# Patient Record
Sex: MALE | Race: BLACK OR AFRICAN AMERICAN
[De-identification: names, ages, dates, MRNs, and addresses within clinical notes are randomized per-mention and may not be internally consistent; named-entity substitution may affect disease eponyms.]

---

## 2017-01-15 ENCOUNTER — HOSPITAL ENCOUNTER (INPATIENT)
Dept: HOSPITAL 74 - YASAS | Age: 57
LOS: 4 days | Discharge: HOME | DRG: 897 | End: 2017-01-19
Attending: INTERNAL MEDICINE | Admitting: INTERNAL MEDICINE
Payer: COMMERCIAL

## 2017-01-15 VITALS — BODY MASS INDEX: 24.4 KG/M2

## 2017-01-15 DIAGNOSIS — F10.230: Primary | ICD-10-CM

## 2017-01-15 DIAGNOSIS — F17.210: ICD-10-CM

## 2017-01-15 DIAGNOSIS — F20.0: ICD-10-CM

## 2017-01-15 DIAGNOSIS — Z87.438: ICD-10-CM

## 2017-01-15 DIAGNOSIS — F12.20: ICD-10-CM

## 2017-01-15 DIAGNOSIS — Z86.79: ICD-10-CM

## 2017-01-15 DIAGNOSIS — Z87.898: ICD-10-CM

## 2017-01-15 LAB
APPEARANCE UR: CLEAR
BILIRUB UR STRIP.AUTO-MCNC: NEGATIVE MG/DL
COLOR UR: (no result)
KETONES UR QL STRIP: NEGATIVE
LEUKOCYTE ESTERASE UR QL STRIP.AUTO: NEGATIVE
NITRITE UR QL STRIP: NEGATIVE
PH UR: 8 [PH] (ref 5–8)
PROT UR QL STRIP: NEGATIVE
PROT UR QL STRIP: NEGATIVE
RBC # UR STRIP: NEGATIVE /UL
SP GR UR: 1.01 (ref 1–1.03)
UROBILINOGEN UR STRIP-MCNC: NEGATIVE E.U./DL (ref 0.2–1)

## 2017-01-15 PROCEDURE — HZ2ZZZZ DETOXIFICATION SERVICES FOR SUBSTANCE ABUSE TREATMENT: ICD-10-PCS | Performed by: INTERNAL MEDICINE

## 2017-01-15 RX ADMIN — Medication SCH MG: at 22:13

## 2017-01-15 RX ADMIN — NICOTINE POLACRILEX PRN MG: 4 GUM, CHEWING ORAL at 21:38

## 2017-01-15 RX ADMIN — NICOTINE POLACRILEX PRN MG: 4 GUM, CHEWING ORAL at 17:31

## 2017-01-15 NOTE — HP
CIWA Score





- CIWA Score


Nausea/Vomitin


Muscle Tremors: 3


Anxiety: 2


Agitation: 1-Slight > Activity


Paroxysmal Sweats: 2


Orientation: 0-Oriented


Tacttile Disturbances: 2-Mild Itch/Numbness/Burn


Auditory Disturbances: 1-Very Mild


Visual Disturbances: 2-Mild Sensitivity


Headache: 0-None Present


CIWA-Ar Total Score: 18





Admission ROS [facility]





- HPI


Chief Complaint: 


"I am here to try to stop drinking and using drugs."


Allergies/Adverse Reactions: 


 Allergies











Allergy/AdvReac Type Severity Reaction Status Date / Time


 


No Known Allergies Allergy   Verified 01/15/17 12:04











History of Present Illness: 


Pt. is a 55 YO male here to Detox from Alcohol. History of previous Detox 

admissions. Pt. also uses K2 on a daily basis and occasionally uses Crack 

Cocaine.


Exam Limitations: No Limitations





- Ebola screening


Have you traveled outside of the country in the last 21 days: No


Have you had contact with anyone from an Ebola affected area: No


Have you been sick,other than usual withdrawal symptoms: No


Do you have a fever: No





- Review of Systems


Constitutional: Diaphoresis, Malaise, Night Sweats


EENT: reports: Blurred Vision, Tinnitus (Occasional - bilateral.), Nose 

Congestion, Sinus Pressure, Dental Problems (Multiple cavities.)


Respiratory: reports: Cough, SOB with Exertion


Cardiac: reports: No Symptoms Reported


GI: reports: Diarrhea, Nausea, Vomiting


: reports: No Symptoms Reported


Musculoskeletal: reports: Joint Pain (Pain in Left hip.)


Integumentary: reports: No Symptoms Reported


Neuro: reports: Numbness (Occasional in Left Upper Back.), Tingling (Occasional 

in Left Upper Back.), Tremors


Endocrine: reports: No Symptoms Reported


Hematology: reports: No Symptoms Reported


Psychiatric: reports: Judgement Intact, Mood/Affect Appropiate, Orientated x3, 

Anxious, Depressed


Other Systems: Reviewed and Negative





Patient History





- Patient Medical History


Hx Anemia: No


Hx Asthma: No


Hx Chronic Obstructive Pulmonary Disease (COPD): No


Hx Cancer: No


Hx Cardiac Disorders: No


Hx Congestive Heart Failure: No


Hx Hypertension: No


Hx Hypercholesterolemia: No


Hx Pacemaker: No


HX Cerebrovascular Accident: No


Hx Seizures: No


Hx Dementia: No


Hx Diabetes: No


Hx Gastrointestinal Disorders: No


Hx Liver Disease: No


Hx Genitourinary Disorders: No


Hx Sexually Transmitted Disorders: Yes (gonerrhea and syphillis ; Treated.)


Hx Renal Disease (ESRD): No


Hx Thyroid Disease: No


Hx Human Immunodeficiency Virus (HIV): No (LAST TESTED: : NEGATIVE.)


Hx Hepatitis C: No (Never Tested.)


Hx Depression: Yes


Hx Suicide Attempt: No


Hx Bipolar Disorder: No


Hx Schizophrenia: Yes (On meds.)





- Patient Surgical History


Past Surgical History: No


Hx Neurologic Surgery: No


Hx Cataract Extraction: No


Hx Cardiac Surgery: No


Hx Lung Surgery: No


Hx Breast Surgery: No


Hx Breast Biopsy: No


Hx Abdominal Surgery: No


Hx Appendectomy: No


Hx Cholecystectomy: No


Hx Genitourinary Surgery: No


Hx  Section: No


Hx Orthopedic Surgery: No


Anesthesia Reaction: No





- PPD History


Previous Implant?: Yes ((+) PPD hx.)


Documented Results: Positive w/o proof


Implanted On Prior R Admission?: No


PPD to be Administered?: No





- Reproductive History


Patient is a Female of Child Bearing Age (11 -55 yrs old): No (PATIENT IS MALE.)





- Smoking Cessation


Smoking history: Current every day smoker


Have you smoked in the past 12 months: Yes


Aproximately how many cigarettes per day: 40


Cigars Per Day: 1


Hx Chewing Tobacco Use: No


Initiated information on smoking cessation: Yes


'Breaking Loose' booklet given: 01/15/17 (GIVEN ON UNIT.)





- Substance & Tx. History


Hx Alcohol Use: Yes


Hx Substance Use: Yes


Substance Use Type: Marijuana


Hx Substance Use Treatment: Yes (Previous Detox Admissions.)





- Substances Abused


  ** Alcohol


Route: Oral


Frequency: Daily


Amount used: (4) 40OZ bEER, 2 PINTS EJ RAFAEL


Age of first use: 10


Date of Last Use: 17





  ** Marijuana/Hashish


Route: Smoking


Frequency: Daily


Amount used: 1-2 Joints (K2)


Age of first use: 53


Date of Last Use: 15





Family Disease History





- Family Disease History


Family Disease History: Diabetes: Mother (Arthritis.), Brother, Other: Father (

alcohol,), Mother





Admission Physical Exam [facility]





- Vital Signs


Vital Signs: 


 Vital Signs - 24 hr











  01/15/17





  12:07


 


Temperature 97.9 F


 


Pulse Rate 92 H


 


Respiratory 18





Rate 


 


Blood Pressure 129/74














- Physical


General Appearance: Yes: No Apparent Distress, Nourished, Appropriately Dressed

, Tremorous


HEENTM: Yes: Hearing grossly Normal, Normocephalic, Normal Voice, CHRIS, Pharynx 

Normal


Respiratory: Yes: Chest Non-Tender, Lungs Clear, No Respiratory Distress


Neck: Yes: No masses,lesions,Nodules, Supple, Trachea in good position


Breast: Yes: Breast Exam Deferred


Cardiology: Yes: Regular Rhythm, Regular Rate, S1, S2


Abdominal: Yes: Normal Bowel Sounds, Non Tender, Flat


Genitourinary: Yes: Within Normal Limits


Back: Yes: Normal Inspection


Musculoskeletal: Yes: full range of Motion, Gait Steady


Extremities: Yes: Normal Inspection, Normal Range of Motion, Non-Tender, Tremors


Neurological: Yes: Fully Oriented, Alert, Normal Mood/Affect, Normal Response


Integumentary: Yes: Normal Color, Dry, Warm


Lymphatic: Yes: Within Normal Limits





- Diagnostic


(1) Nicotine dependence


Current Visit: Yes   Status: Chronic





(2) Alcohol dependence with uncomplicated withdrawal


Current Visit: Yes   Status: Acute





(3) Cannabis dependence


Current Visit: Yes   Status: Acute








Cleared for Admission [facility]





- Detox or Rehab


[facility] Level of Care: Medically Managed


Detox Regimen/Protocol: Librium (PATIENT ADVISED TO FOLLOW-UP WITH  / REHAB 

MEDICAL PROIVDER AFTER DETOX DISCHARGE FOR GENERAL MEDICAL EVALUATION.)





[facility] Breath Alcohol Content


Breath Alcohol Content: 0





Urine Drug Screen





- Results


Drug Screen Negative: Yes

## 2017-01-16 LAB
ALBUMIN SERPL-MCNC: 3.3 G/DL (ref 3.4–5)
ALP SERPL-CCNC: 71 U/L (ref 45–117)
ALT SERPL-CCNC: 23 U/L (ref 12–78)
ANION GAP SERPL CALC-SCNC: 5 MMOL/L (ref 8–16)
AST SERPL-CCNC: 15 U/L (ref 15–37)
BILIRUB SERPL-MCNC: 0.5 MG/DL (ref 0.2–1)
CALCIUM SERPL-MCNC: 8.6 MG/DL (ref 8.5–10.1)
CO2 SERPL-SCNC: 32 MMOL/L (ref 21–32)
CREAT SERPL-MCNC: 1 MG/DL (ref 0.7–1.3)
DEPRECATED RDW RBC AUTO: 16.4 % (ref 11.9–15.9)
GLUCOSE SERPL-MCNC: 123 MG/DL (ref 74–106)
MCH RBC QN AUTO: 23.1 PG (ref 25.7–33.7)
MCHC RBC AUTO-ENTMCNC: 32.5 G/DL (ref 32–35.9)
MCV RBC: 71 FL (ref 80–96)
PLATELET # BLD AUTO: 144 K/MM3 (ref 134–434)
PMV BLD: 10.1 FL (ref 7.5–11.1)
PROT SERPL-MCNC: 6.2 G/DL (ref 6.4–8.2)
WBC # BLD AUTO: 7 K/MM3 (ref 4–10)

## 2017-01-16 RX ADMIN — NICOTINE POLACRILEX PRN MG: 4 GUM, CHEWING ORAL at 12:59

## 2017-01-16 RX ADMIN — NICOTINE POLACRILEX PRN MG: 4 GUM, CHEWING ORAL at 10:53

## 2017-01-16 RX ADMIN — NICOTINE POLACRILEX PRN MG: 4 GUM, CHEWING ORAL at 16:53

## 2017-01-16 RX ADMIN — NICOTINE POLACRILEX PRN MG: 4 GUM, CHEWING ORAL at 19:55

## 2017-01-16 RX ADMIN — Medication SCH MG: at 22:12

## 2017-01-16 RX ADMIN — NICOTINE POLACRILEX PRN MG: 4 GUM, CHEWING ORAL at 22:13

## 2017-01-16 RX ADMIN — NICOTINE POLACRILEX PRN MG: 4 GUM, CHEWING ORAL at 08:30

## 2017-01-16 RX ADMIN — NICOTINE POLACRILEX PRN MG: 4 GUM, CHEWING ORAL at 01:12

## 2017-01-16 RX ADMIN — LITHIUM CARBONATE SCH MG: 300 CAPSULE, GELATIN COATED ORAL at 22:12

## 2017-01-16 RX ADMIN — LITHIUM CARBONATE SCH MG: 300 CAPSULE, GELATIN COATED ORAL at 10:57

## 2017-01-16 RX ADMIN — HYDROXYZINE PAMOATE PRN MG: 50 CAPSULE ORAL at 02:23

## 2017-01-16 RX ADMIN — Medication SCH TAB: at 10:13

## 2017-01-16 RX ADMIN — NICOTINE POLACRILEX PRN MG: 4 GUM, CHEWING ORAL at 05:21

## 2017-01-16 NOTE — CONSULT
[facility] Psychiatric Consult





- Data


Date of interview: 01/16/17


Admission source: [facility]


Identifying data: This is 56 years ols AA  male,father of 2 resides in 

shelter,supported by [facility].Patient admitted to [address] [place] [address] for Alcohol,Cannabis 

and K2 dependence.


Substance Abuse History: REports drinking beer,pint of lien since 9 yo.Cannabis including K2 since 53 years old(2 joints daily).


Medical History: H/O Alcohol induced syncope.


Psychiatric History: patient sees psychiatrist since 1997 when he was admitted 

to [facility] after DOD.Patient was dx with Schizophrenia and started 

on Thorazine.He reports more that 15 psychiatric hospitalizations.Most recent 

admisssion was about 2 months ago.Patient has no regular psychiatric follow up ,

obtaining meds from local ER.Current medications:Li 600 mg po bid and Zyprexa 

10 mg po bid.


Physical/Sexual Abuse/Trauma History: denies





Mental Status Exam





- Mental Status Exam


Alert and Oriented to: Time, Place, Person


Cognitive Function: Grossly Intact


Patient Appearance: Unkempt


Mood: Sad, Anxious


Affect: Mood Congruent, Labile


Patient Behavior: Cooperative


Speech Pattern: Clear


Voice Loudness: Normal


Thought Process: Goal Oriented


Thought Disorder: Being Controlled


Hallucinations: Denies


Suicidal Ideation: Denies


Homicidal Ideation: Denies


Insight/Judgement: Fair


Sleep: Fair


Appetite: Fair


Muscle strength/Tone: Normal


Gait/Station: Normal





Psychiatric Findings





- Problem List (Axis 1, 2,3)


(1) Cannabis dependence


Current Visit: Yes   Status: Chronic





(2) Nicotine dependence


Current Visit: Yes   Status: Chronic





(3) Alcohol dependence


Current Visit: No   Status: Active





(4) Weight decreased


Current Visit: Yes   Status: Active





(5) Paranoid schizophrenia


Current Visit: Yes   Status: Chronic








- Initial Treatment Plan


Initial Treatment Plan: Continue Zyprexa 0 mg po bid and Li 600 mg po bid.  

Will monitor porgress.

## 2017-01-16 NOTE — PN
S CIWA





- CIWA Score


Nausea/Vomitin-No Nausea/No Vomiting


Muscle Tremors: 3


Anxiety: 4-Mod. Anxious/Guarded


Agitation: 4-Moderately Restless


Paroxysmal Sweats: 3


Orientation: 0-Oriented


Tacttile Disturbances: 1-Very Mild Itch/Numbness


Auditory Disturbances: 0-None


Visual Disturbances: 0-None


Headache: 0-None Present


CIWA-Ar Total Score: 15





[facility] Progress Note (SOAP)


Subjective: 


ANXIETY,TREMORS,SWEATING,INTERRUPTED SLEEP,RESTLESS.


Objective: 





17 10:03


 Vital Signs - 8 hr











  17





  03:30 06:35


 


Temperature  97.6 F


 


Pulse Rate  78


 


Respiratory 18 18





Rate  


 


Blood Pressure  107/72








 Laboratory Last Values











Urine Color  Straw   01/15/17  11:00    


 


Urine Appearance  Clear   01/15/17  11:00    


 


Urine pH  8.0  (5.0-8.0)   01/15/17  11:00    


 


Ur Specific Gravity  1.012  (1.001-1.035)   01/15/17  11:00    


 


Urine Protein  Negative  (NEGATIVE)   01/15/17  11:00    


 


Urine Glucose (UA)  Negative  (NEGATIVE)   01/15/17  11:00    


 


Urine Ketones  Negative  (NEGATIVE)   01/15/17  11:00    


 


Urine Blood  Negative  (NEGATIVE)   01/15/17  11:00    


 


Urine Nitrite  Negative  (NEGATIVE)   01/15/17  11:00    


 


Urine Bilirubin  Negative  (NEGATIVE)   01/15/17  11:00    


 


Urine Urobilinogen  Negative E.U./dl (0.2-1.0)   01/15/17  11:00    


 


Ur Leukocyte Esterase  Negative  (NEGATIVE)   01/15/17  11:00    








LABS NOTED


Assessment: 





17 10:03


WITHDRAWAL SX.


Plan: 


CONTINUE DETOX

## 2017-01-16 NOTE — EKG
Test Reason : 

Blood Pressure : ***/*** mmHG

Vent. Rate : 082 BPM     Atrial Rate : 082 BPM

   P-R Int : 164 ms          QRS Dur : 104 ms

    QT Int : 418 ms       P-R-T Axes : 067 064 017 degrees

   QTc Int : 488 ms

 

NORMAL SINUS RHYTHM

VOLTAGE CRITERIA FOR LEFT VENTRICULAR HYPERTROPHY

T WAVE ABNORMALITY, CONSIDER LATERAL ISCHEMIA  VS REPOLARIZATION

ABNORMALITIES

PROLONGED QT

ABNORMAL ECG

NO PREVIOUS ECGS AVAILABLE

Confirmed by KAILASH AGOSTO MD ([id number]) on 1/16/2017 9:24:54 AM

 

Referred By:             Confirmed By:KAILASH AGOSTO MD

## 2017-01-17 RX ADMIN — NICOTINE POLACRILEX PRN MG: 4 GUM, CHEWING ORAL at 21:04

## 2017-01-17 RX ADMIN — Medication SCH MG: at 22:35

## 2017-01-17 RX ADMIN — NICOTINE POLACRILEX PRN MG: 4 GUM, CHEWING ORAL at 09:17

## 2017-01-17 RX ADMIN — NICOTINE POLACRILEX PRN MG: 4 GUM, CHEWING ORAL at 12:01

## 2017-01-17 RX ADMIN — NICOTINE POLACRILEX PRN MG: 4 GUM, CHEWING ORAL at 16:59

## 2017-01-17 RX ADMIN — HYDROXYZINE PAMOATE PRN MG: 50 CAPSULE ORAL at 17:39

## 2017-01-17 RX ADMIN — NICOTINE POLACRILEX PRN MG: 4 GUM, CHEWING ORAL at 06:42

## 2017-01-17 RX ADMIN — NICOTINE POLACRILEX PRN MG: 4 GUM, CHEWING ORAL at 19:02

## 2017-01-17 RX ADMIN — NICOTINE POLACRILEX PRN MG: 4 GUM, CHEWING ORAL at 02:49

## 2017-01-17 RX ADMIN — LITHIUM CARBONATE SCH MG: 300 CAPSULE, GELATIN COATED ORAL at 22:35

## 2017-01-17 RX ADMIN — LITHIUM CARBONATE SCH MG: 300 CAPSULE, GELATIN COATED ORAL at 10:16

## 2017-01-17 RX ADMIN — Medication SCH TAB: at 10:17

## 2017-01-17 NOTE — PN
S CIWA





- CIWA Score


Nausea/Vomitin-No Nausea/No Vomiting


Muscle Tremors: 3


Anxiety: 3


Agitation: 4-Moderately Restless


Paroxysmal Sweats: 3


Orientation: 0-Oriented


Tacttile Disturbances: 0-None


Auditory Disturbances: 0-None


Visual Disturbances: 0-None


Headache: 0-None Present


CIWA-Ar Total Score: 13





BHS Progress Note (SOAP)


Subjective: 


ANXIETY,TREMORS,SWEATING,INTERRUPTED SLEEP,RESTLESS.


Objective: 





17 11:26


 Vital Signs - 8 hr











  17





  06:17 09:18


 


Temperature 96.5 F L 96.2 F L


 


Pulse Rate 81 83


 


Respiratory 18 18





Rate  


 


Blood Pressure 102/67 115/75








 Laboratory Tests











  01/15/17 01/16/17 01/16/17





  11:00 08:00 08:00


 


WBC   7.0 


 


RBC   5.26 


 


Hgb   12.1 


 


Hct   37.4 


 


MCV   71.0 L 


 


MCHC   32.5 


 


RDW   16.4 H 


 


Plt Count   144 


 


MPV   10.1 


 


Sickle Cell Screen   


 


Sodium    139


 


Potassium    4.3


 


Chloride    102


 


Carbon Dioxide    32


 


Anion Gap    5 L


 


BUN    12  D


 


Creatinine    1.0


 


Creat Clearance w eGFR    > 60


 


Random Glucose    123 H D


 


Calcium    8.6


 


Total Bilirubin    0.5  D


 


AST    15  D


 


ALT    23  D


 


Alkaline Phosphatase    71


 


Total Protein    6.2 L


 


Albumin    3.3 L


 


Urine Color  Straw  


 


Urine Appearance  Clear  


 


Urine pH  8.0  


 


Ur Specific Gravity  1.012  


 


Urine Protein  Negative  


 


Urine Glucose (UA)  Negative  


 


Urine Ketones  Negative  


 


Urine Blood  Negative  


 


Urine Nitrite  Negative  


 


Urine Bilirubin  Negative  


 


Urine Urobilinogen  Negative  


 


Ur Leukocyte Esterase  Negative  


 


RPR Titer   














  17





  08:00 08:00


 


WBC  


 


RBC  


 


Hgb  


 


Hct  


 


MCV  


 


MCHC  


 


RDW  


 


Plt Count  


 


MPV  


 


Sickle Cell Screen   Negative


 


Sodium  


 


Potassium  


 


Chloride  


 


Carbon Dioxide  


 


Anion Gap  


 


BUN  


 


Creatinine  


 


Creat Clearance w eGFR  


 


Random Glucose  


 


Calcium  


 


Total Bilirubin  


 


AST  


 


ALT  


 


Alkaline Phosphatase  


 


Total Protein  


 


Albumin  


 


Urine Color  


 


Urine Appearance  


 


Urine pH  


 


Ur Specific Gravity  


 


Urine Protein  


 


Urine Glucose (UA)  


 


Urine Ketones  


 


Urine Blood  


 


Urine Nitrite  


 


Urine Bilirubin  


 


Urine Urobilinogen  


 


Ur Leukocyte Esterase  


 


RPR Titer  Nonreactive 








LABS NOTED


Assessment: 





17 11:30


WITHDRAWAL SX.


Plan: 


CONTINUE DETOX

## 2017-01-18 RX ADMIN — NICOTINE POLACRILEX PRN MG: 4 GUM, CHEWING ORAL at 10:22

## 2017-01-18 RX ADMIN — HYDROXYZINE PAMOATE PRN MG: 50 CAPSULE ORAL at 13:58

## 2017-01-18 RX ADMIN — NICOTINE POLACRILEX PRN MG: 4 GUM, CHEWING ORAL at 07:28

## 2017-01-18 RX ADMIN — NICOTINE POLACRILEX PRN MG: 4 GUM, CHEWING ORAL at 22:19

## 2017-01-18 RX ADMIN — NICOTINE POLACRILEX PRN MG: 4 GUM, CHEWING ORAL at 17:00

## 2017-01-18 RX ADMIN — LITHIUM CARBONATE SCH MG: 300 CAPSULE, GELATIN COATED ORAL at 10:21

## 2017-01-18 RX ADMIN — NICOTINE POLACRILEX PRN MG: 4 GUM, CHEWING ORAL at 19:00

## 2017-01-18 RX ADMIN — NICOTINE POLACRILEX PRN MG: 4 GUM, CHEWING ORAL at 12:31

## 2017-01-18 RX ADMIN — NICOTINE POLACRILEX PRN MG: 4 GUM, CHEWING ORAL at 02:35

## 2017-01-18 RX ADMIN — LITHIUM CARBONATE SCH MG: 300 CAPSULE, GELATIN COATED ORAL at 22:16

## 2017-01-18 RX ADMIN — Medication SCH TAB: at 10:20

## 2017-01-18 RX ADMIN — Medication SCH MG: at 22:16

## 2017-01-18 RX ADMIN — NICOTINE POLACRILEX PRN MG: 4 GUM, CHEWING ORAL at 05:26

## 2017-01-18 RX ADMIN — NICOTINE POLACRILEX PRN MG: 4 GUM, CHEWING ORAL at 14:38

## 2017-01-18 NOTE — PN
[facility] Progress Note (SOAP)


Subjective: 


SWEATING,INTERRUPTED SLEEP,RESTLESS


Objective: 





01/18/17 15:22


 Vital Signs - 8 hr











  01/18/17 01/18/17





  09:49 14:17


 


Temperature 98.1 F 98.8 F


 


Pulse Rate 84 81


 


Respiratory 18 18





Rate  


 


Blood Pressure 112/70 112/76








 Laboratory Last Values











WBC  7.0 K/mm3 (4.0-10.0)   01/16/17  08:00    


 


RBC  5.26 M/mm3 (4.00-5.60)   01/16/17  08:00    


 


Hgb  12.1 GM/dL (11.7-16.9)   01/16/17  08:00    


 


Hct  37.4 % (35.4-49)   01/16/17  08:00    


 


MCV  71.0 fl (80-96)  L  01/16/17  08:00    


 


MCHC  32.5 g/dl (32.0-35.9)   01/16/17  08:00    


 


RDW  16.4 % (11.9-15.9)  H  01/16/17  08:00    


 


Plt Count  144 K/MM3 (134-434)   01/16/17  08:00    


 


MPV  10.1 fl (7.5-11.1)   01/16/17  08:00    


 


Sickle Cell Screen  Negative  (NEGATIVE)   01/16/17  08:00    


 


Sodium  139 mmol/L (136-145)   01/16/17  08:00    


 


Potassium  4.3 mmol/L (3.5-5.1)   01/16/17  08:00    


 


Chloride  102 mmol/L ()   01/16/17  08:00    


 


Carbon Dioxide  32 mmol/L (21-32)   01/16/17  08:00    


 


Anion Gap  5  (8-16)  L  01/16/17  08:00    


 


BUN  12 mg/dL (7-18)  D 01/16/17  08:00    


 


Creatinine  1.0 mg/dL (0.7-1.3)   01/16/17  08:00    


 


Creat Clearance w eGFR  > 60  (>60)   01/16/17  08:00    


 


Random Glucose  123 mg/dL ()  H D 01/16/17  08:00    


 


Calcium  8.6 mg/dL (8.5-10.1)   01/16/17  08:00    


 


Total Bilirubin  0.5 mg/dL (0.2-1.0)  D 01/16/17  08:00    


 


AST  15 U/L (15-37)  D 01/16/17  08:00    


 


ALT  23 U/L (12-78)  D 01/16/17  08:00    


 


Alkaline Phosphatase  71 U/L ()   01/16/17  08:00    


 


Total Protein  6.2 g/dl (6.4-8.2)  L  01/16/17  08:00    


 


Albumin  3.3 g/dl (3.4-5.0)  L  01/16/17  08:00    


 


Urine Color  Straw   01/15/17  11:00    


 


Urine Appearance  Clear   01/15/17  11:00    


 


Urine pH  8.0  (5.0-8.0)   01/15/17  11:00    


 


Ur Specific Gravity  1.012  (1.001-1.035)   01/15/17  11:00    


 


Urine Protein  Negative  (NEGATIVE)   01/15/17  11:00    


 


Urine Glucose (UA)  Negative  (NEGATIVE)   01/15/17  11:00    


 


Urine Ketones  Negative  (NEGATIVE)   01/15/17  11:00    


 


Urine Blood  Negative  (NEGATIVE)   01/15/17  11:00    


 


Urine Nitrite  Negative  (NEGATIVE)   01/15/17  11:00    


 


Urine Bilirubin  Negative  (NEGATIVE)   01/15/17  11:00    


 


Urine Urobilinogen  Negative E.U./dl (0.2-1.0)   01/15/17  11:00    


 


Ur Leukocyte Esterase  Negative  (NEGATIVE)   01/15/17  11:00    


 


RPR Titer  Nonreactive  (NONREACTIVE)   01/16/17  08:00    








LABS NOTED


Assessment: 





01/18/17 15:22


WITHDRAWAL SX.


Plan: 


CONTINUE DETOX

## 2017-01-19 VITALS — SYSTOLIC BLOOD PRESSURE: 111 MMHG | TEMPERATURE: 97.3 F | DIASTOLIC BLOOD PRESSURE: 75 MMHG | HEART RATE: 77 BPM

## 2017-01-19 RX ADMIN — NICOTINE POLACRILEX PRN MG: 4 GUM, CHEWING ORAL at 07:31

## 2017-01-19 RX ADMIN — NICOTINE POLACRILEX PRN MG: 4 GUM, CHEWING ORAL at 05:22

## 2017-01-19 RX ADMIN — NICOTINE POLACRILEX PRN MG: 4 GUM, CHEWING ORAL at 02:41

## 2017-01-19 NOTE — DS
[facility] Detox Discharge Summary


Admission Date: 


01/15/17





Discharge Date: 01/19/17





- History


Present History: Alcohol Dependence, Cannabis Dependence


Pertinent Past History: 


PARANOID SCHIZOPHRENIA





- Physical Exam Results


Vital Signs: 


 Vital Signs











Temperature  97.3 F L  01/19/17 06:15


 


Pulse Rate  77   01/19/17 06:15


 


Respiratory Rate  16   01/19/17 06:15


 


Blood Pressure  111/75   01/19/17 06:15


 


O2 Sat by Pulse Oximetry (%)      











Pertinent Admission Physical Exam Findings: 


WITHDRAWAL SX.


 Laboratory Last Values











WBC  7.0 K/mm3 (4.0-10.0)   01/16/17  08:00    


 


RBC  5.26 M/mm3 (4.00-5.60)   01/16/17  08:00    


 


Hgb  12.1 GM/dL (11.7-16.9)   01/16/17  08:00    


 


Hct  37.4 % (35.4-49)   01/16/17  08:00    


 


MCV  71.0 fl (80-96)  L  01/16/17  08:00    


 


MCHC  32.5 g/dl (32.0-35.9)   01/16/17  08:00    


 


RDW  16.4 % (11.9-15.9)  H  01/16/17  08:00    


 


Plt Count  144 K/MM3 (134-434)   01/16/17  08:00    


 


MPV  10.1 fl (7.5-11.1)   01/16/17  08:00    


 


Sickle Cell Screen  Negative  (NEGATIVE)   01/16/17  08:00    


 


Sodium  139 mmol/L (136-145)   01/16/17  08:00    


 


Potassium  4.3 mmol/L (3.5-5.1)   01/16/17  08:00    


 


Chloride  102 mmol/L ()   01/16/17  08:00    


 


Carbon Dioxide  32 mmol/L (21-32)   01/16/17  08:00    


 


Anion Gap  5  (8-16)  L  01/16/17  08:00    


 


BUN  12 mg/dL (7-18)  D 01/16/17  08:00    


 


Creatinine  1.0 mg/dL (0.7-1.3)   01/16/17  08:00    


 


Creat Clearance w eGFR  > 60  (>60)   01/16/17  08:00    


 


Random Glucose  123 mg/dL ()  H D 01/16/17  08:00    


 


Calcium  8.6 mg/dL (8.5-10.1)   01/16/17  08:00    


 


Total Bilirubin  0.5 mg/dL (0.2-1.0)  D 01/16/17  08:00    


 


AST  15 U/L (15-37)  D 01/16/17  08:00    


 


ALT  23 U/L (12-78)  D 01/16/17  08:00    


 


Alkaline Phosphatase  71 U/L ()   01/16/17  08:00    


 


Total Protein  6.2 g/dl (6.4-8.2)  L  01/16/17  08:00    


 


Albumin  3.3 g/dl (3.4-5.0)  L  01/16/17  08:00    


 


Urine Color  Straw   01/15/17  11:00    


 


Urine Appearance  Clear   01/15/17  11:00    


 


Urine pH  8.0  (5.0-8.0)   01/15/17  11:00    


 


Ur Specific Gravity  1.012  (1.001-1.035)   01/15/17  11:00    


 


Urine Protein  Negative  (NEGATIVE)   01/15/17  11:00    


 


Urine Glucose (UA)  Negative  (NEGATIVE)   01/15/17  11:00    


 


Urine Ketones  Negative  (NEGATIVE)   01/15/17  11:00    


 


Urine Blood  Negative  (NEGATIVE)   01/15/17  11:00    


 


Urine Nitrite  Negative  (NEGATIVE)   01/15/17  11:00    


 


Urine Bilirubin  Negative  (NEGATIVE)   01/15/17  11:00    


 


Urine Urobilinogen  Negative E.U./dl (0.2-1.0)   01/15/17  11:00    


 


Ur Leukocyte Esterase  Negative  (NEGATIVE)   01/15/17  11:00    


 


RPR Titer  Nonreactive  (NONREACTIVE)   01/16/17  08:00    








LABS NOTED





- Treatment


Hospital Course: Detox Protocol Followed, Detoxed Safely, Responded well, 

Discharged Condition Good, Rehab Referral Accepted





- Medication


Discharge Medications: 


Ambulatory Orders





Lithium Carbonate [Eskalith -] 600 mg PO BID 09/12/16 


Olanzapine [Zyprexa -] 10 mg PO DAILY 01/15/17 


Olanzapine [Zyprexa] 20 mg PO HS 01/15/17 


Lithium Carbonate [Eskalith -] 600 mg PO BID #120 capsule 01/16/17 


Olanzapine [ZyPREXA -] 10 mg PO BID #60 tablet 01/16/17 











- Diagnosis


(1) Alcohol dependence with uncomplicated withdrawal


Current Visit: Yes   Status: Acute





(2) Cannabis dependence


Current Visit: Yes   Status: Chronic





(3) Nicotine dependence


Current Visit: Yes   Status: Chronic





(4) Paranoid schizophrenia


Current Visit: Yes   Status: Chronic








- AMA


Did Patient Leave Against Medical Advice: No

## 2017-03-03 ENCOUNTER — HOSPITAL ENCOUNTER (INPATIENT)
Dept: HOSPITAL 74 - YASAS | Age: 57
LOS: 3 days | Discharge: LEFT BEFORE DISCHARGE | DRG: 894 | End: 2017-03-06
Attending: INTERNAL MEDICINE | Admitting: INTERNAL MEDICINE
Payer: COMMERCIAL

## 2017-03-03 VITALS — BODY MASS INDEX: 23.7 KG/M2

## 2017-03-03 DIAGNOSIS — R76.11: ICD-10-CM

## 2017-03-03 DIAGNOSIS — F20.0: ICD-10-CM

## 2017-03-03 DIAGNOSIS — Z87.898: ICD-10-CM

## 2017-03-03 DIAGNOSIS — F12.20: ICD-10-CM

## 2017-03-03 DIAGNOSIS — F10.230: Primary | ICD-10-CM

## 2017-03-03 DIAGNOSIS — Z87.438: ICD-10-CM

## 2017-03-03 DIAGNOSIS — F17.210: ICD-10-CM

## 2017-03-03 DIAGNOSIS — Z86.79: ICD-10-CM

## 2017-03-03 LAB
APPEARANCE UR: CLEAR
BILIRUB UR STRIP.AUTO-MCNC: NEGATIVE MG/DL
COLOR UR: (no result)
KETONES UR QL STRIP: NEGATIVE
LEUKOCYTE ESTERASE UR QL STRIP.AUTO: NEGATIVE
NITRITE UR QL STRIP: NEGATIVE
PH UR: 6 [PH] (ref 5–8)
PROT UR QL STRIP: NEGATIVE
PROT UR QL STRIP: NEGATIVE
RBC # UR STRIP: NEGATIVE /UL
SP GR UR: 1.02 (ref 1–1.03)
UROBILINOGEN UR STRIP-MCNC: NEGATIVE E.U./DL (ref 0.2–1)

## 2017-03-03 PROCEDURE — HZ2ZZZZ DETOXIFICATION SERVICES FOR SUBSTANCE ABUSE TREATMENT: ICD-10-PCS | Performed by: SURGERY

## 2017-03-03 RX ADMIN — NICOTINE POLACRILEX PRN MG: 4 GUM, CHEWING ORAL at 22:37

## 2017-03-03 RX ADMIN — Medication SCH APPLIC: at 23:50

## 2017-03-03 RX ADMIN — Medication SCH MG: at 22:34

## 2017-03-03 NOTE — HP
CIWA Score





- CIWA Score


Nausea/Vomitin-Mild Nausea/No Vomiting


Muscle Tremors: 4-Moderate,w/Arms Extend


Anxiety: 4-Mod. Anxious/Guarded


Agitation: 4-Moderately Restless


Paroxysmal Sweats: 1-Minimal Palms Moist


Orientation: 3-Disoriented Date>2 days


Tacttile Disturbances: 0-None


Auditory Disturbances: 0-None


Visual Disturbances: 0-None


Headache: 0-None Present


CIWA-Ar Total Score: 17





Admission ROS [facility]





- HPI


Chief Complaint: 


WITHDRAWAL SX


Allergies/Adverse Reactions: 


 Allergies











Allergy/AdvReac Type Severity Reaction Status Date / Time


 


No Known Allergies Allergy   Verified 17 20:48











History of Present Illness: 


56 YEARS OLD MALE WITH LONG HISTORY OF ALCOHOL NICOTINE DEPENDENCE, DENIES 

MEDICAL ISSUE HAS SCHIZOPHRENIA  IS ADMITTED TO DETOX


Exam Limitations: No Limitations





- Ebola screening


Have you traveled outside of the country in the last 21 days: No


Have you had contact with anyone from an Ebola affected area: No


Have you been sick,other than usual withdrawal symptoms: No


Do you have a fever: No





- Review of Systems


Constitutional: Chills, Changes in sleep, Weight Stable


EENT: reports: Dental Problems (POOR DENTITION), Other (READING EYE GLASSES)


Respiratory: reports: SOB with Exertion


Cardiac: reports: No Symptoms Reported


GI: reports: Nausea, Poor Fluid Intake, Abdominal cramping


: reports: No Symptoms Reported


Musculoskeletal: reports: Joint Pain (LEFT SHOULDER)


Integumentary: reports: No Symptoms Reported


Neuro: reports: Tremors


Endocrine: reports: No Symptoms Reported


Hematology: reports: No Symptoms Reported


Psychiatric: reports: Judgement Intact


Other Systems: Reviewed and Negative





Patient History





- Patient Medical History


Hx Anemia: No


Hx Asthma: No


Hx Chronic Obstructive Pulmonary Disease (COPD): No


Hx Cancer: No


Hx Cardiac Disorders: No


Hx Congestive Heart Failure: No


Hx Hypertension: No


Hx Hypercholesterolemia: No


Hx Pacemaker: No


HX Cerebrovascular Accident: No


Hx Seizures: No


Hx Dementia: No


Hx Diabetes: No


Hx Gastrointestinal Disorders: No


Hx Liver Disease: No


Hx Genitourinary Disorders: No


Hx Sexually Transmitted Disorders: Yes (gonerrhea and syphillis ; Treated.)


Hx Renal Disease (ESRD): No


Hx Thyroid Disease: No


Hx Human Immunodeficiency Virus (HIV): No (LAST TESTED: : NEGATIVE.)


Hx Hepatitis C: No (Never Tested.)


Hx Depression: No


Hx Suicide Attempt: No


Hx Bipolar Disorder: No


Hx Schizophrenia: Yes (On meds.)





- Patient Surgical History


Past Surgical History: No


Hx Neurologic Surgery: No


Hx Cataract Extraction: No


Hx Cardiac Surgery: No


Hx Lung Surgery: No


Hx Breast Surgery: No


Hx Breast Biopsy: No


Hx Abdominal Surgery: No


Hx Appendectomy: No


Hx Cholecystectomy: No


Hx Genitourinary Surgery: No


Hx Orthopedic Surgery: No





- PPD History


Previous Implant?: Yes


Documented Results: Positive w/proof


Implanted On Prior [facility] Admission?: No


PPD to be Administered?: No





- Smoking Cessation


Smoking history: Current every day smoker


Have you smoked in the past 12 months: Yes


Aproximately how many cigarettes per day: 40


Cigars Per Day: 1


Hx Chewing Tobacco Use: No


Initiated information on smoking cessation: Yes


'Breaking Loose' booklet given: 17





- Substance & Tx. History


Hx Alcohol Use: Yes


Hx Substance Use: No


Substance Use Type: Alcohol


Hx Substance Use Treatment: Yes





- Substances Abused


  ** Alcohol


Route: Oral


Frequency: Daily


Amount used: 40OZX6 BEER/ RAFAEL PINT


Age of first use: 10


Date of Last Use: 17





Family Disease History





- Family Disease History


Family Disease History: Diabetes: Mother (Arthritis.), Brother, Other: Father (

alcohol,), Mother





Admission Physical Exam [facility]





- Vital Signs


Vital Signs: 


 Vital Signs - 24 hr











  17





  19:00


 


Temperature 96 F L


 


Pulse Rate 84


 


Respiratory 20





Rate 


 


Blood Pressure 129/71














- Physical


General Appearance: Yes: Appropriately Dressed, Mild Distress, Thin, Tremorous, 

Irritable, Sweating, Anxious


HEENTM: Yes: Hearing grossly Normal, Normal ENT Inspection, Normocephalic, 

Normal Voice


Respiratory: Yes: Chest Non-Tender, Lungs Clear, Normal Breath Sounds, No 

Respiratory Distress, No Accessory Muscle Use


Neck: Yes: Supple, Trachea in good position


Breast: Yes: Breasts Symetrical


Cardiology: Yes: Regular Rhythm, Regular Rate, S1, S2


Abdominal: Yes: Non Tender, Soft


Genitourinary: Yes: Within Normal Limits


Back: Yes: Normal Inspection


Musculoskeletal: Yes: full range of Motion, Gait Steady, Muscle Pain (LEFT 

SHOULDER)


Extremities: Yes: Normal Range of Motion, Non-Tender, Tremors


Neurological: Yes: Alert, Motor Strength 5/5, Normal Response, Depressed Affect


Integumentary: Yes: Warm


Lymphatic: Yes: Within Normal Limits





- Diagnostic


(1) Weight decreased


Current Visit: Yes   Status: Active





(2) shizophrenia


Current Visit: Yes   Status: Suspected





(3) Alcohol dependence with uncomplicated withdrawal


Current Visit: Yes   Status: Acute





(4) Nicotine dependence


Current Visit: Yes   Status: Acute





(5) Positive PPD, treated


Current Visit: Yes   Status: Resolved








Cleared for Admission S





- Detox or Rehab


S Level of Care: Medically Managed


Detox Regimen/Protocol: Librium





BHS Breath Alcohol Content


Breath Alcohol Content: 0





Urine Drug Screen





- Results


Drug Screen Negative: Yes

## 2017-03-04 LAB
ALBUMIN SERPL-MCNC: 3.3 G/DL (ref 3.4–5)
ALP SERPL-CCNC: 85 U/L (ref 45–117)
ALT SERPL-CCNC: 14 U/L (ref 12–78)
ANION GAP SERPL CALC-SCNC: 8 MMOL/L (ref 8–16)
AST SERPL-CCNC: 12 U/L (ref 15–37)
BILIRUB SERPL-MCNC: 0.2 MG/DL (ref 0.2–1)
CALCIUM SERPL-MCNC: 8.3 MG/DL (ref 8.5–10.1)
CO2 SERPL-SCNC: 29 MMOL/L (ref 21–32)
CREAT SERPL-MCNC: 1 MG/DL (ref 0.7–1.3)
DEPRECATED RDW RBC AUTO: 16.5 % (ref 11.9–15.9)
GLUCOSE SERPL-MCNC: 125 MG/DL (ref 74–106)
MCH RBC QN AUTO: 23.2 PG (ref 25.7–33.7)
MCHC RBC AUTO-ENTMCNC: 32.3 G/DL (ref 32–35.9)
MCV RBC: 72.1 FL (ref 80–96)
PLATELET # BLD AUTO: 121 K/MM3 (ref 134–434)
PMV BLD: 10 FL (ref 7.5–11.1)
PROT SERPL-MCNC: 6 G/DL (ref 6.4–8.2)
WBC # BLD AUTO: 4.3 K/MM3 (ref 4–10)

## 2017-03-04 RX ADMIN — Medication SCH MG: at 22:30

## 2017-03-04 RX ADMIN — ASPIRIN 81 MG SCH MG: 81 TABLET ORAL at 10:28

## 2017-03-04 RX ADMIN — NICOTINE POLACRILEX PRN MG: 4 GUM, CHEWING ORAL at 22:31

## 2017-03-04 RX ADMIN — NICOTINE POLACRILEX PRN MG: 4 GUM, CHEWING ORAL at 09:18

## 2017-03-04 RX ADMIN — NICOTINE SCH: 21 PATCH TRANSDERMAL at 10:28

## 2017-03-04 RX ADMIN — NICOTINE POLACRILEX PRN MG: 4 GUM, CHEWING ORAL at 06:29

## 2017-03-04 RX ADMIN — Medication SCH APPLIC: at 22:31

## 2017-03-04 RX ADMIN — NICOTINE POLACRILEX PRN MG: 4 GUM, CHEWING ORAL at 17:20

## 2017-03-04 RX ADMIN — Medication SCH TAB: at 10:28

## 2017-03-04 RX ADMIN — NICOTINE POLACRILEX PRN MG: 4 GUM, CHEWING ORAL at 13:19

## 2017-03-04 NOTE — PN
S CIWA





- CIWA Score


Nausea/Vomiting: 3


Muscle Tremors: 3


Anxiety: 3


Agitation: 3


Paroxysmal Sweats: 1-Minimal Palms Moist


Orientation: 0-Oriented


Tacttile Disturbances: 1-Very Mild Itch/Numbness


Auditory Disturbances: 1-Very Mild


Visual Disturbances: 1-Very Mild Sensitivity


Headache: 2-Mild


CIWA-Ar Total Score: 18





BHS Progress Note (SOAP)


Subjective: 


ALERT,IRRITABLE,ANXIOUS,INTERRUPTED SLEEP,TREMOR


Objective: 





03/04/17 10:07


 Vital Signs











Temperature  96.3 F L  03/04/17 06:25


 


Pulse Rate  73   03/04/17 06:25


 


Respiratory Rate  18   03/04/17 06:25


 


Blood Pressure  115/70   03/04/17 06:25


 


O2 Sat by Pulse Oximetry (%)      








EKG NSR INVERTED T IN 2,3,AVF,V3 TO V6


NO CHEST PAIN,NO SOB,NO DIZZINESS


 Laboratory Last Values











Urine Color  Ltyellow   03/03/17  21:09    


 


Urine Appearance  Clear   03/03/17  21:09    


 


Urine pH  6.0  (5.0-8.0)  D 03/03/17  21:09    


 


Ur Specific Gravity  1.017  (1.001-1.035)   03/03/17  21:09    


 


Urine Protein  Negative  (NEGATIVE)   03/03/17  21:09    


 


Urine Glucose (UA)  Negative  (NEGATIVE)   03/03/17  21:09    


 


Urine Ketones  Negative  (NEGATIVE)   03/03/17  21:09    


 


Urine Blood  Negative  (NEGATIVE)   03/03/17  21:09    


 


Urine Nitrite  Negative  (NEGATIVE)   03/03/17  21:09    


 


Urine Bilirubin  Negative  (NEGATIVE)   03/03/17  21:09    


 


Urine Urobilinogen  Negative E.U./dl (0.2-1.0)   03/03/17  21:09    


 


Ur Leukocyte Esterase  Negative  (NEGATIVE)   03/03/17  21:09    








LABS PENDING


Assessment: 





03/04/17 10:11


WITHDRAWAL SYMPTOM


Plan: 


CONTINUE DETOX

## 2017-03-04 NOTE — CONSULT
[facility] Psychiatric Consult





- Data


Date of interview: 03/04/17


Admission source: [facility]


Identifying data: Readmission to [facility] for this 55 y/o AA male seeking 

detox treatment on [address] for alcohol,cocaine (crack) and marijuana (K2) 

dependence.Patient is single without children,domiciled,unemployed and 

supported on SSI benefits.


Substance Abuse History: - Smoking Cessation.  Smoking history: Current every 

day smoker.  Have you smoked in the past 12 months: Yes.  Aproximately how many 

cigarettes per day: 40.  Cigars Per Day: 1.  Hx Chewing Tobacco Use: No.  

Initiated information on smoking cessation: Yes.  'Breaking Loose' booklet given

: 03/03/17.  - Substance & Tx. History.  Hx Alcohol Use: Yes.  Hx Substance Use

: No.  Substance Use Type: Alcohol.  Hx Substance Use Treatment: Yes.  - 

Substances Abused.  ** Alcohol.  Route: Oral.  Frequency: Daily.  Amount used: 

40OZX6 BEER/ RAFAEL PINT.  Age of first use: 10.  Date of Last Use: 03/03/17.  

Confirmed by the patient in this interview.


Psychiatric History: First contact with Psychiatry was in 1997 (committment to 

[facility]) after a suicide attempt via overdose.DOD.Diagnosed 

with Schizophrenia.History of multiple psychiatric hospitalizations.Mr Conrad states that he does not go to OPD clinics.He uses emergency room 

settings for refills of medications.Patient informs that he is currently on 

zyprexa 20 mg po hs (confirmed by pharmacy claims of 3/1/17).


Physical/Sexual Abuse/Trauma History: Patient denies.


Additional Comment: Drug Screen is negative.





Mental Status Exam





- Mental Status Exam


Alert and Oriented to: Time, Place, Person


Cognitive Function: Good


Patient Appearance: Well Groomed


Mood: Hopeful, Euthymic


Affect: Normal Range


Patient Behavior: Appropriate, Cooperative


Speech Pattern: Clear


Voice Loudness: Normal


Thought Process: Goal Oriented


Thought Disorder: Not Present


Hallucinations: Denies


Suicidal Ideation: Denies


Homicidal Ideation: Denies


Insight/Judgement: Poor


Sleep: Well


Appetite: Good


Muscle strength/Tone: Normal


Gait/Station: Normal





Psychiatric Findings





- Problem List (Axis 1, 2,3)


(1) Alcohol dependence with uncomplicated withdrawal


Current Visit: Yes   Status: Acute





(2) Cannabis dependence


Current Visit: Yes   Status: Acute





(3) Nicotine dependence


Current Visit: Yes   Status: Acute





(4) Paranoid schizophrenia


Current Visit: Yes   Status: Chronic








- Initial Treatment Plan


Initial Treatment Plan: Psychoeducation.Detoxification.Medication : zyprexa 20 

mg po hs.Patient made aware of risk of metabolic syndrome,sedation and vascular 

issues.He agrees with this plan of care.Observation.No scripts needed at 

discharge.

## 2017-03-05 RX ADMIN — TOLNAFTATE SCH APPLIC: 10 CREAM TOPICAL at 12:30

## 2017-03-05 RX ADMIN — NICOTINE POLACRILEX PRN MG: 4 GUM, CHEWING ORAL at 21:36

## 2017-03-05 RX ADMIN — NICOTINE POLACRILEX PRN MG: 4 GUM, CHEWING ORAL at 10:47

## 2017-03-05 RX ADMIN — NICOTINE POLACRILEX PRN MG: 4 GUM, CHEWING ORAL at 08:46

## 2017-03-05 RX ADMIN — Medication SCH: at 13:14

## 2017-03-05 RX ADMIN — Medication SCH TAB: at 10:47

## 2017-03-05 RX ADMIN — NICOTINE POLACRILEX PRN MG: 4 GUM, CHEWING ORAL at 00:42

## 2017-03-05 RX ADMIN — NICOTINE SCH: 21 PATCH TRANSDERMAL at 10:47

## 2017-03-05 RX ADMIN — NICOTINE POLACRILEX PRN MG: 4 GUM, CHEWING ORAL at 17:21

## 2017-03-05 RX ADMIN — NICOTINE POLACRILEX PRN MG: 4 GUM, CHEWING ORAL at 15:09

## 2017-03-05 RX ADMIN — Medication SCH APPLIC: at 23:20

## 2017-03-05 RX ADMIN — ASPIRIN 81 MG SCH MG: 81 TABLET ORAL at 10:47

## 2017-03-05 RX ADMIN — NICOTINE POLACRILEX PRN MG: 4 GUM, CHEWING ORAL at 04:00

## 2017-03-05 RX ADMIN — Medication SCH APPLIC: at 22:33

## 2017-03-05 RX ADMIN — TOLNAFTATE SCH APPLIC: 10 CREAM TOPICAL at 22:31

## 2017-03-05 RX ADMIN — Medication SCH MG: at 22:28

## 2017-03-05 RX ADMIN — NICOTINE POLACRILEX PRN MG: 4 GUM, CHEWING ORAL at 12:52

## 2017-03-05 RX ADMIN — NICOTINE POLACRILEX PRN MG: 4 GUM, CHEWING ORAL at 19:25

## 2017-03-05 RX ADMIN — NICOTINE POLACRILEX PRN MG: 4 GUM, CHEWING ORAL at 06:04

## 2017-03-05 RX ADMIN — Medication SCH: at 18:55

## 2017-03-05 NOTE — PN
[facility] CIWA





- CIWA Score


Nausea/Vomiting: 3


Muscle Tremors: 3


Anxiety: 3


Agitation: 2


Paroxysmal Sweats: 1-Minimal Palms Moist


Orientation: 0-Oriented


Tacttile Disturbances: 1-Very Mild Itch/Numbness


Auditory Disturbances: 1-Very Mild


Visual Disturbances: 1-Very Mild Sensitivity


Headache: 2-Mild


CIWA-Ar Total Score: 17





BHS Progress Note (SOAP)


Subjective: 


ALERT,IRRITABLE,ANXIOUS,INTERRUPTED SLEEP,INTERRUPTED SLEEP


Objective: 


03/05/17 11:13


 Vital Signs











Temperature  97 F L  03/05/17 09:48


 


Pulse Rate  75   03/05/17 09:48


 


Respiratory Rate  18   03/05/17 09:48


 


Blood Pressure  145/67   03/05/17 09:48


 


O2 Sat by Pulse Oximetry (%)      














03/05/17 11:13


 Laboratory Last Values











WBC  4.3 K/mm3 (4.0-10.0)  D 03/04/17  08:00    


 


RBC  5.22 M/mm3 (4.00-5.60)   03/04/17  08:00    


 


Hgb  12.1 GM/dL (11.7-16.9)   03/04/17  08:00    


 


Hct  37.6 % (35.4-49)   03/04/17  08:00    


 


MCV  72.1 fl (80-96)  L  03/04/17  08:00    


 


MCHC  32.3 g/dl (32.0-35.9)   03/04/17  08:00    


 


RDW  16.5 % (11.9-15.9)  H  03/04/17  08:00    


 


Plt Count  121 K/MM3 (134-434)  L  03/04/17  08:00    


 


MPV  10.0 fl (7.5-11.1)   03/04/17  08:00    


 


Sodium  141 mmol/L (136-145)   03/04/17  08:00    


 


Potassium  4.1 mmol/L (3.5-5.1)   03/04/17  08:00    


 


Chloride  104 mmol/L ()   03/04/17  08:00    


 


Carbon Dioxide  29 mmol/L (21-32)   03/04/17  08:00    


 


Anion Gap  8  (8-16)   03/04/17  08:00    


 


BUN  14 mg/dL (7-18)   03/04/17  08:00    


 


Creatinine  1.0 mg/dL (0.7-1.3)   03/04/17  08:00    


 


Creat Clearance w eGFR  > 60  (>60)   03/04/17  08:00    


 


Random Glucose  125 mg/dL ()  H  03/04/17  08:00    


 


Calcium  8.3 mg/dL (8.5-10.1)  L  03/04/17  08:00    


 


Total Bilirubin  0.2 mg/dL (0.2-1.0)  D 03/04/17  08:00    


 


AST  12 U/L (15-37)  L  03/04/17  08:00    


 


ALT  14 U/L (12-78)  D 03/04/17  08:00    


 


Alkaline Phosphatase  85 U/L ()   03/04/17  08:00    


 


Total Protein  6.0 g/dl (6.4-8.2)  L  03/04/17  08:00    


 


Albumin  3.3 g/dl (3.4-5.0)  L  03/04/17  08:00    


 


Urine Color  Ltyellow   03/03/17  21:09    


 


Urine Appearance  Clear   03/03/17  21:09    


 


Urine pH  6.0  (5.0-8.0)  D 03/03/17  21:09    


 


Ur Specific Gravity  1.017  (1.001-1.035)   03/03/17  21:09    


 


Urine Protein  Negative  (NEGATIVE)   03/03/17  21:09    


 


Urine Glucose (UA)  Negative  (NEGATIVE)   03/03/17  21:09    


 


Urine Ketones  Negative  (NEGATIVE)   03/03/17  21:09    


 


Urine Blood  Negative  (NEGATIVE)   03/03/17  21:09    


 


Urine Nitrite  Negative  (NEGATIVE)   03/03/17  21:09    


 


Urine Bilirubin  Negative  (NEGATIVE)   03/03/17  21:09    


 


Urine Urobilinogen  Negative E.U./dl (0.2-1.0)   03/03/17  21:09    


 


Ur Leukocyte Esterase  Negative  (NEGATIVE)   03/03/17  21:09    


 


RPR Titer  Nonreactive  (NONREACTIVE)   03/04/17  08:00    











Assessment: 





03/05/17 11:14


WITHDRAWAL SYMPTOM


Plan: 


CONTINUE DETOX,BGM MONITORING,INITIAL GLUCOSE

## 2017-03-05 NOTE — EKG
Test Reason : 

Blood Pressure : ***/*** mmHG

Vent. Rate : 062 BPM     Atrial Rate : 062 BPM

   P-R Int : 200 ms          QRS Dur : 108 ms

    QT Int : 470 ms       P-R-T Axes : 067 070 258 degrees

   QTc Int : 477 ms

 

NORMAL SINUS RHYTHM with prolonged AV conduction

MODERATE VOLTAGE CRITERIA FOR LVH, MAY BE NORMAL VARIANT

MARKED T WAVE ABNORMALITY, CONSIDER ANTEROLATERAL ISCHEMIA

PROLONGED QT

ABNORMAL ECG

WHEN COMPARED WITH ECG OF 15-MCKINLEY-2017 16:40,

VA INTERVAL HAS INCREASED

T WAVE INVERSION MORE EVIDENT IN INFERIOR LEADS AND  LATERAL LEADS

Confirmed by LEYDI PHILLIPS, DENNYS ([id number]) on 3/5/2017 10:34:41 PM

 

Referred By:             Confirmed By:DENNYS HOLLEY MD

## 2017-03-05 NOTE — EKG
Test Reason : 

Blood Pressure : ***/*** mmHG

Vent. Rate : 061 BPM     Atrial Rate : 061 BPM

   P-R Int : 202 ms          QRS Dur : 106 ms

    QT Int : 470 ms       P-R-T Axes : 058 060 226 degrees

   QTc Int : 473 ms

 

NORMAL SINUS RHYTHM with prolonged AV conduction

MODERATE VOLTAGE CRITERIA FOR LVH, MAY BE NORMAL VARIANT

MARKED T WAVE ABNORMALITY, CONSIDER ANTEROLATERAL ISCHEMIA

PROLONGED QT

ABNORMAL ECG

WHEN COMPARED WITH ECG OF 03-MAR-2017 22:08,

NO SIGNIFICANT CHANGE WAS FOUND

Confirmed by DENNYS HOLLEY MD ([id number]) on 3/5/2017 10:31:50 PM

 

Referred By:             Confirmed By:DENNYS HOLLEY MD

## 2017-03-06 VITALS — TEMPERATURE: 97 F | SYSTOLIC BLOOD PRESSURE: 127 MMHG | HEART RATE: 86 BPM | DIASTOLIC BLOOD PRESSURE: 79 MMHG

## 2017-03-06 RX ADMIN — NICOTINE POLACRILEX PRN MG: 4 GUM, CHEWING ORAL at 08:33

## 2017-03-06 RX ADMIN — NICOTINE POLACRILEX PRN MG: 4 GUM, CHEWING ORAL at 04:07

## 2017-03-06 RX ADMIN — NICOTINE POLACRILEX PRN MG: 4 GUM, CHEWING ORAL at 06:31

## 2017-03-06 RX ADMIN — NICOTINE POLACRILEX PRN MG: 4 GUM, CHEWING ORAL at 00:01

## 2017-03-06 RX ADMIN — Medication SCH APPLIC: at 05:36

## 2017-03-06 NOTE — DS
[facility] Detox Discharge Summary


Admission Date: 


03/03/17





Discharge Date: 03/06/17 (i need to go to welfare office)





- History


Present History: Alcohol Dependence, Cannabis Dependence





- Physical Exam Results


Vital Signs: 


 Vital Signs











Temperature  97 F L  03/06/17 10:12


 


Pulse Rate  86   03/06/17 10:12


 


Respiratory Rate  18   03/06/17 10:12


 


Blood Pressure  127/79   03/06/17 10:12


 


O2 Sat by Pulse Oximetry (%)      














- Medication


Discharge Medications: 


Ambulatory Orders





Lithium Carbonate [Eskalith -] 600 mg PO BID 09/12/16 


Olanzapine [Zyprexa -] 10 mg PO DAILY 01/15/17 


Olanzapine [Zyprexa] 20 mg PO HS 01/15/17 


Lithium Carbonate [Eskalith -] 600 mg PO BID #120 capsule 01/16/17 


Olanzapine [ZyPREXA -] 10 mg PO BID #60 tablet 01/16/17 











- Diagnosis


(1) Weight decreased


Current Visit: Yes   Status: Active





(2) Alcohol dependence with uncomplicated withdrawal


Current Visit: Yes   Status: Chronic





(3) Cannabis dependence


Current Visit: Yes   Status: Chronic





(4) Nicotine dependence


Current Visit: Yes   Status: Chronic





(5) Paranoid schizophrenia


Current Visit: Yes   Status: Chronic





(6) shizophrenia


Current Visit: Yes   Status: Suspected





(7) Positive PPD, treated


Current Visit: Yes   Status: Resolved





(8) Alcohol dependence


Current Visit: No   Status: Active





(9) syncope alcohol related


Current Visit: No   Status: Active








- AMA


Did Patient Leave Against Medical Advice: Yes

## 2017-03-07 ENCOUNTER — EMERGENCY (EMERGENCY)
Facility: HOSPITAL | Age: 57
LOS: 1 days | Discharge: HOME | End: 2017-03-07
Attending: EMERGENCY MEDICINE | Admitting: EMERGENCY MEDICINE
Payer: MEDICARE

## 2017-03-07 VITALS
DIASTOLIC BLOOD PRESSURE: 70 MMHG | TEMPERATURE: 98 F | HEART RATE: 76 BPM | SYSTOLIC BLOOD PRESSURE: 110 MMHG | OXYGEN SATURATION: 98 % | RESPIRATION RATE: 18 BRPM

## 2017-03-07 VITALS
HEART RATE: 90 BPM | RESPIRATION RATE: 18 BRPM | DIASTOLIC BLOOD PRESSURE: 79 MMHG | TEMPERATURE: 97 F | OXYGEN SATURATION: 98 % | SYSTOLIC BLOOD PRESSURE: 123 MMHG

## 2017-03-07 DIAGNOSIS — R44.3 HALLUCINATIONS, UNSPECIFIED: ICD-10-CM

## 2017-03-07 DIAGNOSIS — Z79.899 OTHER LONG TERM (CURRENT) DRUG THERAPY: ICD-10-CM

## 2017-03-07 DIAGNOSIS — F20.89 OTHER SCHIZOPHRENIA: ICD-10-CM

## 2017-03-07 DIAGNOSIS — F19.232: ICD-10-CM

## 2017-03-07 DIAGNOSIS — F20.0 PARANOID SCHIZOPHRENIA: ICD-10-CM

## 2017-03-07 DIAGNOSIS — R45.851 SUICIDAL IDEATIONS: ICD-10-CM

## 2017-03-07 LAB
ALBUMIN SERPL ELPH-MCNC: 3.7 G/DL — SIGNIFICANT CHANGE UP (ref 3.4–5)
ALP SERPL-CCNC: 90 U/L — SIGNIFICANT CHANGE UP (ref 40–120)
ALT FLD-CCNC: 21 U/L — SIGNIFICANT CHANGE UP (ref 12–42)
ANION GAP SERPL CALC-SCNC: 7 MMOL/L — LOW (ref 9–16)
APAP SERPL-MCNC: <2 UG/ML — LOW (ref 10–30)
AST SERPL-CCNC: 18 U/L — SIGNIFICANT CHANGE UP (ref 15–37)
BASOPHILS NFR BLD AUTO: 1 % — SIGNIFICANT CHANGE UP (ref 0–2)
BILIRUB SERPL-MCNC: 0.1 MG/DL — LOW (ref 0.2–1.2)
BUN SERPL-MCNC: 10 MG/DL — SIGNIFICANT CHANGE UP (ref 7–23)
CALCIUM SERPL-MCNC: 8.5 MG/DL — SIGNIFICANT CHANGE UP (ref 8.5–10.5)
CHLORIDE SERPL-SCNC: 104 MMOL/L — SIGNIFICANT CHANGE UP (ref 96–108)
CO2 SERPL-SCNC: 29 MMOL/L — SIGNIFICANT CHANGE UP (ref 22–31)
CREAT SERPL-MCNC: 1.05 MG/DL — SIGNIFICANT CHANGE UP (ref 0.5–1.3)
EOSINOPHIL NFR BLD AUTO: 2.3 % — SIGNIFICANT CHANGE UP (ref 0–6)
ETHANOL SERPL-MCNC: <3 MG/DL — SIGNIFICANT CHANGE UP
GLUCOSE SERPL-MCNC: 174 MG/DL — HIGH (ref 70–99)
HCT VFR BLD CALC: 36.1 % — LOW (ref 39–50)
HGB BLD-MCNC: 12 G/DL — LOW (ref 13–17)
LYMPHOCYTES # BLD AUTO: 37.3 % — SIGNIFICANT CHANGE UP (ref 13–44)
MCHC RBC-ENTMCNC: 23.5 PG — LOW (ref 27–34)
MCHC RBC-ENTMCNC: 33.2 G/DL — SIGNIFICANT CHANGE UP (ref 32–36)
MCV RBC AUTO: 70.6 FL — LOW (ref 80–100)
MONOCYTES NFR BLD AUTO: 8.7 % — SIGNIFICANT CHANGE UP (ref 2–14)
NEUTROPHILS NFR BLD AUTO: 50.7 % — SIGNIFICANT CHANGE UP (ref 43–77)
PLATELET # BLD AUTO: 130 K/UL — LOW (ref 150–400)
POTASSIUM SERPL-MCNC: 3.7 MMOL/L — SIGNIFICANT CHANGE UP (ref 3.5–5.3)
POTASSIUM SERPL-SCNC: 3.7 MMOL/L — SIGNIFICANT CHANGE UP (ref 3.5–5.3)
PROT SERPL-MCNC: 7 G/DL — SIGNIFICANT CHANGE UP (ref 6.4–8.2)
RBC # BLD: 5.11 M/UL — SIGNIFICANT CHANGE UP (ref 4.2–5.8)
RBC # FLD: 16.2 % — SIGNIFICANT CHANGE UP (ref 10.3–16.9)
SALICYLATES SERPL-MCNC: <1.7 MG/DL — LOW (ref 2.8–20)
SODIUM SERPL-SCNC: 140 MMOL/L — SIGNIFICANT CHANGE UP (ref 135–145)
WBC # BLD: 5.3 K/UL — SIGNIFICANT CHANGE UP (ref 3.8–10.5)
WBC # FLD AUTO: 5.3 K/UL — SIGNIFICANT CHANGE UP (ref 3.8–10.5)

## 2017-03-07 PROCEDURE — 80053 COMPREHEN METABOLIC PANEL: CPT

## 2017-03-07 PROCEDURE — 99284 EMERGENCY DEPT VISIT MOD MDM: CPT | Mod: 25

## 2017-03-07 PROCEDURE — 93005 ELECTROCARDIOGRAM TRACING: CPT

## 2017-03-07 PROCEDURE — 93010 ELECTROCARDIOGRAM REPORT: CPT

## 2017-03-07 PROCEDURE — 80307 DRUG TEST PRSMV CHEM ANLYZR: CPT

## 2017-03-07 PROCEDURE — 85025 COMPLETE CBC W/AUTO DIFF WBC: CPT

## 2017-03-07 PROCEDURE — 99285 EMERGENCY DEPT VISIT HI MDM: CPT | Mod: 25

## 2017-03-07 PROCEDURE — 99285 EMERGENCY DEPT VISIT HI MDM: CPT

## 2017-03-07 PROCEDURE — 36415 COLL VENOUS BLD VENIPUNCTURE: CPT

## 2017-03-07 NOTE — ED BEHAVIORAL HEALTH ASSESSMENT NOTE - DIFFERENTIAL
K2 intoxication  K2 withdrawal  schizophrenia, chronic, paranoid type  K2 use disorder  cannabis use disorder  alcohol use disorder  cocaine use disorder deferred

## 2017-03-07 NOTE — ED BEHAVIORAL HEALTH ASSESSMENT NOTE - AXIS III
sickle cell trait, alpha thalassemia sickle cell trait, alpha thalassemia, arthritis, chronic shoulder dislocation

## 2017-03-07 NOTE — ED BEHAVIORAL HEALTH ASSESSMENT NOTE - DESCRIPTION
Pt has not been agitated.  He is located in the Hallway on a wheelchair with 1:1 at side.  He is sleeping with the covers over his head, and examination could not be adequately conducted due to pt sedation. sickle cell trait and alpha thalassemia per [facility] ED eval 11/30/16 undomiciled- between shelter and streets, recently at [place]. also documented at [facility] on past eval.  Documented to receive disability per [facility] ED eval 9/6/16.  No other details noted. cooperative, anxious, behaving appropriately lives with son. Has lived in shelters for years as well Pt has not been agitated.  He is located in the Hallway on a wheelchair with 1:1 at side.  He is sleeping with the covers over his head, and examination could not be adequately conducted due to pt sedation.  None

## 2017-03-07 NOTE — ED BEHAVIORAL HEALTH ASSESSMENT NOTE - NS ED [facility] PLAN HOLD IN ED BH CONTACTED FT
spoke directly with ACT Team who should be notified again when dispo is determined- by ED physician or psychiatrist

## 2017-03-07 NOTE — ED BEHAVIORAL HEALTH ASSESSMENT NOTE - HPI (INCLUDE ILLNESS QUALITY, SEVERITY, DURATION, TIMING, CONTEXT, MODIFYING FACTORS, ASSOCIATED SIGNS AND SYMPTOMS)
Per ED physician, Dr. Barnes, pt reported he was not currently taking his medications and WI c/o CAH to "hurt [him]self" for a period of time, but claimed that today he felt like he was "actually going to do it".  Denied OD, or any recent substance use, injuries.  Denied homicidal ideation.  Denied having psychiatrist, but records from [facility] where he has visited ED with psychiatric eval at least 4 times in less than 1 year (and has had at least 23 documented psychiatric hospitalizations there alone), including 2 hospitalizations as dispo from first 2 visits- 8/6 and 8/916- he has been followed by ACT.      Most recent system ED visit was 11/30/16 when he was released to the same ACT Team which was involved and called, reporting that pt had just been released the day prior from psychiatric hospitalization at [facility], even though pt reported he had not taken medications x 6 mos.   He had been released from the most recent prior system eval at [facility] ED, 9/16/16, with similar c/o CAH for suicidal ideation as well as urge to jump in front of subway, after it was confirmed that he was recently seen by the team and determined to be "at baseline".      Per ACT team contacted based on records noted, pt had just been seen at their center this morning.  Clinicians there report that he seemed "stable", more than he had in months prior to his coming more regularly as he had in the last mo, ~ 2x weekly, up until a few wks ago when he seemed to have "disappeared" for a couple wks, but returned in the last wk.  He typically has had to visit the providers as opposed to the inverse which would be typical because they explain he is not domiciled or located to be residing/ staying in a consistent location making it very challenging to find/ keep track of him.  He denied any suicidal ideation or CAH then.  He did report having recently used substances and actually sought detox to which he was referred at [facility].  They were not clear on what substances he endorsed having used, suggested it involved K2, as has been typical, but also possibly EtOH and cocaine.     Pt denies any EtOH use, and, indeed, UDS is negative.  He initially denied recent substance use again on direct eval, but he seemed to sedated to provide coherent report, as he also seemed not to deny K2 use.  It was only when prompted about contact with ACT team that he corrected and clarified that he had visited with providers just this morning and that he has been taking psychotropic medication, including last just this morning.  He did admit only again when prompted that the plan was for him to pursue detox today.  He was only able to provide very limited information, including rationale of forgoing this plan that "it is too crowded there", implying concern that he would not be admitted, though he confirms he did not pursue this.  However, any further details beyond that could not be further reviewed or clarified due to pt's decreased sensorium, despite repeated verbal and tactile stimulation.  He could not coherently indicate what impelled him to, instead, come for evaluation at [facility] ED.  He endorsed CAH to self-harm at the outset of evaluation, but, again, only on specific prompting about this, without reporting it outright, and this was not indicated in response to this subsequent inquiry to seek clarification.  He just says that he is "too tired", and indicated at the outset on questioning about this that he "didn't get any sleep last night", specifically, when asked, indicating that he stayed at shelter at [place].  No delusional material elicited or reported, including persecutory ideation about the "devil" which he is known to experience 56 y/o single AAM, domiciled with son, frequently homeless as well, no dependents, disabled due to mental illness, history of Schizoaffective Disorder, multiple admissions, admitted in 2016 for [facility] for psychosis, no reported hx of suicide/violence per past records and patient denies, no known legal problems, history of arthritis, anemia and sickle cell traits, BIB self as a walk-in as patient states he has been feeling increasingly unsafe in the community as he states he is having CAH to kill self and others & believes someone is controlling his body.    Per records, patient was [facility] by [facility] emergency: [phone], [facility] ACT Team ([phone]) - voicemail left with callback number; [phone] (ACT Team 1). Writer called these numbers and one number did not work & messages left on the other lines were not returned as of 5pm.     In ED, patient has poor eye contact, appears internally preoccupied and tells writer he "always" hears voices. However, he states that recently the voices have been more intense, he has been hearing voices telling him to "kill yourself" and states he has also been hearing voices telling him to kill others. He states that he usually can ignore the voices, but recently it has been more difficult and stressful. He reports he has also recently begun to believe than an unknown party is controlling his mind & body & believes they can also force him to say what they want him to say. He states this is very disturbing to him. He reports he takes Zyprexa and this decreases the voices. However, he ran out of medications on Thursday. He states other recent medication changes include Lithium being discontinued, "it caused liver problems". He reports he does not feel safe in the community at this time. Reports mood is "fine" but states sleeps is very poor due to anxiety about voices. He denies any problems with eating, ADLs. He denies any desire to hurt self/others but admits that he does not feel in control of himself presently. Admits he drank a 32 ounce beer yesterday, but otherwise has had no alcohol for over a week. Denies using any drugs. Per ED physician, Dr. Barnes, pt reported he was not currently taking his medications and WI c/o CAH to "hurt [him]self" for a period of time, but claimed that today he felt like he was "actually going to do it".  Denied OD, or any recent substance use, injuries.  Denied homicidal ideation.  Denied having psychiatrist, but records from [facility] where he has visited ED with psychiatric eval at least 4 times in less than 1 year (and has had at least 23 documented psychiatric hospitalizations there alone), including 2 hospitalizations as dispo from first 2 visits- 8/6 and 8/916- he has been followed by ACT.      Most recent system ED visit was 11/30/16 when he was released to the same ACT Team which was involved and called, reporting that pt had just been released the day prior from psychiatric hospitalization at [facility], even though pt reported he had not taken medications x 6 mos.   He had been released from the most recent prior system eval at [facility] ED, 9/16/16, with similar c/o CAH for suicidal ideation as well as urge to [place] front of subway, after it was confirmed that he was recently seen by the team and determined to be "at baseline".      Per ACT team contacted based on records noted, pt had just been seen at their center this morning.  Clinicians there report that he seemed "stable", more than he had in months prior to his coming more regularly as he had in the last mo, ~ 2x weekly, up until a few wks ago when he seemed to have "disappeared" for a couple wks, but returned in the last wk.  He typically has had to visit the providers as opposed to the inverse which would be typical because they explain he is not domiciled or located to be residing/ staying in a consistent location making it very challenging to find/ keep track of him.  He denied any suicidal ideation or CAH then.  He did report having recently used substances and actually sought detox to which he was referred at [facility].  They were not clear on what substances he endorsed having used, suggested it involved K2, as has been typical, but also possibly EtOH and cocaine.     Pt denies any EtOH use, and, indeed, UDS is negative.  He initially denied recent substance use again on direct eval, but he seemed to sedated to provide coherent report, as he also seemed not to deny K2 use.  It was only when prompted about contact with ACT team that he corrected and clarified that he had visited with providers just this morning and that he has been taking psychotropic medication, including last just this morning.  He did admit only again when prompted that the plan was for him to pursue detox today.  He was only able to provide very limited information, including rationale of forgoing this plan that "it is too crowded there", implying concern that he would not be admitted, though he confirms he did not pursue this.  However, any further details beyond that could not be further reviewed or clarified due to pt's decreased sensorium, despite repeated verbal and tactile stimulation.  He could not coherently indicate what impelled him to, instead, come for evaluation at [facility] ED.  He endorsed CAH to self-harm at the outset of evaluation, but, again, only on specific prompting about this, without reporting it outright, and this was not indicated in response to this subsequent inquiry to seek clarification.  He just says that he is "too tired", and indicated at the outset on questioning about this that he "didn't get any sleep last night", specifically, when asked, indicating that he stayed at shelter at [place].  No delusional material elicited or reported, including persecutory ideation about the "devil" which he is known to experience

## 2017-03-07 NOTE — ED BEHAVIORAL HEALTH ASSESSMENT NOTE - PATIENT SEEN BY
Attending Psychiatrist without NP/Trainee Attending Psychiatrist supervising NP/Trainee and meeting pt

## 2017-03-07 NOTE — ED BEHAVIORAL HEALTH ASSESSMENT NOTE - RISK ASSESSMENT
Given pt's current state and limited examination, pt's risk cannot be fully assessed given his concerning statements, even though it is suspicious for malingering based on inconsistency in present and on past similar presentation, but, moreover, the patient must be held in ED in current state due to impairment from sedation. Risk factors include Schizophrenia diagnosis, hx of substance abuse, psychosis, impulsivity, impaired insight & judgement, medication noncompliance/medication changes. This patient is at high risk for harm and requires inpatient level of care for safety and stabilization.

## 2017-03-07 NOTE — ED PROVIDER NOTE - MEDICAL DECISION MAKING DETAILS
hx of schizophrenia and polysubstance abuse here with complaint of suicidal ideation, but in the setting of substance use. Psych initially unable to clear, pt too sleepy, will await sobriety, and reassess

## 2017-03-07 NOTE — ED BEHAVIORAL HEALTH ASSESSMENT NOTE - LEGAL HISTORY
multiple arrests noted in 11/30/16 [facility] ED eval for jumping turnstiles no current legal issues

## 2017-03-07 NOTE — ED BEHAVIORAL HEALTH ASSESSMENT NOTE - SUICIDE RISK FACTORS
Command hallucinations to hurt self/Substance abuse/dependence Substance abuse/dependence/Command hallucinations to hurt self/Unable to engage in safety planning/Agitation/severe anxiety

## 2017-03-07 NOTE — ED BEHAVIORAL HEALTH ASSESSMENT NOTE - DESCRIPTION (FIRST USE, LAST USE, QUANTITY, FREQUENCY, DURATION)
K2 h/o alcohol use disorder, no further details obtained d/t pt sedation,  Pt denies recent use, and UDS negative. reportedly smokes daily per most recent system eval at [facility] ED 11/30/16 h/o cocaine use d/o; unclear pattern of recent use, pt denied at last system eval- [facility] ED 11/30/16 uses k2 in past drank 32 ounce beer yesterday. no other drinking for a week has smoked in past

## 2017-03-07 NOTE — ED BEHAVIORAL HEALTH ASSESSMENT NOTE - DETAILS
Thorazine and Prolixin- dystonia? father- depression informed and d/w ED physician, Dr. Barnes, plan informed and d/w ED physician, Dr. Barnes, plan- pt needs to be re-evaluated due to sedation but based on info obtained and provided with inconsistency of report yielding high suspicion for malingering, along with treatment plan discussed of return to ACT Team as early as tomorrow if not pursuing detox as originally suggested- if not at [facility], may go to [facility] or [facility]- repeat psychiatry speciality assessment may not be necessary, but is available if risk needs further assessment right sided hip pain voices to harm self and others self-referred handoff to prachi informed and d/w ED physician, Dr. Barnes, plan- pt needs to be re-evaluated due to sedation but based on info obtained and provided with inconsistency of report yielding high suspicion for malingering, along with treatment plan discussed of return to ACT Team as early as tomorrow if not pursuing detox as originally suggested- if not at [facility], may go to [facility] or [facility]- repeat psychiatry speciality assessment may not be necessary, but is available if risk needs further assessment
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
## 2017-03-07 NOTE — ED ADULT NURSE NOTE - OBJECTIVE STATEMENT
Pt with PMH od Schizophrenia presents to ED c/o hearing various male voices  "for a day or two" telling him to hurt himself by cutting. Pt reports previous suicidal attempt of jumping off the roof. Pt states he currently takes Zyprexa, he denies seeing a Psychiatrist or visiting a clinic - states " I just go to the ER". He reports staying at the shelter in the [place]. He denies any pain presently, he denies any PMH/PSH. Denies any social support, state has no family in [place]. He declines to see a SW at this time. Constant obs initiated in triage for pt's safety. Belongings labeled and placed separate from pt as per protocol. Pt awaiting MD atkins. No ss of injury, trauma, pt is cooperative and not agitated at this time.

## 2017-03-07 NOTE — ED BEHAVIORAL HEALTH ASSESSMENT NOTE - PSYCHIATRIC ISSUES AND PLAN (INCLUDE STANDING AND PRN MEDICATION)
f/u with ACT if released and c/w outpatient tx regimen if issues related to substance intox/ w/d since pt noted to be o/w stable based on assessment just hrs ago Restart Zyprexa 10mg q HS for psychosis. Consider restarting Lithium after getting collateral (unclear why it was stopped). Zyprexa 5mg IM or p.o. for agitation
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
## 2017-03-07 NOTE — ED BEHAVIORAL HEALTH ASSESSMENT NOTE - SUBSTANCE ISSUES AND PLAN (INCLUDE STANDING AND PRN MEDICATION)
on hold; UDS pending; may refer for detox if still sought none, patient does not use substances to a degree that he is at risk for withdrawal

## 2017-03-07 NOTE — ED PROVIDER NOTE - PSYCHIATRIC, MLM
Alert and oriented to person, place, time/situation. normal mood and affect. endorses suicidal ideation, denies homicidal ideation, denies overdose or suicide attempt

## 2017-03-07 NOTE — ED ADULT NURSE NOTE - CHPI ED SYMPTOMS NEG
no weight loss/no disorientation/no weakness/no paranoia/no change in level of consciousness/no agitation/no homicidal/no confusion

## 2017-03-07 NOTE — ED BEHAVIORAL HEALTH ASSESSMENT NOTE - MEDICAL ISSUES AND PLAN (INCLUDE STANDING AND PRN MEDICATION)
n/a, stable fall risk. EM provider states patient is medically cleared for psychiatric hospitalization with no ongoing recommendations. Motrin 400mg q 6 hours PRN for pain. na

## 2017-03-07 NOTE — ED BEHAVIORAL HEALTH ASSESSMENT NOTE - PAST PSYCHOTROPIC MEDICATION
Prolixin- dystonia?  Documented: Thorazine, Haldol 5-10 mg BID with Cogentin 1 mg at bedtime to BID, Haldol decanoate 200 mg IM q mo, olanzapine 10 mg BID  Lithium

## 2017-03-07 NOTE — ED BEHAVIORAL HEALTH ASSESSMENT NOTE - OTHER
ED physician, [facility] Mental Health CounselorMauro (emergency line- [phone], main- [phone]) unable to assess due to sedation impoverished walks slowly due to arthritic pain psychotic and with CAH to harm self and others

## 2017-03-07 NOTE — ED BEHAVIORAL HEALTH ASSESSMENT NOTE - SUICIDE PROTECTIVE FACTORS
Positive therapeutic relationships Responsibility to family and others/Positive therapeutic relationships

## 2017-03-07 NOTE — ED PROVIDER NOTE - OBJECTIVE STATEMENT
57yo M hx of schizophrenia, states hasn't been on meds in months, here for suicidal ideation. Known to use drugs, pt sleepy in ED, denies etoh ingestion. Denies any drug overdose. states that he is hearing voices telling him to kill himself.

## 2017-03-07 NOTE — ED BEHAVIORAL HEALTH ASSESSMENT NOTE - NS ED BHA PLAN TR REFERRAL APPT DISCUSSED2 FT
Patient does not want detox.  benzo + urine.  Refusing to go back to shelter.  Denies AVH HI SI and has been sleeping peacefully all evening at this point.  Wants to stay and asks to remain until AM to sleep but is told he will be discharged.  Says he will sleep on train but then asks for [employer].  Did not say he had compliants with shelter. ·  Referral / Appointment Details	Patient does not want detox.  benzo + urine.  Refusing to go back to shelter.  Denies AVH HI SI and has been sleeping peacefully all evening at this point.  Wants to stay and asks to remain until AM to sleep but is told he will be discharged.  Says he will sleep on train but then asks for [employer].  Did not say he had compliants with shelter.

## 2017-03-07 NOTE — ED BEHAVIORAL HEALTH ASSESSMENT NOTE - CURRENT MEDICATION
unknown, reports compliance, but does not confirm regimen-  most recent noted is divalproex 500 mg BID Haldol 10 mg BID Zyprexa unknown dose  Patient states Lithium was recently discontinued

## 2017-03-07 NOTE — ED PROVIDER NOTE - PROGRESS NOTE DETAILS
Took sign out from Dr. Barnes, 56M with hx of schizophrenia, unclear compliance with meds, presenting with auditory hallucinations. Pt has hx of polysubstance abuse. Awaiting re-eval by psych. Dr. Cao to re-eval. As per Dr. Cao, pt can be discharged home. No active si/hi at this time. Pt jessica PO, sleeping but awakens and conversant.

## 2017-03-11 ENCOUNTER — INPATIENT (INPATIENT)
Facility: HOSPITAL | Age: 57
LOS: 4 days | Discharge: HOME | End: 2017-03-16
Attending: PSYCHIATRY & NEUROLOGY | Admitting: PSYCHIATRY & NEUROLOGY
Payer: MEDICARE

## 2017-03-11 VITALS
RESPIRATION RATE: 16 BRPM | TEMPERATURE: 98 F | DIASTOLIC BLOOD PRESSURE: 82 MMHG | SYSTOLIC BLOOD PRESSURE: 122 MMHG | OXYGEN SATURATION: 100 % | HEART RATE: 88 BPM

## 2017-03-11 DIAGNOSIS — F20.9 SCHIZOPHRENIA, UNSPECIFIED: ICD-10-CM

## 2017-03-11 LAB
ALBUMIN SERPL ELPH-MCNC: 4.6 G/DL — SIGNIFICANT CHANGE UP (ref 3.3–5)
ALP SERPL-CCNC: 79 U/L — SIGNIFICANT CHANGE UP (ref 40–120)
ALT FLD-CCNC: 16 U/L — SIGNIFICANT CHANGE UP (ref 4–41)
AMPHET UR-MCNC: NEGATIVE — SIGNIFICANT CHANGE UP
ANISOCYTOSIS BLD QL: SLIGHT — SIGNIFICANT CHANGE UP
APAP SERPL-MCNC: < 15 UG/ML — LOW (ref 15–25)
APPEARANCE UR: CLEAR — SIGNIFICANT CHANGE UP
AST SERPL-CCNC: 23 U/L — SIGNIFICANT CHANGE UP (ref 4–40)
BARBITURATES MEASUREMENT: NEGATIVE — SIGNIFICANT CHANGE UP
BARBITURATES UR SCN-MCNC: NEGATIVE — SIGNIFICANT CHANGE UP
BASOPHILS # BLD AUTO: 0.02 K/UL — SIGNIFICANT CHANGE UP (ref 0–0.2)
BASOPHILS NFR BLD AUTO: 0.3 % — SIGNIFICANT CHANGE UP (ref 0–2)
BASOPHILS NFR SPEC: 0.8 % — SIGNIFICANT CHANGE UP (ref 0–2)
BENZODIAZ SERPL-MCNC: POSITIVE — SIGNIFICANT CHANGE UP
BENZODIAZ UR-MCNC: POSITIVE — SIGNIFICANT CHANGE UP
BILIRUB SERPL-MCNC: 0.2 MG/DL — SIGNIFICANT CHANGE UP (ref 0.2–1.2)
BILIRUB UR-MCNC: NEGATIVE — SIGNIFICANT CHANGE UP
BLOOD UR QL VISUAL: NEGATIVE — SIGNIFICANT CHANGE UP
BUN SERPL-MCNC: 20 MG/DL — SIGNIFICANT CHANGE UP (ref 7–23)
CALCIUM SERPL-MCNC: 9.9 MG/DL — SIGNIFICANT CHANGE UP (ref 8.4–10.5)
CANNABINOIDS UR-MCNC: NEGATIVE — SIGNIFICANT CHANGE UP
CHLORIDE SERPL-SCNC: 102 MMOL/L — SIGNIFICANT CHANGE UP (ref 98–107)
CO2 SERPL-SCNC: 28 MMOL/L — SIGNIFICANT CHANGE UP (ref 22–31)
COCAINE METAB.OTHER UR-MCNC: NEGATIVE — SIGNIFICANT CHANGE UP
COLOR SPEC: YELLOW — SIGNIFICANT CHANGE UP
CREAT SERPL-MCNC: 1.11 MG/DL — SIGNIFICANT CHANGE UP (ref 0.5–1.3)
EOSINOPHIL # BLD AUTO: 0.08 K/UL — SIGNIFICANT CHANGE UP (ref 0–0.5)
EOSINOPHIL NFR BLD AUTO: 1.1 % — SIGNIFICANT CHANGE UP (ref 0–6)
EOSINOPHIL NFR FLD: 0.8 % — SIGNIFICANT CHANGE UP (ref 0–6)
ETHANOL BLD-MCNC: < 10 MG/DL — SIGNIFICANT CHANGE UP
GIANT PLATELETS BLD QL SMEAR: PRESENT — SIGNIFICANT CHANGE UP
GLUCOSE SERPL-MCNC: 121 MG/DL — HIGH (ref 70–99)
GLUCOSE UR-MCNC: NEGATIVE — SIGNIFICANT CHANGE UP
HCT VFR BLD CALC: 40.6 % — SIGNIFICANT CHANGE UP (ref 39–50)
HGB BLD-MCNC: 13.4 G/DL — SIGNIFICANT CHANGE UP (ref 13–17)
HYALINE CASTS # UR AUTO: SIGNIFICANT CHANGE UP (ref 0–?)
IMM GRANULOCYTES NFR BLD AUTO: 0.3 % — SIGNIFICANT CHANGE UP (ref 0–1.5)
KETONES UR-MCNC: NEGATIVE — SIGNIFICANT CHANGE UP
LEUKOCYTE ESTERASE UR-ACNC: NEGATIVE — SIGNIFICANT CHANGE UP
LYMPHOCYTES # BLD AUTO: 2.17 K/UL — SIGNIFICANT CHANGE UP (ref 1–3.3)
LYMPHOCYTES # BLD AUTO: 30.8 % — SIGNIFICANT CHANGE UP (ref 13–44)
LYMPHOCYTES NFR SPEC AUTO: 26.2 % — SIGNIFICANT CHANGE UP (ref 13–44)
MCHC RBC-ENTMCNC: 23.4 PG — LOW (ref 27–34)
MCHC RBC-ENTMCNC: 33 % — SIGNIFICANT CHANGE UP (ref 32–36)
MCV RBC AUTO: 70.9 FL — LOW (ref 80–100)
METHADONE UR-MCNC: NEGATIVE — SIGNIFICANT CHANGE UP
MICROCYTES BLD QL: SLIGHT — SIGNIFICANT CHANGE UP
MONOCYTES # BLD AUTO: 0.57 K/UL — SIGNIFICANT CHANGE UP (ref 0–0.9)
MONOCYTES NFR BLD AUTO: 8.1 % — SIGNIFICANT CHANGE UP (ref 2–14)
MONOCYTES NFR BLD: 4.9 % — SIGNIFICANT CHANGE UP (ref 2–9)
MUCOUS THREADS # UR AUTO: SIGNIFICANT CHANGE UP
NEUTROPHIL AB SER-ACNC: 65.7 % — SIGNIFICANT CHANGE UP (ref 43–77)
NEUTROPHILS # BLD AUTO: 4.18 K/UL — SIGNIFICANT CHANGE UP (ref 1.8–7.4)
NEUTROPHILS NFR BLD AUTO: 59.4 % — SIGNIFICANT CHANGE UP (ref 43–77)
NITRITE UR-MCNC: NEGATIVE — SIGNIFICANT CHANGE UP
OPIATES UR-MCNC: NEGATIVE — SIGNIFICANT CHANGE UP
OVALOCYTES BLD QL SMEAR: SLIGHT — SIGNIFICANT CHANGE UP
OXYCODONE UR-MCNC: NEGATIVE — SIGNIFICANT CHANGE UP
PCP UR-MCNC: NEGATIVE — SIGNIFICANT CHANGE UP
PH UR: 7 — SIGNIFICANT CHANGE UP (ref 4.6–8)
PLATELET # BLD AUTO: 129 K/UL — LOW (ref 150–400)
PLATELET COUNT - ESTIMATE: SIGNIFICANT CHANGE UP
PMV BLD: SIGNIFICANT CHANGE UP FL (ref 7–13)
POIKILOCYTOSIS BLD QL AUTO: SLIGHT — SIGNIFICANT CHANGE UP
POTASSIUM SERPL-MCNC: 4.8 MMOL/L — SIGNIFICANT CHANGE UP (ref 3.5–5.3)
POTASSIUM SERPL-SCNC: 4.8 MMOL/L — SIGNIFICANT CHANGE UP (ref 3.5–5.3)
PROT SERPL-MCNC: 8.1 G/DL — SIGNIFICANT CHANGE UP (ref 6–8.3)
PROT UR-MCNC: 20 — SIGNIFICANT CHANGE UP
RBC # BLD: 5.73 M/UL — SIGNIFICANT CHANGE UP (ref 4.2–5.8)
RBC # FLD: 15.9 % — HIGH (ref 10.3–14.5)
RBC CASTS # UR COMP ASSIST: SIGNIFICANT CHANGE UP (ref 0–?)
SALICYLATES SERPL-MCNC: < 5 MG/DL — LOW (ref 15–30)
SODIUM SERPL-SCNC: 144 MMOL/L — SIGNIFICANT CHANGE UP (ref 135–145)
SP GR SPEC: 1.02 — SIGNIFICANT CHANGE UP (ref 1–1.03)
SQUAMOUS # UR AUTO: SIGNIFICANT CHANGE UP
TSH SERPL-MCNC: 0.88 UIU/ML — SIGNIFICANT CHANGE UP (ref 0.27–4.2)
UROBILINOGEN FLD QL: NORMAL E.U. — SIGNIFICANT CHANGE UP (ref 0.1–0.2)
VARIANT LYMPHS # BLD: 1.6 % — SIGNIFICANT CHANGE UP
WBC # BLD: 7.04 K/UL — SIGNIFICANT CHANGE UP (ref 3.8–10.5)
WBC # FLD AUTO: 7.04 K/UL — SIGNIFICANT CHANGE UP (ref 3.8–10.5)
WBC UR QL: SIGNIFICANT CHANGE UP (ref 0–?)

## 2017-03-11 PROCEDURE — 99285 EMERGENCY DEPT VISIT HI MDM: CPT

## 2017-03-11 RX ORDER — OLANZAPINE 15 MG/1
5 TABLET, FILM COATED ORAL ONCE
Qty: 0 | Refills: 0 | Status: DISCONTINUED | OUTPATIENT
Start: 2017-03-11 | End: 2017-03-16

## 2017-03-11 RX ORDER — OLANZAPINE 15 MG/1
5 TABLET, FILM COATED ORAL EVERY 6 HOURS
Qty: 0 | Refills: 0 | Status: DISCONTINUED | OUTPATIENT
Start: 2017-03-11 | End: 2017-03-16

## 2017-03-11 RX ORDER — OLANZAPINE 15 MG/1
10 TABLET, FILM COATED ORAL AT BEDTIME
Qty: 0 | Refills: 0 | Status: DISCONTINUED | OUTPATIENT
Start: 2017-03-11 | End: 2017-03-14

## 2017-03-11 RX ORDER — IBUPROFEN 200 MG
400 TABLET ORAL ONCE
Qty: 0 | Refills: 0 | Status: COMPLETED | OUTPATIENT
Start: 2017-03-11 | End: 2017-03-11

## 2017-03-11 RX ORDER — NICOTINE POLACRILEX 2 MG
4 GUM BUCCAL
Qty: 0 | Refills: 0 | Status: DISCONTINUED | OUTPATIENT
Start: 2017-03-11 | End: 2017-03-16

## 2017-03-11 RX ORDER — IBUPROFEN 200 MG
400 TABLET ORAL EVERY 6 HOURS
Qty: 0 | Refills: 0 | Status: DISCONTINUED | OUTPATIENT
Start: 2017-03-11 | End: 2017-03-16

## 2017-03-11 RX ADMIN — Medication 400 MILLIGRAM(S): at 17:02

## 2017-03-11 RX ADMIN — Medication 400 MILLIGRAM(S): at 17:29

## 2017-03-11 RX ADMIN — OLANZAPINE 10 MILLIGRAM(S): 15 TABLET, FILM COATED ORAL at 21:57

## 2017-03-11 NOTE — ED ADULT TRIAGE NOTE - CHIEF COMPLAINT QUOTE
pt with a c/o Auditory Hallucinations pt states the voices are telling him to kill himself and to kill others. Pt visual Hallucinations.   pmhx Anemia schizophrenia

## 2017-03-11 NOTE — ED BEHAVIORAL HEALTH ASSESSMENT NOTE - RISK ASSESSMENT
Risk factors include Schizophrenia diagnosis, hx of substance abuse, psychosis, impulsivity, impaired insight & judgement, medication noncompliance/medication changes. This patient is at high risk for harm and requires inpatient level of care for safety and stabilization.

## 2017-03-11 NOTE — ED BEHAVIORAL HEALTH NOTE - BEHAVIORAL HEALTH NOTE
Writer performed psyckes under emergency status and provided to evaluating psychiatric nurse practitioner.

## 2017-03-11 NOTE — ED BEHAVIORAL HEALTH ASSESSMENT NOTE - MEDICAL ISSUES AND PLAN (INCLUDE STANDING AND PRN MEDICATION)
fall risk. EM provider states patient is medically cleared for psychiatric hospitalization with no ongoing recommendations. Motrin 400mg q 6 hours PRN for pain.

## 2017-03-11 NOTE — ED PROVIDER NOTE - PSYCHIATRIC RISK
VERBALIZING WISH TO HARM OTHERS/VERBALIZING HOMICIDAL IDEATIONS/VERBALIZING WISH TO HARM SELF/VERBALIZING SUICIDAL IDEATIONS

## 2017-03-11 NOTE — ED BEHAVIORAL HEALTH ASSESSMENT NOTE - DESCRIPTION
cooperative, anxious, behaving appropriately sickle cell trait and alpha thalassemia per J ED eval 11/30/16 lives with son. Has lived in shelters for years as well

## 2017-03-11 NOTE — ED PROVIDER NOTE - MEDICAL DECISION MAKING DETAILS
55 y/o Schizoaffective Disorder, Arthritis, Anemia and Sickle cell traits  Labs, Urine Tox/UA, EKG. No evidence of physical injuries , broken skin or deformities.   Medical evaluation performed. There is no clinical evidence of intoxication or any acute medical problem requiring immediate intervention. Patient is awaiting psychiatric consultation. Final disposition will be determined by psychiatrist.

## 2017-03-11 NOTE — ED BEHAVIORAL HEALTH ASSESSMENT NOTE - DETAILS
Thorazine and Prolixin- dystonia? father- depression right sided hip pain voices to harm self and others handoff to prachi self-referred

## 2017-03-11 NOTE — ED BEHAVIORAL HEALTH ASSESSMENT NOTE - HPI (INCLUDE ILLNESS QUALITY, SEVERITY, DURATION, TIMING, CONTEXT, MODIFYING FACTORS, ASSOCIATED SIGNS AND SYMPTOMS)
56 y/o single AAM, domiciled with son, frequently homeless as well, no dependents, disabled due to mental illness, history of Schizoaffective Disorder, multiple admissions, admitted in 2016 for [facility] for psychosis, no reported hx of suicide/violence per past records and patient denies, no known legal problems, history of arthritis, anemia and sickle cell traits, BIB self as a walk-in as patient states he has been feeling increasingly unsafe in the community as he states he is having CAH to kill self and others & believes someone is controlling his body.    Per records, patient was treated by [facility] emergency: [phone], [facility] ACT Team ([phone]) - voicemail left with callback number; [phone] (ACT Team 1). Writer called these numbers and one number did not work & messages left on the other lines were not returned as of 5pm.     In ED, patient has poor eye contact, appears internally preoccupied and tells writer he "always" hears voices. However, he states that recently the voices have been more intense, he has been hearing voices telling him to "kill yourself" and states he has also been hearing voices telling him to kill others. He states that he usually can ignore the voices, but recently it has been more difficult and stressful. He reports he has also recently begun to believe than an unknown party is controlling his mind & body & believes they can also force him to say what they want him to say. He states this is very disturbing to him. He reports he takes Zyprexa and this decreases the voices. However, he ran out of medications on Thursday. He states other recent medication changes include Lithium being discontinued, "it caused liver problems". He reports he does not feel safe in the community at this time. Reports mood is "fine" but states sleeps is very poor due to anxiety about voices. He denies any problems with eating, ADLs. He denies any desire to hurt self/others but admits that he does not feel in control of himself presently. Admits he drank a 32 ounce beer yesterday, but otherwise has had no alcohol for over a week. Denies using any drugs.

## 2017-03-11 NOTE — ED BEHAVIORAL HEALTH ASSESSMENT NOTE - CASE SUMMARY
58 y/o single AAM, domiciled with son, frequently homeless as well, no dependents, disabled due to mental illness, history of Schizoaffective Disorder, multiple admissions, admitted in 2016 for [facility] for psychosis, no reported hx of suicide/violence per past records and patient denies, no known legal problems, history of arthritis, anemia and sickle cell traits, BIB self as a walk-in as patient states he has been feeling increasingly unsafe in the community as he states he is having CAH to kill self and others & believes someone is controlling his body. Would benefit from an inpatient psychiatric hospitalization.

## 2017-03-11 NOTE — ED BEHAVIORAL HEALTH ASSESSMENT NOTE - SUMMARY
58 y/o single AAM, domiciled with son, frequently homeless as well, no dependents, disabled due to mental illness, history of Schizoaffective Disorder, multiple admissions, admitted in 2016 for [facility] for psychosis, no reported hx of suicide/violence per past records and patient denies, no known legal problems, history of arthritis, anemia and sickle cell traits, BIB self as a walk-in as patient states he has been feeling increasingly unsafe in the community as he states he is having CAH to kill self and others & believes someone is controlling his body.  In ED, patient is reporting continuous CAH to harm self, others & is reporting delusions of control. He has had medication changes recently which may be causing a worsening of symptoms & is also admitting to missing Zyprexa for the past 3 days. At present, patient does not feel safe in the community and presents a risk for harm to self or others. Patient will benefit from inpatient psychiatric hospitalization for safety and stabilization.

## 2017-03-11 NOTE — ED PROVIDER NOTE - OBJECTIVE STATEMENT
57 y/o Schizoaffective Disorder, Arthritis, Anemia and Sickle cell trait, Substance Abuse  BIB self w c/o command auditory hallucinations to kill self and others. States he has an active plan to cut wrist. Denies punching or kicking any objects. Denies pain, SOB, fever, chills chest/ abdominal discomfort. Denies visual hallucinations. Denies recent use of alcohol or illicit drugs.

## 2017-03-11 NOTE — ED BEHAVIORAL HEALTH ASSESSMENT NOTE - PSYCHIATRIC ISSUES AND PLAN (INCLUDE STANDING AND PRN MEDICATION)
Restart Zyprexa 10mg q HS for psychosis. Consider restarting Lithium after getting collateral (unclear why it was stopped). Zyprexa 5mg IM or p.o. for agitation

## 2017-03-11 NOTE — ED BEHAVIORAL HEALTH ASSESSMENT NOTE - SUICIDE RISK FACTORS
Command hallucinations to hurt self/Agitation/severe anxiety/Substance abuse/dependence/Unable to engage in safety planning

## 2017-03-12 PROCEDURE — 99222 1ST HOSP IP/OBS MODERATE 55: CPT

## 2017-03-12 RX ORDER — DIPHENHYDRAMINE HCL 50 MG
50 CAPSULE ORAL AT BEDTIME
Qty: 0 | Refills: 0 | Status: DISCONTINUED | OUTPATIENT
Start: 2017-03-12 | End: 2017-03-16

## 2017-03-12 RX ADMIN — Medication 50 MILLIGRAM(S): at 21:02

## 2017-03-12 RX ADMIN — Medication 4 MILLIGRAM(S): at 07:05

## 2017-03-12 RX ADMIN — Medication 4 MILLIGRAM(S): at 16:57

## 2017-03-12 RX ADMIN — Medication 4 MILLIGRAM(S): at 13:19

## 2017-03-12 RX ADMIN — OLANZAPINE 10 MILLIGRAM(S): 15 TABLET, FILM COATED ORAL at 20:58

## 2017-03-13 PROCEDURE — 99232 SBSQ HOSP IP/OBS MODERATE 35: CPT

## 2017-03-13 RX ORDER — TUBERCULIN PURIFIED PROTEIN DERIVATIVE 5 [IU]/.1ML
5 INJECTION, SOLUTION INTRADERMAL ONCE
Qty: 0 | Refills: 0 | Status: DISCONTINUED | OUTPATIENT
Start: 2017-03-13 | End: 2017-03-16

## 2017-03-13 RX ADMIN — Medication 4 MILLIGRAM(S): at 09:34

## 2017-03-13 RX ADMIN — Medication 4 MILLIGRAM(S): at 03:24

## 2017-03-13 RX ADMIN — Medication 50 MILLIGRAM(S): at 20:30

## 2017-03-13 RX ADMIN — OLANZAPINE 10 MILLIGRAM(S): 15 TABLET, FILM COATED ORAL at 20:57

## 2017-03-13 RX ADMIN — Medication 4 MILLIGRAM(S): at 19:41

## 2017-03-13 NOTE — ED BEHAVIORAL HEALTH NOTE - BEHAVIORAL HEALTH NOTE
SHAMA called pt's insurance  and informed that pt has [facility] Medicare ID # [id number]. SHAMA spoke to Dai and obtained auth 3/11/17-3/16/17 #[id number] concurrent review Chante Mahmood [phone] ext. [phone] due on 3/16.

## 2017-03-14 PROCEDURE — 99232 SBSQ HOSP IP/OBS MODERATE 35: CPT

## 2017-03-14 RX ORDER — OLANZAPINE 15 MG/1
15 TABLET, FILM COATED ORAL AT BEDTIME
Qty: 0 | Refills: 0 | Status: DISCONTINUED | OUTPATIENT
Start: 2017-03-14 | End: 2017-03-16

## 2017-03-14 RX ADMIN — Medication 4 MILLIGRAM(S): at 19:30

## 2017-03-14 RX ADMIN — Medication 50 MILLIGRAM(S): at 20:30

## 2017-03-14 RX ADMIN — Medication 4 MILLIGRAM(S): at 05:40

## 2017-03-14 RX ADMIN — OLANZAPINE 15 MILLIGRAM(S): 15 TABLET, FILM COATED ORAL at 20:41

## 2017-03-15 VITALS — DIASTOLIC BLOOD PRESSURE: 74 MMHG | SYSTOLIC BLOOD PRESSURE: 108 MMHG | TEMPERATURE: 98 F | HEART RATE: 74 BPM

## 2017-03-15 PROCEDURE — 71010: CPT | Mod: 26

## 2017-03-15 PROCEDURE — 99232 SBSQ HOSP IP/OBS MODERATE 35: CPT

## 2017-03-15 RX ORDER — OLANZAPINE 15 MG/1
1 TABLET, FILM COATED ORAL
Qty: 15 | Refills: 0
Start: 2017-03-15 | End: 2017-03-30

## 2017-03-15 RX ORDER — TUBERCULIN PURIFIED PROTEIN DERIVATIVE 5 [IU]/.1ML
5 INJECTION, SOLUTION INTRADERMAL ONCE
Qty: 0 | Refills: 0 | Status: DISCONTINUED | OUTPATIENT
Start: 2017-03-15 | End: 2017-03-15

## 2017-03-15 RX ADMIN — Medication 4 MILLIGRAM(S): at 04:30

## 2017-03-15 RX ADMIN — OLANZAPINE 15 MILLIGRAM(S): 15 TABLET, FILM COATED ORAL at 20:02

## 2017-03-16 PROCEDURE — 99238 HOSP IP/OBS DSCHRG MGMT 30/<: CPT

## 2017-03-16 RX ADMIN — Medication 4 MILLIGRAM(S): at 08:53

## 2018-01-29 ENCOUNTER — FORM ENCOUNTER (OUTPATIENT)
Age: 58
End: 2018-01-29

## 2018-01-29 ENCOUNTER — APPOINTMENT (OUTPATIENT)
Dept: INTERNAL MEDICINE | Facility: CLINIC | Age: 58
End: 2018-01-29
Payer: MEDICAID

## 2018-01-29 VITALS
SYSTOLIC BLOOD PRESSURE: 117 MMHG | HEART RATE: 67 BPM | BODY MASS INDEX: 24.61 KG/M2 | TEMPERATURE: 98.1 F | DIASTOLIC BLOOD PRESSURE: 77 MMHG | WEIGHT: 166.13 LBS | HEIGHT: 69.09 IN | OXYGEN SATURATION: 97 %

## 2018-01-29 DIAGNOSIS — R10.30 LOWER ABDOMINAL PAIN, UNSPECIFIED: ICD-10-CM

## 2018-01-29 DIAGNOSIS — Z82.49 FAMILY HISTORY OF ISCHEMIC HEART DISEASE AND OTHER DISEASES OF THE CIRCULATORY SYSTEM: ICD-10-CM

## 2018-01-29 DIAGNOSIS — Z78.9 OTHER SPECIFIED HEALTH STATUS: ICD-10-CM

## 2018-01-29 DIAGNOSIS — Z12.11 ENCOUNTER FOR SCREENING FOR MALIGNANT NEOPLASM OF COLON: ICD-10-CM

## 2018-01-29 PROCEDURE — 99204 OFFICE O/P NEW MOD 45 MIN: CPT

## 2018-01-30 ENCOUNTER — OUTPATIENT (OUTPATIENT)
Dept: OUTPATIENT SERVICES | Facility: HOSPITAL | Age: 58
LOS: 1 days | End: 2018-01-30

## 2018-01-30 ENCOUNTER — APPOINTMENT (OUTPATIENT)
Dept: ULTRASOUND IMAGING | Facility: CLINIC | Age: 58
End: 2018-01-30
Payer: MEDICAID

## 2018-01-30 ENCOUNTER — TRANSCRIPTION ENCOUNTER (OUTPATIENT)
Age: 58
End: 2018-01-30

## 2018-01-30 PROBLEM — R10.30 SEVERE RT GROIN PAIN: Status: ACTIVE | Noted: 2018-01-29

## 2018-01-30 PROCEDURE — 76882 US LMTD JT/FCL EVL NVASC XTR: CPT | Mod: 26,LT

## 2018-01-30 PROCEDURE — 76870 US EXAM SCROTUM: CPT | Mod: 26

## 2018-02-01 ENCOUNTER — TRANSCRIPTION ENCOUNTER (OUTPATIENT)
Age: 58
End: 2018-02-01

## 2018-02-05 ENCOUNTER — TRANSCRIPTION ENCOUNTER (OUTPATIENT)
Age: 58
End: 2018-02-05

## 2018-02-09 ENCOUNTER — TRANSCRIPTION ENCOUNTER (OUTPATIENT)
Age: 58
End: 2018-02-09

## 2018-02-26 ENCOUNTER — EMERGENCY (EMERGENCY)
Facility: HOSPITAL | Age: 58
LOS: 1 days | Discharge: HOME | End: 2018-02-26
Attending: EMERGENCY MEDICINE | Admitting: EMERGENCY MEDICINE
Payer: MEDICAID

## 2018-02-26 VITALS
DIASTOLIC BLOOD PRESSURE: 55 MMHG | HEART RATE: 59 BPM | OXYGEN SATURATION: 99 % | RESPIRATION RATE: 18 BRPM | SYSTOLIC BLOOD PRESSURE: 93 MMHG

## 2018-02-26 VITALS
OXYGEN SATURATION: 100 % | TEMPERATURE: 98 F | SYSTOLIC BLOOD PRESSURE: 142 MMHG | RESPIRATION RATE: 17 BRPM | HEART RATE: 69 BPM | DIASTOLIC BLOOD PRESSURE: 82 MMHG

## 2018-02-26 DIAGNOSIS — R07.9 CHEST PAIN, UNSPECIFIED: ICD-10-CM

## 2018-02-26 DIAGNOSIS — R05 COUGH: ICD-10-CM

## 2018-02-26 DIAGNOSIS — R07.2 PRECORDIAL PAIN: ICD-10-CM

## 2018-02-26 DIAGNOSIS — R00.2 PALPITATIONS: ICD-10-CM

## 2018-02-26 DIAGNOSIS — R42 DIZZINESS AND GIDDINESS: ICD-10-CM

## 2018-02-26 DIAGNOSIS — R06.02 SHORTNESS OF BREATH: ICD-10-CM

## 2018-02-26 DIAGNOSIS — G47.00 INSOMNIA, UNSPECIFIED: ICD-10-CM

## 2018-02-26 LAB
ALBUMIN SERPL ELPH-MCNC: 3.7 G/DL — SIGNIFICANT CHANGE UP (ref 3.4–5)
ALP SERPL-CCNC: 80 U/L — SIGNIFICANT CHANGE UP (ref 40–120)
ALT FLD-CCNC: 28 U/L — SIGNIFICANT CHANGE UP (ref 12–42)
ANION GAP SERPL CALC-SCNC: 10 MMOL/L — SIGNIFICANT CHANGE UP (ref 9–16)
AST SERPL-CCNC: 25 U/L — SIGNIFICANT CHANGE UP (ref 15–37)
BILIRUB SERPL-MCNC: 0.5 MG/DL — SIGNIFICANT CHANGE UP (ref 0.2–1.2)
BUN SERPL-MCNC: 17 MG/DL — SIGNIFICANT CHANGE UP (ref 7–23)
CALCIUM SERPL-MCNC: 9.5 MG/DL — SIGNIFICANT CHANGE UP (ref 8.5–10.5)
CHLORIDE SERPL-SCNC: 107 MMOL/L — SIGNIFICANT CHANGE UP (ref 96–108)
CK MB BLD-MCNC: 1.06 % — SIGNIFICANT CHANGE UP
CK MB CFR SERPL CALC: 2.2 NG/ML — SIGNIFICANT CHANGE UP (ref 0.5–3.6)
CO2 SERPL-SCNC: 26 MMOL/L — SIGNIFICANT CHANGE UP (ref 22–31)
CREAT SERPL-MCNC: 1.17 MG/DL — SIGNIFICANT CHANGE UP (ref 0.5–1.3)
D DIMER BLD IA.RAPID-MCNC: 400 NG/ML DDU — HIGH
GLUCOSE SERPL-MCNC: 118 MG/DL — HIGH (ref 70–99)
HCT VFR BLD CALC: 48.7 % — SIGNIFICANT CHANGE UP (ref 39–50)
HGB BLD-MCNC: 16.1 G/DL — SIGNIFICANT CHANGE UP (ref 13–17)
MAGNESIUM SERPL-MCNC: 2.1 MG/DL — SIGNIFICANT CHANGE UP (ref 1.6–2.6)
MCHC RBC-ENTMCNC: 31.7 PG — SIGNIFICANT CHANGE UP (ref 27–34)
MCHC RBC-ENTMCNC: 33.1 G/DL — SIGNIFICANT CHANGE UP (ref 32–36)
MCV RBC AUTO: 95.9 FL — SIGNIFICANT CHANGE UP (ref 80–100)
NT-PROBNP SERPL-SCNC: 43 PG/ML — SIGNIFICANT CHANGE UP
PCP SPEC-MCNC: SIGNIFICANT CHANGE UP
PLATELET # BLD AUTO: 210 K/UL — SIGNIFICANT CHANGE UP (ref 150–400)
POTASSIUM SERPL-MCNC: 3.9 MMOL/L — SIGNIFICANT CHANGE UP (ref 3.5–5.3)
POTASSIUM SERPL-SCNC: 3.9 MMOL/L — SIGNIFICANT CHANGE UP (ref 3.5–5.3)
PROT SERPL-MCNC: 7.1 G/DL — SIGNIFICANT CHANGE UP (ref 6.4–8.2)
RBC # BLD: 5.08 M/UL — SIGNIFICANT CHANGE UP (ref 4.2–5.8)
RBC # FLD: 12 % — SIGNIFICANT CHANGE UP (ref 10.3–16.9)
SODIUM SERPL-SCNC: 143 MMOL/L — SIGNIFICANT CHANGE UP (ref 132–145)
TROPONIN I SERPL-MCNC: <0.017 NG/ML — LOW (ref 0.02–0.06)
TROPONIN I SERPL-MCNC: <0.017 NG/ML — LOW (ref 0.02–0.06)
WBC # BLD: 6.8 K/UL — SIGNIFICANT CHANGE UP (ref 3.8–10.5)
WBC # FLD AUTO: 6.8 K/UL — SIGNIFICANT CHANGE UP (ref 3.8–10.5)

## 2018-02-26 PROCEDURE — 93010 ELECTROCARDIOGRAM REPORT: CPT | Mod: 77

## 2018-02-26 PROCEDURE — 93010 ELECTROCARDIOGRAM REPORT: CPT

## 2018-02-26 PROCEDURE — 71046 X-RAY EXAM CHEST 2 VIEWS: CPT | Mod: 26

## 2018-02-26 PROCEDURE — 75574 CT ANGIO HRT W/3D IMAGE: CPT | Mod: 26

## 2018-02-26 PROCEDURE — 99285 EMERGENCY DEPT VISIT HI MDM: CPT

## 2018-02-26 PROCEDURE — 99220: CPT | Mod: 25

## 2018-02-26 RX ORDER — METOPROLOL TARTRATE 50 MG
50 TABLET ORAL ONCE
Refills: 0 | Status: COMPLETED | OUTPATIENT
Start: 2018-02-26 | End: 2018-02-26

## 2018-02-26 RX ORDER — NITROGLYCERIN 6.5 MG
0.4 CAPSULE, EXTENDED RELEASE ORAL ONCE
Refills: 0 | Status: COMPLETED | OUTPATIENT
Start: 2018-02-26 | End: 2018-02-26

## 2018-02-26 RX ORDER — NITROGLYCERIN 6.5 MG
0.4 CAPSULE, EXTENDED RELEASE ORAL ONCE
Refills: 0 | Status: DISCONTINUED | OUTPATIENT
Start: 2018-02-26 | End: 2018-02-26

## 2018-02-26 RX ORDER — ASPIRIN/CALCIUM CARB/MAGNESIUM 324 MG
325 TABLET ORAL ONCE
Refills: 0 | Status: COMPLETED | OUTPATIENT
Start: 2018-02-26 | End: 2018-02-26

## 2018-02-26 RX ORDER — METOPROLOL TARTRATE 50 MG
5 TABLET ORAL ONCE
Refills: 0 | Status: DISCONTINUED | OUTPATIENT
Start: 2018-02-26 | End: 2018-02-26

## 2018-02-26 RX ADMIN — Medication 325 MILLIGRAM(S): at 09:14

## 2018-02-26 RX ADMIN — Medication 0.4 MILLIGRAM(S): at 14:47

## 2018-02-26 RX ADMIN — Medication 50 MILLIGRAM(S): at 09:14

## 2018-02-26 NOTE — ED PROVIDER NOTE - ATTENDING CONTRIBUTION TO CARE
Pt is a 58yo M with no PMH who reports sudden onset of SSCP that started last night around 11pm.  Associated with palpitations.  Lasted 20-30 minutes last night.  Pain returned at ~6am this morning.  +Dizziness and SOB.    PE - agree with PA exam as above.   A/P - CP.  EKG - multiple PVCs and T w inversions.  IV, labs, CTA coronaries.  Cardiology consultation.  Will place on observation for cardiac w/u.

## 2018-02-26 NOTE — ED CDU PROVIDER INITIAL DAY NOTE - FAMILY HISTORY
Father  Still living? Yes, Estimated age: 51-60  Family history of myocardial infarction, Age at diagnosis: Age Unknown  Family history of cerebrovascular accident (CVA), Age at diagnosis: Age Unknown     Mother  Still living? Yes, Estimated age: 51-60  Family history of myocardial infarction, Age at diagnosis: Age Unknown

## 2018-02-26 NOTE — ED PROVIDER NOTE - DIAGNOSTIC INTERPRETATION
Xray (wet reads) interpreted by DAREN MIN   CXR - Cardiac silhouette, aortic knob, mediastinal and hilar contours appear wnl, no acute consolidation, infiltrate, effusion, or PTX. No bony abnormalities noted

## 2018-02-26 NOTE — ED PROVIDER NOTE - PHYSICAL EXAMINATION
Gen - NAD, comfortable in stretcher, non-toxic appearing, speaking in full sentences   Skin - warm, dry, intact  HEENT - AT/NC, PERRL, EOMI, no conjunctival injection, moist oral mucosa, o/p clear with no erythema, edema, or exudate, uvula midline, airway patent, neck supple and NT, FROM, no JVD or carotid bruits b/l, no palpable nodes  CV - S1S2, R/R/R, no reproducible tenderness on exam   Resp - respiration non-labored, CTAB, symmetric bs b/l, no r/r/w  GI - NABS, soft, ND, NT, no rebound or guarding, no CVAT b/l   MS - w/w/p, no c/c/e, calves supple and NT, distal pulses symmetric b/l   Neuro - AxOx3, no focal neuro deficits, CN II-XII grossly intact, cerebellar function intact, negative pronator drift, negative nystagmus, ambulatory without gait disturbance

## 2018-02-26 NOTE — ED CDU PROVIDER INITIAL DAY NOTE - ATTENDING CONTRIBUTION TO CARE
Pt on observation due to new onset of CP.  Pt being evaluated by cardiology and will perform CTA of heart.  Will f/u cardiology recommendations.

## 2018-02-26 NOTE — ED CDU PROVIDER INITIAL DAY NOTE - PROGRESS NOTE DETAILS
pt with mid sternal cp with associated palpitations, subjective sob, and dizziness since last night, +family h/o MI/stents/PPM in age 50-60, EKG and tele monitoring noted frequent PVCs vs NSVT, will keep in obs for serial labs, CCTA r/o ischemic dz, and cardiology consult.  Given dose of ASA and metoprolol, Dr. Red consulted and made aware, agrees with plan.  Will continue current monitoring attempted to proceed with CCTA but pt noted to have erratic range of HR while in CT, accelerated junctional vs NSR w multiple runs of PVCs, repeat EKG performed noted to be sinus bradycardia at 45 with J point elevations, case discussed with cardio, will abort CCTA for now and continue cardiac monitoring, will repeat EKG and 2nd trop, awaiting Dr. Red consult heart rhythm appears to revert back to NSR without any aberrancy or PVCs, sustaining in 50s, case discussed with cardiology again, seen by Dr. Red at bedside, decides to proceed with CCTA, and repeat EKGs/labs

## 2018-02-26 NOTE — ED CDU PROVIDER INITIAL DAY NOTE - ST/T WAVE
+PVCs and Q waves in II, ST changes TW strain pattern noted in V3-6 with PVCs, no ST changes J point elevations, no ST changes

## 2018-02-26 NOTE — ED PROVIDER NOTE - MEDICAL DECISION MAKING DETAILS
pt with mid sternal cp with associated palpitations, subjective sob, and dizziness since last night, +family h/o MI/stents/PPM in age 50-60, EKG and tele monitoring noted frequent PVCs vs NSVT, will keep in obs for serial labs, CCTA r/o ischemic dz, and cardiology consult

## 2018-02-26 NOTE — ED PROVIDER NOTE - OBJECTIVE STATEMENT
56 yo M with no known PMHx, non smoker, presenting 56 yo M with no known PMHx, non smoker, presenting c/o sudden onset of CP in the mid sternal region approximately 11pm last night.  Pain is dull and uncomfortable with associated palpitations 58 yo M with no known PMHx, non smoker, presenting c/o sudden onset of CP in the mid sternal region approximately 11pm last night.  Pain is dull and uncomfortable with associated palpitations, rated 5/10 and lasting for 20-30 mins last night.  Pt reports difficulty sleeping through the night and noted relapse of pain around 6am this morning when he woke up to do some morning exercise.  Pain has been persistent since then with lightheadedness and subjective SOB.  No similar sx in the past.  Denies fever, chills, diaphoresis, EDWARDS, orthopnea, cough, hemoptysis, wheezing, peripheral edema, focal weakness, numbness, tingling, paresthesia, HA, dizziness, neck pain, N/V/D/C, abdominal pain, change in urinary/bowel function, trauma, fall, rash, and malaise. Family h/o father and mother with MI w stents, PPM, and CVA in the past.  No prior stress test or TTE noted

## 2018-02-26 NOTE — ED CDU PROVIDER INITIAL DAY NOTE - OBJECTIVE STATEMENT
58 yo M with no known PMHx, non smoker, presenting c/o sudden onset of CP in the mid sternal region approximately 11pm last night.  Pain is dull and uncomfortable with associated palpitations, rated 5/10 and lasting for 20-30 mins last night.  Pt reports difficulty sleeping through the night and noted relapse of pain around 6am this morning when he woke up to do some morning exercise.  Pain has been persistent since then with lightheadedness and subjective SOB.  No similar sx in the past.  Denies fever, chills, diaphoresis, EDWARDS, orthopnea, cough, hemoptysis, wheezing, peripheral edema, focal weakness, numbness, tingling, paresthesia, HA, dizziness, neck pain, N/V/D/C, abdominal pain, change in urinary/bowel function, trauma, fall, rash, and malaise. Family h/o father and mother with MI w stents, PPM, and CVA in the past.  No prior stress test or TTE noted

## 2018-02-26 NOTE — ED CDU PROVIDER DISPOSITION NOTE - CLINICAL COURSE
pt with palpitations and atypical mid sternal cp since last night, noted frequent PVCs on EKG, labs unremarkable, cardiology consulted, rec CCTA and tele monitoring, seen by Dr. Red at bedside,  limited echo reveals preserved LVEF, no acute valvular issues.  CE neg x 2.  Given frequent ectopics on EKG, rec prompt f/u in office for further cardiology w/u, holter and TTE.  CCTA with unremarkable findings, 0 calcium score, AFVSS at time of d/c, pt non-toxic appearing and hemodynamically stable, results, ddx, and f/u plans discussed with pt at bedside, strict return precautions discussed, prompt return to ER for any worsening or new sx, pt verbalized understanding.

## 2018-02-26 NOTE — CONSULT NOTE ADULT - SUBJECTIVE AND OBJECTIVE BOX
[facility] Cardiology Consultation    CHIEF COMPLAINT: CP/palpitations    HISTORY OF PRESENT ILLNESS: 56 y/o male who presented today secondary to chest discomfort. Symptoms were noted this morning after doing some pushups. The discomfort was mid-sternal and pressure like in nature. It was associated with minor palpitations. No syncope. NO dyspnea. No dizziness noted. No prior such complains. No recent cardiac testing. Frequent ectopy noted on arrival.     PAST MEDICAL & SURGICAL HISTORY:  No pertinent past medical history  No significant past surgical history        Allergies NKDA      MEDICATIONS: none    FAMILY HISTORY:  Family history of cerebrovascular accident (CVA) (Father)  Family history of myocardial infarction (Father, Mother)    SOCIAL HISTORY:  no EtOH, tobacco or drug use    REVIEW OF SYSTEMS:  CONSTITUTIONAL: No fever, weight loss, or fatigue  EYES: No eye pain, visual disturbances, or discharge  ENMT:  No difficulty hearing, tinnitus, vertigo; No sinus or throat pain  NECK: No pain or stiffness  BREASTS: No pain, masses, or nipple discharge  RESPIRATORY: No cough, wheezing, chills or hemoptysis; No Shortness of Breath  CARDIOVASCULAR: No chest pain, palpitations, dizziness, or leg swelling  GASTROINTESTINAL: No abdominal or epigastric pain. No nausea, vomiting, or hematemesis; No diarrhea or constipation. No melena or hematochezia.  GENITOURINARY: No dysuria, frequency, hematuria, or incontinence  NEUROLOGICAL: No headaches, memory loss, loss of strength, numbness, or tremors  SKIN: No itching, burning, rashes, or lesions   LYMPH Nodes: No enlarged glands  ENDOCRINE: No heat or cold intolerance; No hair loss  MUSCULOSKELETAL: No joint pain or swelling; No muscle, back, or extremity pain  PSYCHIATRIC: No depression, anxiety, mood swings, or difficulty sleeping  HEME/LYMPH: No easy bruising, or bleeding gums  ALLERY AND IMMUNOLOGIC: No hives or eczema	      PHYSICAL EXAM:  T(C): 36.7 (02-26-18 @ 13:20), Max: 36.7 (02-26-18 @ 10:59)  HR: 50 (02-26-18 @ 13:20) (50 - 69)  BP: 99/60 (02-26-18 @ 13:20) (99/56 - 142/82)  RR: 16 (02-26-18 @ 13:20) (16 - 17)  SpO2: 99% (02-26-18 @ 13:20) (99% - 100%)  Appearance: middle aged male NAD  HEENT:   Normal oral mucosa, PERRL, EOMI	  Lymphatic: No lymphadenopathy  Cardiovascular: Normal S1 S2, No JVD, No murmurs, No edema  Respiratory: Lungs clear to auscultation	  Psychiatry: A & O x 3, Mood & affect appropriate  Gastrointestinal:  Soft, Non-tender, + BS	  Skin: No rashes, No ecchymoses, No cyanosis	  Neurologic: Non-focal  Extremities: Normal range of motion, No clubbing, cyanosis or edema  Vascular: Peripheral pulses palpable 2+ bilaterally  	    ECG:  	Sinus kain no st-t abnormalities.     LABS:	 	  CARDIAC MARKERS:  Troponin I, Serum: <0.017 ng/mL (02-26 @ 12:25)  Troponin I, Serum: <0.017 ng/mL (02-26 @ 09:08)                          16.1   6.8   )-----------( 210      ( 26 Feb 2018 09:08 )             48.7     02-26    143  |  107  |  17  ----------------------------<  118<H>  3.9   |  26  |  1.17    Ca    9.5      26 Feb 2018 09:08  Mg     2.1     02-26    TPro  7.1  /  Alb  3.7  /  TBili  0.5  /  DBili  x   /  AST  25  /  ALT  28  /  AlkPhos  80  02-26    proBNP: Serum Pro-Brain Natriuretic Peptide: 43 pg/mL (02-26 @ 09:09)        ASSESSMENT/PLAN: 	56 y/o male with atypical chest discomfort and palpitations  1. patient seen and examined, chart reviewed  2. limited bedside echo done, c/w preserved LVEF, no acute valvular issues  3. s/p metoprolol, holding for now and his heart rate seems to be at the lower end  4. recommend a follow up in the office for echo and a holter    I spent 45 min in care of this patient

## 2018-02-26 NOTE — ED CDU PROVIDER DISPOSITION NOTE - ATTENDING CONTRIBUTION TO CARE
Pt was placed on observation for cardiac work up.  EKG shows multiple PVCs.  Cardiac troponin negative x 2.  Pt evaluated by Dr. Red and limited echo performed.  Good LV function and no acute valvular dz.  CTA performed and ca score of zero.  Pt stable for d/c but strict instructions on continued cardiac f/u and testing as out pt.

## 2018-02-27 ENCOUNTER — TRANSCRIPTION ENCOUNTER (OUTPATIENT)
Age: 58
End: 2018-02-27

## 2018-02-27 ENCOUNTER — OTHER (OUTPATIENT)
Age: 58
End: 2018-02-27

## 2018-02-28 ENCOUNTER — NON-APPOINTMENT (OUTPATIENT)
Age: 58
End: 2018-02-28

## 2018-02-28 ENCOUNTER — APPOINTMENT (OUTPATIENT)
Dept: HEART AND VASCULAR | Facility: CLINIC | Age: 58
End: 2018-02-28
Payer: MEDICAID

## 2018-02-28 VITALS
WEIGHT: 164 LBS | OXYGEN SATURATION: 98 % | DIASTOLIC BLOOD PRESSURE: 79 MMHG | TEMPERATURE: 97.8 F | HEIGHT: 69.9 IN | SYSTOLIC BLOOD PRESSURE: 138 MMHG | BODY MASS INDEX: 23.48 KG/M2 | HEART RATE: 60 BPM

## 2018-02-28 DIAGNOSIS — I47.2 VENTRICULAR TACHYCARDIA: ICD-10-CM

## 2018-02-28 PROCEDURE — 99214 OFFICE O/P EST MOD 30 MIN: CPT

## 2018-03-01 PROBLEM — I47.2 VENTRICULAR TACHYCARDIA (PAROXYSMAL): Status: ACTIVE | Noted: 2018-03-01

## 2018-03-01 NOTE — ED CDU PROVIDER INITIAL DAY NOTE - NS ED MD PROGRESS NOTE ADD
Nemesio Calvin  30 y.o. female  1992  [address]  [id number]     [facility] Practice: Progress Note       Encounter Date: 3/1/2018    Chief Complaint   Patient presents with    Diarrhea     abdominal cramping    Form Completion     FMLA forms       History provided by patient and mother  History of Present Illness   Nemesio Calvin is a 30 y.o. female who presents to clinic today for:    Diarrhea/fatgiue  Patient present with cc of diarrhea x 2 days. Patient reports that she usually has diarrhea intermittently 2-3/week ut has been > 10 episodes. Hx of C. Diff and has been treated with both flagyl and vanomycin. She is hydrating. Associated with increased weakness. Reports that her blood sugars have been ranging 129-260s. Health Maintenance  Health Maintenance Due   Topic Date Due    DTaP/Tdap/Td series (1 - Tdap) 03/03/2011    PAP AKA CERVICAL CYTOLOGY  03/03/2011     Review of Systems   Review of Systems   Constitutional: Positive for malaise/fatigue. Negative for chills, diaphoresis and fever. Respiratory: Negative for cough, shortness of breath and wheezing. Cardiovascular: Negative for palpitations and leg swelling. Gastrointestinal: Positive for abdominal pain, diarrhea and nausea. Negative for constipation and vomiting. Genitourinary: Negative for dysuria, frequency, hematuria and urgency. Skin: Negative for itching and rash. Neurological: Positive for weakness. Negative for dizziness and headaches. Vitals/Objective:     Vitals:    03/01/18 1402   BP: 129/90   Pulse: (!) 102   Resp: 18   Temp: 98.3 °F (36.8 °C)   TempSrc: Oral   SpO2: 100%   Weight: 144 lb (65.3 kg)   Height: 5' 5.5\" (1.664 m)     Body mass index is 23.6 kg/(m^2). Wt Readings from Last 3 Encounters:   03/01/18 144 lb (65.3 kg)   02/23/18 154 lb 5.2 oz (70 kg)   02/07/18 162 lb (73.5 kg)       Physical Exam   Constitutional: She is oriented to person, place, and time.  She appears well-developed and well-nourished. No distress. HENT:   Head: Normocephalic and atraumatic. Mouth/Throat: No oropharyngeal exudate. Eyes: Conjunctivae are normal. Pupils are equal, round, and reactive to light. Neck: Normal range of motion. Neck supple. Cardiovascular: Normal rate and regular rhythm. No murmur heard. Pulmonary/Chest: Effort normal and breath sounds normal. No respiratory distress. She has no wheezes. Abdominal: Bowel sounds are normal. She exhibits no distension. There is no tenderness. There is no rebound and no guarding. Musculoskeletal: She exhibits no edema. Neurological: She is alert and oriented to person, place, and time. No results found for this or any previous visit (from the past 24 hour(s)). Assessment and Plan:     Encounter Diagnoses     ICD-10-CM ICD-9-CM   1. Diarrhea of presumed infectious origin A09 009.3   2. Type 1 diabetes mellitus with complication ([facility]) W08.9 250.91   3. Anemia, unspecified type D64.9 285.9   4. Gastroparesis due to DM ([facility]) E11.43 250.60    K31.84 536.3       1. Diarrhea of presumed infectious origin  Patient has had recurrent episodes of c diff. Will treat based on clinic picture. - CULTURE, STOOL  - C DIFFICILE TOXIN A & B BY EIA  - metroNIDAZOLE (FLAGYL) 500 mg tablet; Take 1 Tab by mouth three (3) times daily for 10 days. Dispense: 30 Tab; Refill: 0    2. Type 1 diabetes mellitus with complication ([facility])  - REFERRAL TO ENDOCRINOLOGY    3. Anemia, unspecified type  - FOLATE  - IRON PROFILE  - VITAMIN B12  - RETICULOCYTE COUNT    4. Gastroparesis due to DM [facility]  Patient has resumed taking.   - amitriptyline (ELAVIL) 10 mg tablet; Take 1 Tab by mouth nightly. Dispense: 30 Tab; Refill: 0    I have discussed the diagnosis with the patient and the intended plan as seen in the above orders. she has expressed understanding. The patient has received an after-visit summary and questions were answered concerning future plans.   I have discussed medication side effects and warnings with the patient as well. Electronically Signed: Norma Arenas MD     History/Allergies   Patients past medical, surgical and family histories were reviewed and updated. Past Medical History:   Diagnosis Date    Alcoholic pancreatitis     Clostridium difficile infection 2017    treated with po vanc in ER    Diabetes mellitus 2002    Gastroparesis due to DM ([facility] [address]     Neuropathy in diabetes [facility])       Past Surgical History:   Procedure Laterality Date    HX  SECTION  2006     Family History   Problem Relation Age of Onset    Breast Cancer Maternal Grandmother 61    Hypertension Maternal Grandmother     Cancer Paternal Grandmother     Diabetes Paternal Grandmother     Diabetes Mother     Hypertension Mother     Diabetes Father      Social History     Social History    Marital status: SINGLE     Spouse name: N/A    Number of children: N/A    Years of education: N/A     Occupational History    Not on file. Social History Main Topics    Smoking status: Never Smoker    Smokeless tobacco: Never Used    Alcohol use No    Drug use: Yes     Special: Marijuana    Sexual activity: Not on file     Other Topics Concern    Not on file     Social History Narrative         No Known Allergies    Disposition     Follow-up Disposition:  Return in about 4 weeks (around 3/29/2018) for Fatigue. No future appointments. Current Medications after this visit     Current Outpatient Prescriptions   Medication Sig    amitriptyline (ELAVIL) 10 mg tablet Take 1 Tab by mouth nightly.  metroNIDAZOLE (FLAGYL) 500 mg tablet Take 1 Tab by mouth three (3) times daily for 10 days.  ondansetron (ZOFRAN ODT) 4 mg disintegrating tablet Take 1 Tab by mouth every eight (8) hours as needed for Nausea for up to 12 doses.  metoclopramide HCl (REGLAN) 5 mg tablet Take 1 Tab by mouth four (4) times daily.     insulin glargine (LANTUS,BASAGLAR) 100 unit/mL (3 mL) inpn 15 Units by SubCUTAneous route daily.  insulin regular (HUMULIN R U-100) 100 unit/mL injection INJECT 1 UNIT UNDER THE SKIN BEFORE BREAKFAST, 3 UNITS BEFORE LUNCH AND 1 UNIT BEFORE DINNER    gabapentin (NEURONTIN) 100 mg capsule Take 1 Cap by mouth two (2) times a day.  Blood-Glucose Meter (ONETOUCH ULTRA2) monitoring kit Dx. Code E11.9    acetaminophen (TYLENOL) 500 mg tablet Take 1 Tab by mouth every six (6) hours as needed for Pain.  insulin syringe-needle U-100 (BD INSULIN SYRINGE ULTRA-FINE) 1/2 mL 31 gauge x 15/64\" syrg 1 Syringe by SubCUTAneous route four (4) times daily. Dx code E10.59    glucose blood VI test strips (CONTOUR NEXT STRIPS) strip QID Dx. E10.59    glucose blood VI test strips (ASCENSIA AUTODISC VI, ONE TOUCH ULTRA TEST VI) strip QID Dx. E11.9    Insulin Needles, Disposable, (PAUL PEN NEEDLE) 32 x 5/32 \" ndle Use 4 times daily. Dx code 250.00     No current facility-administered medications for this visit.       Medications Discontinued During This Encounter   Medication Reason    furosemide (LASIX) 20 mg tablet Therapy Completed    amitriptyline (ELAVIL) 10 mg tablet Reorder Add Progress Note...

## 2018-03-02 ENCOUNTER — APPOINTMENT (OUTPATIENT)
Dept: HEART AND VASCULAR | Facility: CLINIC | Age: 58
End: 2018-03-02
Payer: MEDICAID

## 2018-03-02 PROCEDURE — 93306 TTE W/DOPPLER COMPLETE: CPT

## 2018-03-14 ENCOUNTER — APPOINTMENT (OUTPATIENT)
Dept: HEART AND VASCULAR | Facility: CLINIC | Age: 58
End: 2018-03-14
Payer: MEDICAID

## 2018-03-14 DIAGNOSIS — I49.3 VENTRICULAR PREMATURE DEPOLARIZATION: ICD-10-CM

## 2018-03-14 DIAGNOSIS — R00.2 PALPITATIONS: ICD-10-CM

## 2018-03-14 DIAGNOSIS — R07.9 CHEST PAIN, UNSPECIFIED: ICD-10-CM

## 2018-03-14 PROCEDURE — 93015 CV STRESS TEST SUPVJ I&R: CPT

## 2018-03-19 ENCOUNTER — TRANSCRIPTION ENCOUNTER (OUTPATIENT)
Age: 58
End: 2018-03-19

## 2018-03-20 ENCOUNTER — TRANSCRIPTION ENCOUNTER (OUTPATIENT)
Age: 58
End: 2018-03-20

## 2018-05-10 ENCOUNTER — TRANSCRIPTION ENCOUNTER (OUTPATIENT)
Age: 58
End: 2018-05-10

## 2018-05-10 ENCOUNTER — RX RENEWAL (OUTPATIENT)
Age: 58
End: 2018-05-10

## 2018-07-03 ENCOUNTER — APPOINTMENT (OUTPATIENT)
Dept: GASTROENTEROLOGY | Facility: CLINIC | Age: 58
End: 2018-07-03
Payer: MEDICAID

## 2018-07-03 VITALS
WEIGHT: 165 LBS | SYSTOLIC BLOOD PRESSURE: 100 MMHG | RESPIRATION RATE: 14 BRPM | DIASTOLIC BLOOD PRESSURE: 75 MMHG | OXYGEN SATURATION: 98 % | BODY MASS INDEX: 23.62 KG/M2 | HEIGHT: 69.9 IN | HEART RATE: 69 BPM | TEMPERATURE: 98.2 F

## 2018-07-03 DIAGNOSIS — Z88.9 ALLERGY STATUS TO UNSPECIFIED DRUGS, MEDICAMENTS AND BIOLOGICAL SUBSTANCES: ICD-10-CM

## 2018-07-03 PROCEDURE — 99203 OFFICE O/P NEW LOW 30 MIN: CPT

## 2018-08-08 ENCOUNTER — RESULT REVIEW (OUTPATIENT)
Age: 58
End: 2018-08-08

## 2018-08-08 ENCOUNTER — APPOINTMENT (OUTPATIENT)
Dept: GASTROENTEROLOGY | Facility: HOSPITAL | Age: 58
End: 2018-08-08

## 2018-08-08 ENCOUNTER — OUTPATIENT (OUTPATIENT)
Dept: OUTPATIENT SERVICES | Facility: HOSPITAL | Age: 58
LOS: 1 days | Discharge: HOME | End: 2018-08-08
Payer: MEDICAID

## 2018-08-08 PROCEDURE — 45385 COLONOSCOPY W/LESION REMOVAL: CPT

## 2018-08-09 LAB — SURGICAL PATHOLOGY STUDY: SIGNIFICANT CHANGE UP

## 2018-08-17 ENCOUNTER — TRANSCRIPTION ENCOUNTER (OUTPATIENT)
Age: 58
End: 2018-08-17

## 2018-08-22 ENCOUNTER — APPOINTMENT (OUTPATIENT)
Dept: INTERNAL MEDICINE | Facility: CLINIC | Age: 58
End: 2018-08-22
Payer: MEDICAID

## 2018-08-22 VITALS
HEIGHT: 69.9 IN | BODY MASS INDEX: 23.62 KG/M2 | OXYGEN SATURATION: 98 % | DIASTOLIC BLOOD PRESSURE: 73 MMHG | HEART RATE: 64 BPM | TEMPERATURE: 98.2 F | WEIGHT: 165 LBS | SYSTOLIC BLOOD PRESSURE: 143 MMHG

## 2018-08-22 DIAGNOSIS — J45.990 EXERCISE INDUCED BRONCHOSPASM: ICD-10-CM

## 2018-08-22 DIAGNOSIS — R06.02 SHORTNESS OF BREATH: ICD-10-CM

## 2018-08-22 DIAGNOSIS — J30.9 ALLERGIC RHINITIS, UNSPECIFIED: ICD-10-CM

## 2018-08-22 PROCEDURE — 99214 OFFICE O/P EST MOD 30 MIN: CPT

## 2018-08-22 NOTE — PHYSICAL EXAM
[No Acute Distress] : no acute distress [Well Nourished] : well nourished [Normal Sclera/Conjunctiva] : normal sclera/conjunctiva [PERRL] : pupils equal round and reactive to light [Normal Outer Ear/Nose] : the outer ears and nose were normal in appearance [Normal Oropharynx] : the oropharynx was normal [No JVD] : no jugular venous distention [Supple] : supple [No Joint Swelling] : no joint swelling [Grossly Normal Strength/Tone] : grossly normal strength/tone [No Rash] : no rash [No Skin Lesions] : no skin lesions [Normal Gait] : normal gait [Coordination Grossly Intact] : coordination grossly intact [Normal Affect] : the affect was normal [Normal Insight/Judgement] : insight and judgment were intact [de-identified] : left ear cerumen impaction, right ear canal open

## 2018-08-22 NOTE — REVIEW OF SYSTEMS
[Dyspnea on Exertion] : dyspnea on exertion [Dysuria] : no dysuria [Negative] : Heme/Lymph [FreeTextEntry4] : left ear fullness

## 2018-08-22 NOTE — HISTORY OF PRESENT ILLNESS
[FreeTextEntry8] : patient today complains of ear clogged ear of the left. he has had a ear wax build up in the past that was done by previous MD.\par \par

## 2018-08-23 ENCOUNTER — TRANSCRIPTION ENCOUNTER (OUTPATIENT)
Age: 58
End: 2018-08-23

## 2018-08-27 ENCOUNTER — RX RENEWAL (OUTPATIENT)
Age: 58
End: 2018-08-27

## 2018-08-27 RX ORDER — ALBUTEROL SULFATE 90 UG/1
108 (90 BASE) AEROSOL, METERED RESPIRATORY (INHALATION) EVERY 4 HOURS
Qty: 1 | Refills: 0 | Status: DISCONTINUED | COMMUNITY
Start: 2018-08-22 | End: 2018-08-27

## 2018-08-28 ENCOUNTER — APPOINTMENT (OUTPATIENT)
Dept: OTOLARYNGOLOGY | Facility: CLINIC | Age: 58
End: 2018-08-28
Payer: MEDICAID

## 2018-08-28 VITALS
WEIGHT: 163 LBS | TEMPERATURE: 98.2 F | BODY MASS INDEX: 24.14 KG/M2 | HEIGHT: 69 IN | SYSTOLIC BLOOD PRESSURE: 123 MMHG | HEART RATE: 65 BPM | OXYGEN SATURATION: 96 % | DIASTOLIC BLOOD PRESSURE: 81 MMHG

## 2018-08-28 DIAGNOSIS — H61.23 IMPACTED CERUMEN, BILATERAL: ICD-10-CM

## 2018-08-28 PROCEDURE — 69210 REMOVE IMPACTED EAR WAX UNI: CPT

## 2018-08-28 PROCEDURE — 99203 OFFICE O/P NEW LOW 30 MIN: CPT | Mod: 25

## 2018-10-22 ENCOUNTER — APPOINTMENT (OUTPATIENT)
Dept: INTERNAL MEDICINE | Facility: CLINIC | Age: 58
End: 2018-10-22
Payer: MEDICAID

## 2018-10-22 VITALS
HEART RATE: 63 BPM | BODY MASS INDEX: 23.99 KG/M2 | DIASTOLIC BLOOD PRESSURE: 77 MMHG | HEIGHT: 69 IN | OXYGEN SATURATION: 98 % | TEMPERATURE: 98.4 F | WEIGHT: 162 LBS | SYSTOLIC BLOOD PRESSURE: 123 MMHG

## 2018-10-22 DIAGNOSIS — Z23 ENCOUNTER FOR IMMUNIZATION: ICD-10-CM

## 2018-10-22 DIAGNOSIS — Z12.5 ENCOUNTER FOR SCREENING FOR MALIGNANT NEOPLASM OF PROSTATE: ICD-10-CM

## 2018-10-22 PROCEDURE — 99396 PREV VISIT EST AGE 40-64: CPT | Mod: 25

## 2018-10-22 PROCEDURE — 90686 IIV4 VACC NO PRSV 0.5 ML IM: CPT

## 2018-10-22 PROCEDURE — G0008: CPT

## 2018-10-22 PROCEDURE — 36415 COLL VENOUS BLD VENIPUNCTURE: CPT

## 2018-10-22 PROCEDURE — G0444 DEPRESSION SCREEN ANNUAL: CPT

## 2018-10-22 RX ORDER — ALBUTEROL SULFATE 90 UG/1
108 (90 BASE) AEROSOL, METERED RESPIRATORY (INHALATION) EVERY 4 HOURS
Qty: 1 | Refills: 3 | Status: DISCONTINUED | COMMUNITY
Start: 2018-08-27 | End: 2018-10-22

## 2018-10-22 NOTE — PHYSICAL EXAM

## 2018-10-22 NOTE — HEALTH RISK ASSESSMENT
[0] : 2) Feeling down, depressed, or hopeless: Not at all (0) [[id number]] : 0 [Patient reported colonoscopy was normal] : Patient reported colonoscopy was normal [ColonoscopyDate] : 8/8/2018

## 2018-10-22 NOTE — HISTORY OF PRESENT ILLNESS
[de-identified] : 57 year old male patient came in for preventive visit.\par \par Exercises every day, admits to a healthy diet.\par \par MSM male, is in a committed relationship with a male partner, Mandeep for 27 years. \par \par Patient is an  in University.

## 2018-10-24 ENCOUNTER — TRANSCRIPTION ENCOUNTER (OUTPATIENT)
Age: 58
End: 2018-10-24

## 2018-10-24 LAB
ALBUMIN SERPL ELPH-MCNC: 4.3 G/DL
ALP BLD-CCNC: 72 U/L
ALT SERPL-CCNC: 22 U/L
ANION GAP SERPL CALC-SCNC: 11 MMOL/L
APPEARANCE: CLEAR
AST SERPL-CCNC: 23 U/L
BACTERIA: NEGATIVE
BASOPHILS # BLD AUTO: 0.05 K/UL
BASOPHILS NFR BLD AUTO: 0.9 %
BILIRUB SERPL-MCNC: 0.6 MG/DL
BILIRUBIN URINE: NEGATIVE
BLOOD URINE: NEGATIVE
BUN SERPL-MCNC: 16 MG/DL
CALCIUM SERPL-MCNC: 9.7 MG/DL
CHLORIDE SERPL-SCNC: 104 MMOL/L
CHOLEST SERPL-MCNC: 197 MG/DL
CHOLEST/HDLC SERPL: 3.9 RATIO
CO2 SERPL-SCNC: 27 MMOL/L
COLOR: YELLOW
CREAT SERPL-MCNC: 1.16 MG/DL
EOSINOPHIL # BLD AUTO: 0.18 K/UL
EOSINOPHIL NFR BLD AUTO: 3.1 %
GLUCOSE QUALITATIVE U: NEGATIVE MG/DL
GLUCOSE SERPL-MCNC: 107 MG/DL
HBA1C MFR BLD HPLC: 5.7 %
HCT VFR BLD CALC: 50.1 %
HDLC SERPL-MCNC: 51 MG/DL
HGB BLD-MCNC: 16.1 G/DL
IMM GRANULOCYTES NFR BLD AUTO: 0.3 %
KETONES URINE: NEGATIVE
LDLC SERPL CALC-MCNC: 113 MG/DL
LEUKOCYTE ESTERASE URINE: NEGATIVE
LYMPHOCYTES # BLD AUTO: 1.61 K/UL
LYMPHOCYTES NFR BLD AUTO: 27.9 %
MAN DIFF?: NORMAL
MCHC RBC-ENTMCNC: 32.1 GM/DL
MCHC RBC-ENTMCNC: 32.3 PG
MCV RBC AUTO: 100.6 FL
MICROSCOPIC-UA: NORMAL
MONOCYTES # BLD AUTO: 0.47 K/UL
MONOCYTES NFR BLD AUTO: 8.1 %
NEUTROPHILS # BLD AUTO: 3.44 K/UL
NEUTROPHILS NFR BLD AUTO: 59.7 %
NITRITE URINE: NEGATIVE
PH URINE: 6
PLATELET # BLD AUTO: 212 K/UL
POTASSIUM SERPL-SCNC: 5 MMOL/L
PROT SERPL-MCNC: 7.4 G/DL
PROTEIN URINE: NEGATIVE MG/DL
PSA FREE FLD-MCNC: 42.8
PSA FREE SERPL-MCNC: 1.13 NG/ML
PSA SERPL-MCNC: 2.64 NG/ML
RBC # BLD: 4.98 M/UL
RBC # FLD: 13.9 %
RED BLOOD CELLS URINE: 1 /HPF
SODIUM SERPL-SCNC: 142 MMOL/L
SPECIFIC GRAVITY URINE: 1.02
SQUAMOUS EPITHELIAL CELLS: 0 /HPF
TRIGL SERPL-MCNC: 164 MG/DL
UROBILINOGEN URINE: NEGATIVE MG/DL
WBC # FLD AUTO: 5.77 K/UL
WHITE BLOOD CELLS URINE: 0 /HPF

## 2018-10-24 NOTE — ED ADULT NURSE NOTE - ISOLATION TYPE:
27 y/o man with a PMH of poorly controlled HIV due to nonadherence, ESRD, seizures disorder, here for obtundation, intubated, bilateral PNA, self extubated and now DNR/DNI.  his questions regarding ARV and surgical intervention for R arm AVF site and renal transplant were addressed. Needs to improve his HIV care before any decision about surgery.     PNA  -stop zosyn  -Start augmentin 500mg daily to complete 7 days for possible Aspiration pneumonia     HIV/AIDS  -ARV has been resumed, the same regimen and repeat VL pending  -continue Mepron 750mg enteral tube BID    ESRD   -continue dialysis  -meds dosed renally.     history of Seizure d/o   -continue Anti epileptic drugs  -There is possibility of drug related AMS (on pain meds and benzodiazepines) None

## 2018-10-31 ENCOUNTER — APPOINTMENT (OUTPATIENT)
Dept: DERMATOLOGY | Facility: CLINIC | Age: 58
End: 2018-10-31
Payer: MEDICAID

## 2018-10-31 VITALS — SYSTOLIC BLOOD PRESSURE: 135 MMHG | DIASTOLIC BLOOD PRESSURE: 75 MMHG

## 2018-10-31 DIAGNOSIS — D22.9 MELANOCYTIC NEVI, UNSPECIFIED: ICD-10-CM

## 2018-10-31 DIAGNOSIS — Z12.83 ENCOUNTER FOR SCREENING FOR MALIGNANT NEOPLASM OF SKIN: ICD-10-CM

## 2018-10-31 PROCEDURE — 99203 OFFICE O/P NEW LOW 30 MIN: CPT

## 2018-11-26 ENCOUNTER — TRANSCRIPTION ENCOUNTER (OUTPATIENT)
Age: 58
End: 2018-11-26

## 2019-01-28 ENCOUNTER — TRANSCRIPTION ENCOUNTER (OUTPATIENT)
Age: 59
End: 2019-01-28

## 2019-01-28 ENCOUNTER — RX RENEWAL (OUTPATIENT)
Age: 59
End: 2019-01-28

## 2019-04-19 ENCOUNTER — TRANSCRIPTION ENCOUNTER (OUTPATIENT)
Age: 59
End: 2019-04-19

## 2019-04-22 ENCOUNTER — TRANSCRIPTION ENCOUNTER (OUTPATIENT)
Age: 59
End: 2019-04-22

## 2019-06-26 ENCOUNTER — EMERGENCY (EMERGENCY)
Facility: HOSPITAL | Age: 59
LOS: 1 days | Discharge: PSYCHIATRIC OR SUBSTANCE USE FACILITY | End: 2019-06-26
Attending: EMERGENCY MEDICINE | Admitting: EMERGENCY MEDICINE
Payer: COMMERCIAL

## 2019-06-26 ENCOUNTER — INPATIENT (INPATIENT)
Facility: HOSPITAL | Age: 59
LOS: 15 days | Discharge: HOME | End: 2019-07-12
Attending: PSYCHIATRY & NEUROLOGY | Admitting: PSYCHIATRY & NEUROLOGY
Payer: COMMERCIAL

## 2019-06-26 VITALS
TEMPERATURE: 98 F | HEART RATE: 80 BPM | SYSTOLIC BLOOD PRESSURE: 100 MMHG | DIASTOLIC BLOOD PRESSURE: 80 MMHG | OXYGEN SATURATION: 100 % | RESPIRATION RATE: 18 BRPM

## 2019-06-26 VITALS
RESPIRATION RATE: 22 BRPM | DIASTOLIC BLOOD PRESSURE: 75 MMHG | SYSTOLIC BLOOD PRESSURE: 143 MMHG | OXYGEN SATURATION: 100 % | HEART RATE: 108 BPM

## 2019-06-26 VITALS — HEIGHT: 71 IN | RESPIRATION RATE: 18 BRPM | WEIGHT: 160.28 LBS | TEMPERATURE: 97 F

## 2019-06-26 DIAGNOSIS — R69 ILLNESS, UNSPECIFIED: ICD-10-CM

## 2019-06-26 DIAGNOSIS — F20.9 SCHIZOPHRENIA, UNSPECIFIED: ICD-10-CM

## 2019-06-26 PROBLEM — D64.9 ANEMIA, UNSPECIFIED: Chronic | Status: ACTIVE | Noted: 2017-03-11

## 2019-06-26 PROBLEM — D57.1 SICKLE-CELL DISEASE WITHOUT CRISIS: Chronic | Status: ACTIVE | Noted: 2017-03-11

## 2019-06-26 LAB
ALBUMIN SERPL ELPH-MCNC: 3.8 G/DL — SIGNIFICANT CHANGE UP (ref 3.3–5)
ALP SERPL-CCNC: 80 U/L — SIGNIFICANT CHANGE UP (ref 40–120)
ALT FLD-CCNC: 11 U/L — SIGNIFICANT CHANGE UP (ref 4–41)
ANION GAP SERPL CALC-SCNC: 11 MMO/L — SIGNIFICANT CHANGE UP (ref 7–14)
ANISOCYTOSIS BLD QL: SIGNIFICANT CHANGE UP
APAP SERPL-MCNC: < 15 UG/ML — LOW (ref 15–25)
APTT BLD: 39.3 SEC — HIGH (ref 27.5–36.3)
AST SERPL-CCNC: 13 U/L — SIGNIFICANT CHANGE UP (ref 4–40)
BASOPHILS # BLD AUTO: 0.02 K/UL — SIGNIFICANT CHANGE UP (ref 0–0.2)
BASOPHILS NFR BLD AUTO: 0.4 % — SIGNIFICANT CHANGE UP (ref 0–2)
BASOPHILS NFR SPEC: 0 % — SIGNIFICANT CHANGE UP (ref 0–2)
BILIRUB SERPL-MCNC: < 0.2 MG/DL — LOW (ref 0.2–1.2)
BLASTS # FLD: 0 % — SIGNIFICANT CHANGE UP (ref 0–0)
BUN SERPL-MCNC: 12 MG/DL — SIGNIFICANT CHANGE UP (ref 7–23)
CALCIUM SERPL-MCNC: 9.4 MG/DL — SIGNIFICANT CHANGE UP (ref 8.4–10.5)
CHLORIDE SERPL-SCNC: 93 MMOL/L — LOW (ref 98–107)
CO2 SERPL-SCNC: 27 MMOL/L — SIGNIFICANT CHANGE UP (ref 22–31)
CREAT SERPL-MCNC: 0.8 MG/DL — SIGNIFICANT CHANGE UP (ref 0.5–1.3)
ELLIPTOCYTES BLD QL SMEAR: SIGNIFICANT CHANGE UP
EOSINOPHIL # BLD AUTO: 0.01 K/UL — SIGNIFICANT CHANGE UP (ref 0–0.5)
EOSINOPHIL NFR BLD AUTO: 0.2 % — SIGNIFICANT CHANGE UP (ref 0–6)
EOSINOPHIL NFR FLD: 0 % — SIGNIFICANT CHANGE UP (ref 0–6)
ETHANOL BLD-MCNC: < 10 MG/DL — SIGNIFICANT CHANGE UP
GIANT PLATELETS BLD QL SMEAR: PRESENT — SIGNIFICANT CHANGE UP
GLUCOSE SERPL-MCNC: 130 MG/DL — HIGH (ref 70–99)
HCT VFR BLD CALC: 31 % — LOW (ref 39–50)
HGB BLD-MCNC: 9.9 G/DL — LOW (ref 13–17)
HYPOCHROMIA BLD QL: SIGNIFICANT CHANGE UP
IMM GRANULOCYTES NFR BLD AUTO: 0.4 % — SIGNIFICANT CHANGE UP (ref 0–1.5)
INR BLD: 1.42 — HIGH (ref 0.88–1.17)
LYMPHOCYTES # BLD AUTO: 1.03 K/UL — SIGNIFICANT CHANGE UP (ref 1–3.3)
LYMPHOCYTES # BLD AUTO: 18.7 % — SIGNIFICANT CHANGE UP (ref 13–44)
LYMPHOCYTES NFR SPEC AUTO: 18 % — SIGNIFICANT CHANGE UP (ref 13–44)
MCHC RBC-ENTMCNC: 21.4 PG — LOW (ref 27–34)
MCHC RBC-ENTMCNC: 31.9 % — LOW (ref 32–36)
MCV RBC AUTO: 67 FL — LOW (ref 80–100)
METAMYELOCYTES # FLD: 0 % — SIGNIFICANT CHANGE UP (ref 0–1)
MICROCYTES BLD QL: SIGNIFICANT CHANGE UP
MONOCYTES # BLD AUTO: 0.35 K/UL — SIGNIFICANT CHANGE UP (ref 0–0.9)
MONOCYTES NFR BLD AUTO: 6.4 % — SIGNIFICANT CHANGE UP (ref 2–14)
MONOCYTES NFR BLD: 5.1 % — SIGNIFICANT CHANGE UP (ref 2–9)
MYELOCYTES NFR BLD: 0 % — SIGNIFICANT CHANGE UP (ref 0–0)
NEUTROPHIL AB SER-ACNC: 73.5 % — SIGNIFICANT CHANGE UP (ref 43–77)
NEUTROPHILS # BLD AUTO: 4.07 K/UL — SIGNIFICANT CHANGE UP (ref 1.8–7.4)
NEUTROPHILS NFR BLD AUTO: 73.9 % — SIGNIFICANT CHANGE UP (ref 43–77)
NEUTS BAND # BLD: 0 % — SIGNIFICANT CHANGE UP (ref 0–6)
NRBC # FLD: 0 K/UL — SIGNIFICANT CHANGE UP (ref 0–0)
OTHER - HEMATOLOGY %: 0 — SIGNIFICANT CHANGE UP
PLATELET # BLD AUTO: 314 K/UL — SIGNIFICANT CHANGE UP (ref 150–400)
PLATELET COUNT - ESTIMATE: NORMAL — SIGNIFICANT CHANGE UP
PMV BLD: 9.1 FL — SIGNIFICANT CHANGE UP (ref 7–13)
POIKILOCYTOSIS BLD QL AUTO: SIGNIFICANT CHANGE UP
POLYCHROMASIA BLD QL SMEAR: SLIGHT — SIGNIFICANT CHANGE UP
POTASSIUM SERPL-MCNC: 4.1 MMOL/L — SIGNIFICANT CHANGE UP (ref 3.5–5.3)
POTASSIUM SERPL-SCNC: 4.1 MMOL/L — SIGNIFICANT CHANGE UP (ref 3.5–5.3)
PROMYELOCYTES # FLD: 0 % — SIGNIFICANT CHANGE UP (ref 0–0)
PROT SERPL-MCNC: 7.5 G/DL — SIGNIFICANT CHANGE UP (ref 6–8.3)
PROTHROM AB SERPL-ACNC: 15.9 SEC — HIGH (ref 9.8–13.1)
RBC # BLD: 4.63 M/UL — SIGNIFICANT CHANGE UP (ref 4.2–5.8)
RBC # FLD: 16.7 % — HIGH (ref 10.3–14.5)
SALICYLATES SERPL-MCNC: < 5 MG/DL — LOW (ref 15–30)
SODIUM SERPL-SCNC: 131 MMOL/L — LOW (ref 135–145)
TSH SERPL-MCNC: 0.45 UIU/ML — SIGNIFICANT CHANGE UP (ref 0.27–4.2)
VARIANT LYMPHS # BLD: 3.4 % — SIGNIFICANT CHANGE UP
WBC # BLD: 5.5 K/UL — SIGNIFICANT CHANGE UP (ref 3.8–10.5)
WBC # FLD AUTO: 5.5 K/UL — SIGNIFICANT CHANGE UP (ref 3.8–10.5)

## 2019-06-26 PROCEDURE — 99285 EMERGENCY DEPT VISIT HI MDM: CPT

## 2019-06-26 PROCEDURE — 70450 CT HEAD/BRAIN W/O DYE: CPT | Mod: 26

## 2019-06-26 RX ORDER — HALOPERIDOL DECANOATE 100 MG/ML
5 INJECTION INTRAMUSCULAR
Refills: 0 | Status: DISCONTINUED | OUTPATIENT
Start: 2019-06-26 | End: 2019-06-27

## 2019-06-26 RX ORDER — ATORVASTATIN CALCIUM 80 MG/1
10 TABLET, FILM COATED ORAL AT BEDTIME
Refills: 0 | Status: DISCONTINUED | OUTPATIENT
Start: 2019-06-26 | End: 2019-07-12

## 2019-06-26 RX ORDER — HYDROXYZINE HCL 10 MG
25 TABLET ORAL EVERY 8 HOURS
Refills: 0 | Status: DISCONTINUED | OUTPATIENT
Start: 2019-06-26 | End: 2019-07-12

## 2019-06-26 RX ORDER — HALOPERIDOL DECANOATE 100 MG/ML
5 INJECTION INTRAMUSCULAR ONCE
Refills: 0 | Status: COMPLETED | OUTPATIENT
Start: 2019-06-26 | End: 2019-06-26

## 2019-06-26 RX ORDER — OLANZAPINE 15 MG/1
15 TABLET, FILM COATED ORAL AT BEDTIME
Refills: 0 | Status: DISCONTINUED | OUTPATIENT
Start: 2019-06-26 | End: 2019-06-27

## 2019-06-26 RX ORDER — MIDAZOLAM HYDROCHLORIDE 1 MG/ML
5 INJECTION, SOLUTION INTRAMUSCULAR; INTRAVENOUS ONCE
Refills: 0 | Status: DISCONTINUED | OUTPATIENT
Start: 2019-06-26 | End: 2019-06-26

## 2019-06-26 RX ORDER — LISINOPRIL 2.5 MG/1
2.5 TABLET ORAL DAILY
Refills: 0 | Status: DISCONTINUED | OUTPATIENT
Start: 2019-06-26 | End: 2019-07-10

## 2019-06-26 RX ORDER — METFORMIN HYDROCHLORIDE 850 MG/1
500 TABLET ORAL
Refills: 0 | Status: DISCONTINUED | OUTPATIENT
Start: 2019-06-26 | End: 2019-07-03

## 2019-06-26 RX ORDER — BENZTROPINE MESYLATE 1 MG
1 TABLET ORAL
Refills: 0 | Status: DISCONTINUED | OUTPATIENT
Start: 2019-06-26 | End: 2019-07-12

## 2019-06-26 RX ORDER — NICOTINE POLACRILEX 2 MG
2 GUM BUCCAL
Refills: 0 | Status: DISCONTINUED | OUTPATIENT
Start: 2019-06-26 | End: 2019-07-12

## 2019-06-26 RX ADMIN — MIDAZOLAM HYDROCHLORIDE 5 MILLIGRAM(S): 1 INJECTION, SOLUTION INTRAMUSCULAR; INTRAVENOUS at 13:00

## 2019-06-26 RX ADMIN — HALOPERIDOL DECANOATE 5 MILLIGRAM(S): 100 INJECTION INTRAMUSCULAR at 11:24

## 2019-06-26 RX ADMIN — Medication 2 MILLIGRAM(S): at 11:24

## 2019-06-26 NOTE — ED ADULT NURSE NOTE - CHIEF COMPLAINT QUOTE
pt from [facility] staff is unknown if is complaint with medications. pt presents stating he is the devil and going to kill everyone. aggressive in triage. refused temp in triage

## 2019-06-26 NOTE — ED ADULT TRIAGE NOTE - CHIEF COMPLAINT QUOTE
pt from [facility] staff is unknown if is complaint with medications. pt presents stating he is the devil and going to kill everyone. aggressive in triage. refused temp in triage

## 2019-06-26 NOTE — ED ADULT NURSE NOTE - OBJECTIVE STATEMENT
Patient to  using profanity and accompanied by [employer] security. Pt undressed and given meds for agitation. Evaluated by Medical MD and waITING FOR PSYCH EVAL, FURTHER TESTING AND DISPO.    NALINI Bañuelos

## 2019-06-26 NOTE — ED BEHAVIORAL HEALTH ASSESSMENT NOTE - RISK ASSESSMENT
Risk factors include Schizophrenia diagnosis, hx of substance abuse, psychosis, impulsivity, impaired insight & judgement, medication noncompliance/medication changes. This patient is at high risk for harm and requires inpatient level of care for safety and stabilization. High acute risk; a safe discharge cannot not be made at this time, pt is an imminent danger to others, has recent incarceration for violence, is making threats in ED and requires inpatient psychiatric admission for treatment and stabilization. No need for 1; 1 CO, despite threats has maintained good behavioural control in the ED.

## 2019-06-26 NOTE — ED BEHAVIORAL HEALTH NOTE - BEHAVIORAL HEALTH NOTE
Worker called [facility] and spoke to Dary ([phone]) who accepted patient for inpatient transfer to [address] [place] [address] 2nd floor bed 275 with accepting Dr. Bauer. Worker informed nurse ruelas and informed that nurse to nurse report be given to [phone] Fiorella. Worker faxed legal paper work to [phone]. Legals is in chart. Patient will be transferred.

## 2019-06-26 NOTE — ED BEHAVIORAL HEALTH ASSESSMENT NOTE - NS ED BHA BILLING ATTENDING WO NP TRAINEE
I concur with the Admission Order and I certify that services are provided in accordance with Section 42 CFR § 412.3
Other

## 2019-06-26 NOTE — H&P ADULT - NSHPPHYSICALEXAM_GEN_ALL_CORE
Vital Signs Last 24 Hrs  T(C): 35.9 (06-26-19 @ 19:50)  T(F): 96.6 (06-26-19 @ 19:50), Max: 97.7 (06-26-19 @ 15:36)  HR: 80 (06-26-19 @ 15:36) (80 - 108)  BP: 100/80 (06-26-19 @ 15:36)  BP(mean): --  RR: 18 (06-26-19 @ 19:50) (18 - 22)  SpO2: 100% (06-26-19 @ 15:36) (100% - 100%)  Wt(kg): --

## 2019-06-26 NOTE — ED PROVIDER NOTE - PROGRESS NOTE DETAILS
MD Cochran: patient medically cleared.  No evidence of hemorrhage on CT head.  Area of low-attenuation likely represents chronic microvascular change more than it does acute CVA.  As far as restarting DOAC is concerned, I do not see any medical problem in his Hx that would necessitate anticoagulation, and there is no indication in the paperwork that he came from.  Per Psychiatry, he will likely get admitted to [facility] inpatient psychiatry, and they are actively looking for a bed.

## 2019-06-26 NOTE — ED BEHAVIORAL HEALTH ASSESSMENT NOTE - PSYCHIATRIC ISSUES AND PLAN (INCLUDE STANDING AND PRN MEDICATION)
Restart Zyprexa 10mg q HS for psychosis. Consider restarting Lithium after getting collateral (unclear why it was stopped). Zyprexa 5mg IM or p.o. for agitation Consider NAIK given poor compliance, restart haldol 5mg bid, zyprexa 15mg hs,benztropine 1mg bid

## 2019-06-26 NOTE — ED BEHAVIORAL HEALTH ASSESSMENT NOTE - HPI (INCLUDE ILLNESS QUALITY, SEVERITY, DURATION, TIMING, CONTEXT, MODIFYING FACTORS, ASSOCIATED SIGNS AND SYMPTOMS)
59 y/o single AAM, domiciled in \[facility] [place] [facility], no dependents, disabled due to mental illness, history of Schizoaffective Disorder, multiple admissions, last admitted 3/19 [place] [address] for psychosis;  no reported hx of suicide/violence per past records and patient denies, no known legal problems, history of arthritis, anemia and sickle cell traits, BIB EMS, sent in by psychiatric residence with worsening agitation and     Per records, patient was [facility] by [facility]: [facility] emergency: [phone], [facility] ACT Team ([phone]) - voicemail left with callback number; [phone] (ACT Team 1). Writer called these numbers and one number did not work & messages left on the other lines were not returned as of 5pm.     In ED, patient has poor eye contact, appears internally preoccupied and tells writer he "always" hears voices. However, he states that recently the voices have been more intense, he has been hearing voices telling him to "kill yourself" and states he has also been hearing voices telling him to kill others. He states that he usually can ignore the voices, but recently it has been more difficult and stressful. He reports he has also recently begun to believe than an unknown party is controlling his mind & body & believes they can also force him to say what they want him to say. He states this is very disturbing to him. He reports he takes Zyprexa and this decreases the voices. However, he ran out of medications on Thursday. He states other recent medication changes include Lithium being discontinued, "it caused liver problems". He reports he does not feel safe in the community at this time. Reports mood is "fine" but states sleeps is very poor due to anxiety about voices. He denies any problems with eating, ADLs. He denies any desire to hurt self/others but admits that he does not feel in control of himself presently. Admits he drank a 32 ounce beer yesterday, but otherwise has had no alcohol for over a week. Denies using any drugs. 57 y/o single AAM, domiciled in [facility] [place] [facility] since 6/7/19;, no dependents, disabled due to mental illness, history of Schizoaffective Disorder, multiple admissions, last admitted 3/19 [facility] for psychosis;  no reported hx of suicide/violence per past records and patient denies, patient is on parole w a hx of assault in the second degree and was in [facility] until 6/7/19 , history of arthritis, anemia and sickle cell traits, BIB EMS, sent in by psychiatric residence with worsening agitation and threatening to kill staff with guns. Of note, patient's  was informed by [facility] staff and he met with patient this morning and suggested that EMS be activated. Staff report that the patient was at his chronic baseline until the past week , and is delusional (thinks he "can make bears do things") and threatening staff. On interview pt is in jeans and T shirt and a high visibility workman's vest; he is guarded, suspicious, combative and irritable, responding to internal stimuli, thought processes are disorganized. He tells writer "shut up ...suck my d**k bitch, I am God and I will have you killed". He states "I can control things ...I will control you ..I control all the bears". Staff at [facility] suspect  medication non compliance-patient will take pill in a cup but no one witnesses him take medication. Patient required IM medication for severe agitation

## 2019-06-26 NOTE — ED PROVIDER NOTE - ENMT, MLM
Airway patent, Nasal mucosa clear. Mouth with normal mucosa. Throat has no vesicles, no oropharyngeal exudates and uvula is midline.  NCAT

## 2019-06-26 NOTE — ED BEHAVIORAL HEALTH ASSESSMENT NOTE - MEDICAL ISSUES AND PLAN (INCLUDE STANDING AND PRN MEDICATION)
fall risk. EM provider states patient is medically cleared for psychiatric hospitalization with no ongoing recommendations. Motrin 400mg q 6 hours PRN for pain. fall risk. EM provider states patient is medically cleared for psychiatric hospitalization with no ongoing recommendations., restart  atorvastatin 10mg hs, metformin 500mg bid, Lisinopril 2.5mg qd; will not restart Eliquis until further information acquired as to why patient is on Eliquis

## 2019-06-26 NOTE — ED BEHAVIORAL HEALTH ASSESSMENT NOTE - DETAILS
handoff to prachi father- depression right sided hip pain voices to harm self and others self-referred Thorazine and Prolixin- dystonia? [facility] handoff given

## 2019-06-26 NOTE — H&P ADULT - NSHPLABSRESULTS_GEN_ALL_CORE
9.9    5.50  )-----------( 314      ( 26 Jun 2019 12:31 )             31.0       06-26    131<L>  |  93<L>  |  12  ----------------------------<  130<H>  4.1   |  27  |  0.80    Ca    9.4      26 Jun 2019 12:10    TPro  7.5  /  Alb  3.8  /  TBili  < 0.2<L>  /  DBili  x   /  AST  13  /  ALT  11  /  AlkPhos  80  06-26                  PT/INR - ( 26 Jun 2019 12:10 )   PT: 15.9 SEC;   INR: 1.42          PTT - ( 26 Jun 2019 12:10 )  PTT:39.3 SEC    Lactate Trend            CAPILLARY BLOOD GLUCOSE

## 2019-06-26 NOTE — ED PROVIDER NOTE - CLINICAL SUMMARY MEDICAL DECISION MAKING FREE TEXT BOX
Impression:  psychosis, due to medication noncompliance as an outpatient  Plan:  labs, ecg, ua/utox, CT head (on Eliquis).

## 2019-06-26 NOTE — ED PROVIDER NOTE - OBJECTIVE STATEMENT
59 yo M, bib EMS from [place] c/o threatening behavior, psychosis, HI.    Onset: unknown.  Context:  known schizophrenia, recent [facility] admits for same problem.  Per med list, patient on apixaban (unk reasons).  Better/worse: unk due to mental status (combative/defiant).  Assoc. Sx:   unk due to mental status

## 2019-06-26 NOTE — ED BEHAVIORAL HEALTH ASSESSMENT NOTE - SUICIDE RISK FACTORS
Substance abuse/dependence/Agitation/severe anxiety/Unable to engage in safety planning/Command hallucinations to hurt self

## 2019-06-26 NOTE — ED BEHAVIORAL HEALTH ASSESSMENT NOTE - DESCRIPTION
lives with son. Has lived in shelters for years as well cooperative, anxious, behaving appropriately sickle cell trait and alpha thalassemia per J ED eval 11/30/16 guarded, suspicious, combative and irritable, responding to internal stimuliICU Vital Signs Last 24 Hrs  T(C): --  T(F): --  HR: 108 (26 Jun 2019 10:46) (108 - 108)  BP: 143/75 (26 Jun 2019 10:46) (143/75 - 143/75)  BP(mean): --  ABP: --  ABP(mean): --  RR: 22 (26 Jun 2019 10:46) (22 - 22)  SpO2: 100% (26 Jun 2019 10:46) (100% - 100%)
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
## 2019-06-26 NOTE — H&P ADULT - HISTORY OF PRESENT ILLNESS
58 YEARS OLD MALE WITH PMH OF ANEMIA, SCHIZOPHRENIA, SICKLE , AND SUBSTANCE ABUSE COME TO THE HOSPITAL C/O HOMICIDAL IDEATIONS .
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
## 2019-06-26 NOTE — ED BEHAVIORAL HEALTH NOTE - BEHAVIORAL HEALTH NOTE
Worker called [facility] and was informed that there is no male beds at this time. Worker called [facility] and spoke to Susie who states that she will inform the attending to review the chart.
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
## 2019-06-26 NOTE — ED BEHAVIORAL HEALTH ASSESSMENT NOTE - OTHER
psychotic and with CAH to harm self and others walks slowly due to arthritic pain staff at [facility] refused to answer question suspected AH [id number] peers

## 2019-06-26 NOTE — ED BEHAVIORAL HEALTH ASSESSMENT NOTE - CURRENT MEDICATION
Zyprexa unknown dose  Patient states Lithium was recently discontinued atorvastatin 10mg hs, metformin 500mg bid, Lisinopril 2.5mg qd, apixaban (Eliquis)5mg bid, olanzapine 15mg bid, haldol 5mg bid, benztropine 1mg bid

## 2019-06-26 NOTE — ED BEHAVIORAL HEALTH ASSESSMENT NOTE - AXIS IV
Problem related to social environment Problem related to social environment/Problems with access to healthcare services

## 2019-06-27 DIAGNOSIS — E11.49 TYPE 2 DIABETES MELLITUS WITH OTHER DIABETIC NEUROLOGICAL COMPLICATION: ICD-10-CM

## 2019-06-27 DIAGNOSIS — I10 ESSENTIAL (PRIMARY) HYPERTENSION: ICD-10-CM

## 2019-06-27 DIAGNOSIS — F20.9 SCHIZOPHRENIA, UNSPECIFIED: ICD-10-CM

## 2019-06-27 LAB — PCP SPEC-MCNC: SIGNIFICANT CHANGE UP

## 2019-06-27 PROCEDURE — 90792 PSYCH DIAG EVAL W/MED SRVCS: CPT

## 2019-06-27 RX ORDER — DIPHENHYDRAMINE HCL 50 MG
50 CAPSULE ORAL AT BEDTIME
Refills: 0 | Status: DISCONTINUED | OUTPATIENT
Start: 2019-06-27 | End: 2019-07-12

## 2019-06-27 RX ORDER — HALOPERIDOL DECANOATE 100 MG/ML
10 INJECTION INTRAMUSCULAR AT BEDTIME
Refills: 0 | Status: DISCONTINUED | OUTPATIENT
Start: 2019-06-27 | End: 2019-07-12

## 2019-06-27 RX ORDER — BENZOCAINE AND MENTHOL 5; 1 G/100ML; G/100ML
1 LIQUID ORAL THREE TIMES A DAY
Refills: 0 | Status: COMPLETED | OUTPATIENT
Start: 2019-06-27 | End: 2019-06-28

## 2019-06-27 RX ORDER — HALOPERIDOL DECANOATE 100 MG/ML
5 INJECTION INTRAMUSCULAR DAILY
Refills: 0 | Status: DISCONTINUED | OUTPATIENT
Start: 2019-06-27 | End: 2019-07-12

## 2019-06-27 RX ADMIN — Medication 2 MILLIGRAM(S): at 23:51

## 2019-06-27 RX ADMIN — HALOPERIDOL DECANOATE 5 MILLIGRAM(S): 100 INJECTION INTRAMUSCULAR at 07:46

## 2019-06-27 RX ADMIN — HALOPERIDOL DECANOATE 10 MILLIGRAM(S): 100 INJECTION INTRAMUSCULAR at 20:26

## 2019-06-27 NOTE — CHART NOTE - NSCHARTNOTEFT_GEN_A_CORE
Patient is a 58 year old  Male with serious persistent mental illness and multiple prior admissions admitted to unit from [facility] ED due to command hallucinations to harm himself and others. Patient is a resident of [facility]  Kalyani Monteiro . Patient has a lengthy criminal history, was [facility] on 6-9-19. Patient is calm upon approach and is amenable to treatment. Patient contracts for safety on the unit. Patient denies active SI HI and AVH but appears internally preoccupied. Patient is thought disordered and labile. Patient admitted to unit 939 emergency status for treatment safety and observation. Please see social work psychosocial assessment for more information.

## 2019-06-27 NOTE — CONSULT NOTE ADULT - SUBJECTIVE AND OBJECTIVE BOX
ANDREA ESCOBEDO  58y  Male      Patient is a 58y old  Male who presents with a chief complaint of 58 YEARS OLD MALE COME TO THE HOSPITAL FOR PSYCHIATRIC EVALUATION . (26 Jun 2019 20:23)    HPI:  58 YEARS OLD MALE WITH PMH OF ANEMIA, SCHIZOPHRENIA, SICKLE , AND SUBSTANCE ABUSE COME TO THE HOSPITAL C/O HOMICIDAL IDEATIONS . (26 Jun 2019 20:23)    INTERVAL HPI/OVERNIGHT EVENTS:  HEALTH ISSUES - PROBLEM Dx:  Schizophrenia: Schizophrenia        PAST MEDICAL & SURGICAL HISTORY:  Sickle cell anemia  Anemia  Substance abuse  Schizophrenia  No significant past surgical history    alos h/o htn adn niddm     FAMILY HISTORY:  No pertinent family history in first degree relatives    atorvastatin 10 milliGRAM(s) Oral at bedtime  benztropine 1 milliGRAM(s) Oral two times a day  haloperidol     Tablet 5 milliGRAM(s) Oral two times a day  hydrOXYzine hydrochloride 25 milliGRAM(s) Oral every 8 hours PRN  lisinopril 2.5 milliGRAM(s) Oral daily  metFORMIN 500 milliGRAM(s) Oral two times a day  nicotine  Polacrilex Gum 2 milliGRAM(s) Oral every 3 hours PRN  OLANZapine 15 milliGRAM(s) Oral at bedtime      REVIEW OF SYSTEMS:  CONSTITUTIONAL: No fever, weight loss, or fatigue  EYES: No eye pain, visual disturbances, or discharge  ENMT:  No difficulty hearing, tinnitus, vertigo; No sinus or throat pain  NECK: No pain or stiffness  BREASTS: No pain, masses, or nipple discharge  RESPIRATORY: No cough, wheezing, chills or hemoptysis; No shortness of breath  CARDIOVASCULAR: No chest pain, palpitations, dizziness, or leg swelling  GASTROINTESTINAL: No abdominal or epigastric pain. No nausea, vomiting, or hematemesis; No diarrhea or constipation. No melena or hematochezia.  GENITOURINARY: No dysuria, frequency, hematuria, or incontinence  NEUROLOGICAL: No headaches, memory loss, loss of strength, numbness, or tremors  SKIN: No itching, burning, rashes, or lesions   LYMPH NODES: No enlarged glands  ENDOCRINE: No heat or cold intolerance; No hair loss  MUSCULOSKELETAL: No joint pain or swelling; No muscle, back, or extremity pain  PSYCHIATRIC: as per hpi and previous psych history  HEME/LYMPH: No easy bruising, or bleeding gums  ALLERY AND IMMUNOLOGIC: No hives or eczema    T(C): 35.9 (06-26-19 @ 19:50), Max: 36.5 (06-26-19 @ 15:36)  HR: 80 (06-26-19 @ 15:36) (80 - 108)  BP: 100/80 (06-26-19 @ 15:36) (100/80 - 143/75)  RR: 18 (06-26-19 @ 19:50) (18 - 22)  SpO2: 100% (06-26-19 @ 15:36) (100% - 100%)  Wt(kg): --Vital Signs Last 24 Hrs  T(C): 35.9 (26 Jun 2019 19:50), Max: 36.5 (26 Jun 2019 15:36)  T(F): 96.6 (26 Jun 2019 19:50), Max: 97.7 (26 Jun 2019 15:36)  HR: 80 (26 Jun 2019 15:36) (80 - 108)  BP: 100/80 (26 Jun 2019 15:36) (100/80 - 143/75)  BP(mean): --  RR: 18 (26 Jun 2019 19:50) (18 - 22)  SpO2: 100% (26 Jun 2019 15:36) (100% - 100%)    PHYSICAL EXAM:  GENERAL: NAD,well-developed  HEAD:  Atraumatic, Normocephalic  EYES: EOMI, PERRLA, conjunctiva and sclera clear  ENMT: No tonsillar erythema, exudates, or enlargement; Moist mucous membranes, Good dentition, No lesions  NECK: Supple, No JVD, Normal thyroid  CHEST/LUNG: Clear bs bilaterally; No rales, rhonchi, wheezing  HEART: Regular rate and rhythm; No murmurs, rubs, or gallops  ABDOMEN: Soft, Nontender, Nondistended; Bowel sounds present  EXTREMITIES:  2+ Peripheral Pulses, No clubbing, cyanosis, or edema  LYMPH: No lymphadenopathy noted  SKIN: No rashes or lesions  Neuro: alert  no focal deficits    Consultant(s) Notes Reviewed:  [x ] YES  [ ] NO  Care Discussed with Consultants/Other Providers [ x] YES  [ ] NO    LABS:                        9.9    5.50  )-----------( 314      ( 26 Jun 2019 12:31 )             31.0     06-26    131<L>  |  93<L>  |  12  ----------------------------<  130<H>  4.1   |  27  |  0.80    Ca    9.4      26 Jun 2019 12:10    TPro  7.5  /  Alb  3.8  /  TBili  < 0.2<L>  /  DBili  x   /  AST  13  /  ALT  11  /  AlkPhos  80  06-26    PT/INR - ( 26 Jun 2019 12:10 )   PT: 15.9 SEC;   INR: 1.42          PTT - ( 26 Jun 2019 12:10 )  PTT:39.3 SEC    CAPILLARY BLOOD GLUCOSE                RADIOLOGY & ADDITIONAL TESTS:    Imaging Personally Reviewed:  [ ] YES  [ ] NO

## 2019-06-27 NOTE — PROGRESS NOTE BEHAVIORAL HEALTH - NSBHFUPINTERVALHXFT_PSY_A_CORE
Pt was seen, and evaluated, and chart was reviewed. No acute events overnight.  Patient is alert, Ox3,  calm, cooperative, compliant with treatment. Patient is in good behavioral control.  Pt presents no acute management problems.     ***Pt denies and presents no evidence for suicidal or homicidal ideas plans or intentions.  Pt is not acutely psychotic and denies A/V H.  ***Pt slept well, and reports normal appetite and regular bowel movements. Pt is tolerating meds well w/o any acute S/E.  Pt has no medication related complaints. Continues current medication regimen. Writer attempted to call collateral Mr [facility] at [phone] LPN at residence. Pt Is on AOT c  Dr. Vann at [facility] psychiatrist per records, and other providers on team have included Arnol Zabala and Neda. Pt was seen, and evaluated, and chart was reviewed. No acute events overnight.  Patient is alert, evasive, thought disordered, paranoid. Patient is in good behavioral control.  Pt presents no acute management problems.     One to one obs. was discontinued. ***Pt denies and presents no evidence for suicidal or homicidal ideas plans or intentions.    Continues home medication regimen. Writer attempted to call collateral Mr Kearney at [facility] at [phone] LPN at residence. Pt was seen, and evaluated, and chart was reviewed. No acute events overnight.  Patient is alert, evasive, thought disordered, paranoid. Patient is in good behavioral control.  Pt presents no acute management problems.     One to one obs. was discontinued. ***Pt denies and presents no evidence for suicidal or homicidal ideas plans or intentions.    Continues home medication regimen. Writer attempted to call collateral Mr Kearney at [facility] at [phone] LPN at residence. Nika Kearns is the pts forensic ICM worker- [phone]. She is expected to visit pt later today . She castro aslo provide additional clin info re the pt.

## 2019-06-28 ENCOUNTER — TRANSCRIPTION ENCOUNTER (OUTPATIENT)
Age: 59
End: 2019-06-28

## 2019-06-28 LAB
AMPHET UR-MCNC: NEGATIVE — SIGNIFICANT CHANGE UP
BARBITURATES UR SCN-MCNC: NEGATIVE — SIGNIFICANT CHANGE UP
BENZODIAZ UR-MCNC: POSITIVE
COCAINE METAB.OTHER UR-MCNC: NEGATIVE — SIGNIFICANT CHANGE UP
METHADONE UR-MCNC: NEGATIVE — SIGNIFICANT CHANGE UP
OPIATES UR-MCNC: NEGATIVE — SIGNIFICANT CHANGE UP
PROPOXYPHENE QUALITATIVE URINE RESULT: NEGATIVE — SIGNIFICANT CHANGE UP

## 2019-06-28 PROCEDURE — 99232 SBSQ HOSP IP/OBS MODERATE 35: CPT

## 2019-06-28 RX ORDER — APIXABAN 2.5 MG/1
5 TABLET, FILM COATED ORAL
Refills: 0 | Status: DISCONTINUED | OUTPATIENT
Start: 2019-06-28 | End: 2019-07-12

## 2019-06-28 RX ADMIN — HALOPERIDOL DECANOATE 5 MILLIGRAM(S): 100 INJECTION INTRAMUSCULAR at 08:30

## 2019-06-28 RX ADMIN — HALOPERIDOL DECANOATE 10 MILLIGRAM(S): 100 INJECTION INTRAMUSCULAR at 20:39

## 2019-06-28 NOTE — CHART NOTE - NSCHARTNOTEFT_GEN_A_CORE
Called by RN, patient w/Hx of AFIB on chronic AC.  RN reports paperwork from [facility] relates patient on Eliquis 5mg BID.  Medication entered into home med rec and entered for inpatient use.

## 2019-06-28 NOTE — CHART NOTE - NSCHARTNOTEFT_GEN_A_CORE
Treatment team met with patient on rounds this morning. Patient is compliant with treatement patient is taking medications as prescribed. Patient is AOx3, denies SI HI and AHV. Patient in good behavioral control at this time. Patient activities of daily living are within normal limits. Patient reports normal appetite and good sleep. Patient has no physical complaints. Patient has no medication related questions. Patient is disorganized, labile and unpredictable. Patient not stable for discharge at this juncture.

## 2019-06-29 PROCEDURE — 99232 SBSQ HOSP IP/OBS MODERATE 35: CPT

## 2019-06-29 RX ADMIN — Medication 1 MILLIGRAM(S): at 10:15

## 2019-06-29 RX ADMIN — Medication 2 MILLIGRAM(S): at 12:04

## 2019-06-29 RX ADMIN — Medication 1 MILLIGRAM(S): at 20:17

## 2019-06-29 RX ADMIN — HALOPERIDOL DECANOATE 5 MILLIGRAM(S): 100 INJECTION INTRAMUSCULAR at 10:15

## 2019-06-29 RX ADMIN — HALOPERIDOL DECANOATE 10 MILLIGRAM(S): 100 INJECTION INTRAMUSCULAR at 20:17
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
## 2019-06-29 NOTE — PROGRESS NOTE BEHAVIORAL HEALTH - NSBHFUPINTERVALHXFT_PSY_A_CORE
Pt was seen, and evaluated, and chart was reviewed. No acute events overnight.  Patient is alert, Ox3,  calm, cooperative, compliant with treatment. Patient is in good behavioral control.  Pt is thought diorderd and ilogcal    ***Pt denies and presents no evidence for suicidal or homicidal ideas plans or intentions.  Pt denies A/V H.  ***Pt slept well, is hungry is asking for double portions.  Pt is tolerating meds well w/o any acute S/E.  Pt has no medication related complaints. Continues current medication regimen. Pt was seen, and evaluated, and chart was reviewed. No acute events overnight.  Patient is alert, Ox3,  calm, cooperative, compliant with treatment. Patient is in good behavioral control.  Pt is thought diorderd and ilogcal    ***Pt denies and presents no evidence for suicidal or homicidal ideas plans or intentions.  Pt denies A/V H.  ***Pt slept well, Pt is tolerating meds well w/o any acute S/E.  Pt has no medication related complaints. Continues current medication regimen.

## 2019-06-30 PROCEDURE — 99231 SBSQ HOSP IP/OBS SF/LOW 25: CPT

## 2019-06-30 RX ADMIN — Medication 2 MILLIGRAM(S): at 19:38

## 2019-06-30 RX ADMIN — Medication 1 MILLIGRAM(S): at 08:26

## 2019-06-30 RX ADMIN — HALOPERIDOL DECANOATE 10 MILLIGRAM(S): 100 INJECTION INTRAMUSCULAR at 20:36

## 2019-06-30 RX ADMIN — Medication 1 MILLIGRAM(S): at 20:37

## 2019-06-30 RX ADMIN — HALOPERIDOL DECANOATE 5 MILLIGRAM(S): 100 INJECTION INTRAMUSCULAR at 08:25

## 2019-06-30 NOTE — PROGRESS NOTE BEHAVIORAL HEALTH - NSBHFUPINTERVALHXFT_PSY_A_CORE
Pt is seen, and evaluated, and chart is reviewed. No acute events overnight.  Patient is alert, Ox3,  calm, cooperative, compliant with treatment. Patient is in good behavioral control.  Pt  has been disorganized psychotic presents with thought disorder. He denies and presents no evidence for suicidal or homicidal ideas plans or intentions.  Pt denies A/V H.  He slept well, Pt is tolerating meds well w/o any acute S/E.  Pt has no medication related complaints. Continues current medication regimen.

## 2019-07-01 ENCOUNTER — EMERGENCY (EMERGENCY)
Facility: HOSPITAL | Age: 59
LOS: 1 days | Discharge: HOME | End: 2019-07-01
Attending: EMERGENCY MEDICINE | Admitting: EMERGENCY MEDICINE
Payer: MEDICAID

## 2019-07-01 VITALS
TEMPERATURE: 98 F | HEART RATE: 75 BPM | OXYGEN SATURATION: 97 % | RESPIRATION RATE: 17 BRPM | DIASTOLIC BLOOD PRESSURE: 86 MMHG | SYSTOLIC BLOOD PRESSURE: 140 MMHG

## 2019-07-01 DIAGNOSIS — R33.9 RETENTION OF URINE, UNSPECIFIED: ICD-10-CM

## 2019-07-01 DIAGNOSIS — M79.89 OTHER SPECIFIED SOFT TISSUE DISORDERS: ICD-10-CM

## 2019-07-01 DIAGNOSIS — M79.661 PAIN IN RIGHT LOWER LEG: ICD-10-CM

## 2019-07-01 LAB
APPEARANCE UR: CLEAR — SIGNIFICANT CHANGE UP
BILIRUB UR-MCNC: NEGATIVE — SIGNIFICANT CHANGE UP
COLOR SPEC: YELLOW — SIGNIFICANT CHANGE UP
DIFF PNL FLD: ABNORMAL
GLUCOSE UR QL: NEGATIVE — SIGNIFICANT CHANGE UP
KETONES UR-MCNC: NEGATIVE — SIGNIFICANT CHANGE UP
LEUKOCYTE ESTERASE UR-ACNC: NEGATIVE — SIGNIFICANT CHANGE UP
NITRITE UR-MCNC: NEGATIVE — SIGNIFICANT CHANGE UP
PH UR: 5.5 — SIGNIFICANT CHANGE UP (ref 5–8)
PROT UR-MCNC: NEGATIVE MG/DL — SIGNIFICANT CHANGE UP
SP GR SPEC: >=1.03 — SIGNIFICANT CHANGE UP (ref 1–1.03)
UROBILINOGEN FLD QL: 0.2 E.U./DL — SIGNIFICANT CHANGE UP

## 2019-07-01 PROCEDURE — 99283 EMERGENCY DEPT VISIT LOW MDM: CPT

## 2019-07-01 PROCEDURE — 99231 SBSQ HOSP IP/OBS SF/LOW 25: CPT

## 2019-07-01 RX ADMIN — METFORMIN HYDROCHLORIDE 500 MILLIGRAM(S): 850 TABLET ORAL at 08:33

## 2019-07-01 RX ADMIN — METFORMIN HYDROCHLORIDE 500 MILLIGRAM(S): 850 TABLET ORAL at 20:20

## 2019-07-01 RX ADMIN — Medication 1 TABLET(S): at 21:46

## 2019-07-01 RX ADMIN — HALOPERIDOL DECANOATE 10 MILLIGRAM(S): 100 INJECTION INTRAMUSCULAR at 20:20

## 2019-07-01 RX ADMIN — APIXABAN 5 MILLIGRAM(S): 2.5 TABLET, FILM COATED ORAL at 08:33

## 2019-07-01 RX ADMIN — HALOPERIDOL DECANOATE 5 MILLIGRAM(S): 100 INJECTION INTRAMUSCULAR at 08:33

## 2019-07-01 RX ADMIN — Medication 1 MILLIGRAM(S): at 20:19

## 2019-07-01 RX ADMIN — APIXABAN 5 MILLIGRAM(S): 2.5 TABLET, FILM COATED ORAL at 20:19

## 2019-07-01 RX ADMIN — Medication 2 MILLIGRAM(S): at 16:49

## 2019-07-01 RX ADMIN — ATORVASTATIN CALCIUM 10 MILLIGRAM(S): 80 TABLET, FILM COATED ORAL at 20:19

## 2019-07-01 RX ADMIN — Medication 1 MILLIGRAM(S): at 08:33

## 2019-07-01 NOTE — PROGRESS NOTE ADULT - ASSESSMENT
medically stable with no acute issues  reviewd with pt val harrison back on eliquis wuith past history

## 2019-07-01 NOTE — ED PROVIDER NOTE - OBJECTIVE STATEMENT
acute urinary retention, was on TMP/SMX but stopped it on his own but still has 5 days left, will start back on TMP/SMX, has hx of large prostate, first time not being able not to pass urine at all, straight catheter placed and 650mL urine out, UA/Ucx sent, seeing his doctor at 9am already has appointment, declined Wesley and leg bag and states he will return if he cannot pass urine tonight

## 2019-07-01 NOTE — CHART NOTE - NSCHARTNOTEFT_GEN_A_CORE
Mr. South remains on the unit for continued treatment, safety and observation. He was seen by  and treatment team during morning rounds. He remained in bed during rounds. He denies and presents no evidence for suicidal and homicidal ideation. He continues to present as unpredictable, disorganized and thought disordered. He reports eating and sleeping well. He was cooperative with treatment team but according to nursing staff, he has been refusing his medication for his medical needs. Treatment team encouraged Mr. South to attend unit groups.      will continue to meet with Mr. South one on one and with treatment team daily. When stable, Mr. South will return to his residence at [facility] and will resume treatment with his SW Kalyani Monteiro and TJEA Knight. Patient is not yet psychiatrically stable for discharge.

## 2019-07-01 NOTE — PROGRESS NOTE BEHAVIORAL HEALTH - NSBHFUPINTERVALHXFT_PSY_A_CORE
Pt was seen, and evaluated, and chart was reviewed. No acute events overnight.  Patient is alert, Ox3,  calm, cooperative, compliant with treatment. Compliant with medicines prescribed the internist as well. Patient is in good behavioral control.  Pt is slightly less  thought disordered and illogical    ***Pt denies and presents no evidence for suicidal or homicidal ideas plans or intentions.  Pt denies A/V H.  ***Pt slept well, Pt is tolerating meds well w/o any acute S/E.  Pt has no medication related complaints. Continues current medication regimen.

## 2019-07-01 NOTE — ED ADULT NURSE REASSESSMENT NOTE - NS ED NURSE REASSESS COMMENT FT1
Pt had catheter placed using asceptic technique, 620 mls return of urine, pt feeling much better, used size 14fr, catheter removed as per pt request, urine sample sent, pt seeing his own pcp 0900 tomorrow, if further retention occurs overnight pt knows to return to the er

## 2019-07-01 NOTE — PROGRESS NOTE ADULT - SUBJECTIVE AND OBJECTIVE BOX
pt stable alert in NAD  no new complaints    HOMICIDAL IDEATION  ^HOMICIDAL IDEATION  No pertinent family history in first degree relatives  Handoff  Sickle cell anemia  Anemia  Substance abuse  Schizophrenia  Schizophrenia, unspecified type  Type 2 diabetes mellitus with other neurologic complication, without long-term current use of insulin  Essential hypertension  Schizophrenia  No significant past surgical history    HEALTH ISSUES - PROBLEM Dx:  Schizophrenia, unspecified type: Schizophrenia, unspecified type  Type 2 diabetes mellitus with other neurologic complication, without long-term current use of insulin: Type 2 diabetes mellitus with other neurologic complication, without long-term current use of insulin  Essential hypertension: Essential hypertension  Schizophrenia: Schizophrenia        PAST MEDICAL & SURGICAL HISTORY:  Sickle cell anemia  Anemia  Substance abuse  Schizophrenia  No significant past surgical history    No Known Allergies      FAMILY HISTORY:  No pertinent family history in first degree relatives      apixaban 5 milliGRAM(s) Oral two times a day  atorvastatin 10 milliGRAM(s) Oral at bedtime  benztropine 1 milliGRAM(s) Oral two times a day  diphenhydrAMINE 50 milliGRAM(s) Oral at bedtime PRN  haloperidol     Tablet 10 milliGRAM(s) Oral at bedtime  haloperidol     Tablet 5 milliGRAM(s) Oral daily  hydrOXYzine hydrochloride 25 milliGRAM(s) Oral every 8 hours PRN  lisinopril 2.5 milliGRAM(s) Oral daily  metFORMIN 500 milliGRAM(s) Oral two times a day  nicotine  Polacrilex Gum 2 milliGRAM(s) Oral every 3 hours PRN      T(C): --  HR: --  BP: --  RR: --  SpO2: --    PE;  general:  no changes noted in nad    Lungs:    Heart:    EXT:    Neuro:  aelrt nod eciits                          CAPILLARY BLOOD GLUCOSE

## 2019-07-01 NOTE — ED ADULT TRIAGE NOTE - CHIEF COMPLAINT QUOTE
pt complains of urinary retention. States hx of enlarged prostate, supposed to see his PCP tomorrow but could not wait any longer. Pt reports small amount of urine for two days. Has not urinated for the past few hours.

## 2019-07-01 NOTE — ED PROVIDER NOTE - CLINICAL SUMMARY MEDICAL DECISION MAKING FREE TEXT BOX
43 y/o M w hx of DM BIBEMS from shelter presents to ED c/o fever, chills, and increased urination. Pt had a fever of 100.9. Pt also reporting R leg pain w swelling. States he had similar sx in the past but to L leg. Denies any N/V, weakness, dizziness.  PCP: Linda Fisher

## 2019-07-01 NOTE — ED ADULT NURSE NOTE - OBJECTIVE STATEMENT
Pt pmhx elnarged prostate, has had no problems up until today where he feels he cant fullyempty bladder, has been going to pass urine at night up to 8 times a night, due to see pcp in the am but feels too uncomfortable to wait till tomorrow

## 2019-07-01 NOTE — ED PROVIDER NOTE - NSFOLLOWUPINSTRUCTIONS_ED_ALL_ED_FT
See your doctor tomorrow morning as scheduled.  Please return if you have urinary retention again.  Go back on the antibiotic.

## 2019-07-01 NOTE — ED PROVIDER NOTE - CARE PROVIDER_API CALL
Mike Mccollum)  Urology  [address] [place] [address], [address]  [place], [place] [id number]  Phone: [phone]  Fax: [phone]  Follow Up Time:

## 2019-07-02 ENCOUNTER — APPOINTMENT (OUTPATIENT)
Dept: INTERNAL MEDICINE | Facility: CLINIC | Age: 59
End: 2019-07-02

## 2019-07-02 ENCOUNTER — APPOINTMENT (OUTPATIENT)
Dept: INTERNAL MEDICINE | Facility: CLINIC | Age: 59
End: 2019-07-02
Payer: MEDICAID

## 2019-07-02 VITALS
DIASTOLIC BLOOD PRESSURE: 74 MMHG | HEIGHT: 69 IN | SYSTOLIC BLOOD PRESSURE: 133 MMHG | TEMPERATURE: 99 F | WEIGHT: 163 LBS | BODY MASS INDEX: 24.14 KG/M2 | HEART RATE: 83 BPM | OXYGEN SATURATION: 97 %

## 2019-07-02 DIAGNOSIS — K64.4 RESIDUAL HEMORRHOIDAL SKIN TAGS: ICD-10-CM

## 2019-07-02 LAB
ALBUMIN SERPL ELPH-MCNC: 3.9 G/DL — SIGNIFICANT CHANGE UP (ref 3.5–5.2)
ALP SERPL-CCNC: 92 U/L — SIGNIFICANT CHANGE UP (ref 30–115)
ALT FLD-CCNC: 23 U/L — SIGNIFICANT CHANGE UP (ref 0–41)
ANION GAP SERPL CALC-SCNC: 11 MMOL/L — SIGNIFICANT CHANGE UP (ref 7–14)
AST SERPL-CCNC: 20 U/L — SIGNIFICANT CHANGE UP (ref 0–41)
BILIRUB SERPL-MCNC: 0.2 MG/DL — SIGNIFICANT CHANGE UP (ref 0.2–1.2)
BUN SERPL-MCNC: 17 MG/DL — SIGNIFICANT CHANGE UP (ref 10–20)
CALCIUM SERPL-MCNC: 9 MG/DL — SIGNIFICANT CHANGE UP (ref 8.5–10.1)
CHLORIDE SERPL-SCNC: 102 MMOL/L — SIGNIFICANT CHANGE UP (ref 98–110)
CHOLEST SERPL-MCNC: 144 MG/DL — SIGNIFICANT CHANGE UP (ref 100–200)
CO2 SERPL-SCNC: 26 MMOL/L — SIGNIFICANT CHANGE UP (ref 17–32)
CREAT SERPL-MCNC: 0.8 MG/DL — SIGNIFICANT CHANGE UP (ref 0.7–1.5)
FERRITIN SERPL-MCNC: 217 NG/ML — SIGNIFICANT CHANGE UP (ref 30–400)
FOLATE SERPL-MCNC: 12.4 NG/ML — SIGNIFICANT CHANGE UP
GLUCOSE SERPL-MCNC: 135 MG/DL — HIGH (ref 70–99)
HCT VFR BLD CALC: 34.6 % — LOW (ref 42–52)
HDLC SERPL-MCNC: 45 MG/DL — SIGNIFICANT CHANGE UP
HGB BLD-MCNC: 11.1 G/DL — LOW (ref 14–18)
IRON SATN MFR SERPL: 17 % — SIGNIFICANT CHANGE UP (ref 15–50)
IRON SATN MFR SERPL: 32 UG/DL — LOW (ref 35–150)
LIPID PNL WITH DIRECT LDL SERPL: 98 MG/DL — SIGNIFICANT CHANGE UP (ref 4–129)
MCHC RBC-ENTMCNC: 21.5 PG — LOW (ref 27–31)
MCHC RBC-ENTMCNC: 32.1 G/DL — SIGNIFICANT CHANGE UP (ref 32–37)
MCV RBC AUTO: 66.9 FL — LOW (ref 80–94)
NRBC # BLD: 0 /100 WBCS — SIGNIFICANT CHANGE UP (ref 0–0)
PLATELET # BLD AUTO: 284 K/UL — SIGNIFICANT CHANGE UP (ref 130–400)
POTASSIUM SERPL-MCNC: 4.5 MMOL/L — SIGNIFICANT CHANGE UP (ref 3.5–5)
POTASSIUM SERPL-SCNC: 4.5 MMOL/L — SIGNIFICANT CHANGE UP (ref 3.5–5)
PROT SERPL-MCNC: 7.4 G/DL — SIGNIFICANT CHANGE UP (ref 6–8)
RBC # BLD: 5.17 M/UL — SIGNIFICANT CHANGE UP (ref 4.7–6.1)
RBC # FLD: 16.8 % — HIGH (ref 11.5–14.5)
SODIUM SERPL-SCNC: 139 MMOL/L — SIGNIFICANT CHANGE UP (ref 135–146)
TIBC SERPL-MCNC: 192 UG/DL — LOW (ref 220–430)
TOTAL CHOLESTEROL/HDL RATIO MEASUREMENT: 3.2 RATIO — LOW (ref 4–5.5)
TRIGL SERPL-MCNC: 67 MG/DL — SIGNIFICANT CHANGE UP (ref 10–149)
UIBC SERPL-MCNC: 160 UG/DL — SIGNIFICANT CHANGE UP (ref 110–370)
VIT B12 SERPL-MCNC: 472 PG/ML — SIGNIFICANT CHANGE UP (ref 232–1245)
WBC # BLD: 7.46 K/UL — SIGNIFICANT CHANGE UP (ref 4.8–10.8)
WBC # FLD AUTO: 7.46 K/UL — SIGNIFICANT CHANGE UP (ref 4.8–10.8)

## 2019-07-02 PROCEDURE — 99232 SBSQ HOSP IP/OBS MODERATE 35: CPT

## 2019-07-02 PROCEDURE — 99214 OFFICE O/P EST MOD 30 MIN: CPT

## 2019-07-02 RX ADMIN — METFORMIN HYDROCHLORIDE 500 MILLIGRAM(S): 850 TABLET ORAL at 20:17

## 2019-07-02 RX ADMIN — APIXABAN 5 MILLIGRAM(S): 2.5 TABLET, FILM COATED ORAL at 21:14

## 2019-07-02 RX ADMIN — Medication 2 MILLIGRAM(S): at 06:38

## 2019-07-02 RX ADMIN — Medication 1 MILLIGRAM(S): at 08:48

## 2019-07-02 RX ADMIN — Medication 2 MILLIGRAM(S): at 21:21

## 2019-07-02 RX ADMIN — METFORMIN HYDROCHLORIDE 500 MILLIGRAM(S): 850 TABLET ORAL at 08:49

## 2019-07-02 RX ADMIN — Medication 1 MILLIGRAM(S): at 20:13

## 2019-07-02 RX ADMIN — HALOPERIDOL DECANOATE 10 MILLIGRAM(S): 100 INJECTION INTRAMUSCULAR at 20:13

## 2019-07-02 RX ADMIN — HALOPERIDOL DECANOATE 5 MILLIGRAM(S): 100 INJECTION INTRAMUSCULAR at 08:48

## 2019-07-02 RX ADMIN — ATORVASTATIN CALCIUM 10 MILLIGRAM(S): 80 TABLET, FILM COATED ORAL at 21:15

## 2019-07-02 RX ADMIN — APIXABAN 5 MILLIGRAM(S): 2.5 TABLET, FILM COATED ORAL at 08:48

## 2019-07-02 NOTE — PROGRESS NOTE BEHAVIORAL HEALTH - NSBHFUPINTERVALHXFT_PSY_A_CORE
The patient attended today’s treatment team meeting participated in tx and discharge planning and signed the attendance sheet.      Pt was seen, and evaluated, and chart was reviewed. No acute events overnight.  Patient is alert, Ox3,  calm, cooperative, compliant with treatment. Compliant with medicines prescribed the internist as well, except for lisinopril. Patient is in good behavioral control.  Pt is slightly less  thought disordered and illogical    ***Pt denies and presents no evidence for suicidal or homicidal ideas plans or intentions.  Pt denies A/V H.  ***Pt slept well, Pt is tolerating meds well w/o any acute S/E.  Pt has no medication related complaints. Continues current medication regimen. Writer has  contacted Nika from [facility] to prepare for possible d/c next week.

## 2019-07-02 NOTE — PROGRESS NOTE BEHAVIORAL HEALTH - RISK ASSESSMENT
High acute risk; a safe discharge cannot not be made at this time, pt is an imminent danger to others, has recent incarceration for violence, is making threats in ED and requires inpatient psychiatric admission for treatment and stabilization. No need for 1; 1 CO, despite threats has maintained good behavioural control in the ED.
Moderate. Pt has been not exhibiting or endorses any agitated / threatening /homicidal behavior at this time. he denies hearing voices at this time. No need for 1; 1 observation but needs enhanced supervion at this time.
High acute risk; a safe discharge cannot not be made at this time, pt is an imminent danger to others, has recent incarceration for violence, is making threats in ED and requires inpatient psychiatric admission for treatment and stabilization. No need for 1; 1 CO, despite threats has maintained good behavioural control in the ED.
High acute risk; a safe discharge cannot not be made at this time, pt is an imminent danger to others, has recent incarceration for violence, is making threats in ED and requires inpatient psychiatric admission for treatment and stabilization. No need for 1; 1 CO, despite threats has maintained good behavioural control in the ED.

## 2019-07-03 DIAGNOSIS — D57.1 SICKLE-CELL DISEASE WITHOUT CRISIS: ICD-10-CM

## 2019-07-03 DIAGNOSIS — I48.91 UNSPECIFIED ATRIAL FIBRILLATION: ICD-10-CM

## 2019-07-03 DIAGNOSIS — D56.0 ALPHA THALASSEMIA: ICD-10-CM

## 2019-07-03 PROBLEM — K64.4 EXTERNAL HEMORRHOID: Status: ACTIVE | Noted: 2019-07-02

## 2019-07-03 LAB
CULTURE RESULTS: NO GROWTH — SIGNIFICANT CHANGE UP
ESTIMATED AVERAGE GLUCOSE: 137 MG/DL — HIGH (ref 68–114)
HBA1C BLD-MCNC: 6.4 % — HIGH (ref 4–5.6)
SPECIMEN SOURCE: SIGNIFICANT CHANGE UP

## 2019-07-03 PROCEDURE — 99231 SBSQ HOSP IP/OBS SF/LOW 25: CPT

## 2019-07-03 RX ORDER — METFORMIN HYDROCHLORIDE 850 MG/1
1000 TABLET ORAL
Refills: 0 | Status: DISCONTINUED | OUTPATIENT
Start: 2019-07-03 | End: 2019-07-12

## 2019-07-03 RX ADMIN — APIXABAN 5 MILLIGRAM(S): 2.5 TABLET, FILM COATED ORAL at 20:28

## 2019-07-03 RX ADMIN — Medication 1 MILLIGRAM(S): at 20:28

## 2019-07-03 RX ADMIN — HALOPERIDOL DECANOATE 5 MILLIGRAM(S): 100 INJECTION INTRAMUSCULAR at 08:12

## 2019-07-03 RX ADMIN — Medication 2 MILLIGRAM(S): at 15:00

## 2019-07-03 RX ADMIN — HALOPERIDOL DECANOATE 10 MILLIGRAM(S): 100 INJECTION INTRAMUSCULAR at 20:29

## 2019-07-03 RX ADMIN — Medication 2 MILLIGRAM(S): at 18:49

## 2019-07-03 RX ADMIN — METFORMIN HYDROCHLORIDE 1000 MILLIGRAM(S): 850 TABLET ORAL at 20:29

## 2019-07-03 RX ADMIN — ATORVASTATIN CALCIUM 10 MILLIGRAM(S): 80 TABLET, FILM COATED ORAL at 20:28

## 2019-07-03 RX ADMIN — APIXABAN 5 MILLIGRAM(S): 2.5 TABLET, FILM COATED ORAL at 08:12

## 2019-07-03 RX ADMIN — METFORMIN HYDROCHLORIDE 500 MILLIGRAM(S): 850 TABLET ORAL at 08:12

## 2019-07-03 RX ADMIN — Medication 1 MILLIGRAM(S): at 08:12

## 2019-07-03 NOTE — PHYSICAL EXAM
[Well Nourished] : well nourished [No Acute Distress] : no acute distress [Normal Sclera/Conjunctiva] : normal sclera/conjunctiva [No JVD] : no jugular venous distention [PERRL] : pupils equal round and reactive to light [No Respiratory Distress] : no respiratory distress  [No Lymphadenopathy] : no lymphadenopathy [Normal Rate] : normal rate  [Clear to Auscultation] : lungs were clear to auscultation bilaterally [Normal S1, S2] : normal S1 and S2 [Non Tender] : non-tender [Soft] : abdomen soft [No Spinal Tenderness] : no spinal tenderness [No CVA Tenderness] : no CVA  tenderness [No Rash] : no rash [No Skin Lesions] : no skin lesions [Coordination Grossly Intact] : coordination grossly intact [No Focal Deficits] : no focal deficits [Normal Affect] : the affect was normal [Normal Insight/Judgement] : insight and judgment were intact [FreeTextEntry1] : enlarged external hemorrhoids 2 in size - 2cm and 2.5 cm in size.

## 2019-07-03 NOTE — PROGRESS NOTE ADULT - SUBJECTIVE AND OBJECTIVE BOX
pt stable alert in NAD  no new complaints    HOMICIDAL IDEATION  ^HOMICIDAL IDEATION  No pertinent family history in first degree relatives  Handoff  Sickle cell anemia  Anemia  Substance abuse  Schizophrenia  Schizophrenia, unspecified type  Type 2 diabetes mellitus with other neurologic complication, without long-term current use of insulin  Essential hypertension  Schizophrenia  No significant past surgical history    HEALTH ISSUES - PROBLEM Dx:  Schizophrenia, unspecified type: Schizophrenia, unspecified type  Type 2 diabetes mellitus with other neurologic complication, without long-term current use of insulin: Type 2 diabetes mellitus with other neurologic complication, without long-term current use of insulin  Essential hypertension: Essential hypertension  Schizophrenia: Schizophrenia        PAST MEDICAL & SURGICAL HISTORY:  Sickle cell anemia  Anemia  Substance abuse  Schizophrenia  No significant past surgical history    No Known Allergies      FAMILY HISTORY:  No pertinent family history in first degree relatives      apixaban 5 milliGRAM(s) Oral two times a day  atorvastatin 10 milliGRAM(s) Oral at bedtime  benztropine 1 milliGRAM(s) Oral two times a day  diphenhydrAMINE 50 milliGRAM(s) Oral at bedtime PRN  haloperidol     Tablet 10 milliGRAM(s) Oral at bedtime  haloperidol     Tablet 5 milliGRAM(s) Oral daily  hydrOXYzine hydrochloride 25 milliGRAM(s) Oral every 8 hours PRN  lisinopril 2.5 milliGRAM(s) Oral daily  metFORMIN 500 milliGRAM(s) Oral two times a day  nicotine  Polacrilex Gum 2 milliGRAM(s) Oral every 3 hours PRN      T(C): --  HR: --  BP: --  RR: --  SpO2: --    PE;  general:  no achnges in stastus in nad    Lungs:    Heart: irreg    EXT:    Neuro:   aeelrt nodeficits    11.1  34.6  7.46  17  0.8  4.5  135      07-02    139  |  102  |  17  ----------------------------<  135<H>  4.5   |  26  |  0.8    Ca    9.0      02 Jul 2019 07:56    TPro  7.4  /  Alb  3.9  /  TBili  0.2  /  DBili  x   /  AST  20  /  ALT  23  /  AlkPhos  92  07-02      LIVER FUNCTIONS - ( 02 Jul 2019 07:56 )  Alb: 3.9 g/dL / Pro: 7.4 g/dL / ALK PHOS: 92 U/L / ALT: 23 U/L / AST: 20 U/L / GGT: x                                   11.1   7.46  )-----------( 284      ( 02 Jul 2019 07:56 )             34.6       CAPILLARY BLOOD GLUCOSE

## 2019-07-03 NOTE — REVIEW OF SYSTEMS
[Constipation] : constipation [Hesitancy] : hesitancy [Dysuria] : no dysuria [Frequency] : no frequency [Nocturia] : nocturia [Negative] : Heme/Lymph

## 2019-07-03 NOTE — CHART NOTE - NSCHARTNOTEFT_GEN_A_CORE
Mr. South remains on the unit for continued treatment, safety and observation. He was seen by  and treatment team during morning rounds. He remained in bed during rounds. According to nursing staff, he was viewed speaking to himself on the unit. He continues to present as unpredictable, slightly less thought disordered. He denies and presents no evidence for suicidal and homicidal ideation. He reports eating and sleeping well. He is cooperative with treatment team and in good behavioral control. Treatment team encourages Mr. South to attend unit groups.      will continue to meet with Mr. South one on one and with treatment team daily. When stable, Mr. South will return to his residence at [facility] and will resume treatment with his SW Kalyani Monteiro and TEJA Knight. PHP Programs are also being explored. Patient did not provide permission for  to speak with his  (message left from number [phone]). Patient is not yet psychiatrically stable for discharge. Mr. South remains on the unit for continued treatment, safety and observation. He was seen by  and treatment team during morning rounds. He remained in bed during rounds. According to nursing staff, he was viewed speaking to himself on the unit. He continues to present as slightly less thought disordered. He denies and presents no evidence for suicidal and homicidal ideation. He reports eating and sleeping well. He is cooperative with treatment team and in good behavioral control. Treatment team encourages Mr. South to attend unit groups.      will continue to meet with Mr. South one on one and with treatment team daily. When stable, Mr. South will return to his residence at [facility] and will resume treatment with his SW Kalyani Monteiro and TEJA Knight. PHP Programs are also being explored. Patient did not provide permission for  to speak with his  (message left from number [phone]). Patient is not yet psychiatrically stable for discharge. Mr. South remains on the unit for continued treatment, safety and observation. He was seen by  and treatment team during morning rounds. He remained in bed during rounds. According to nursing staff, he was viewed speaking to himself on the unit. He continues to present as slightly less thought disordered. He denies and presents no evidence for suicidal and homicidal ideation. He reports eating and sleeping well. He is cooperative with treatment team and in good behavioral control. Treatment team encourages Mr. South to attend unit groups.      will continue to meet with Mr. South one on one and with treatment team daily. When stable, Mr. South will return to his residence at [facility] and will resume treatment with his SW Kalyani Monteiro and TEJA Knight.  spoke with both individuals and provided them with an update this afternoon. PHP Programs are also being explored. Patient did not provide permission for  to speak with his  (message left from number [phone]). Patient is not yet psychiatrically stable for discharge. Mr. South remains on the unit for continued treatment, safety and observation. He was seen by  and treatment team during morning rounds. He remained in bed during rounds. According to nursing staff, he was viewed speaking to himself on the unit. He continues to present as slightly less thought disordered. He denies and presents no evidence for suicidal and homicidal ideation. He reports eating and sleeping well. He is cooperative with treatment team and in good behavioral control. Treatment team encourages Mr. South to attend unit groups.      will continue to meet with Mr. South one on one and with treatment team daily. When stable, Mr. South will return to his residence at [facility] and will resume treatment with his SW Kalyani Monteiro and TEJA Knight.  spoke with both individuals and provided them with an update this afternoon. PHP Programs are also being explored. Patient did not provide permission for  to speak with his  (message left from number [phone]). Patient is not yet psychiatrically stable for discharge.

## 2019-07-03 NOTE — PROGRESS NOTE BEHAVIORAL HEALTH - NSBHFUPINTERVALHXFT_PSY_A_CORE
Pt was seen, and evaluated, and chart was reviewed. No acute events overnight.  Patient is alert, Ox3,  calm, cooperative, compliant with treatment. Compliant with medicines prescribed the internist as well, except for lisinopril. Patient is in good behavioral control.  Pt is less  thought disordered and illogical    ***Pt denies and presents no evidence for suicidal or homicidal ideas plans or intentions.  Pt denies A/V H.  ***Pt slept well, Pt is tolerating meds well w/o any acute S/E.  Pt has no medication related complaints. Continues current medication regimen. Writer has  contacted Nika from [facility] to prepare for possible d/c next week. Labs were reviewd. Metformin was increased to 1000 mg po bid for hyperglycemia.

## 2019-07-03 NOTE — HISTORY OF PRESENT ILLNESS
[FreeTextEntry8] : 58 year old male patient came in for follow up on his BPH and recurrent hemorrhoids. \par \par Patient over the weekend was evaluated at urgent care as he wasn’t able to urinate for over 2 days, patient was  diagnosed with urinary retention, 610 ml of urine was obtained after straight cath. Of note patients previous doctor has told him in the past that he has an enlarged prostate after rectal exam but was never put on medication. \par patient today also complains of rectal mass and pain attributed to hemorrhoids. he reports he has had hemorrhoids in the past and they always self resolved. The past 4 days patient has major hemorrhoids, that is progressing. he has tried topical meds and denies any benefit. Current lesions are associated with sphincter spasms, prevent sleeping.\par

## 2019-07-04 PROCEDURE — 99231 SBSQ HOSP IP/OBS SF/LOW 25: CPT

## 2019-07-04 RX ADMIN — Medication 2 MILLIGRAM(S): at 16:39

## 2019-07-04 RX ADMIN — METFORMIN HYDROCHLORIDE 1000 MILLIGRAM(S): 850 TABLET ORAL at 20:17

## 2019-07-04 RX ADMIN — HALOPERIDOL DECANOATE 5 MILLIGRAM(S): 100 INJECTION INTRAMUSCULAR at 08:18

## 2019-07-04 RX ADMIN — APIXABAN 5 MILLIGRAM(S): 2.5 TABLET, FILM COATED ORAL at 08:18

## 2019-07-04 RX ADMIN — Medication 1 MILLIGRAM(S): at 20:17

## 2019-07-04 RX ADMIN — ATORVASTATIN CALCIUM 10 MILLIGRAM(S): 80 TABLET, FILM COATED ORAL at 20:17

## 2019-07-04 RX ADMIN — APIXABAN 5 MILLIGRAM(S): 2.5 TABLET, FILM COATED ORAL at 20:16

## 2019-07-04 RX ADMIN — Medication 1 MILLIGRAM(S): at 08:18

## 2019-07-04 RX ADMIN — HALOPERIDOL DECANOATE 10 MILLIGRAM(S): 100 INJECTION INTRAMUSCULAR at 20:17

## 2019-07-04 RX ADMIN — METFORMIN HYDROCHLORIDE 1000 MILLIGRAM(S): 850 TABLET ORAL at 08:18

## 2019-07-04 RX ADMIN — LISINOPRIL 2.5 MILLIGRAM(S): 2.5 TABLET ORAL at 08:18

## 2019-07-04 RX ADMIN — Medication 2 MILLIGRAM(S): at 12:06

## 2019-07-04 RX ADMIN — Medication 2 MILLIGRAM(S): at 08:22

## 2019-07-05 ENCOUNTER — TRANSCRIPTION ENCOUNTER (OUTPATIENT)
Age: 59
End: 2019-07-05

## 2019-07-05 ENCOUNTER — RX RENEWAL (OUTPATIENT)
Age: 59
End: 2019-07-05

## 2019-07-05 PROCEDURE — 99231 SBSQ HOSP IP/OBS SF/LOW 25: CPT

## 2019-07-05 RX ADMIN — ATORVASTATIN CALCIUM 10 MILLIGRAM(S): 80 TABLET, FILM COATED ORAL at 20:18

## 2019-07-05 RX ADMIN — Medication 2 MILLIGRAM(S): at 14:26

## 2019-07-05 RX ADMIN — METFORMIN HYDROCHLORIDE 1000 MILLIGRAM(S): 850 TABLET ORAL at 08:15

## 2019-07-05 RX ADMIN — HALOPERIDOL DECANOATE 5 MILLIGRAM(S): 100 INJECTION INTRAMUSCULAR at 08:15

## 2019-07-05 RX ADMIN — HALOPERIDOL DECANOATE 10 MILLIGRAM(S): 100 INJECTION INTRAMUSCULAR at 20:17

## 2019-07-05 RX ADMIN — APIXABAN 5 MILLIGRAM(S): 2.5 TABLET, FILM COATED ORAL at 08:15

## 2019-07-05 RX ADMIN — Medication 1 MILLIGRAM(S): at 08:15

## 2019-07-05 RX ADMIN — METFORMIN HYDROCHLORIDE 1000 MILLIGRAM(S): 850 TABLET ORAL at 20:17

## 2019-07-05 RX ADMIN — Medication 1 MILLIGRAM(S): at 20:18

## 2019-07-05 RX ADMIN — APIXABAN 5 MILLIGRAM(S): 2.5 TABLET, FILM COATED ORAL at 20:18

## 2019-07-05 NOTE — CHART NOTE - NSCHARTNOTEFT_GEN_A_CORE
Mr. South remains on the unit for continued treatment, safety and observation. He was seen by  and treatment team during morning rounds. He remained in bed during rounds. According to nursing staff, there have been no issues on the unit. He remains in good behavioral control. He continues to present as slightly less thought disordered. He denies and presents no evidence for suicidal and homicidal ideation. He reports eating and sleeping well. He is cooperative with treatment team and in good behavioral control. Treatment team encourages Mr. South to attend unit groups.      will continue to meet with Mr. South one on one and with treatment team daily. When stable, Mr. South will return to his residence at [facility] and will resume treatment with his SHAMA Monteiro and TEJA Knight.  spoke with both individuals on 7/3 and provided them with an update. TEJA Maher plans to meet with Dr. Bauer on Monday to review patient's progress. PHP Programs are also being explored. Patient did not provide permission for  to speak with his  (message left from number [phone]). Patient is not yet psychiatrically stable for discharge.

## 2019-07-05 NOTE — PROGRESS NOTE BEHAVIORAL HEALTH - NSBHFUPINTERVALHXFT_PSY_A_CORE
Pt was seen, and evaluated, and chart was reviewed. No acute events overnight.  Patient is alert, Ox3,  calm, cooperative, compliant with treatment.  Patient is in good behavioral control.  Pt's conditoin is improving he  is more coherent, less  thought disordered and less illogical    ***Pt denies and presents no evidence for suicidal or homicidal ideas plans or intentions.  Pt denies A/V H.  ***Pt slept well, Pt is tolerating meds well w/o any acute S/E.  Pt has no medication related complaints. Continues current medication regimen. Writer has  contacted Nika from [facility] to prepare for possible d/c next week.  She is scheduled to will meet c pt and team MOnday. Continues Metformin  1000 mg po bid for hyperglycemia.

## 2019-07-06 RX ADMIN — APIXABAN 5 MILLIGRAM(S): 2.5 TABLET, FILM COATED ORAL at 08:53

## 2019-07-06 RX ADMIN — Medication 1 MILLIGRAM(S): at 20:05

## 2019-07-06 RX ADMIN — Medication 1 MILLIGRAM(S): at 08:53

## 2019-07-06 RX ADMIN — METFORMIN HYDROCHLORIDE 1000 MILLIGRAM(S): 850 TABLET ORAL at 20:05

## 2019-07-06 RX ADMIN — Medication 2 MILLIGRAM(S): at 08:54

## 2019-07-06 RX ADMIN — ATORVASTATIN CALCIUM 10 MILLIGRAM(S): 80 TABLET, FILM COATED ORAL at 20:05

## 2019-07-06 RX ADMIN — Medication 2 MILLIGRAM(S): at 15:07

## 2019-07-06 RX ADMIN — HALOPERIDOL DECANOATE 5 MILLIGRAM(S): 100 INJECTION INTRAMUSCULAR at 08:53

## 2019-07-06 RX ADMIN — APIXABAN 5 MILLIGRAM(S): 2.5 TABLET, FILM COATED ORAL at 20:05

## 2019-07-06 RX ADMIN — HALOPERIDOL DECANOATE 10 MILLIGRAM(S): 100 INJECTION INTRAMUSCULAR at 20:05

## 2019-07-06 RX ADMIN — METFORMIN HYDROCHLORIDE 1000 MILLIGRAM(S): 850 TABLET ORAL at 08:53

## 2019-07-07 ENCOUNTER — TRANSCRIPTION ENCOUNTER (OUTPATIENT)
Age: 59
End: 2019-07-07

## 2019-07-07 RX ADMIN — Medication 2 MILLIGRAM(S): at 17:23

## 2019-07-07 RX ADMIN — ATORVASTATIN CALCIUM 10 MILLIGRAM(S): 80 TABLET, FILM COATED ORAL at 20:18

## 2019-07-07 RX ADMIN — METFORMIN HYDROCHLORIDE 1000 MILLIGRAM(S): 850 TABLET ORAL at 20:18

## 2019-07-07 RX ADMIN — METFORMIN HYDROCHLORIDE 1000 MILLIGRAM(S): 850 TABLET ORAL at 08:04

## 2019-07-07 RX ADMIN — HALOPERIDOL DECANOATE 10 MILLIGRAM(S): 100 INJECTION INTRAMUSCULAR at 20:18

## 2019-07-07 RX ADMIN — Medication 1 MILLIGRAM(S): at 08:04

## 2019-07-07 RX ADMIN — Medication 1 MILLIGRAM(S): at 20:18

## 2019-07-07 RX ADMIN — HALOPERIDOL DECANOATE 5 MILLIGRAM(S): 100 INJECTION INTRAMUSCULAR at 08:04

## 2019-07-07 RX ADMIN — Medication 2 MILLIGRAM(S): at 08:04

## 2019-07-07 RX ADMIN — APIXABAN 5 MILLIGRAM(S): 2.5 TABLET, FILM COATED ORAL at 08:04

## 2019-07-07 RX ADMIN — APIXABAN 5 MILLIGRAM(S): 2.5 TABLET, FILM COATED ORAL at 20:18

## 2019-07-08 ENCOUNTER — APPOINTMENT (OUTPATIENT)
Dept: COLORECTAL SURGERY | Facility: CLINIC | Age: 59
End: 2019-07-08

## 2019-07-08 ENCOUNTER — APPOINTMENT (OUTPATIENT)
Dept: UROLOGY | Facility: CLINIC | Age: 59
End: 2019-07-08
Payer: MEDICAID

## 2019-07-08 ENCOUNTER — TRANSCRIPTION ENCOUNTER (OUTPATIENT)
Age: 59
End: 2019-07-08

## 2019-07-08 VITALS
HEART RATE: 70 BPM | SYSTOLIC BLOOD PRESSURE: 125 MMHG | BODY MASS INDEX: 24.14 KG/M2 | HEIGHT: 69 IN | DIASTOLIC BLOOD PRESSURE: 80 MMHG | OXYGEN SATURATION: 94 % | WEIGHT: 163 LBS | TEMPERATURE: 98.2 F

## 2019-07-08 VITALS
SYSTOLIC BLOOD PRESSURE: 128 MMHG | BODY MASS INDEX: 24.14 KG/M2 | HEIGHT: 69 IN | DIASTOLIC BLOOD PRESSURE: 78 MMHG | TEMPERATURE: 97.8 F | OXYGEN SATURATION: 98 % | HEART RATE: 66 BPM | WEIGHT: 163 LBS

## 2019-07-08 DIAGNOSIS — N41.0 ACUTE PROSTATITIS: ICD-10-CM

## 2019-07-08 PROCEDURE — 99231 SBSQ HOSP IP/OBS SF/LOW 25: CPT

## 2019-07-08 PROCEDURE — 99203 OFFICE O/P NEW LOW 30 MIN: CPT | Mod: 25

## 2019-07-08 PROCEDURE — 51798 US URINE CAPACITY MEASURE: CPT

## 2019-07-08 RX ADMIN — APIXABAN 5 MILLIGRAM(S): 2.5 TABLET, FILM COATED ORAL at 08:28

## 2019-07-08 RX ADMIN — Medication 1 MILLIGRAM(S): at 08:28

## 2019-07-08 RX ADMIN — Medication 2 MILLIGRAM(S): at 20:20

## 2019-07-08 RX ADMIN — METFORMIN HYDROCHLORIDE 1000 MILLIGRAM(S): 850 TABLET ORAL at 08:27

## 2019-07-08 RX ADMIN — HALOPERIDOL DECANOATE 10 MILLIGRAM(S): 100 INJECTION INTRAMUSCULAR at 20:18

## 2019-07-08 RX ADMIN — Medication 2 MILLIGRAM(S): at 15:53

## 2019-07-08 RX ADMIN — ATORVASTATIN CALCIUM 10 MILLIGRAM(S): 80 TABLET, FILM COATED ORAL at 20:18

## 2019-07-08 RX ADMIN — Medication 2 MILLIGRAM(S): at 11:06

## 2019-07-08 RX ADMIN — HALOPERIDOL DECANOATE 5 MILLIGRAM(S): 100 INJECTION INTRAMUSCULAR at 08:27

## 2019-07-08 RX ADMIN — METFORMIN HYDROCHLORIDE 1000 MILLIGRAM(S): 850 TABLET ORAL at 20:17

## 2019-07-08 RX ADMIN — APIXABAN 5 MILLIGRAM(S): 2.5 TABLET, FILM COATED ORAL at 20:18

## 2019-07-08 RX ADMIN — Medication 1 MILLIGRAM(S): at 20:18

## 2019-07-08 NOTE — CHART NOTE - NSCHARTNOTEFT_GEN_A_CORE
Treatment team met with patient on rounds this morning. Patient is compliant with treatement patient is taking medications as prescribed. Patient is AOx3, denies SI HI and AHV. Patient in good behavioral control. Patient activities of daily living are within normal limits. Patient reports normal appetite and good sleep. Patient has no physical complaints. Patient has no medication related questions. Discharge planning to his residence on the grounds of [facility] ensues. Patient projected for discharge this week providing no regressive behaviors. Patient is amenable to plan. as is the forensic ACT team who came to meet with PT and TX team today to assist in discharge planing ([email or web address]).

## 2019-07-08 NOTE — PHYSICAL EXAM
[General Appearance - Well Developed] : well developed [Edema] : no peripheral edema [Exaggerated Use Of Accessory Muscles For Inspiration] : no accessory muscle use [Bowel Sounds] : normal bowel sounds [] : no hepato-splenomegaly [Abdomen Soft] : soft [Abdomen Tenderness] : non-tender [Penis Abnormality] : normal circumcised penis [Epididymis] : the epididymides were normal [Urethral Meatus] : meatus normal [Prostate Size ___ (0-4)] : prostate size [unfilled] (scale: 0-4) [Testes Mass (___cm)] : there were no testicular masses [Testes Tenderness] : no tenderness of the testes [Skin Color & Pigmentation] : normal skin color and pigmentation [Normal Station and Gait] : the gait and station were normal for the patient's age [No Focal Deficits] : no focal deficits [Inguinal Lymph Nodes Enlarged Bilaterally] : inguinal [Oriented To Time, Place, And Person] : oriented to person, place, and time [FreeTextEntry1] : prostate tender;  PVR 22

## 2019-07-08 NOTE — ASSESSMENT
[FreeTextEntry1] : 58 year old male with a recent history of what seems consistent with acute prostatitis.  He was treated for several days with resolution of symptoms which was complicated by acute urinary retention.  He had an atraumatic catheterization and had had gradual return to baseline of urinary symptoms.\par We discussed his recent symptoms and physical findings and will assume that he has partially treated prostatitis. I would recommend completion of 2 week course.  We discussed side effects allergic reactions to TMP/SX\par \par His urinalysis from ED demonstrated microscopic hematuria which may be artifact of catheterization. \par ? traumatic from catheterization.  We will repeat at this point\par \par \par fu 3 weeks with flow rate

## 2019-07-08 NOTE — PROGRESS NOTE ADULT - SUBJECTIVE AND OBJECTIVE BOX
pt stable alert in NAD  no new complaints    HOMICIDAL IDEATION  ^HOMICIDAL IDEATION  No pertinent family history in first degree relatives  Handoff  Sickle cell anemia  Anemia  Substance abuse  Schizophrenia  Alpha thalassemia  Sickle cell anemia  Atrial fibrillation  Schizophrenia, unspecified type  Type 2 diabetes mellitus with other neurologic complication, without long-term current use of insulin  Essential hypertension  Schizophrenia  No significant past surgical history    HEALTH ISSUES - PROBLEM Dx:  Alpha thalassemia  Sickle cell anemia  Atrial fibrillation: Atrial fibrillation  Schizophrenia, unspecified type: Schizophrenia, unspecified type  Type 2 diabetes mellitus with other neurologic complication, without long-term current use of insulin: Type 2 diabetes mellitus with other neurologic complication, without long-term current use of insulin  Essential hypertension: Essential hypertension  Schizophrenia: Schizophrenia        PAST MEDICAL & SURGICAL HISTORY:  Sickle cell anemia  Anemia  Substance abuse  Schizophrenia  No significant past surgical history    No Known Allergies      FAMILY HISTORY:  No pertinent family history in first degree relatives      apixaban 5 milliGRAM(s) Oral two times a day  atorvastatin 10 milliGRAM(s) Oral at bedtime  benztropine 1 milliGRAM(s) Oral two times a day  diphenhydrAMINE 50 milliGRAM(s) Oral at bedtime PRN  haloperidol     Tablet 10 milliGRAM(s) Oral at bedtime  haloperidol     Tablet 5 milliGRAM(s) Oral daily  hydrOXYzine hydrochloride 25 milliGRAM(s) Oral every 8 hours PRN  lisinopril 2.5 milliGRAM(s) Oral daily  metFORMIN 1000 milliGRAM(s) Oral two times a day  nicotine  Polacrilex Gum 2 milliGRAM(s) Oral every 3 hours PRN      T(C): --  HR: 105 (07-07-19 @ 17:35) (105 - 105)  BP: 112/82 (07-07-19 @ 17:35) (112/82 - 112/82)  RR: 17 (07-07-19 @ 17:35) (17 - 17)  SpO2: --    PE;  general:  stable no acute changes noted    Lungs:    Heart:    EXT:    Neuro:  aelrt no deficits                          CAPILLARY BLOOD GLUCOSE

## 2019-07-08 NOTE — HISTORY OF PRESENT ILLNESS
[Nocturia] : nocturia [Currently Experiencing ___] :  [unfilled] 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\par no other sexual partners; no condoms; no anal sexual intercourse\par \par black male with urinary buring and frequency approximately 10 days ago\par treated with TMP/Sx and then told to stop because culture was reported as negative\par one week ago had acute urinary retention 600 ml catheterized x 1 \par thereafter back to baseline\par no further dysuria or frequency [Urinary Frequency] : no urinary frequency [Erectile Dysfunction] : no Erectile Dysfunction [Dysuria] : no dysuria [Hematuria - Gross] : no gross hematuria

## 2019-07-08 NOTE — PROGRESS NOTE BEHAVIORAL HEALTH - NSBHFUPINTERVALHXFT_PSY_A_CORE
Pt was seen, and evaluated, and chart was reviewed. No acute events overnight.  Patient is alert, Ox3,  calm, cooperative, compliant with treatment.  Patient is in good behavioral control.  Pt's condition is improving he  is more coherent, less  thought disordered and less illogical . Negative symptoms of schizophrenia persist, Pt has limited interest and initive, minimal plans fro the future and is isolative.   ***Pt denies and presents no evidence for suicidal or homicidal ideas plans or intentions.  Pt denies A/V H.  ***Pt slept well, Pt is tolerating meds well w/o any acute S/E.  Pt has no medication related complaints. Continues current medication regimen. .   Continues Metformin  1000 mg po bid for hyperglycemia. ICM  Nika is schedule to meet today  c pt and team for dc panning

## 2019-07-08 NOTE — PROGRESS NOTE ADULT - PROBLEM SELECTOR PLAN 1
continue present treatment as per psych plan as reviewed  Medically stable with no new changes in treatment  will continue to monitor medical status while being treated on psych  cont meds reviwed

## 2019-07-09 ENCOUNTER — TRANSCRIPTION ENCOUNTER (OUTPATIENT)
Age: 59
End: 2019-07-09

## 2019-07-09 LAB
APPEARANCE: CLEAR
BACTERIA: NEGATIVE
BILIRUBIN URINE: NEGATIVE
BLOOD URINE: NEGATIVE
COLOR: YELLOW
GLUCOSE QUALITATIVE U: NEGATIVE
HYALINE CASTS: 0 /LPF
KETONES URINE: NEGATIVE
LEUKOCYTE ESTERASE URINE: NEGATIVE
MICROSCOPIC-UA: NORMAL
NITRITE URINE: NEGATIVE
PH URINE: 5.5
PROTEIN URINE: NORMAL
RED BLOOD CELLS URINE: 2 /HPF
SPECIFIC GRAVITY URINE: 1.03
SQUAMOUS EPITHELIAL CELLS: 0 /HPF
UROBILINOGEN URINE: NORMAL
WHITE BLOOD CELLS URINE: 1 /HPF

## 2019-07-09 PROCEDURE — 99232 SBSQ HOSP IP/OBS MODERATE 35: CPT

## 2019-07-09 RX ADMIN — APIXABAN 5 MILLIGRAM(S): 2.5 TABLET, FILM COATED ORAL at 08:23

## 2019-07-09 RX ADMIN — Medication 1 MILLIGRAM(S): at 21:45

## 2019-07-09 RX ADMIN — Medication 1 MILLIGRAM(S): at 08:23

## 2019-07-09 RX ADMIN — ATORVASTATIN CALCIUM 10 MILLIGRAM(S): 80 TABLET, FILM COATED ORAL at 21:47

## 2019-07-09 RX ADMIN — APIXABAN 5 MILLIGRAM(S): 2.5 TABLET, FILM COATED ORAL at 21:44

## 2019-07-09 RX ADMIN — HALOPERIDOL DECANOATE 5 MILLIGRAM(S): 100 INJECTION INTRAMUSCULAR at 08:23

## 2019-07-09 RX ADMIN — Medication 2 MILLIGRAM(S): at 10:42

## 2019-07-09 RX ADMIN — METFORMIN HYDROCHLORIDE 1000 MILLIGRAM(S): 850 TABLET ORAL at 08:22

## 2019-07-09 RX ADMIN — HALOPERIDOL DECANOATE 10 MILLIGRAM(S): 100 INJECTION INTRAMUSCULAR at 21:45

## 2019-07-09 RX ADMIN — METFORMIN HYDROCHLORIDE 1000 MILLIGRAM(S): 850 TABLET ORAL at 21:44

## 2019-07-09 NOTE — PROGRESS NOTE BEHAVIORAL HEALTH - PRN MEDS
nicotine  Polacrilex Gum   2 milliGRAM(s) Oral (07-05-19 @ 14:26)   2 milliGRAM(s) Oral (07-04-19 @ 16:39)
nicotine  Polacrilex Gum   2 milliGRAM(s) Oral (07-08-19 @ 11:06)   2 milliGRAM(s) Oral (07-07-19 @ 17:23)
nicotine  Polacrilex Gum   2 milliGRAM(s) Oral (07-09-19 @ 10:42)   2 milliGRAM(s) Oral (07-08-19 @ 20:20)

## 2019-07-09 NOTE — PROGRESS NOTE BEHAVIORAL HEALTH - NSBHFUPINTERVALHXFT_PSY_A_CORE
The patient attended today’s treatment team meeting participated in tx and discharge planning and signed the attendance sheet.      Pt was seen, and evaluated, and chart was reviewed. No acute events overnight.  Patient is alert, Ox3,  calm, cooperative, compliant with treatment.  Patient is in good behavioral control.  Pt's condition is improving he  is more coherent, less  thought disordered and less illogical . Negative symptoms of schizophrenia persist, Pt has limited interest and initiative, minimal plans fro the future and is isolative.   ***Pt denies and presents no evidence for suicidal or homicidal ideas plans or intentions.  Pt denies A/V H.  ***Pt slept well, Pt is tolerating meds well w/o any acute S/E.  Pt has no medication related complaints. Continues current medication regimen. .   Continues Metformin  1000 mg po bid for hyperglycemia. Writer met c pt and  ICM  Nika wills pt's SoWO yesterday  and discussed dx, tx and  dc panning. Pt to be d/c'ed this week c f/u by ACT team.

## 2019-07-10 PROCEDURE — 99231 SBSQ HOSP IP/OBS SF/LOW 25: CPT

## 2019-07-10 RX ADMIN — METFORMIN HYDROCHLORIDE 1000 MILLIGRAM(S): 850 TABLET ORAL at 08:21

## 2019-07-10 RX ADMIN — ATORVASTATIN CALCIUM 10 MILLIGRAM(S): 80 TABLET, FILM COATED ORAL at 20:17

## 2019-07-10 RX ADMIN — APIXABAN 5 MILLIGRAM(S): 2.5 TABLET, FILM COATED ORAL at 08:21

## 2019-07-10 RX ADMIN — Medication 1 MILLIGRAM(S): at 08:21

## 2019-07-10 RX ADMIN — HALOPERIDOL DECANOATE 5 MILLIGRAM(S): 100 INJECTION INTRAMUSCULAR at 08:21

## 2019-07-10 RX ADMIN — Medication 1 MILLIGRAM(S): at 20:17

## 2019-07-10 RX ADMIN — METFORMIN HYDROCHLORIDE 1000 MILLIGRAM(S): 850 TABLET ORAL at 20:17

## 2019-07-10 RX ADMIN — APIXABAN 5 MILLIGRAM(S): 2.5 TABLET, FILM COATED ORAL at 20:17

## 2019-07-10 RX ADMIN — HALOPERIDOL DECANOATE 10 MILLIGRAM(S): 100 INJECTION INTRAMUSCULAR at 20:17

## 2019-07-10 NOTE — PROGRESS NOTE ADULT - SUBJECTIVE AND OBJECTIVE BOX
pt stable alert in NAD  no new complaints    HOMICIDAL IDEATION  ^HOMICIDAL IDEATION  No pertinent family history in first degree relatives  Handoff  Sickle cell anemia  Anemia  Substance abuse  Schizophrenia  Alpha thalassemia  Sickle cell anemia  Atrial fibrillation  Schizophrenia, unspecified type  Type 2 diabetes mellitus with other neurologic complication, without long-term current use of insulin  Essential hypertension  Schizophrenia  No significant past surgical history    HEALTH ISSUES - PROBLEM Dx:  Alpha thalassemia  Sickle cell anemia  Atrial fibrillation: Atrial fibrillation  Schizophrenia, unspecified type: Schizophrenia, unspecified type  Type 2 diabetes mellitus with other neurologic complication, without long-term current use of insulin: Type 2 diabetes mellitus with other neurologic complication, without long-term current use of insulin  Essential hypertension: Essential hypertension  Schizophrenia: Schizophrenia        PAST MEDICAL & SURGICAL HISTORY:  Sickle cell anemia  Anemia  Substance abuse  Schizophrenia  No significant past surgical history    No Known Allergies      FAMILY HISTORY:  No pertinent family history in first degree relatives      apixaban 5 milliGRAM(s) Oral two times a day  atorvastatin 10 milliGRAM(s) Oral at bedtime  benztropine 1 milliGRAM(s) Oral two times a day  diphenhydrAMINE 50 milliGRAM(s) Oral at bedtime PRN  haloperidol     Tablet 10 milliGRAM(s) Oral at bedtime  haloperidol     Tablet 5 milliGRAM(s) Oral daily  hydrOXYzine hydrochloride 25 milliGRAM(s) Oral every 8 hours PRN  lisinopril 2.5 milliGRAM(s) Oral daily  metFORMIN 1000 milliGRAM(s) Oral two times a day  nicotine  Polacrilex Gum 2 milliGRAM(s) Oral every 3 hours PRN      T(C): 35.8 (07-09-19 @ 17:27), Max: 35.8 (07-09-19 @ 17:27)  HR: 75 (07-09-19 @ 17:27) (70 - 75)  BP: 117/71 (07-09-19 @ 17:27) (103/58 - 117/71)  RR: 18 (07-09-19 @ 17:27) (18 - 18)  SpO2: --    PE;  general: no changes ntoed from previosu in nad    Lungs:    Heart:    EXT:    Neuro:  alert no defciits                          CAPILLARY BLOOD GLUCOSE

## 2019-07-10 NOTE — PROGRESS NOTE BEHAVIORAL HEALTH - NSBHFUPINTERVALHXFT_PSY_A_CORE
Pt was seen, and evaluated, and chart was reviewed. No acute events overnight.  Patient is alert, Ox3,  calm, cooperative, compliant with treatment.  Patient is in good behavioral control.  Pt's condition is improving he  is more coherent, less  thought disordered and less illogical . ,    ***Pt denies and presents no evidence for suicidal or homicidal ideas plans or intentions.  Pt denies A/V H.  ***Pt slept well, Pt is tolerating meds well w/o any acute S/E.  Pt has no medication related complaints. Continues current medication regimen. Will dc  lisinopril b/c pt Continues to refuse , and b/c BP is WNL. .  On MOnday 7/8/ writer met c pt and  ICM  Nika wills pt's SoWO  and discussed dx, tx and  dc panning. Pt to be d/c'ed Friday or Monday .

## 2019-07-10 NOTE — CHART NOTE - NSCHARTNOTEFT_GEN_A_CORE
Patient remains on unit for continued treatment safety and observation. Patient is visible on unit and compliant with treatment as well as unit protocol. Writer meets with patient daily on rounds and or in team meeting. Writer provides support and education to the patient daily. Patient to remain on unit until cleared by attending for discharge. Patient denies suicidal ideation, homicidal ideation and presents with no evidence of audio visual hallucinations. Patient is slated for discharge 7-12 with outpatient follow up scheduled for 7-15 providing no regressive behaviors.

## 2019-07-10 NOTE — PROGRESS NOTE ADULT - REASON FOR ADMISSION
58 YEARS OLD MALE COME TO THE HOSPITAL FOR PSYCHIATRIC EVALUATION .

## 2019-07-11 ENCOUNTER — APPOINTMENT (OUTPATIENT)
Dept: COLORECTAL SURGERY | Facility: CLINIC | Age: 59
End: 2019-07-11
Payer: MEDICAID

## 2019-07-11 VITALS
HEIGHT: 69 IN | BODY MASS INDEX: 24.44 KG/M2 | DIASTOLIC BLOOD PRESSURE: 74 MMHG | HEART RATE: 77 BPM | SYSTOLIC BLOOD PRESSURE: 143 MMHG | WEIGHT: 165 LBS | TEMPERATURE: 97.8 F

## 2019-07-11 DIAGNOSIS — K64.9 UNSPECIFIED HEMORRHOIDS: ICD-10-CM

## 2019-07-11 PROCEDURE — 99231 SBSQ HOSP IP/OBS SF/LOW 25: CPT

## 2019-07-11 PROCEDURE — 99203 OFFICE O/P NEW LOW 30 MIN: CPT | Mod: 25

## 2019-07-11 PROCEDURE — 46600 DIAGNOSTIC ANOSCOPY SPX: CPT

## 2019-07-11 RX ORDER — BENZTROPINE MESYLATE 1 MG
1 TABLET ORAL
Qty: 60 | Refills: 0
Start: 2019-07-11 | End: 2019-08-09

## 2019-07-11 RX ORDER — APIXABAN 2.5 MG/1
1 TABLET, FILM COATED ORAL
Qty: 60 | Refills: 0
Start: 2019-07-11 | End: 2019-08-09

## 2019-07-11 RX ORDER — ATORVASTATIN CALCIUM 80 MG/1
1 TABLET, FILM COATED ORAL
Qty: 30 | Refills: 0
Start: 2019-07-11 | End: 2019-08-09

## 2019-07-11 RX ORDER — HALOPERIDOL DECANOATE 100 MG/ML
2 INJECTION INTRAMUSCULAR
Qty: 0 | Refills: 0 | DISCHARGE
Start: 2019-07-11 | End: 2019-08-09

## 2019-07-11 RX ORDER — HALOPERIDOL DECANOATE 100 MG/ML
3 INJECTION INTRAMUSCULAR
Qty: 90 | Refills: 0
Start: 2019-07-11 | End: 2019-08-09

## 2019-07-11 RX ORDER — NICOTINE POLACRILEX 2 MG
2 GUM BUCCAL
Qty: 0 | Refills: 0 | DISCHARGE
Start: 2019-07-11

## 2019-07-11 RX ORDER — METFORMIN HYDROCHLORIDE 850 MG/1
1 TABLET ORAL
Qty: 60 | Refills: 0
Start: 2019-07-11 | End: 2019-08-09

## 2019-07-11 RX ORDER — APIXABAN 2.5 MG/1
1 TABLET, FILM COATED ORAL
Qty: 0 | Refills: 0 | DISCHARGE

## 2019-07-11 RX ADMIN — METFORMIN HYDROCHLORIDE 1000 MILLIGRAM(S): 850 TABLET ORAL at 08:17

## 2019-07-11 RX ADMIN — HALOPERIDOL DECANOATE 10 MILLIGRAM(S): 100 INJECTION INTRAMUSCULAR at 20:08

## 2019-07-11 RX ADMIN — METFORMIN HYDROCHLORIDE 1000 MILLIGRAM(S): 850 TABLET ORAL at 20:08

## 2019-07-11 RX ADMIN — APIXABAN 5 MILLIGRAM(S): 2.5 TABLET, FILM COATED ORAL at 08:17

## 2019-07-11 RX ADMIN — APIXABAN 5 MILLIGRAM(S): 2.5 TABLET, FILM COATED ORAL at 20:08

## 2019-07-11 RX ADMIN — Medication 1 MILLIGRAM(S): at 08:17

## 2019-07-11 RX ADMIN — Medication 1 MILLIGRAM(S): at 20:08

## 2019-07-11 RX ADMIN — ATORVASTATIN CALCIUM 10 MILLIGRAM(S): 80 TABLET, FILM COATED ORAL at 20:08

## 2019-07-11 RX ADMIN — HALOPERIDOL DECANOATE 5 MILLIGRAM(S): 100 INJECTION INTRAMUSCULAR at 08:17

## 2019-07-11 NOTE — PROGRESS NOTE BEHAVIORAL HEALTH - BEHAVIOR
Hostile/Uncooperative
Uncooperative/Hostile
Uncooperative/Hostile
Hostile/Uncooperative
Hostile/Uncooperative
Uncooperative/Hostile
Hostile/Uncooperative
Uncooperative/Hostile
Uncooperative/Hostile
Hostile/Uncooperative
Uncooperative/Hostile
Hostile/Uncooperative

## 2019-07-11 NOTE — PROGRESS NOTE BEHAVIORAL HEALTH - ADDITIONAL DETAILS / COMMENTS
l Mr Kearney at [facility] at [phone] LPN at residence.   Nika Velasco is the pts forensic ICM worker- [phone]. S
Writer attempted to call collateral Mr Kearney at [facility] at [phone] LPN at residence. Nika Kearns is the pts forensic [facility] worker- [phone]. She is expected to visit pt later today . She will also provide additional clin info re the pt.
l Mr Kearney at [facility] at [phone] LPN at residence.   Nika Velasco is the pts forensic ICM worker- [phone]. S
Writer attempted to call collateral Mr Kearney at [facility] at [phone] LPN at residence. Nika Kearns is the pts forensic [facility] worker- [phone]. She is expected to visit pt later today . She castro aslo provide additional clin info re the pt.
Writer attempted to call collateral Mr Kearney at [facility] at [phone] LPN at residence. Nika Kearns is the pts forensic [facility] worker- [phone]. She is expected to visit pt later today . She castro aslo provide additional clin info re the pt.
l Mr Kearney at [facility] at [phone] LPN at residence.   Nika Velasco is the pts forensic ICM worker- [phone]. S
Writer attempted to call collateral Mr Kearney at [facility] at [phone] LPN at residence. Nika Velasco is the pts forensic ICM worker- [phone]. S
Pt Is on AOT c  Dr. Vann at [facility] psychiatrist per records, and other providers on team have included Arnol Zabala and Neda.
Writer attempted to call collateral Mr Kearney at [facility] at [phone] LPN at residence. Nika Velasco is the pts forensic ICM worker- [phone]. S
Writer attempted to call collateral Mr Kearney at [facility] at [phone] LPN at residence. Nika Kearns is the pts forensic [facility] worker- [phone]. She is expected to visit pt later today . She will also provide additional clin info re the pt.
Writer attempted to call collateral Mr Kearney at [facility] at [phone] LPN at residence. Nika Kearns is the pts forensic [facility] worker- [phone]. She is expected to visit pt later today . She castro aslo provide additional clin info re the pt.
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
## 2019-07-11 NOTE — PROGRESS NOTE BEHAVIORAL HEALTH - ESTIMATED DISCHARGE DATE
12-Jul-2019

## 2019-07-11 NOTE — PROGRESS NOTE BEHAVIORAL HEALTH - GAIT / STATION
Normal gait / station
Other
Normal gait / station
Other

## 2019-07-11 NOTE — PROGRESS NOTE BEHAVIORAL HEALTH - NS ED BHA MSE SPEECH VOLUME
Normal/Loud
Loud/Normal
Loud/Normal
Normal/Loud
Normal/Loud
Loud/Normal
Normal/Loud

## 2019-07-11 NOTE — PROGRESS NOTE BEHAVIORAL HEALTH - NSBHCHARTREVIEWLAB_PSY_A_CORE FT
26 Jun 2019 12:10  Sodium 131<L> [135 - 145]  Chloride 93<L> [98 - 107]  BUN 12 [7 - 23]  Glucose 130<H> [70 - 99]  Potassium 4.1 [3.5 - 5.3]  Anion Gap 11 [7 - 14]  Carbon dioxide 27 [22 - 31]  Creatinine 0.80 [0.5 - 1.3]      Ca    9.4 [8.4 - 10.5]      26 Jun 2019 12:10        26 Jun 2019 12:10  TPro 7.5 [6.0 - 8.3]  Alb  3.8 [3.3 - 5.0]   TBili < 0.2<L> [0.2 - 1.2]   DBili x       AST  13 [4 - 40]  ALT  11 [4 - 41]   AlkPhos 80 [40 - 120]         CBC Full  -  ( 26 Jun 2019 12:31 )  WBC Count : 5.50 K/uL  RBC Count : 4.63 M/uL  Hemoglobin : 9.9 g/dL  Hematocrit : 31.0 %  Platelet Count - Automated : 314 K/uL  Mean Cell Volume : 67.0 fL  Mean Cell Hemoglobin : 21.4 pg  Mean Cell Hemoglobin Concentration : 31.9 %  Auto Neutrophil # : 4.07 K/uL  Auto Lymphocyte # : 1.03 K/uL  Auto Monocyte # : 0.35 K/uL  Auto Eosinophil # : 0.01 K/uL  Auto Basophil # : 0.02 K/uL  Auto Neutrophil % : 73.9 %  Auto Lymphocyte % : 18.7 %  Auto Monocyte % : 6.4 %  Auto Eosinophil % : 0.2 %  Auto Basophil % : 0.4 %

## 2019-07-11 NOTE — PROGRESS NOTE BEHAVIORAL HEALTH - SECONDARY DX4
Alpha thalassemia

## 2019-07-11 NOTE — PROGRESS NOTE BEHAVIORAL HEALTH - AFFECT QUALITY
Euthymic
Irritable/Anxious
Anxious/Irritable
Euthymic
Anxious/Irritable
Euthymic
Irritable/Anxious
Anxious/Irritable
Irritable/Anxious
Irritable/Anxious
Anxious/Irritable
Irritable/Anxious

## 2019-07-11 NOTE — PROGRESS NOTE BEHAVIORAL HEALTH - SECONDARY DX3
Sickle cell anemia

## 2019-07-11 NOTE — PROGRESS NOTE BEHAVIORAL HEALTH - NSBHPTASSESSDT_PSY_A_CORE
01-Jul-2019 15:55
02-Jul-2019 15:48
05-Jul-2019 16:32
09-Jul-2019 16:43
10-Jul-2019 15:08
30-Jun-2019 10:13
27-Jun-2019 13:58
08-Jul-2019 14:51
29-Jun-2019 11:45
11-Jul-2019 11:25
03-Jul-2019 13:55
29-Jun-2019 11:58

## 2019-07-11 NOTE — PROGRESS NOTE BEHAVIORAL HEALTH - NSBHFUPVIOL_PSY_A_CORE
none known
unable to assess
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
## 2019-07-11 NOTE — PROGRESS NOTE BEHAVIORAL HEALTH - THOUGHT CONTENT
Unremarkable
Ideas of reference/Delusions
Delusions/Ideas of reference
Ideas of reference
Ideas of reference/Delusions
Unremarkable
Ideas of reference
Delusions/Ideas of reference
Ideas of reference
Delusions/Ideas of reference
Ideas of reference/Delusions
Delusions/Ideas of reference

## 2019-07-11 NOTE — PROGRESS NOTE BEHAVIORAL HEALTH - NSBHADMITIPOBSFT_PSY_A_CORE
safety
risk for agitation and threatening assaultive behavior
safety

## 2019-07-11 NOTE — PROGRESS NOTE BEHAVIORAL HEALTH - THOUGHT PROCESS
Impaired reasoning/Linear
Disorganized/Illogical/Impaired reasoning
Disorganized/Illogical/Impaired reasoning
Illogical/Impaired reasoning
Illogical/Impaired reasoning/Disorganized
Linear/Impaired reasoning
Illogical/Impaired reasoning
Impaired reasoning/Disorganized
Impaired reasoning/Illogical
Impaired reasoning/Disorganized
Illogical/Impaired reasoning/Disorganized
Disorganized/Illogical/Impaired reasoning

## 2019-07-11 NOTE — PROGRESS NOTE BEHAVIORAL HEALTH - NSBHFUPINTERVALHXFT_PSY_A_CORE
Pt was seen, and evaluated, and chart was reviewed. No acute events overnight.  Patient is alert, Ox3,  calm, cooperative, compliant with treatment.  Patient is in good behavioral control.  Pt is coherent, relevant an d goal directed.   ***Pt denies and presents no evidence for suicidal or homicidal ideas plans or intentions.  Pt denies A/V H.  ***Pt slept well, Pt is tolerating meds well w/o any acute S/E.  Pt has no medication related complaints. Continues current medication regimen. .  On MOnday 7/8/ writer met c pt and  ICM  Nika wills pt's SoWO  and discussed dx, tx and  dc panning. Pt to be d/c'ed tomorrow  Friday.

## 2019-07-11 NOTE — PROGRESS NOTE BEHAVIORAL HEALTH - NSBHADMITMEDEDUDETAILS_A_CORE FT
haldol medication indications, risks, and benefits were discussed

## 2019-07-11 NOTE — PROGRESS NOTE BEHAVIORAL HEALTH - NSBHADMITIPREASON_PSY_A_CORE
Danger to others; mental illness expected to respond to inpatient care
Hx of agitated threatening  behavior/Danger to others; mental illness expected to respond to inpatient care
Danger to others; mental illness expected to respond to inpatient care

## 2019-07-11 NOTE — PROGRESS NOTE BEHAVIORAL HEALTH - NSBHLEGALSTATUS_PSY_A_CORE
9.39 (Emergency)
9.27 (2PC)
9.39 (Emergency)

## 2019-07-11 NOTE — PHYSICAL EXAM
[FreeTextEntry1] :  Medical assistant present for duration of physical examination\par \par Gen NAD, AOx3\par \par Anorectal Exam:\par Inspection no erythema, induration or fluctuance, no skin excoriation, no fissure, soft mildly edematous external hemorrhoids nonthrombosed\par LA nontender, no masses palpated, no blood on gloved finger\par Anoscopy no fissure, resolving mild to moderate nonfriable internal hemorrhoids\par \par

## 2019-07-11 NOTE — PROGRESS NOTE BEHAVIORAL HEALTH - AXIS III
sickle cell trait, alpha thalassemia, arthritis, chronic shoulder dislocation

## 2019-07-11 NOTE — PROGRESS NOTE BEHAVIORAL HEALTH - SECONDARY DX2
Type 2 diabetes mellitus with other neurologic complication, without long-term current use of insulin

## 2019-07-11 NOTE — PROGRESS NOTE BEHAVIORAL HEALTH - NSBHADMITIPBHPROVIDER_PSY_A_CORE
yes/pending
pending/yes
no
pending/yes
yes/pending
yes/pending
pending/yes
yes/pending
pending/yes
yes/pending

## 2019-07-11 NOTE — PROGRESS NOTE BEHAVIORAL HEALTH - PERCEPTIONS
No abnormalities/Other
Other
Other/No abnormalities
Other
Other/No abnormalities
No abnormalities/Other
Other
Other/No abnormalities
Other

## 2019-07-11 NOTE — PROGRESS NOTE BEHAVIORAL HEALTH - NS ED BHA MSE GENERAL APPEARANCE
No deformities present/Well developed
Well developed/No deformities present
No deformities present/Well developed
Well developed/No deformities present
No deformities present/Well developed
No deformities present/Well developed
Well developed/No deformities present
No deformities present/Well developed
Well developed/No deformities present
Well developed/No deformities present

## 2019-07-11 NOTE — HISTORY OF PRESENT ILLNESS
[FreeTextEntry1] : 57 yo M presents for evaluation of hemorrhoids\par \par hx of small hemorrhoids in the past that resolved on their own\par \par Reports hemorrhoidal flare since 6/29/19, prolapsed, unable to manually reduce\par Associated w/ rectal spasms in the night that have since resolved\par Seen by PCP 7/1/19, given hydrocortisone 1%, prolocaine/lidocaine 2.5% and hydrocortisone suppositories, w/ improvement in shrinking down, w/ slight improvement after sitz baths\par Denies itching, burning, BPR\par Denies constipation or straining or diarrhea\par Currently being treated for prostatitis, on Bactrim\par \par BH: twice in morning, no complaints\par Reports adequate intake of dietary fiber and 6 glasses juice/water daily\par No use of stool softeners or fiber supplements\par \par MSM, in monogamous relationship for 28 years. \par hx of anal receptive sex several years ago. Never had an anal pap, never completed Gardasil vaccome\par \par Colonoscopy 7/2018 w/ benign polyps, previous colonoscopy 2008\par Denies [facility] colorectal CA\par No ASA/NSAIDs in last 7 days

## 2019-07-12 VITALS
TEMPERATURE: 98 F | DIASTOLIC BLOOD PRESSURE: 67 MMHG | HEART RATE: 84 BPM | RESPIRATION RATE: 18 BRPM | SYSTOLIC BLOOD PRESSURE: 105 MMHG

## 2019-07-12 PROCEDURE — 99238 HOSP IP/OBS DSCHRG MGMT 30/<: CPT

## 2019-07-12 RX ADMIN — METFORMIN HYDROCHLORIDE 1000 MILLIGRAM(S): 850 TABLET ORAL at 08:27

## 2019-07-12 RX ADMIN — Medication 2 MILLIGRAM(S): at 10:26

## 2019-07-12 RX ADMIN — APIXABAN 5 MILLIGRAM(S): 2.5 TABLET, FILM COATED ORAL at 08:27

## 2019-07-12 RX ADMIN — HALOPERIDOL DECANOATE 5 MILLIGRAM(S): 100 INJECTION INTRAMUSCULAR at 08:27

## 2019-07-12 RX ADMIN — Medication 1 MILLIGRAM(S): at 08:27

## 2019-07-12 NOTE — DISCHARGE NOTE BEHAVIORAL HEALTH - MEDICATION SUMMARY - MEDICATIONS TO STOP TAKING
I will STOP taking the medications listed below when I get home from the hospital:    OLANZapine 15 mg oral tablet  -- 1 tab(s) by mouth once a day (at bedtime)
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
## 2019-07-12 NOTE — DISCHARGE NOTE BEHAVIORAL HEALTH - MEDICATION SUMMARY - MEDICATIONS TO TAKE
I will START or STAY ON the medications listed below when I get home from the hospital:    Eliquis 5 mg oral tablet  -- 1 tab(s) by mouth 2 times a day x 30 days   -- Indication: For prophylaxis for thromboembolism    metFORMIN 1000 mg oral tablet  -- 1 tab(s) by mouth 2 times a day x 30 days   -- Indication: For Hyperglycemia    atorvastatin 10 mg oral tablet  -- 1 tab(s) by mouth once a day (at bedtime) x 30 days   -- Indication: For HLD    benztropine 1 mg oral tablet  -- 1 tab(s) by mouth 2 times a day x 30 days   -- Indication: For EPS    haloperidol 5 mg oral tablet  -- 3 tab(s) by mouth once a day (at bedtime) x 30 days   -- Indication: For psychosis    nicotine  -- 2 milligram(s) buccally every 2 hours, As Needed  -- Indication: For NRT

## 2019-07-12 NOTE — DISCHARGE NOTE BEHAVIORAL HEALTH - NSTOBACCOREFERRAL_GEN_A_NCS
Yes/Patient registered and referred to the [place] [facility]. Phone appointment scheduled for 7/15/19 at 0900.
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
## 2019-07-12 NOTE — DISCHARGE NOTE BEHAVIORAL HEALTH - NSBHDCIDCAREGVRNAMEFT_PSY_A_CORE
Mary Anne Monteiro [facility]    Katie Knight [facility] [email or web address]
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
## 2019-07-12 NOTE — DISCHARGE NOTE BEHAVIORAL HEALTH - SECONDARY DIAGNOSIS.
Type 2 diabetes mellitus with other neurologic complication, without long-term current use of insulin Sickle cell anemia Alpha thalassemia Substance abuse DM (diabetes mellitus), type 2 History of pulmonary embolism

## 2019-07-12 NOTE — DISCHARGE NOTE BEHAVIORAL HEALTH - FAMILY HISTORY OF PSYCHIATRIC ILLNESS
59 y/o single AAM, domiciled in [facility] since 6/7/19;, , unemployed, on disability, is in touch with his mother by phone and with his adult son. Pt was recently relaesed from long-term and is followed up by forensic ACT team

## 2019-07-12 NOTE — CHART NOTE - NSCHARTNOTEFT_GEN_A_CORE
Patient discharging today as per attending. Patient AOx3, denies SI HI and AVH. Pt in good behavioral control. Pt eager for discharge and expresses a commitment to the discharge plan. Please see below for detailed social work discharge plan.      SOCIAL WORK:    SOCIAL WORK DISCHARGE PLAN - Residence:  · Residence	other...      Aftercare Appointments:  · Agency Name	[facility]  · Appointment Date/Time	15-Jul-2019 01:00  · Address	[address] [place] [address]  · Phone #	[phone]  · Purpose	Appointment with Siri Keita LCSW  · Agency Name	[facility]  · Appointment Date/Time	15-Jul-2019 01:30  · Address	[address] [place] [address]  · Phone #	[phone]  · Purpose	Appointment with Dr Miller     Alcohol/Substance Abuse Treatment and Referrals:  · Alcohol/Substance Use Referrals	yes...   · Alcohol/Substance Abuse Treatment Referrals	[facility]  · Other Referrals	no      Crisis Plan:  1. If I Have The Following Problems:: Suicidal Thoughts  I Should:: Report to the nearest emergency room     Resources:  · Inpatient Psychiatric Unit - 24/7 phone #	[phone]  · Does the patient have a ?	no      Advance Directive:  · Does patient have advance directives (both medical and psychiatric), or a surrogate decision maker?	no...   · Reason	Patient declines     24 Hours Prior to Discharge:  · Caregiver Identified	yes...   · Caregiver Name and Contact Information	Mary Anne Monteiro LCSW    Katie Knight LCSW [email or web address]

## 2019-07-17 DIAGNOSIS — F20.0 PARANOID SCHIZOPHRENIA: ICD-10-CM

## 2019-07-17 DIAGNOSIS — I48.91 UNSPECIFIED ATRIAL FIBRILLATION: ICD-10-CM

## 2019-07-17 DIAGNOSIS — M19.90 UNSPECIFIED OSTEOARTHRITIS, UNSPECIFIED SITE: ICD-10-CM

## 2019-07-17 DIAGNOSIS — F17.210 NICOTINE DEPENDENCE, CIGARETTES, UNCOMPLICATED: ICD-10-CM

## 2019-07-17 DIAGNOSIS — Z79.01 LONG TERM (CURRENT) USE OF ANTICOAGULANTS: ICD-10-CM

## 2019-07-17 DIAGNOSIS — Z91.14 PATIENT'S OTHER NONCOMPLIANCE WITH MEDICATION REGIMEN: ICD-10-CM

## 2019-07-17 DIAGNOSIS — D56.0 ALPHA THALASSEMIA: ICD-10-CM

## 2019-07-17 DIAGNOSIS — D57.1 SICKLE-CELL DISEASE WITHOUT CRISIS: ICD-10-CM

## 2019-07-17 DIAGNOSIS — I10 ESSENTIAL (PRIMARY) HYPERTENSION: ICD-10-CM

## 2019-07-17 DIAGNOSIS — E11.49 TYPE 2 DIABETES MELLITUS WITH OTHER DIABETIC NEUROLOGICAL COMPLICATION: ICD-10-CM

## 2019-07-17 DIAGNOSIS — Z86.711 PERSONAL HISTORY OF PULMONARY EMBOLISM: ICD-10-CM

## 2019-07-23 ENCOUNTER — EMERGENCY (EMERGENCY)
Facility: HOSPITAL | Age: 59
LOS: 1 days | Discharge: HOME | End: 2019-07-23
Attending: EMERGENCY MEDICINE | Admitting: EMERGENCY MEDICINE
Payer: SELF-PAY

## 2019-07-23 VITALS
DIASTOLIC BLOOD PRESSURE: 95 MMHG | TEMPERATURE: 98 F | SYSTOLIC BLOOD PRESSURE: 140 MMHG | OXYGEN SATURATION: 100 % | RESPIRATION RATE: 18 BRPM | HEART RATE: 94 BPM

## 2019-07-23 PROCEDURE — 90792 PSYCH DIAG EVAL W/MED SRVCS: CPT

## 2019-07-23 PROCEDURE — 99283 EMERGENCY DEPT VISIT LOW MDM: CPT
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
## 2019-07-23 NOTE — ED PROVIDER NOTE - PROGRESS NOTE DETAILS
Michoacano SETHI: Pt has been evaluated by psychiatry.  He is clear for discharge, now retracting homicidal statements.  Will transport to [facility] via EMS.

## 2019-07-23 NOTE — ED BEHAVIORAL HEALTH ASSESSMENT NOTE - RISK ASSESSMENT
medium risk. Patient has a history of aggression, multiple prior inpatient admissions and was recently incarcerated for aggression. Protective factors include no suicide attempts, recent medication compliance, no access to guns, no global insomnia, no current substance abuse, supportive housing, willingness to seek help, no suicidal ideation or homicidal ideation, hopefulness for future. See  note for C-SRS
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
## 2019-07-23 NOTE — ED BEHAVIORAL HEALTH ASSESSMENT NOTE - DESCRIPTION
calm and cooperative, occasionally talking to himself.     ICU Vital Signs Last 24 Hrs  T(C): 36.7 (23 Jul 2019 01:32), Max: 36.7 (23 Jul 2019 01:32)  T(F): 98.1 (23 Jul 2019 01:32), Max: 98.1 (23 Jul 2019 01:32)  HR: 94 (23 Jul 2019 01:32) (94 - 94)  BP: 140/95 (23 Jul 2019 01:32) (140/95 - 140/95)  BP(mean): --  ABP: --  ABP(mean): --  RR: 18 (23 Jul 2019 01:32) (18 - 18)  SpO2: 100% (23 Jul 2019 01:32) (100% - 100%) sickle cell trait and alpha thalassemia per J ED eval 11/30/16 disabled, [facility] [address]

## 2019-07-23 NOTE — ED BEHAVIORAL HEALTH ASSESSMENT NOTE - OTHER
lives in supportive housing staff at [facility] peers walks slowly due to arthritic pain "fine, just fine" fairly linear, is able to answer questions posed to him suspected AH, talking to himself

## 2019-07-23 NOTE — ED ADULT TRIAGE NOTE - CHIEF COMPLAINT QUOTE
Pt sent from [facility] due to agitation.  Pt threatened staff stating he would kill them.   admits to hearing voices but states "he can control his head."  Pt calm in triage.  awaiting  eval.    denies SI/HI/drugs/ETOH  history of Schizoaffective Disorder.  Refusing temperature states his temperature is normal.

## 2019-07-23 NOTE — ED ADULT NURSE NOTE - OBJECTIVE STATEMENT
pt brought in by EMS from [place] [address] for agitation towards staff. pt refusing to answer questions regarding SI/HI, pt states "why should I". pt denies elicit drug use. pts speech difficult to understand at baseline. pt states he no longer take zyprexa due to giving him diabetes.

## 2019-07-23 NOTE — ED ADULT NURSE NOTE - CHIEF COMPLAINT QUOTE
Pt sent from [facility] due to agitation.  Pt threatened staff stating he would kill them.   admits to hearing voices but states "he can control his head."  Pt calm in triage.  awaiting  eval.    denies SI/HI/drugs/ETOH  history of Schizoaffective Disorder.  Refusing temperature states his temperature is normal.
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
## 2019-07-23 NOTE — ED BEHAVIORAL HEALTH NOTE - BEHAVIORAL HEALTH NOTE
C-SSRS Screener     1. Have you ever wished to be dead or wished you could go to sleep and not wake up?  [  ]Yes, [ x ]No, [  ]Unable to Assess  Details _____________________________     2. Have you actually had any thoughts of killing yourself?   [  ]Yes, [x  ]No, [  ]Unable to Assess  Details _____________________________     If answer is “No” for 1 and 2, stop here. If answer is “Yes” to 1 or 2, proceed to 3.     3. Have you been thinking about how you might kill yourself?  [  ]Yes, [  ]No, [  ]Unable to Assess  Details _____________________________     4. Have you had these thoughts and had some intention of acting on them?  [  ]Yes, [  ]No, [  ]Unable to Assess  Details _____________________________     5. Have you started to work out or worked out the details of how to kill yourself? Do you intend to carry out this plan?  [  ]Yes, [  ]No, [  ]Unable to Assess  Details _____________________________     6. Have you ever done anything, started to do anything, or prepared to do anything to end your life? If so, was it in the past 3 months?  [  ]Yes, [  ]No, [  ]Unable to Assess  Details _____________________________        Additional Suicide Risk Factors (select all that apply)  [  ]Access to lethal means including firearms  [  ]Family history of suicide  [ x ]Impulsivity  [  ] Current or past mood disorder  [ x ] Current or past psychotic disorder  [  ] Current or past PTSD  [  ] Current or past ADHD  [  ] Current or past TBI  [  ] Current or past cluster B personality disorder or traits  [  ] Current or past conduct problems  [  ] Recent onset of current or past psychiatric disorder  [  ] Family history of psychiatric diagnoses requiring hospitalization     Additional Activating Events (select all that apply)  [  ]Perceived burden on family or others  [  ]Current sexual or physical abuse  [  ]Substance intoxication or withdrawal  [  ]Inadequate social supports  [  ]Hopeless about or dissatisfied with current provider or treatment     Additional Protective Factors (select all that apply)  [ x ] Future plans  [  ] Quaker beliefs  [  ] Beloved pets

## 2019-07-23 NOTE — ED PROVIDER NOTE - NSFOLLOWUPCLINICS_GEN_ALL_ED_FT
[facility]  Behavioral Health  [id number] [place] [address]  Phone: [phone]  Fax:   Follow Up Time:

## 2019-07-23 NOTE — ED PROVIDER NOTE - CLINICAL SUMMARY MEDICAL DECISION MAKING FREE TEXT BOX
57 y/o M with h/o schizoaffective disorder from [facility] after threatening residents there.  Pt is mildly irritable here, but otherwise in behavioral control.  No e/o acute intoxication.  Pt declining fingerstick.  Will have psych evaluate, reassess.

## 2019-07-23 NOTE — ED PROVIDER NOTE - OBJECTIVE STATEMENT
59 y/o M with h/o HTN, DM, schizoaffective disorder sent from [place] [address] for homicidal threats.  Pt reports he got into a verbal argument with staff and "said somethings."  Per transfer paperwork - pt threatening to kill staff members.  No physical assault or injuries.  Pt now states "I didn't mean it" but is irritable on evaluation, refusing to answer other questions.  Denies drug or ETOH use.

## 2019-07-23 NOTE — ED BEHAVIORAL HEALTH ASSESSMENT NOTE - SUICIDE PROTECTIVE FACTORS
Future oriented/Other/Responsibility to family and others/Fear of death or dying due to pain/suffering/Positive therapeutic relationships
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
## 2019-07-23 NOTE — ED BEHAVIORAL HEALTH ASSESSMENT NOTE - CURRENT MEDICATION
atorvastatin 10mg hs, metformin 1000mg bid, Lisinopril 2.5mg qd, apixaban (Eliquis)5mg bid, haldol 15mg hs, benztropine 1mg bid, trazodone 100mg hs
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
## 2019-11-04 ENCOUNTER — TRANSCRIPTION ENCOUNTER (OUTPATIENT)
Age: 59
End: 2019-11-04

## 2019-11-04 ENCOUNTER — RX RENEWAL (OUTPATIENT)
Age: 59
End: 2019-11-04

## 2019-11-04 RX ORDER — FLUTICASONE PROPIONATE 50 UG/1
50 SPRAY, METERED NASAL
Qty: 1 | Refills: 3 | Status: ACTIVE | COMMUNITY
Start: 2018-01-09 | End: 1900-01-01

## 2020-01-02 ENCOUNTER — TRANSCRIPTION ENCOUNTER (OUTPATIENT)
Age: 60
End: 2020-01-02

## 2020-01-06 ENCOUNTER — TRANSCRIPTION ENCOUNTER (OUTPATIENT)
Age: 60
End: 2020-01-06

## 2020-03-03 ENCOUNTER — EMERGENCY (EMERGENCY)
Facility: HOSPITAL | Age: 60
LOS: 1 days | Discharge: HOME | End: 2020-03-03
Attending: EMERGENCY MEDICINE | Admitting: EMERGENCY MEDICINE
Payer: COMMERCIAL

## 2020-03-03 VITALS
RESPIRATION RATE: 16 BRPM | OXYGEN SATURATION: 100 % | SYSTOLIC BLOOD PRESSURE: 132 MMHG | TEMPERATURE: 98 F | HEART RATE: 112 BPM | DIASTOLIC BLOOD PRESSURE: 89 MMHG

## 2020-03-03 LAB
ALBUMIN SERPL ELPH-MCNC: 3.1 G/DL — LOW (ref 3.3–5)
ALP SERPL-CCNC: 80 U/L — SIGNIFICANT CHANGE UP (ref 40–120)
ALT FLD-CCNC: 29 U/L — SIGNIFICANT CHANGE UP (ref 4–41)
ANION GAP SERPL CALC-SCNC: 12 MMO/L — SIGNIFICANT CHANGE UP (ref 7–14)
AST SERPL-CCNC: 32 U/L — SIGNIFICANT CHANGE UP (ref 4–40)
BASOPHILS # BLD AUTO: 0.03 K/UL — SIGNIFICANT CHANGE UP (ref 0–0.2)
BASOPHILS NFR BLD AUTO: 0.3 % — SIGNIFICANT CHANGE UP (ref 0–2)
BILIRUB SERPL-MCNC: 0.2 MG/DL — SIGNIFICANT CHANGE UP (ref 0.2–1.2)
BUN SERPL-MCNC: 21 MG/DL — SIGNIFICANT CHANGE UP (ref 7–23)
CALCIUM SERPL-MCNC: 9.1 MG/DL — SIGNIFICANT CHANGE UP (ref 8.4–10.5)
CHLORIDE SERPL-SCNC: 98 MMOL/L — SIGNIFICANT CHANGE UP (ref 98–107)
CO2 SERPL-SCNC: 26 MMOL/L — SIGNIFICANT CHANGE UP (ref 22–31)
CREAT SERPL-MCNC: 0.88 MG/DL — SIGNIFICANT CHANGE UP (ref 0.5–1.3)
EOSINOPHIL # BLD AUTO: 0.11 K/UL — SIGNIFICANT CHANGE UP (ref 0–0.5)
EOSINOPHIL NFR BLD AUTO: 1 % — SIGNIFICANT CHANGE UP (ref 0–6)
GLUCOSE SERPL-MCNC: 112 MG/DL — HIGH (ref 70–99)
HCT VFR BLD CALC: 29 % — LOW (ref 39–50)
HGB BLD-MCNC: 9.8 G/DL — LOW (ref 13–17)
IMM GRANULOCYTES NFR BLD AUTO: 0.4 % — SIGNIFICANT CHANGE UP (ref 0–1.5)
LYMPHOCYTES # BLD AUTO: 1.54 K/UL — SIGNIFICANT CHANGE UP (ref 1–3.3)
LYMPHOCYTES # BLD AUTO: 14.7 % — SIGNIFICANT CHANGE UP (ref 13–44)
MCHC RBC-ENTMCNC: 21.7 PG — LOW (ref 27–34)
MCHC RBC-ENTMCNC: 33.8 % — SIGNIFICANT CHANGE UP (ref 32–36)
MCV RBC AUTO: 64.2 FL — LOW (ref 80–100)
MONOCYTES # BLD AUTO: 1.27 K/UL — HIGH (ref 0–0.9)
MONOCYTES NFR BLD AUTO: 12.1 % — SIGNIFICANT CHANGE UP (ref 2–14)
NEUTROPHILS # BLD AUTO: 7.49 K/UL — HIGH (ref 1.8–7.4)
NEUTROPHILS NFR BLD AUTO: 71.5 % — SIGNIFICANT CHANGE UP (ref 43–77)
NRBC # FLD: 0 K/UL — SIGNIFICANT CHANGE UP (ref 0–0)
PLATELET # BLD AUTO: 169 K/UL — SIGNIFICANT CHANGE UP (ref 150–400)
PMV BLD: SIGNIFICANT CHANGE UP FL (ref 7–13)
POTASSIUM SERPL-MCNC: 4 MMOL/L — SIGNIFICANT CHANGE UP (ref 3.5–5.3)
POTASSIUM SERPL-SCNC: 4 MMOL/L — SIGNIFICANT CHANGE UP (ref 3.5–5.3)
PROT SERPL-MCNC: 7.8 G/DL — SIGNIFICANT CHANGE UP (ref 6–8.3)
RBC # BLD: 4.52 M/UL — SIGNIFICANT CHANGE UP (ref 4.2–5.8)
RBC # FLD: 17.1 % — HIGH (ref 10.3–14.5)
SODIUM SERPL-SCNC: 136 MMOL/L — SIGNIFICANT CHANGE UP (ref 135–145)
WBC # BLD: 10.48 K/UL — SIGNIFICANT CHANGE UP (ref 3.8–10.5)
WBC # FLD AUTO: 10.48 K/UL — SIGNIFICANT CHANGE UP (ref 3.8–10.5)

## 2020-03-03 PROCEDURE — 99284 EMERGENCY DEPT VISIT MOD MDM: CPT

## 2020-03-03 RX ORDER — SODIUM CHLORIDE 9 MG/ML
1000 INJECTION INTRAMUSCULAR; INTRAVENOUS; SUBCUTANEOUS ONCE
Refills: 0 | Status: COMPLETED | OUTPATIENT
Start: 2020-03-03 | End: 2020-03-03

## 2020-03-03 RX ADMIN — SODIUM CHLORIDE 1000 MILLILITER(S): 9 INJECTION INTRAMUSCULAR; INTRAVENOUS; SUBCUTANEOUS at 23:47

## 2020-03-03 NOTE — ED PROVIDER NOTE - CHIEF COMPLAINT
The patient is a 59y Male complaining of The patient is a 59y Male complaining of burning with urination x 5 days.

## 2020-03-03 NOTE — ED PROVIDER NOTE - PATIENT PORTAL LINK FT
You can access the FollowMyHealth Patient Portal offered by [facility] by registering at the following website: [email or web address]. By joining [email or web address]’s FollowMyHealth portal, you will also be able to view your health information using other applications (apps) compatible with our system.

## 2020-03-03 NOTE — ED ADULT NURSE NOTE - OBJECTIVE STATEMENT
Pt received into intake A&Ox4, ambulatory with history of schizophrenia, hx of anemia, on Eliquis for history of blood clots, here for 5 days of dysuria. He reports recent hospitalization at [facility]. He states he has been having burning with urination for 5 days. No rashes. Denies fevers, chills, nausea, vomiting, abdominal pain, flank pain. Denies new sexual partners. Denies hearing voices. No SI/HI. Reports compliance with taking haldol and zyprexa. Patient is from [facility]. Md evaluated, VS as noted. 20 G IV placed to L arm, labs sent. Awaiting urine at this time. Pt instructed on providing  sample. Report given to area RN.

## 2020-03-03 NOTE — ED ADULT TRIAGE NOTE - CHIEF COMPLAINT QUOTE
Pt c/o dysuria and urinary incontinence starting 4 days ago, denies fever, chills, or back pain. States he was discharged from the hospital 5 days ago.

## 2020-03-03 NOTE — ED PROVIDER NOTE - NSFOLLOWUPINSTRUCTIONS_ED_ALL_ED_FT
Pt has a UTI based on the urinalysis. Received ceftriaxone IV.   Take Keflex 500mg twice a day for 7days.

## 2020-03-03 NOTE — ED PROVIDER NOTE - PROGRESS NOTE DETAILS
Called [phone], Ms. lucretia, who states patient came to hospital on his own and there were no specific concerns that they had. He did not tell them his chief complaint. Ms. Elizondo is the Mental therapy hygiene aid.  She provides history that on  Feb 27th, patient went to [facility], for evaluation of bizarre behavior and pacing throughout the night shift with paranoid thoughts, at times verbally threatening. Returned on 2/28/2020. ASHLEE Coker: Pt in NAD. UTI on UA. given ceftriaxone IV. Pt going back to [facility], advising keflex 500mg bid x 7days.

## 2020-03-03 NOTE — ED PROVIDER NOTE - CLINICAL SUMMARY MEDICAL DECISION MAKING FREE TEXT BOX
dysuria x 5 days. plan to check cbc, cmp to check creatinine, check u/a and send urine culture. Will also check chlamydia/ gc.

## 2020-03-04 VITALS
SYSTOLIC BLOOD PRESSURE: 127 MMHG | DIASTOLIC BLOOD PRESSURE: 76 MMHG | OXYGEN SATURATION: 100 % | HEART RATE: 80 BPM | RESPIRATION RATE: 18 BRPM

## 2020-03-04 LAB
APPEARANCE UR: CLEAR — SIGNIFICANT CHANGE UP
BACTERIA # UR AUTO: HIGH
BILIRUB UR-MCNC: NEGATIVE — SIGNIFICANT CHANGE UP
BLOOD UR QL VISUAL: SIGNIFICANT CHANGE UP
C TRACH RRNA SPEC QL NAA+PROBE: SIGNIFICANT CHANGE UP
COLOR SPEC: SIGNIFICANT CHANGE UP
GLUCOSE UR-MCNC: NEGATIVE — SIGNIFICANT CHANGE UP
HYALINE CASTS # UR AUTO: NEGATIVE — SIGNIFICANT CHANGE UP
KETONES UR-MCNC: NEGATIVE — SIGNIFICANT CHANGE UP
LEUKOCYTE ESTERASE UR-ACNC: SIGNIFICANT CHANGE UP
N GONORRHOEA RRNA SPEC QL NAA+PROBE: SIGNIFICANT CHANGE UP
NITRITE UR-MCNC: POSITIVE — HIGH
PH UR: 6.5 — SIGNIFICANT CHANGE UP (ref 5–8)
PROT UR-MCNC: 10 — SIGNIFICANT CHANGE UP
RBC CASTS # UR COMP ASSIST: SIGNIFICANT CHANGE UP (ref 0–?)
SP GR SPEC: 1.01 — SIGNIFICANT CHANGE UP (ref 1–1.04)
SPECIMEN SOURCE: SIGNIFICANT CHANGE UP
SQUAMOUS # UR AUTO: SIGNIFICANT CHANGE UP
UROBILINOGEN FLD QL: SIGNIFICANT CHANGE UP
WBC UR QL: >50 — HIGH (ref 0–?)

## 2020-03-04 RX ORDER — CEPHALEXIN 500 MG
1 CAPSULE ORAL
Qty: 14 | Refills: 0
Start: 2020-03-04 | End: 2020-03-10

## 2020-03-04 RX ORDER — CEFTRIAXONE 500 MG/1
1000 INJECTION, POWDER, FOR SOLUTION INTRAMUSCULAR; INTRAVENOUS ONCE
Refills: 0 | Status: COMPLETED | OUTPATIENT
Start: 2020-03-04 | End: 2020-03-04

## 2020-03-04 RX ADMIN — CEFTRIAXONE 100 MILLIGRAM(S): 500 INJECTION, POWDER, FOR SOLUTION INTRAMUSCULAR; INTRAVENOUS at 04:08

## 2020-03-04 NOTE — PROVIDER CONTACT NOTE (OTHER) - RECOMMENDATIONS
Discussed transportation with provider, who is in agreement with pt traveling via ambulette.  MAS contacted to arrange trip-MAS conf # [id number]; trip scheduled with [employer].

## 2020-03-04 NOTE — PROVIDER CONTACT NOTE (OTHER) - ASSESSMENT
SHAMA contacted above residence; spoke with NALINI Vann, who confirms that pt can return to the facility.  As per Edwar, pt travels via ambulette upon discharge from the hospital. SHAMA contacted above residence; spoke with NALINI Vann, who confirms that pt can return to the facility.  As per Edwar, pt travels via ambulette upon discharge from the hospital.  Pt's Medicaid # is [id number].

## 2020-03-04 NOTE — PROVIDER CONTACT NOTE (OTHER) - ACTION/TREATMENT ORDERED:
[facility] to  pt ASAP for return to residence at [address] [facility] [address]

## 2020-06-28 NOTE — INPATIENT CERTIFICATION FOR MEDICARE PATIENTS - PHYSICIAN CONCUR
I concur with the Admission Order and I certify that services are provided in accordance with Section 42 CFR § 412.3 Attending Attestation (For Attendings USE Only)...

## 2020-07-06 NOTE — ED BEHAVIORAL HEALTH ASSESSMENT NOTE - NS ED BHA MED ROS ALLERGIC IMMUNOLOGIC
Chest Pain   WHAT YOU NEED TO KNOW:   Chest pain can be caused by a range of conditions, from not serious to life-threatening  Chest pain can be a symptom of a digestive problem, such as acid reflux or a stomach ulcer  An anxiety attack or a strong emotion, such as anger, can also cause chest pain  Infection, inflammation, or a fracture in the bones or cartilage in your chest can cause pain or discomfort  Sometimes chest pain or pressure is caused by poor blood flow to your heart (angina)  Chest pain may also be caused by life-threatening conditions such as a heart attack or blood clot in your lungs  DISCHARGE INSTRUCTIONS:   Call 911 if:   · You have any of the following signs of a heart attack:      ¨ Squeezing, pressure, or pain in your chest that lasts longer than 5 minutes or returns    ¨ Discomfort or pain in your back, neck, jaw, stomach, or arm     ¨ Trouble breathing    ¨ Nausea or vomiting    ¨ Lightheadedness or a sudden cold sweat, especially with chest pain or trouble breathing    Return to the emergency department if:   · You have chest discomfort that gets worse, even with medicine  · You cough or vomit blood  · Your bowel movements are black or bloody  · You cannot stop vomiting, or it hurts to swallow  Contact your healthcare provider if:   · You have questions or concerns about your condition or care  Medicines:   · Medicines  may be given to treat the cause of your chest pain  Examples include pain medicine, anxiety medicine, or medicines to increase blood flow to your heart  · Do not take certain medicines without asking your healthcare provider first   These include NSAIDs, herbal or vitamin supplements, or hormones (estrogen or progestin)  · Take your medicine as directed  Contact your healthcare provider if you think your medicine is not helping or if you have side effects  Tell him or her if you are allergic to any medicine   Keep a list of the medicines, vitamins, and herbs you take  Include the amounts, and when and why you take them  Bring the list or the pill bottles to follow-up visits  Carry your medicine list with you in case of an emergency  Follow up with your healthcare provider within 72 hours, or as directed: You may need to return for more tests to find the cause of your chest pain  You may be referred to a specialist, such as a cardiologist or gastroenterologist  Write down your questions so you remember to ask them during your visits  Healthy living tips: The following are general healthy guidelines  If your chest pain is caused by a heart problem, your healthcare provider will give you specific guidelines to follow  · Do not smoke  Nicotine and other chemicals in cigarettes and cigars can cause lung and heart damage  Ask your healthcare provider for information if you currently smoke and need help to quit  E-cigarettes or smokeless tobacco still contain nicotine  Talk to your healthcare provider before you use these products  · Eat a variety of healthy, low-fat foods  Healthy foods include fruits, vegetables, whole-grain breads, low-fat dairy products, beans, lean meats, and fish  Ask for more information about a heart healthy diet  · Ask about activity  Your healthcare provider will tell you which activities to limit or avoid  Ask when you can drive, return to work, and have sex  Ask about the best exercise plan for you  · Maintain a healthy weight  Ask your healthcare provider how much you should weigh  Ask him or her to help you create a weight loss plan if you are overweight  · Get the flu and pneumonia vaccines  All adults should get the influenza (flu) vaccine  Get it every year as soon as it becomes available  The pneumococcal vaccine is given to adults aged 72 years or older  The vaccine is given every 5 years to prevent pneumococcal disease, such as pneumonia    © 2017 [employer] Information is for End User's use only and may not be sold, redistributed or otherwise used for commercial purposes  All illustrations and images included in CareNotes® are the copyrighted property of A D A M , Inc  or Ronak Cordero  The above information is an  only  It is not intended as medical advice for individual conditions or treatments  Talk to your doctor, nurse or pharmacist before following any medical regimen to see if it is safe and effective for you  No complaints

## 2020-12-08 NOTE — ED BEHAVIORAL HEALTH ASSESSMENT NOTE - SAFETY PLAN DETAILS
not applicable patient advised to return to ED or call 911 for any worsening symptoms and patient agreed. Call [phone] if you need to speak with someone 24/7

## 2021-04-01 ENCOUNTER — INPATIENT (INPATIENT)
Facility: HOSPITAL | Age: 61
LOS: 19 days | Discharge: OTHER INSTITUTION | End: 2021-04-21
Attending: STUDENT IN AN ORGANIZED HEALTH CARE EDUCATION/TRAINING PROGRAM | Admitting: STUDENT IN AN ORGANIZED HEALTH CARE EDUCATION/TRAINING PROGRAM
Payer: MEDICARE

## 2021-04-01 VITALS
OXYGEN SATURATION: 92 % | DIASTOLIC BLOOD PRESSURE: 96 MMHG | RESPIRATION RATE: 18 BRPM | HEART RATE: 92 BPM | SYSTOLIC BLOOD PRESSURE: 119 MMHG | HEIGHT: 71 IN | TEMPERATURE: 95 F

## 2021-04-01 DIAGNOSIS — D64.9 ANEMIA, UNSPECIFIED: ICD-10-CM

## 2021-04-01 DIAGNOSIS — E83.52 HYPERCALCEMIA: ICD-10-CM

## 2021-04-01 DIAGNOSIS — F19.10 OTHER PSYCHOACTIVE SUBSTANCE ABUSE, UNCOMPLICATED: ICD-10-CM

## 2021-04-01 DIAGNOSIS — R91.8 OTHER NONSPECIFIC ABNORMAL FINDING OF LUNG FIELD: ICD-10-CM

## 2021-04-01 DIAGNOSIS — I10 ESSENTIAL (PRIMARY) HYPERTENSION: ICD-10-CM

## 2021-04-01 DIAGNOSIS — M54.2 CERVICALGIA: ICD-10-CM

## 2021-04-01 DIAGNOSIS — E11.9 TYPE 2 DIABETES MELLITUS WITHOUT COMPLICATIONS: ICD-10-CM

## 2021-04-01 DIAGNOSIS — Z29.9 ENCOUNTER FOR PROPHYLACTIC MEASURES, UNSPECIFIED: ICD-10-CM

## 2021-04-01 DIAGNOSIS — I82.409 ACUTE EMBOLISM AND THROMBOSIS OF UNSPECIFIED DEEP VEINS OF UNSPECIFIED LOWER EXTREMITY: ICD-10-CM

## 2021-04-01 LAB
ALBUMIN SERPL ELPH-MCNC: 2.6 G/DL — LOW (ref 3.3–5)
ALP SERPL-CCNC: 68 U/L — SIGNIFICANT CHANGE UP (ref 40–120)
ALT FLD-CCNC: 15 U/L — SIGNIFICANT CHANGE UP (ref 4–41)
ANION GAP SERPL CALC-SCNC: 8 MMOL/L — SIGNIFICANT CHANGE UP (ref 7–14)
APAP SERPL-MCNC: <15 UG/ML — SIGNIFICANT CHANGE UP (ref 15–25)
APPEARANCE UR: CLEAR — SIGNIFICANT CHANGE UP
AST SERPL-CCNC: 29 U/L — SIGNIFICANT CHANGE UP (ref 4–40)
BASOPHILS # BLD AUTO: 0.04 K/UL — SIGNIFICANT CHANGE UP (ref 0–0.2)
BASOPHILS NFR BLD AUTO: 0.3 % — SIGNIFICANT CHANGE UP (ref 0–2)
BILIRUB SERPL-MCNC: 0.3 MG/DL — SIGNIFICANT CHANGE UP (ref 0.2–1.2)
BILIRUB UR-MCNC: NEGATIVE — SIGNIFICANT CHANGE UP
BUN SERPL-MCNC: 29 MG/DL — HIGH (ref 7–23)
CALCIUM SERPL-MCNC: 14.1 MG/DL — CRITICAL HIGH (ref 8.4–10.5)
CHLORIDE SERPL-SCNC: 102 MMOL/L — SIGNIFICANT CHANGE UP (ref 98–107)
CO2 SERPL-SCNC: 27 MMOL/L — SIGNIFICANT CHANGE UP (ref 22–31)
COLOR SPEC: SIGNIFICANT CHANGE UP
CREAT SERPL-MCNC: 1.07 MG/DL — SIGNIFICANT CHANGE UP (ref 0.5–1.3)
DIFF PNL FLD: ABNORMAL
EOSINOPHIL # BLD AUTO: 0 K/UL — SIGNIFICANT CHANGE UP (ref 0–0.5)
EOSINOPHIL NFR BLD AUTO: 0 % — SIGNIFICANT CHANGE UP (ref 0–6)
GLUCOSE SERPL-MCNC: 92 MG/DL — SIGNIFICANT CHANGE UP (ref 70–99)
GLUCOSE UR QL: NEGATIVE — SIGNIFICANT CHANGE UP
HCT VFR BLD CALC: 26.7 % — LOW (ref 39–50)
HGB BLD-MCNC: 8.2 G/DL — LOW (ref 13–17)
IANC: 12.6 K/UL — HIGH (ref 1.5–8.5)
IMM GRANULOCYTES NFR BLD AUTO: 0.5 % — SIGNIFICANT CHANGE UP (ref 0–1.5)
KETONES UR-MCNC: NEGATIVE — SIGNIFICANT CHANGE UP
LEUKOCYTE ESTERASE UR-ACNC: NEGATIVE — SIGNIFICANT CHANGE UP
LYMPHOCYTES # BLD AUTO: 0.76 K/UL — LOW (ref 1–3.3)
LYMPHOCYTES # BLD AUTO: 5.4 % — LOW (ref 13–44)
MCHC RBC-ENTMCNC: 21 PG — LOW (ref 27–34)
MCHC RBC-ENTMCNC: 30.7 GM/DL — LOW (ref 32–36)
MCV RBC AUTO: 68.3 FL — LOW (ref 80–100)
MONOCYTES # BLD AUTO: 0.52 K/UL — SIGNIFICANT CHANGE UP (ref 0–0.9)
MONOCYTES NFR BLD AUTO: 3.7 % — SIGNIFICANT CHANGE UP (ref 2–14)
NEUTROPHILS # BLD AUTO: 12.6 K/UL — HIGH (ref 1.8–7.4)
NEUTROPHILS NFR BLD AUTO: 90.1 % — HIGH (ref 43–77)
NITRITE UR-MCNC: NEGATIVE — SIGNIFICANT CHANGE UP
NRBC # BLD: 0 /100 WBCS — SIGNIFICANT CHANGE UP
NRBC # FLD: 0 K/UL — SIGNIFICANT CHANGE UP
PCP SPEC-MCNC: SIGNIFICANT CHANGE UP
PH UR: 6.5 — SIGNIFICANT CHANGE UP (ref 5–8)
PLATELET # BLD AUTO: 373 K/UL — SIGNIFICANT CHANGE UP (ref 150–400)
POTASSIUM SERPL-MCNC: 3.5 MMOL/L — SIGNIFICANT CHANGE UP (ref 3.5–5.3)
POTASSIUM SERPL-SCNC: 3.5 MMOL/L — SIGNIFICANT CHANGE UP (ref 3.5–5.3)
PROT SERPL-MCNC: 9.6 G/DL — HIGH (ref 6–8.3)
PROT UR-MCNC: ABNORMAL
RBC # BLD: 3.91 M/UL — LOW (ref 4.2–5.8)
RBC # FLD: 18.4 % — HIGH (ref 10.3–14.5)
SALICYLATES SERPL-MCNC: <5 MG/DL — LOW (ref 15–30)
SARS-COV-2 RNA SPEC QL NAA+PROBE: SIGNIFICANT CHANGE UP
SODIUM SERPL-SCNC: 137 MMOL/L — SIGNIFICANT CHANGE UP (ref 135–145)
SP GR SPEC: 1.02 — SIGNIFICANT CHANGE UP (ref 1.01–1.02)
TOXICOLOGY SCREEN, DRUGS OF ABUSE, SERUM RESULT: SIGNIFICANT CHANGE UP
TROPONIN T, HIGH SENSITIVITY RESULT: 37 NG/L — SIGNIFICANT CHANGE UP
TSH SERPL-MCNC: 2.61 UIU/ML — SIGNIFICANT CHANGE UP (ref 0.27–4.2)
UROBILINOGEN FLD QL: SIGNIFICANT CHANGE UP
WBC # BLD: 13.99 K/UL — HIGH (ref 3.8–10.5)
WBC # FLD AUTO: 13.99 K/UL — HIGH (ref 3.8–10.5)

## 2021-04-01 PROCEDURE — 99223 1ST HOSP IP/OBS HIGH 75: CPT | Mod: GC

## 2021-04-01 PROCEDURE — 72125 CT NECK SPINE W/O DYE: CPT | Mod: 26

## 2021-04-01 PROCEDURE — 99285 EMERGENCY DEPT VISIT HI MDM: CPT | Mod: 25

## 2021-04-01 PROCEDURE — 71045 X-RAY EXAM CHEST 1 VIEW: CPT | Mod: 26

## 2021-04-01 PROCEDURE — 93010 ELECTROCARDIOGRAM REPORT: CPT

## 2021-04-01 RX ORDER — LIDOCAINE 4 G/100G
1 CREAM TOPICAL DAILY
Refills: 0 | Status: DISCONTINUED | OUTPATIENT
Start: 2021-04-01 | End: 2021-04-21

## 2021-04-01 RX ORDER — ENOXAPARIN SODIUM 100 MG/ML
30 INJECTION SUBCUTANEOUS DAILY
Refills: 0 | Status: DISCONTINUED | OUTPATIENT
Start: 2021-04-01 | End: 2021-04-01

## 2021-04-01 RX ORDER — OLANZAPINE 15 MG/1
1 TABLET, FILM COATED ORAL
Qty: 0 | Refills: 0 | DISCHARGE

## 2021-04-01 RX ORDER — HEPARIN SODIUM 5000 [USP'U]/ML
5000 INJECTION INTRAVENOUS; SUBCUTANEOUS EVERY 12 HOURS
Refills: 0 | Status: DISCONTINUED | OUTPATIENT
Start: 2021-04-01 | End: 2021-04-21

## 2021-04-01 RX ORDER — SODIUM CHLORIDE 9 MG/ML
1000 INJECTION, SOLUTION INTRAVENOUS
Refills: 0 | Status: DISCONTINUED | OUTPATIENT
Start: 2021-04-01 | End: 2021-04-03

## 2021-04-01 RX ORDER — SODIUM CHLORIDE 9 MG/ML
1000 INJECTION INTRAMUSCULAR; INTRAVENOUS; SUBCUTANEOUS ONCE
Refills: 0 | Status: COMPLETED | OUTPATIENT
Start: 2021-04-01 | End: 2021-04-01

## 2021-04-01 RX ORDER — ACETAMINOPHEN 500 MG
650 TABLET ORAL EVERY 6 HOURS
Refills: 0 | Status: DISCONTINUED | OUTPATIENT
Start: 2021-04-01 | End: 2021-04-21

## 2021-04-01 RX ORDER — ACETAMINOPHEN 500 MG
650 TABLET ORAL ONCE
Refills: 0 | Status: COMPLETED | OUTPATIENT
Start: 2021-04-01 | End: 2021-04-01

## 2021-04-01 RX ADMIN — SODIUM CHLORIDE 200 MILLILITER(S): 9 INJECTION, SOLUTION INTRAVENOUS at 21:41

## 2021-04-01 RX ADMIN — Medication 650 MILLIGRAM(S): at 10:33

## 2021-04-01 RX ADMIN — SODIUM CHLORIDE 1000 MILLILITER(S): 9 INJECTION INTRAMUSCULAR; INTRAVENOUS; SUBCUTANEOUS at 18:19

## 2021-04-01 NOTE — PATIENT PROFILE ADULT - NSPROPOAPRESSUREINJURY_GEN_A_NUR
----- Message from Portia Mitchell MD sent at 6/11/2018 10:23 AM CDT -----  Patient would like to switch to Tecfidera due to needle fatigue and worsening depression   no

## 2021-04-01 NOTE — ED PROVIDER NOTE - PHYSICAL EXAMINATION
Physical Exam:    I have reviewed the triage vital signs.    Gen: chronically ill appearing, AOx3, non-toxic appearing, able to ambulate without assistance  Head and Neck: NCAT, Neck supple without meningismus   HEENT: EOMI, PEERLA, normal conjunctiva, tongue midline, oral mucosa moist  Lung: CTAB, no respiratory distress, no wheezes/rhonchi/rales B/L, speaking in full sentences  CV: RRR, no murmurs, rubs or gallops  Abd: soft, NT, ND, no guarding, no rigidity, no rebound tenderness, no CVA tenderness, no masses.   MSK: no gross deformities, ROM normal in UE/LE, no back tenderness  Neuro: CNs II-XII grossly intact. No focal sensory or motor deficits  Skin: Warm, well perfused, no rash, no leg swelling  Psych: endorsing auditory hallucinations, but calm, cooperative, not disorganized, not psychotic, appears in control. Appropriate mood and affect

## 2021-04-01 NOTE — H&P ADULT - PROBLEM SELECTOR PLAN 5
Hx of 1ppd cigarette use for "many years", cocaine use, alcohol abuse, marijuana use  -Utox neg Active smoker: 1ppd cigarette use for "many years", cocaine use, alcohol abuse, marijuana use  -Utox neg

## 2021-04-01 NOTE — H&P ADULT - ATTENDING COMMENTS
Patient seen and examined on 4/1/2021.    60yr old man, active smoker, PMH HTN, Type 2 DM, schizoaffective disorder, polysubstance abuse (cocaine, alcohol, cigarettes), hx of DVT? previously on Eliquis p/w neck pain and hearing voices. Found to have 2.9 cm RML nodular opacity and complete opacification of L hemithorax and hypercalcemia, concerning for malignancy.    Difficulty to obtain ROS and further history from patient due to hoarse voice    CXR from 2017 is normal  Obtain CT chest, A/P, CTH  IVF for hypercalcemia, f/u Vit D 25OH, PTH intact, PTHrP  Anemia workup ordered for microcytic anemia. Check iron panel, hx of sickle cell trait?  Stox, UTox and UA negative, TSH WNL  Reports hearing voices that are telling him to kill himself but denies having suicidal or homicidal ideations. Obtain psych consult

## 2021-04-01 NOTE — ED PROVIDER NOTE - CLINICAL SUMMARY MEDICAL DECISION MAKING FREE TEXT BOX
Linnea - Linnea - adult male, pmh schizophrenia, hearing voices (baseline) Linnea - adult male, pmh schizophrenia, hearing voices (baseline) presents with neck pain after fall from standing height yesterday. Midline neck ttp, therefore will CT cervical spine to r/o fx.     Update: white out left lung visualized on ct scan. will cxr, ct chest to further eval white out lung.

## 2021-04-01 NOTE — H&P ADULT - NSICDXPASTMEDICALHX_GEN_ALL_CORE_FT
PAST MEDICAL HISTORY:  Anemia     Deep vein thrombosis (DVT) previously on Eliquis    Diabetes mellitus type 2, previously on metformin    HTN (hypertension) not on any medications?    Schizophrenia schizoaffective    Sickle cell anemia     Substance abuse cocaine, alcohol, cigarettes, marijuana

## 2021-04-01 NOTE — H&P ADULT - PROBLEM SELECTOR PLAN 2
Ca 15 on admission (corrected), with cervical spine pain on exam, likely 2/2 malignancy vs. paraneoplastic  -f/u PTH, PTHrP, vit D  -c/w LR at 200/hr  -malignancy work up as above

## 2021-04-01 NOTE — H&P ADULT - PROBLEM SELECTOR PLAN 9
DVT ppx: lovenox  Diet: regular  Dispo: pending clinical improvement, PT eval  Code status: Full DVT ppx: lovenox  Diet: regular  Dispo: pending clinical improvement, PT eval  Code status: full

## 2021-04-01 NOTE — H&P ADULT - NSHPSOCIALHISTORY_GEN_ALL_CORE
Pt lives alone at [facility]. He does not have contact with any of his family members and will not provide contact numbers. His mother Twyla South (mother) [phone] (not working number). He has a son, Liam South. Pt lives alone at [facility]. He does not have contact with any of his family members and will not provide contact numbers. His mother Twyla South (mother) [phone] (not working number). He has a son, Liam South. Hx of 1ppd cigarette use for "many years", cocaine use, alcohol abuse, marijuana use Pt lives alone at [facility]. He does not have contact with any of his family members and will not provide contact numbers. His mother Twyla South (mother) [phone] (not working number). He has a son, Liam South. Active smoker: 1ppd cigarette use for "many years", cocaine use, alcohol abuse, marijuana use

## 2021-04-01 NOTE — H&P ADULT - PROBLEM SELECTOR PLAN 8
unable to obtain hx, unclear if still taking eliquis  -DVT ppx as below unable to obtain hx, unclear if still taking eliquis  -DVT ppx as below  -clarify with medication reconciliation

## 2021-04-01 NOTE — H&P ADULT - NSHPOUTPATIENTPROVIDERS_GEN_ALL_CORE
Anuel (mental health ) [phone]  Tino Red ([facility]) [phone]  Arin Carrillo (psychiatry NP) [phone]  Twyla South (mother) [phone] (not working number)  Dr. Pate (PCP)

## 2021-04-01 NOTE — ED ADULT NURSE NOTE - NSIMPLEMENTINTERV_GEN_ALL_ED
Implemented All Fall Risk Interventions:  [place] to call system. Call bell, personal items and telephone within reach. Instruct patient to call for assistance. Room bathroom lighting operational. Non-slip footwear when patient is off stretcher. Physically safe environment: no spills, clutter or unnecessary equipment. Stretcher in lowest position, wheels locked, appropriate side rails in place. Provide visual cue, wrist band, yellow gown, etc. Monitor gait and stability. Monitor for mental status changes and reorient to person, place, and time. Review medications for side effects contributing to fall risk. Reinforce activity limits and safety measures with patient and family.

## 2021-04-01 NOTE — ED ADULT NURSE NOTE - CHIEF COMPLAINT QUOTE
Pt c/o weakness in bilateral legs; was on the street overnight because he is homeless, unable to obtain oral temp: axillary 95.1; pt left AMA from [facility] yesterday where he was being evaluated for weakness in his legs and hearing voices (pt has Hx of schizophrenia - states he has been taking his meds, denies hearing voices at this time); Left arm IV has apparently been in place since he left [facility]; pt calm and cooperative; warm blankets provided

## 2021-04-01 NOTE — H&P ADULT - PROBLEM SELECTOR PLAN 1
concern for malignancy (primary vs. secondary), less likely infectious, CXR w/ 2.9 cm R lung mass, recent unintentional weight loss, bone pain (cervical spine), pt w/ 1 ppd smoking hx for "many years"  -f/u CTH, chest w/ IV con, A/P w/ IV con concern for malignancy (primary vs. secondary), less likely infectious, CXR w/ 2.9 cm R lung mass, recent unintentional weight loss, bone pain (cervical spine), active smoker: 1 ppd for "many years", father w/ hx of lung cancer  -f/u CTH, chest w/ IV con, A/P w/ IV con  -plan for eventual biopsy

## 2021-04-01 NOTE — H&P ADULT - NSICDXFAMILYHX_GEN_ALL_CORE_FT
FAMILY HISTORY:  No pertinent family history in first degree relatives     FAMILY HISTORY:  Father  Still living? Unknown  FH: lung cancer, Age at diagnosis: Age Unknown

## 2021-04-01 NOTE — H&P ADULT - HISTORY OF PRESENT ILLNESS
60M w/ HTN, DM, schizoaffective disorder, polysubstance abuse (cocaine, alcohol, cigarettes), hx of DVT? previously on Eliquis presents to ED c/o neck pain. Pt is not cooperative with interview and exam. Per pt, he states that he has had significant unintentional weight loss recently and generalized weakness. He denies any CP, SOB, fevers/chills, night sweats, cough. Pt reports auditory hallucinations, but no HI/SI. Patient states to the ED provider that he fell yesterday, was able to get up, no LOC. Patient lives alone at [facility] (Tino Red ([facility]) [phone] and follows with a Mental Health Service Team Anuel (mental health ) [phone]. He has access to his medications via his Mental Health Service Team, last taken them yesterday. Per Anuel, pt does not have any communication with family. He has not had a hospitalization since his inpatient psychiatric stay in 2019. It is unclear when his last PCP visit was.    In the ED, T96.9, HR 86, /89, RR18, 93% on RA, given tylenol and 1L NS   60M w/ HTN, DM, schizoaffective disorder, polysubstance abuse (cocaine, alcohol, cigarettes), hx of DVT? previously on Eliquis presents to ED c/o neck pain. Pt is not cooperative with interview and exam. Per pt, he states that he has had significant unintentional weight loss recently and generalized weakness. He denies any CP, SOB, fevers/chills, night sweats, cough. He reports that he is an active smoker. His father had lung cancer. Pt reports auditory hallucinations, but no HI/SI. Patient states to the ED provider that he fell yesterday, was able to get up, no LOC. Patient lives alone at [facility] (Tino Red ([facility]) [phone] and follows with a Mental Health Service Team Anuel (mental health services ) [phone]). He has access to his medications via his Mental Health Service Team, last taken them yesterday. Per Anuel, pt does not have any communication with family. He has not had a hospitalization since his inpatient psychiatric stay in 2019. It is unclear when his last PCP visit was.    In the ED, T96.9, HR 86, /89, RR18, 93% on RA, given tylenol and 1L NS

## 2021-04-01 NOTE — H&P ADULT - NSHPPHYSICALEXAM_GEN_ALL_CORE
Physical Exam:  Gen: Alert, lying in bed, NAD, cachetic, uncooperative with exam  HEENT: NCAT, PERRL, EOMI, clear conjunctiva, no scleral icterus, no erythema or exudates in the oropharynx, mmm  Neck: cervical spine tender to palpation  CV: RRR, S1S2, no m/r/g  Resp: normal respiratory effort, unable to exam due to pt refusal  Abd: unable to exam due to pt refusal  Ext: no edema in b/l LE  Neuro: able to answer questions appropriately, mild dysarthria  Skin: warm, perfused Physical Exam:  Gen: Alert, lying in bed, NAD, cachetic, uncooperative with exam  HEENT: NCAT, PERRL, EOMI, clear conjunctiva, no scleral icterus, no erythema or exudates in the oropharynx, mmm  Neck: cervical spine tender to palpation, cervical mass  CV: RRR, S1S2, no m/r/g  Resp: normal respiratory effort, decreased breath sounds on L  Abd: soft, NT, ND  Ext: no edema in b/l LE  Neuro: able to answer questions appropriately, mild dysarthria  Skin: warm, perfused Vital Signs Last 24 Hrs  T(C): 36.6 (01 Apr 2021 20:13), Max: 36.6 (01 Apr 2021 15:10)  T(F): 97.9 (01 Apr 2021 20:13), Max: 97.9 (01 Apr 2021 15:10)  HR: 82 (01 Apr 2021 20:13) (74 - 92)  BP: 118/80 (01 Apr 2021 20:13) (104/70 - 124/89)  BP(mean): --  RR: 18 (01 Apr 2021 20:13) (17 - 18)  SpO2: 97% (01 Apr 2021 20:13) (92% - 100%)    Physical Exam:  Gen: Alert, lying in bed, NAD, cachetic, uncooperative with exam  HEENT: NCAT, PERRL, EOMI, clear conjunctiva, no scleral icterus, no erythema or exudates in the oropharynx, mmm  Neck: cervical spine tender to palpation, cervical mass  CV: RRR, S1S2, no m/r/g  Resp: normal respiratory effort, decreased breath sounds on L  Abd: soft, NT, ND  Ext: no edema in b/l LE  Neuro: able to answer questions appropriately, mild dysarthria  Skin: warm, perfused

## 2021-04-01 NOTE — ED ADULT TRIAGE NOTE - CHIEF COMPLAINT QUOTE
Pt c/o weakness in bilateral legs; was on the street overnight because he is homeless, unable to obtain oral temp: axillary 95.1; pt left AMA from [facility] yesterday where he was being evaluated for weakness in his legs and hearing voices (pt has Hx of schizophrenia - states he has been taking his meds, denies hearing voices at this time); Left arm IV has apparently been in place since he left [facility]; pt calm and cooperative. Pt c/o weakness in bilateral legs; was on the street overnight because he is homeless, unable to obtain oral temp: axillary 95.1; pt left AMA from [facility] yesterday where he was being evaluated for weakness in his legs and hearing voices (pt has Hx of schizophrenia - states he has been taking his meds, denies hearing voices at this time); Left arm IV has apparently been in place since he left [facility]; pt calm and cooperative; warm blankets provided

## 2021-04-01 NOTE — ED ADULT NURSE NOTE - OBJECTIVE STATEMENT
Patient alert and awake, oriented x 3, calm and cooperative, homeless, speaks with hoarse voice, breathing with ease on room air, skin intact, ambulates independently with unsteady gait. Patient transferred from [facility] for bilateral lower extremities weakness with hx schizophrenia and hearing voices. Patient arrived to ED, denies hearing voices, denies any pain anywhere, denies weakness, chest pains, trouble breathing, n/v/d, fevers, chills or any complaints. Patient denies use of tobacco, ETOH, or recreational drugs. Patient denies SI/HI, denies A/V/O hallucinations. Patient alert and awake, oriented x 3, calm and cooperative, homeless, speaks with hoarse voice, breathing with ease on room air, skin intact, ambulates independently with unsteady gait. Patient transferred from [facility] for bilateral lower extremities weakness with hx schizophrenia and hearing voices. Patient arrived to ED, reports bilateral lower extremities weakness. Patient denies hearing voices, denies any pain anywhere, chest pains, trouble breathing, n/v/d, fevers, chills or any complaints. Patient denies use of tobacco, ETOH, or recreational drugs. Patient denies SI/HI, denies A/V/O hallucinations. Patient alert and awake, oriented x 3, calm and cooperative, lives in apartment with program, speaks with hoarse voice, breathing with ease on room air, skin intact, ambulates independently with unsteady gait. Patient transferred from [facility] for bilateral lower extremities weakness with hx schizophrenia and hearing voices. Patient arrived to ED, reports bilateral lower extremities weakness. Patient denies hearing voices, denies any pain, chest discomfort, trouble breathing, n/v/d, fevers, chills or any complaints. Patient denies use of tobacco, ETOH, or recreational drugs. Patient denies SI/HI, denies A/V/O hallucinations.

## 2021-04-01 NOTE — H&P ADULT - ASSESSMENT
60M w/ HTN, DM, schizoaffective disorder, polysubstance abuse (cocaine, alcohol, cigarettes), hx of DVT? previously on Eliquis presents to ED c/o neck pain, found to have 2.9 cm right midlung nodular opacity and complete opacification of L hemithorax, with concern for malignancy 60M w/ HTN, DM, schizoaffective disorder, polysubstance abuse (cocaine, alcohol, cigarettes), hx of DVT? previously on Eliquis presents to ED c/o neck pain, found to have 2.9 cm right midlung nodular opacity and complete opacification of L hemithorax, with concern for new malignancy

## 2021-04-01 NOTE — ED PROVIDER NOTE - OBJECTIVE STATEMENT
60M presents to ED c/o hearing voices and neck pain. Patient states he resides at [facility] and follows with Mental health Service Team. He has access to his medications (zyprexa and haldol), last taken them yesterday. Auditory hallucinations, but no HI/SI. Patient states he fell yesterday, was able to get up, no LOC. Lillian PO. No cp fevers sob cough.     Collateral: Anuel [phone]

## 2021-04-01 NOTE — ED PROVIDER NOTE - PROGRESS NOTE DETAILS
flavia: pt with chronic hallucinations, no perceived notions of risk of harm to self or others. pt with no acute psychological issues at this point. tba for possible malignancy in chest wall

## 2021-04-01 NOTE — H&P ADULT - NSHPLABSRESULTS_GEN_ALL_CORE
LABS: Personally reviewed labs, imaging, and ECG                          8.2    13.99 )-----------( 373      ( 2021 12:33 )             26.7       04    137  |  102  |  29<H>  ----------------------------<  92  3.5   |  27  |  1.07    Ca    14.1<HH>      2021 12:33    TPro  9.6<H>  /  Alb  2.6<L>  /  TBili  0.3  /  DBili  x   /  AST  29  /  ALT  15  /  AlkPhos  68  04-       LIVER FUNCTIONS - ( 2021 12:33 )  Alb: 2.6 g/dL / Pro: 9.6 g/dL / ALK PHOS: 68 U/L / ALT: 15 U/L / AST: 29 U/L / GGT: x                    Urinalysis Basic - ( 2021 12:33 )    Color: Light Yellow / Appearance: Clear / S.021 / pH: x  Gluc: x / Ketone: Negative  / Bili: Negative / Urobili: <2 mg/dL   Blood: x / Protein: Trace / Nitrite: Negative   Leuk Esterase: Negative / RBC: x / WBC x   Sq Epi: x / Non Sq Epi: x / Bacteria: x      Lactate Trend      CAPILLARY BLOOD GLUCOSE      RADIOLOGY & ADDITIONAL TESTS:    < from: Xray Chest 1 View- PORTABLE-Urgent (Xray Chest 1 View- PORTABLE-Urgent .) (21 @ 11:28) >    IMPRESSION:  Ill-defined 2.9 cm right midlung nodular opacity, of indeterminate nature. Infection and neoplasm are both possible.    Virtually complete opacification of left hemithorax, of uncertain etiology. Left lung atelectasis, large pleural effusion, and/or other pathology is possible.    A CT scan of the chest (preferably enhanced,) could be performed for further evaluation of these findings, if felt to be clinically indicated.    < end of copied text >

## 2021-04-02 DIAGNOSIS — F25.9 SCHIZOAFFECTIVE DISORDER, UNSPECIFIED: ICD-10-CM

## 2021-04-02 LAB
24R-OH-CALCIDIOL SERPL-MCNC: 23.7 NG/ML — LOW (ref 30–80)
ALBUMIN SERPL ELPH-MCNC: 2 G/DL — LOW (ref 3.3–5)
ALP SERPL-CCNC: 68 U/L — SIGNIFICANT CHANGE UP (ref 40–120)
ALT FLD-CCNC: 13 U/L — SIGNIFICANT CHANGE UP (ref 4–41)
ANION GAP SERPL CALC-SCNC: 10 MMOL/L — SIGNIFICANT CHANGE UP (ref 7–14)
AST SERPL-CCNC: 42 U/L — HIGH (ref 4–40)
BASOPHILS # BLD AUTO: 0.07 K/UL — SIGNIFICANT CHANGE UP (ref 0–0.2)
BASOPHILS NFR BLD AUTO: 0.5 % — SIGNIFICANT CHANGE UP (ref 0–2)
BILIRUB SERPL-MCNC: 0.2 MG/DL — SIGNIFICANT CHANGE UP (ref 0.2–1.2)
BUN SERPL-MCNC: 24 MG/DL — HIGH (ref 7–23)
CALCIUM SERPL-MCNC: 13.2 MG/DL — CRITICAL HIGH (ref 8.4–10.5)
CHLORIDE SERPL-SCNC: 104 MMOL/L — SIGNIFICANT CHANGE UP (ref 98–107)
CO2 SERPL-SCNC: 22 MMOL/L — SIGNIFICANT CHANGE UP (ref 22–31)
COVID-19 SPIKE DOMAIN AB INTERP: NEGATIVE — SIGNIFICANT CHANGE UP
COVID-19 SPIKE DOMAIN ANTIBODY RESULT: 0.4 U/ML — SIGNIFICANT CHANGE UP
CREAT SERPL-MCNC: 0.93 MG/DL — SIGNIFICANT CHANGE UP (ref 0.5–1.3)
EOSINOPHIL # BLD AUTO: 0.12 K/UL — SIGNIFICANT CHANGE UP (ref 0–0.5)
EOSINOPHIL NFR BLD AUTO: 0.8 % — SIGNIFICANT CHANGE UP (ref 0–6)
FERRITIN SERPL-MCNC: 558 NG/ML — HIGH (ref 30–400)
GLUCOSE SERPL-MCNC: 107 MG/DL — HIGH (ref 70–99)
HCT VFR BLD CALC: 29 % — LOW (ref 39–50)
HCV AB S/CO SERPL IA: 0.11 S/CO — SIGNIFICANT CHANGE UP (ref 0–0.99)
HCV AB SERPL-IMP: SIGNIFICANT CHANGE UP
HGB BLD-MCNC: 9 G/DL — LOW (ref 13–17)
IANC: 12.03 K/UL — HIGH (ref 1.5–8.5)
IMM GRANULOCYTES NFR BLD AUTO: 0.3 % — SIGNIFICANT CHANGE UP (ref 0–1.5)
IRON SATN MFR SERPL: 18 % — SIGNIFICANT CHANGE UP (ref 14–50)
IRON SATN MFR SERPL: 31 UG/DL — LOW (ref 45–165)
LYMPHOCYTES # BLD AUTO: 1.45 K/UL — SIGNIFICANT CHANGE UP (ref 1–3.3)
LYMPHOCYTES # BLD AUTO: 10 % — LOW (ref 13–44)
MAGNESIUM SERPL-MCNC: 1.4 MG/DL — LOW (ref 1.6–2.6)
MCHC RBC-ENTMCNC: 21.4 PG — LOW (ref 27–34)
MCHC RBC-ENTMCNC: 31 GM/DL — LOW (ref 32–36)
MCV RBC AUTO: 69 FL — LOW (ref 80–100)
MONOCYTES # BLD AUTO: 0.8 K/UL — SIGNIFICANT CHANGE UP (ref 0–0.9)
MONOCYTES NFR BLD AUTO: 5.5 % — SIGNIFICANT CHANGE UP (ref 2–14)
NEUTROPHILS # BLD AUTO: 12.03 K/UL — HIGH (ref 1.8–7.4)
NEUTROPHILS NFR BLD AUTO: 82.9 % — HIGH (ref 43–77)
NRBC # BLD: 0 /100 WBCS — SIGNIFICANT CHANGE UP
NRBC # FLD: 0 K/UL — SIGNIFICANT CHANGE UP
PHOSPHATE SERPL-MCNC: 2.2 MG/DL — LOW (ref 2.5–4.5)
PLATELET # BLD AUTO: 366 K/UL — SIGNIFICANT CHANGE UP (ref 150–400)
POTASSIUM SERPL-MCNC: 4.4 MMOL/L — SIGNIFICANT CHANGE UP (ref 3.5–5.3)
POTASSIUM SERPL-SCNC: 4.4 MMOL/L — SIGNIFICANT CHANGE UP (ref 3.5–5.3)
PROT SERPL-MCNC: 8.2 G/DL — SIGNIFICANT CHANGE UP (ref 6–8.3)
PTH-INTACT FLD-MCNC: 2 PG/ML — LOW (ref 15–65)
RBC # BLD: 4.2 M/UL — SIGNIFICANT CHANGE UP (ref 4.2–5.8)
RBC # FLD: 18.8 % — HIGH (ref 10.3–14.5)
SARS-COV-2 IGG+IGM SERPL QL IA: 0.4 U/ML — SIGNIFICANT CHANGE UP
SARS-COV-2 IGG+IGM SERPL QL IA: NEGATIVE — SIGNIFICANT CHANGE UP
SODIUM SERPL-SCNC: 136 MMOL/L — SIGNIFICANT CHANGE UP (ref 135–145)
TIBC SERPL-MCNC: 171 UG/DL — LOW (ref 220–430)
UIBC SERPL-MCNC: 140 UG/DL — SIGNIFICANT CHANGE UP (ref 110–370)
WBC # BLD: 14.51 K/UL — HIGH (ref 3.8–10.5)
WBC # FLD AUTO: 14.51 K/UL — HIGH (ref 3.8–10.5)

## 2021-04-02 PROCEDURE — 90792 PSYCH DIAG EVAL W/MED SRVCS: CPT

## 2021-04-02 PROCEDURE — 70450 CT HEAD/BRAIN W/O DYE: CPT | Mod: 26

## 2021-04-02 PROCEDURE — 74177 CT ABD & PELVIS W/CONTRAST: CPT | Mod: 26

## 2021-04-02 PROCEDURE — 71260 CT THORAX DX C+: CPT | Mod: 26

## 2021-04-02 PROCEDURE — 99233 SBSQ HOSP IP/OBS HIGH 50: CPT | Mod: GC

## 2021-04-02 RX ORDER — SENNA PLUS 8.6 MG/1
2 TABLET ORAL AT BEDTIME
Refills: 0 | Status: DISCONTINUED | OUTPATIENT
Start: 2021-04-02 | End: 2021-04-21

## 2021-04-02 RX ORDER — OLANZAPINE 15 MG/1
20 TABLET, FILM COATED ORAL
Refills: 0 | Status: DISCONTINUED | OUTPATIENT
Start: 2021-04-02 | End: 2021-04-02

## 2021-04-02 RX ORDER — HALOPERIDOL DECANOATE 100 MG/ML
10 INJECTION INTRAMUSCULAR
Refills: 0 | Status: DISCONTINUED | OUTPATIENT
Start: 2021-04-02 | End: 2021-04-21

## 2021-04-02 RX ORDER — BENZTROPINE MESYLATE 1 MG
1 TABLET ORAL
Refills: 0 | Status: DISCONTINUED | OUTPATIENT
Start: 2021-04-02 | End: 2021-04-21

## 2021-04-02 RX ORDER — OLANZAPINE 15 MG/1
15 TABLET, FILM COATED ORAL
Refills: 0 | Status: DISCONTINUED | OUTPATIENT
Start: 2021-04-02 | End: 2021-04-06

## 2021-04-02 RX ORDER — TRAZODONE HCL 50 MG
100 TABLET ORAL AT BEDTIME
Refills: 0 | Status: DISCONTINUED | OUTPATIENT
Start: 2021-04-02 | End: 2021-04-21

## 2021-04-02 RX ORDER — POLYETHYLENE GLYCOL 3350 17 G/17G
17 POWDER, FOR SOLUTION ORAL DAILY
Refills: 0 | Status: DISCONTINUED | OUTPATIENT
Start: 2021-04-02 | End: 2021-04-21

## 2021-04-02 RX ORDER — NICOTINE POLACRILEX 2 MG
2 GUM BUCCAL
Refills: 0 | Status: DISCONTINUED | OUTPATIENT
Start: 2021-04-02 | End: 2021-04-21

## 2021-04-02 RX ADMIN — Medication 1 MILLIGRAM(S): at 18:06

## 2021-04-02 RX ADMIN — HEPARIN SODIUM 5000 UNIT(S): 5000 INJECTION INTRAVENOUS; SUBCUTANEOUS at 18:06

## 2021-04-02 RX ADMIN — HEPARIN SODIUM 5000 UNIT(S): 5000 INJECTION INTRAVENOUS; SUBCUTANEOUS at 05:54

## 2021-04-02 RX ADMIN — POLYETHYLENE GLYCOL 3350 17 GRAM(S): 17 POWDER, FOR SOLUTION ORAL at 18:07

## 2021-04-02 RX ADMIN — SODIUM CHLORIDE 200 MILLILITER(S): 9 INJECTION, SOLUTION INTRAVENOUS at 18:07

## 2021-04-02 RX ADMIN — HALOPERIDOL DECANOATE 10 MILLIGRAM(S): 100 INJECTION INTRAMUSCULAR at 18:06

## 2021-04-02 RX ADMIN — OLANZAPINE 15 MILLIGRAM(S): 15 TABLET, FILM COATED ORAL at 18:06

## 2021-04-02 NOTE — BH CONSULTATION LIAISON ASSESSMENT NOTE - NSSUICPROTFACTOTHER_PSY_ALL_CORE
Family [facility] and [facility]; Has staff support at residential housing

## 2021-04-02 NOTE — PROGRESS NOTE ADULT - PROBLEM SELECTOR PLAN 9
unable to obtain hx, unclear if still taking eliquis  -DVT ppx as below  -clarify with medication reconciliation pt reports hx of PE but no longer taking eliquis  -DVT ppx as below

## 2021-04-02 NOTE — PROGRESS NOTE ADULT - PROBLEM SELECTOR PLAN 5
Reported hearing voices in ED. No active SI/HI  -c/w home olanzepine, benztropine, trazadone, haldol (doses confirmed with mental health services )  -psych consult

## 2021-04-02 NOTE — BH CONSULTATION LIAISON ASSESSMENT NOTE - SUMMARY
60M single, disabled by mental illness, no dependents, resides w/ supportive housing system PMHx of HTN and diabetes w/ PPHx of schizoaffective disorder, polysubstance abuse (Cocaine, EtOH) w/ multiple past psychiatric hospitalizations (Including [facility]) last in 2019 now presenting to ED c/o neck pain found to have a new lung mass in the setting of recent unintentional weight loss and choric smoker course complicated by hypercalcemia. Psychiatry consulted for patient's endorsement of active comment AH telling him to hurt himself.      PLAN _____ 60M single, disabled by mental illness, no dependents, resides w/ supportive housing system PMHx of HTN and diabetes w/ PPHx of schizoaffective disorder, polysubstance abuse (Cocaine, EtOH) w/ multiple past psychiatric hospitalizations (Including [facility] last in 2019) now presenting to ED c/o neck pain found to have a new lung mass w/ associated unintentional weight loss in setting of choric smoker and course complicated by hypercalcemia. Psychiatry consulted for patient's endorsement of command AH to hurt himself. CVM reviewed from previous hospitalizations per all documentation long-stand h/o psychosis with auditory hallucinations and paranoid ideation/delusions and h/o being uncooperative, hostile, agitated, and severely internally preoccupied when psychiatrically decompensated. Patient has had > 20 hospitalizations throughout various facilities since 1999 last at [facility] in 2019 for auditory hallucinations to hurt himself and others.    At this time patient is lethargic, weak, and intermittently confused in setting of active medical issues including potential malignancy and noted hypercalcemia likely leading to state of delirium. Currently not exhibiting any signs or symptoms of alcohol withdrawal and patient states he has not drank or used drugs in a long time - Awaiting call back from staff at housing facility for collateral. As per chart patient was endorsing command AH, however, currently patient denies AH/VH/SI/HI and was likely in the setting of worsened delirium. Patient currently calm and not aggressive.  UA negative; CXR reviewed; TSH WNL; UTox negative; Ca2+ initially 14.1 now in IVF    PLAN:  - Routine observation   - C/w Haldol 10mg PO BID, Zyprexa 20mg PO BID, Cogentin 1mg PO BID, and Trazadone 100mg qHS  - If patient develops signs/symptoms of EtOH withdrawal (Changes in vital signs, tremors, worsening delirium, etc.) low threshold to place on CIWA protocol   - Obtain B12 and folate for AM   - Haldol 2mg PO/IV/IM q 6 hours PRN for agitation   - Awaiting collateral from housing facility; Psychiatry to follow  60M single, disabled by mental illness, no dependents, resides w/ supportive housing system PMHx of HTN and diabetes w/ PPHx of schizoaffective disorder, polysubstance abuse (Cocaine, EtOH) w/ multiple past psychiatric hospitalizations (Including [facility] last in 2019) now presenting to ED c/o neck pain found to have a new lung mass w/ associated unintentional weight loss in setting of choric smoker and course complicated by hypercalcemia. Psychiatry consulted for patient's endorsement of command AH to hurt himself. CVM reviewed from previous hospitalizations per all documentation long-stand h/o psychosis with auditory hallucinations and paranoid ideation/delusions and h/o being uncooperative, hostile, agitated, and severely internally preoccupied when psychiatrically decompensated. Patient has had > 20 hospitalizations throughout various facilities since 1999 last at [facility] in 2019 for auditory hallucinations to hurt himself and others.    At this time patient is lethargic, weak, and intermittently confused in setting of active medical issues including potential malignancy and noted hypercalcemia likely leading to state of delirium. Currently not exhibiting any signs or symptoms of alcohol withdrawal and patient states he has not drank or used drugs in a long time - Awaiting call back from staff at housing facility for collateral. As per chart patient was endorsing command AH, however, currently patient denies AH/VH/SI/HI and was likely in the setting of worsened delirium. Patient currently calm and not aggressive.   UA negative; CXR reviewed; TSH WNL; UTox negative; Ca2+ initially 14.1 now in IVF    PLAN:  - Routine observation   - C/w Haldol 10mg PO BID, Zyprexa 20mg PO BID, Cogentin 1mg PO BID, and Trazadone 100mg qHS  - If patient develops signs/symptoms of EtOH withdrawal (Changes in vital signs, tremors, worsening delirium, etc.) low threshold to place on CIWA protocol   - Obtain B12 and folate for AM   - Ongoing w/u for lung mass   - Target normalization of Ca2+ levels as can cause symptoms of psychosis   - Haldol 2mg PO/IV/IM q 6 hours PRN for agitation   - Awaiting collateral from housing facility; Psychiatry to follow  60M single, disabled by mental illness, no dependents, resides w/ supportive housing system PMHx of HTN and diabetes w/ PPHx of schizoaffective disorder, polysubstance abuse (Cocaine, EtOH) w/ multiple past psychiatric hospitalizations (Including [facility] last in 2019) now presenting to ED c/o neck pain found to have a new lung mass w/ associated unintentional weight loss in setting of choric smoker and course complicated by hypercalcemia. Psychiatry consulted for patient's endorsement of command AH to hurt himself. CVM reviewed from previous hospitalizations per all documentation long-stand h/o psychosis with auditory hallucinations and paranoid ideation/delusions and h/o being uncooperative, hostile, agitated, and severely internally preoccupied when psychiatrically decompensated. Patient has had > 20 hospitalizations throughout various facilities since 1999 last at [facility] in 2019 for auditory hallucinations to hurt himself and others.    At this time patient is lethargic, weak, and intermittently confused in setting of active medical issues including potential malignancy and noted hypercalcemia likely leading to state of delirium. Currently not exhibiting any signs or symptoms of alcohol withdrawal and patient states he has not drank or used drugs in a long time - Awaiting call back from staff at housing facility for collateral. As per chart patient was endorsing command AH, however, currently patient denies AH/VH/SI/HI and was likely in the setting of worsened delirium. Patient currently calm and not aggressive.   UA negative; CXR reviewed; TSH WNL; UTox negative; Ca2+ initially 14.1 now in IVF    PLAN:  - Routine observation   - C/w Haldol 10mg PO BID, Cogentin 1mg PO BID, and Trazadone 100mg qHS  - DECREASE Zyprexa to 15mg PO BID starting this evening  - If patient develops signs/symptoms of EtOH withdrawal (Changes in vital signs, tremors, worsening delirium, etc.) low threshold to place on CIWA protocol   - Obtain B12 and folate for AM   - Ongoing w/u for lung mass   - Target normalization of Ca2+ levels as can cause symptoms of psychosis   - Haldol 2mg PO/IV/IM q 6 hours PRN for agitation; Monitor QTc  - Awaiting collateral from housing facility; Psychiatry to follow  60M single, disabled by mental illness, no dependents, resides w/ supportive housing system PMHx of HTN and diabetes w/ PPHx of schizoaffective disorder, polysubstance abuse (Cocaine, EtOH) w/ multiple past psychiatric hospitalizations (Including [facility] last in 2019) now presenting to ED c/o neck pain found to have a new lung mass w/ associated unintentional weight loss in setting of choric smoker and course complicated by hypercalcemia. Psychiatry consulted for patient's endorsement of command AH to hurt himself. CVM reviewed from previous hospitalizations per all documentation long-stand h/o psychosis with auditory hallucinations and paranoid ideation/delusions and h/o being uncooperative, hostile, agitated, and severely internally preoccupied when psychiatrically decompensated. Patient has had > 20 hospitalizations throughout various facilities since 1999 last at [facility] in 2019 for auditory hallucinations to hurt himself and others.    At this time patient is lethargic, weak, and intermittently confused in setting of active medical issues including concerning for malignancy and noted hypercalcemia likely leading to state of delirium. Currently not exhibiting any signs or symptoms of alcohol withdrawal and patient states he has not drank or used drugs in a long time - Awaiting call back from staff at housing facility for collateral. As per chart patient was endorsing command AH, however, currently patient denies AH/VH/SI/HI,  and was likely in the setting of worsened delirium. Patient also adamantly denies CAH. Patient currently calm and not aggressive.   UA negative; CXR reviewed; TSH WNL; UTox negative; Ca2+ initially 14.1 now in IVF    PLAN:  - Routine observation, monitor behavior and adjust observation status accordingly    - C/w Haldol 10mg PO BID, Cogentin 1mg PO BID, and Trazodone 100mg qHS  - DECREASE Zyprexa to 15mg PO BID starting this evening  - HOLD psychiatric meds. for excessive sedation  - If patient develops signs/symptoms of EtOH withdrawal (Changes in vital signs, tremors, sweating worsening delirium, etc.) low threshold to place on CIWA protocol   - Obtain B12 and folate for AM   - Ongoing w/u for lung mass   - Target normalization of Ca2+ levels as can cause symptoms of psychosis   - Haldol 2mg PO/IV/IM q 6 hours PRN for agitation; Monitor QTc  - HOLD antipsychotic if qtc >500  - Awaiting collateral from housing facility; Psychiatry to follow   - Do not give IV/IM Ativan within 4hrs of IM Zyprexa due to risk of oversedation and respiratory depression.

## 2021-04-02 NOTE — CONSULT NOTE ADULT - SUBJECTIVE AND OBJECTIVE BOX
Interventional Radiology    Evaluate for Procedure:     HPI: 60y Male with schizoaffective disorder, presented to the ED with neck pain, unintentional weight loss, and generalized weakness. Patient found to be hypercalcemic, with large left thoracic mass filling the entire chest cavity. Patient has significant smoking history and family hx of lung cancer.    Allergies: NKDA  Medications (Abx/Cardiac/Anticoagulation/Blood Products)    heparin   Injectable: 5000 Unit(s) SubCutaneous (04-02 @ 05:54)    Data:  180.3  58.8  T(C): 36.6  HR: 89  BP: 123/77  RR: 18  SpO2: 100%    -WBC 14.51 / HgB 9.0 / Hct 29.0 / Plt 366  -Na 136 / Cl 104 / BUN 24 / Glucose 107  -K 4.4 / CO2 22 / Cr 0.93  -ALT 13 / Alk Phos 68 / T.Bili 0.2  -INR 1.42 / PTT 39.3      Radiology:   CT Chest/abd/pelvis 4/2/21.  IMPRESSION:  Large heterogeneous mass occupying the majority of the left intrathoracic cavity with invasion of the left pulmonary artery, left pulmonary veins, and left atrium. Tumor occludes the left main bronchus.  Probable right pulmonary metastases.    Assessment/Plan:   60y Male with schizoaffective disorder, presented to the ED with neck pain, unintentional weight loss, and generalized weakness. Patient found to be hypercalcemic, with large left thoracic mass filling the entire chest cavity, with additional metastatic lesions in the right lung. IR consulted for biopsy for definitive diagnosis.  -- IR will plan to perform CT-guided lung mass biopsy 4/5/21.  -- Obtain morning labs 4/5/21, including coags/INR.  -- NPO at midnight prior to the procedure.  -- hold a.m. anticoagulation on 4/5/21.  -- please place IR procedure request order under Dr. Wheeler.

## 2021-04-02 NOTE — PROVIDER CONTACT NOTE (OTHER) - BACKGROUND
Patient is a 60 year old male admitted for neck pain. PMH of substance abuse, sickle cell anemia and HTN.

## 2021-04-02 NOTE — BH CONSULTATION LIAISON ASSESSMENT NOTE - OTHER
Limited 2/2 lethargy  Unkempt confused with poverty of thought content Limited 2/2 lethargy , seems impoverished, concrete

## 2021-04-02 NOTE — PROGRESS NOTE ADULT - PROBLEM SELECTOR PLAN 2
Ca 15 on admission (corrected), with cervical spine pain on exam, likely 2/2 malignancy vs. paraneoplastic  -f/u PTH, PTHrP, vit D  -c/w LR at 200/hr  -malignancy work up as above Ca 15 on admission (corrected), with cervical spine pain on exam, likely 2/2 malignancy vs. paraneoplastic  -f/u PTH, PTHrP, vit D  -c/w LR at 200/hr  -malignancy work up as above  -miralax and senna PRN

## 2021-04-02 NOTE — PHYSICAL THERAPY INITIAL EVALUATION ADULT - LEVEL OF INDEPENDENCE: SUPINE/SIT, REHAB EVAL
pt deferred despite + encouragement, putting blankets over his head and shutting down to further questions.

## 2021-04-02 NOTE — PROGRESS NOTE ADULT - ATTENDING COMMENTS
60yr old man, active smoker, PMH HTN, Type 2 DM, schizoaffective disorder, polysubstance abuse (cocaine, alcohol, cigarettes), hx of DVT? previously on Eliquis p/w neck pain and hearing voices. Found to have 2.9 cm RML nodular opacity and complete opacification of L hemithorax and hypercalcemia, concerning for malignancy.    CTH, C/A/P personally reviewed: Large heterogeneous mass occupying majority of the left intrathoracic cavity with invasion of the left pulmonary vasculature and left atrium. Tumor also occludes the left main bronchus and probable right pulmonary metastases.  C/w IVF for hypercalcemia, f/u Vit D 25OH, PTH intact, PTHrP. Give bisphosphonate for hypercalcemia of malignancy  IR for CT guided biopsy on Monday  F/u Anemia workup ordered for microcytic anemia, has hx of sickle cell trait. F/u sickle cell screen  Reports hearing voices that are telling him to kill himself but denies having suicidal or homicidal ideations. Psych consulted

## 2021-04-02 NOTE — BH CONSULTATION LIAISON ASSESSMENT NOTE - RISK ASSESSMENT
Risk factors:  Protective factors: Risk factors: Acute medical issue w/ delirium, lack of social support  Protective factors: Residential stability, medication compliance, sobriety, therapeutic relationships

## 2021-04-02 NOTE — PROGRESS NOTE ADULT - PROBLEM SELECTOR PLAN 6
Active smoker: 1ppd cigarette use for "many years", cocaine use, alcohol abuse, marijuana use  -Utox neg  -c/w home nicotine patch Active smoker: 1ppd cigarette use for "many years", cocaine use, alcohol abuse, marijuana use  -Utox neg  -c/w home nicotine gum

## 2021-04-02 NOTE — PROGRESS NOTE ADULT - ASSESSMENT
60M w/ HTN, DM, schizoaffective disorder, polysubstance abuse (cocaine, alcohol, cigarettes), hx of DVT? previously on Eliquis presents to ED c/o neck pain, found to have 2.9 cm right midlung nodular opacity and complete opacification of L hemithorax, with concern for new malignancy

## 2021-04-02 NOTE — BH CONSULTATION LIAISON ASSESSMENT NOTE - NSBHMSESPABN_PSY_A_CORE
Soft volume/Slowed rate/Decreased productivity Soft volume/Slowed rate/Decreased productivity/Increased latency

## 2021-04-02 NOTE — DIETITIAN INITIAL EVALUATION ADULT. - PATIENT PROFILE REVIEWED
Visit Information Date & Time Provider Department Dept. Phone Encounter #  
 11/13/2017 11:30 AM Ellen Saba MD [facility] [address] [place] [address] [id number] Follow-up Instructions Return for F/u after tests. Your Appointments 11/20/2017 10:00 AM  
Nurse Visit with [facility] [address] ([facility] Appt Note: Dr. Sohail Cesar for asthma  
 [address] [place] [address], [address] [address]  
[phone]  
  
   
 [address] [place] [address], [address] [place] [address],[address] [facility] [address]  
  
    
 11/29/2017  9:30 AM  
New Patient with Suyapa Monroy MD  
[address] ([facility]) Appt Note: Dr. Sohail Cesar for asthma  
 [address] [place] [address], [address] [address]  
[phone]  
  
   
 [address] [place] [address], [address] [place] [address],[address] [address] Upcoming Health Maintenance Date Due Hepatitis C Screening 1964 Pneumococcal 19-64 Medium Risk (1 of 1 - PPSV23) 9/13/1981 DTaP/Tdap/Td series (1 - Tdap) 9/13/1983 PAP AKA CERVICAL CYTOLOGY 9/13/1983 FOBT Q 1 YEAR AGE 50-75 9/13/2012 Influenza Age 5 to Adult 8/1/2017 BREAST CANCER SCRN MAMMOGRAM 11/2/2019 Allergies as of 11/13/2017  Review Complete On: 11/13/2017 By: Ellen Saba MD  
  
 Severity Noted Reaction Type Reactions Penicillins  02/20/2013    Other (comments) Takes skin of body Current Immunizations  Never Reviewed No immunizations on file. Not reviewed this visit You Were Diagnosed With   
  
 Codes Comments Chest pain, unspecified type    -  Primary ICD-10-CM: R07.9 ICD-9-CM: 786.50 ? angina 
abn nuc once recent er visit Hyperlipidemia, unspecified hyperlipidemia type     ICD-10-CM: E78.5 ICD-9-CM: 272.4 stable PVC (premature ventricular contraction)     ICD-10-CM: I49.3 ICD-9-CM: 427.69 stable PFO (patent foramen ovale)     ICD-10-CM: Q21.1 ICD-9-CM: 0.11 old Near syncope     ICD-10-CM: R55 
ICD-9-CM: 780.2 2 episode on exertion and at rest  
  
Vitals BP Pulse Height(growth percentile) Weight(growth percentile) BMI OB Status 105/75 98 5' 6\" (1.676 m) 223 lb (101.2 kg) 35.99 kg/m2 Menopause Smoking Status Former Smoker Vitals History BMI and BSA Data Body Mass Index Body Surface Area 35.99 kg/m 2 2.17 m 2 Preferred Pharmacy Pharmacy Name Phone [facility] [address]. [phone] Your Updated Medication List  
  
   
This list is accurate as of: 11/13/17 11:53 AM.  Always use your most recent med list.  
  
  
  
  
 albuterol 90 mcg/actuation inhaler Commonly known as:  PROVENTIL HFA, VENTOLIN HFA, PROAIR HFA Take  by inhalation. ASMANEX TWISTHALER 220 mcg (120 doses) Aepb inhaler Generic drug:  mometasone Take 1 Puff by inhalation daily. aspirin delayed-release 81 mg tablet Take 81 mg by mouth daily. cholecalciferol 1,000 unit Cap Commonly known as:  VITAMIN D3 Take  by mouth. FISH OIL EXTRA STRENGTH PO Take  by mouth daily. loratadine 10 mg tablet Commonly known as:  Napolean Travis Take 10 mg by mouth.  
  
 magnesium 250 mg Tab Take 250 mg by mouth.  
  
 multivitamin tablet Commonly known as:  ONE A DAY Take 1 Tab by mouth daily. nitroglycerin 0.4 mg SL tablet Commonly known as:  NITROSTAT  
1 Tab by SubLINGual route every five (5) minutes as needed for Chest Pain. OTHER  
chromax plus take one cap every day VITAMIN B-12 1,000 mcg tablet Generic drug:  cyanocobalamin Take 1,000 mcg by mouth daily. zinc 50 mg Tab tablet Take  by mouth daily. We Performed the Following   
 ECG,PT DEMAND EVENT,PRESYMPT MEMORY LOOP [41781 CPT(R)] Follow-up Instructions Return for F/u after tests. To-Do List   
 11/16/2017 Cardiac Services:  2D ECHO COMPLETE ADULT (TTE)   
  
 11/16/2017 7:00 AM  
  Appointment with [facility] 1 at [address] [place] [address] ([phone]) GENERAL INSTRUCTIONS  Bring information (ID card) if you have any medically implanted devices. You will be required to lie still while the procedure is being performed. Remove any jewelry (including body piercing, hairpins) prior to MRI. If you have had a creatinine level drawn within the past 30 days, please bring most recent results to your appt. Bring any films, CD's, and reports related to your study with you on the day of your exam.  This only includes studies done outside of [address] [place] [address], [facility], [facility], and [facility]. Bring a complete list of all medications you are currently taking to include prescriptions, over-the-counter meds, herbals, vitamins & any dietary supplements. If you were given medications for claustrophobia or anxiety, please arrange to have someone drive you to your appointment. QUESTIONS  Notify the MRI Department if you have any questions concerning your study. [facility]ram Ponce [phone]  
  
 11/16/2017 8:00 AM  
  Appointment with [facility] 1 at [facility] ([phone]) No preparation is required for this study. Patient can have their meals and take their medications. Patient should not wear a turtleneck or high neck shirt for this study. Please report to the main location @ [address] [place] [address] approximately 30 minutes prior to your appointment time. The entrance is located on the RIGHT side of the street, immediately adjacent to the Emergency Room. 11/20/2017 Nursing:  SCHEDULE NUCLEAR STUDY Introducing [facility]! Cruz Geronimo introduces [employer] patient portal. Now you can access parts of your medical record, email your doctor's office, and request medication refills online. 1. In your internet browser, go to [email or web address] 2. Click on the First Time User? Click Here link in the Sign In box. You will see the New Member Sign Up page. 3. Enter your [employer] Access Code exactly as it appears below. You will not need to use this code after youve completed the sign-up process. If you do not sign up before the expiration date, you must request a new code. · [employer] Access Code: [email or web address] Expires: 1/17/2018  2:27 PM 
 
4. Enter the last four digits of your Social Security Number (xxxx) and Date of Birth (mm/dd/yyyy) as indicated and click Submit. You will be taken to the next sign-up page. 5. Create a [employer] ID. This will be your Neurovance login ID and cannot be changed, so think of one that is secure and easy to remember. 6. Create a Neurovance password. You can change your password at any time. 7. Enter your Password Reset Question and Answer. This can be used at a later time if you forget your password. 8. Enter your e-mail address. You will receive e-mail notification when new information is available in [address] 9. Click Sign Up. You can now view and download portions of your medical record. 10. Click the Download Summary menu link to download a portable copy of your medical information. If you have questions, please visit the Frequently Asked Questions section of the [employer]vance website. Remember, [employer]vance is NOT to be used for urgent needs. For medical emergencies, dial 911. Now available from your iPhone and Android! Please provide this summary of care documentation to your next provider. Your primary care clinician is listed as Grayson Sellers. If you have any questions after today's visit, please call [phone]. yes

## 2021-04-02 NOTE — BH CONSULTATION LIAISON ASSESSMENT NOTE - HPI (INCLUDE ILLNESS QUALITY, SEVERITY, DURATION, TIMING, CONTEXT, MODIFYING FACTORS, ASSOCIATED SIGNS AND SYMPTOMS)
60M single, disabled by mental illness, no dependents, resides w/ [facility] housing system PMHx of HTN and diabetes w/ PPHx of schizoaffective disorder, polysubstance abuse (Cocaine, EtOH) w/ multiple past psychiatric hospitalizations (Including [facility]) last in 2019 now presenting to ED c/o neck pain found to have a new lung mass in the setting of recent unintentional weight loss and choric smoker course complicated by hypercalcemia. Psychiatry consulted for patient's endorsement of active comment AH telling him to hurt himself.    Patient lives alone at [facility] ([place] [facility] ([facility]) [phone] and follows with a Mental Health Service Team [facility] (mental health services ) [phone]) 60M single, disabled by mental illness, no dependents, resides w/ [facility] housing system PMHx of HTN and diabetes w/ PPHx of schizoaffective disorder, polysubstance abuse (Cocaine, EtOH) w/ multiple past psychiatric hospitalizations (Including [facility] last in 2019) now presenting to ED c/o neck pain found to have a new lung mass w/ associated unintentional weight loss in setting of choric smoker and course complicated by hypercalcemia. Psychiatry consulted for patient's endorsement of command AH to hurt himself.    CVM reviewed from previous hospitalizations per all documentation long-stand h/o psychosis with auditory hallucinations and paranoid ideation/delusions and h/o being uncooperative, hostile, agitated, and severely internally preoccupied when psychiatrically decompensated. Patient has had > 20 hospitalizations throughout various facilities since 1999 last at [facility] in 2019 for auditory hallucinations to hurt himself and others.    Chart reviewed and patient seen at bedside A&O to himself only and aware he is in a hospital. Upon assessment patient is lethargic, weak, cooperative, and intermittently confused, therefore, interview is limited at this time. Patient is able to provide substance use history and states he has not had a drink or done any drugs in "a long time." Upon asking about symptoms of psychosis patient states he was previously having VH seeing dogs, however, not currently - Additionally, when asked about his endorsement of command AH in the ED to hurt himself, he states he does not feel he is hearing any negative voices at this time. Patient also denies SI/HI at this time. He states he is unaware of his medication regimen besides Haldol and he doesn't remember why he is in the hospital.     Patient lives alone at [facility] ([place]  - [phone]) attempted collateral and awaiting call back (Left messgae)  Follows with a Mental Health Service Team [place] ( - [phone]) also attempted to call this individual for collateral.  60M single, disabled by mental illness, no dependents, resides w/ [facility] housing system PMHx of HTN and diabetes w/ PPHx of schizoaffective disorder, polysubstance abuse (Cocaine, EtOH) w/ multiple past psychiatric hospitalizations (Including [facility] last in 2019) now presenting to ED c/o neck pain found to have a new lung mass w/ associated unintentional weight loss in setting of chronic smoker and course complicated by hypercalcemia. Psychiatry consulted for patient's endorsement of command AH to hurt himself.    CVM reviewed from previous hospitalizations per all documentation long-stand h/o psychosis with auditory hallucinations and paranoid ideation/delusions and h/o being uncooperative, hostile, agitated, and severely internally preoccupied when psychiatrically decompensated. Patient has had > 20 hospitalizations throughout various facilities since 1999 last at [facility] in 2019 for auditory hallucinations to hurt himself and others.    Chart reviewed and patient seen at bedside A&O to himself only and aware he is in a hospital. Upon assessment patient is lethargic, weak, cooperative, and intermittently confused, therefore, interview is limited at this time. Patient is able to provide substance use history and states he has not had a drink or done any drugs in "a long time." Upon asking about symptoms of psychosis patient states he was previously having VH seeing dogs, however, not currently - Additionally, when asked about his endorsement of command AH in the ED to hurt himself, he states he does not feel he is hearing any negative voices at this time. Patient also denies SI/HI at this time. He states he is unaware of his medication regimen besides Haldol and he doesn't remember why he is in the hospital.     Patient lives alone at [facility] ([place]  - [phone]) attempted collateral and awaiting call back (Left message)  Follows with a Mental Health Service Team [place] ( - [phone]) also attempted to call this individual for collateral.

## 2021-04-02 NOTE — BH CONSULTATION LIAISON ASSESSMENT NOTE - NSBHCHARTREVIEWLAB_PSY_A_CORE FT
8.2    13.99 )-----------( 373      ( 01 Apr 2021 12:33 )             26.7   04-01    137  |  102  |  29<H>  ----------------------------<  92  3.5   |  27  |  1.07    Ca    14.1<HH>      01 Apr 2021 12:33    TPro  9.6<H>  /  Alb  2.6<L>  /  TBili  0.3  /  DBili  x   /  AST  29  /  ALT  15  /  AlkPhos  68  04-01

## 2021-04-02 NOTE — DIETITIAN INITIAL EVALUATION ADULT. - PERTINENT MEDS FT
MEDICATIONS  (STANDING):  benztropine 1 milliGRAM(s) Oral two times a day  haloperidol     Tablet 10 milliGRAM(s) Oral two times a day  heparin   Injectable 5000 Unit(s) SubCutaneous every 12 hours  lactated ringers. 1000 milliLiter(s) (200 mL/Hr) IV Continuous <Continuous>  OLANZapine 15 milliGRAM(s) Oral two times a day  traZODone 100 milliGRAM(s) Oral at bedtime

## 2021-04-02 NOTE — PHYSICAL THERAPY INITIAL EVALUATION ADULT - ADDITIONAL COMMENTS
Patient lives alone at [facility]. Unable to obtain further information as pt uncooperative with exam.    Patient was left safely in bed as found, all lines/tubes intact and call bell within reach, RN aware

## 2021-04-02 NOTE — PROGRESS NOTE ADULT - PROBLEM SELECTOR PLAN 1
concern for malignancy (primary vs. secondary), less likely infectious, CXR w/ 2.9 cm R lung mass, recent unintentional weight loss, bone pain (cervical spine), active smoker: 1 ppd for "many years", father w/ hx of lung cancer  -f/u CTH, chest w/ IV con, A/P w/ IV con  -plan for eventual biopsy

## 2021-04-02 NOTE — BH CONSULTATION LIAISON ASSESSMENT NOTE - CASE SUMMARY
Pt repositioned and pillow placed under back for comfort. Pt daughter Pranav at bedside- provided recliner chair, pillow and blanket. Pt and daughter denies other needs at this at this time.    Chart reviewed, pt. seen and evaluated with Meka ROSADO, I agree with above assessment and plan, pt. oriented to self and hospital, was cooperative but appears drowsy, lethargic. Patient seems to be a poor historian, limited, thought process is concrete. Denies AH and VH, denies CAH, denies SI and HI. Plan as written above, monitor EKG for qtc, attempted to get collateral from , however unable to reach at this time, will follow

## 2021-04-02 NOTE — PHYSICAL THERAPY INITIAL EVALUATION ADULT - PATIENT PROFILE REVIEW, REHAB EVAL
PT orders received: no formal activity order. Consult with NALINI PHIPPS, pt may participate in PT evaluation./yes

## 2021-04-02 NOTE — BH CONSULTATION LIAISON ASSESSMENT NOTE - NSBHCHARTREVIEWVS_PSY_A_CORE FT
Vital Signs Last 24 Hrs  T(C): 36.6 (01 Apr 2021 20:13), Max: 36.6 (01 Apr 2021 15:10)  T(F): 97.9 (01 Apr 2021 20:13), Max: 97.9 (01 Apr 2021 15:10)  HR: 80 (02 Apr 2021 07:00) (74 - 82)  BP: 136/72 (02 Apr 2021 07:00) (104/70 - 136/72)  BP(mean): --  RR: 17 (02 Apr 2021 07:00) (17 - 18)  SpO2: 95% (02 Apr 2021 07:00) (95% - 100%)

## 2021-04-02 NOTE — DIETITIAN INITIAL EVALUATION ADULT. - OTHER INFO
59 y/o male with schizoaffective disorder and polysubstance abuse admitted with dx neck pain. Attempted to speak with pt, however he pulled his sheet over his head and refused to engage in conversation. Collateral information obtained from extensive chart review. Nursing said that they believe pt has been eating well. He has been ordered a regular diet although he has DM as his Glu has been WNLs. H&P indicates that pt has had signif wt loss PTA 2/2 polysubstance abuse. Recommend Glucerna Therapeutic Nutrition Shake 240mls 3x daily (660kcals, 30g protein) to optimize nutrition and assist with wt stabilization. Pt likely at risk for malnutrition, however unable to perform nutrition focused physical exam on pt and pt refuses to answer questions asked, therefore unable to establish weight history to confirm loss of weight. Pt did appear thin under bedsheet. Available should pt consent to speaking with an RDN. RDN services to remain available as needed.

## 2021-04-02 NOTE — PHYSICAL THERAPY INITIAL EVALUATION ADULT - PERTINENT HX OF CURRENT PROBLEM, REHAB EVAL
Patient is a 60 year old male admitted to [facility] c/o neck pain and weakness. PMH: HTN, DM, schizoaffective disorder, polysubstance abuse (cocaine, alcohol, cigarettes), hx of DVT? previously on Eliquis.

## 2021-04-02 NOTE — BH CONSULTATION LIAISON ASSESSMENT NOTE - CURRENT MEDICATION
MEDICATIONS  (STANDING):  benztropine 1 milliGRAM(s) Oral two times a day  haloperidol     Tablet 10 milliGRAM(s) Oral two times a day  heparin   Injectable 5000 Unit(s) SubCutaneous every 12 hours  lactated ringers. 1000 milliLiter(s) (200 mL/Hr) IV Continuous <Continuous>  OLANZapine 20 milliGRAM(s) Oral two times a day  traZODone 100 milliGRAM(s) Oral at bedtime    MEDICATIONS  (PRN):  acetaminophen   Tablet .. 650 milliGRAM(s) Oral every 6 hours PRN Temp greater or equal to 38C (100.4F), Mild Pain (1 - 3), Moderate Pain (4 - 6)  lidocaine   Patch 1 Patch Transdermal daily PRN pain  nicotine  Polacrilex Gum 2 milliGRAM(s) Oral every 2 hours PRN smoking cessation  polyethylene glycol 3350 17 Gram(s) Oral daily PRN Constipation  senna 2 Tablet(s) Oral at bedtime PRN Constipation

## 2021-04-02 NOTE — BH CONSULTATION LIAISON ASSESSMENT NOTE - OTHER PAST PSYCHIATRIC HISTORY (INCLUDE DETAILS REGARDING ONSET, COURSE OF ILLNESS, INPATIENT/OUTPATIENT TREATMENT)
Schizoaffective disorder and polysubstance abuse w/ multiple past psychiatric hospitalizations Schizoaffective disorder and polysubstance abuse w/ multiple past psychiatric hospitalizations - See HPI

## 2021-04-02 NOTE — PROGRESS NOTE ADULT - SUBJECTIVE AND OBJECTIVE BOX
PROGRESS NOTE:   Authored by Ira Kong MD  Pager [phone] [facility] / [phone] [facility]    Patient is a 60y old  Male who presents with a chief complaint of neck pain (2021 18:26)      SUBJECTIVE / OVERNIGHT EVENTS:  No acute events overnight.  This morning, pt    MEDICATIONS  (STANDING):  heparin   Injectable 5000 Unit(s) SubCutaneous every 12 hours  lactated ringers. 1000 milliLiter(s) (200 mL/Hr) IV Continuous <Continuous>    MEDICATIONS  (PRN):  acetaminophen   Tablet .. 650 milliGRAM(s) Oral every 6 hours PRN Temp greater or equal to 38C (100.4F), Mild Pain (1 - 3), Moderate Pain (4 - 6)  lidocaine   Patch 1 Patch Transdermal daily PRN pain      CAPILLARY BLOOD GLUCOSE        I&O's Summary      PHYSICAL EXAM:  Vital Signs Last 24 Hrs  T(C): 36.6 (2021 20:13), Max: 36.6 (2021 15:10)  T(F): 97.9 (2021 20:13), Max: 97.9 (2021 15:10)  HR: 80 (2021 07:00) (74 - 92)  BP: 136/72 (2021 07:00) (104/70 - 136/72)  BP(mean): --  RR: 17 (2021 07:00) (17 - 18)  SpO2: 95% (2021 07:00) (92% - 100%)    Gen: Alert, lying in bed, NAD, cachetic, uncooperative with exam  HEENT: NCAT, PERRL, EOMI, clear conjunctiva, no scleral icterus, no erythema or exudates in the oropharynx, mmm  Neck: cervical spine tender to palpation, cervical mass  CV: RRR, S1S2, no m/r/g  Resp: normal respiratory effort, decreased breath sounds on L  Abd: soft, NT, ND  Ext: no edema in b/l LE  Neuro: able to answer questions appropriately, mild dysarthria  Skin: warm, perfused    LABS:                        8.2    13.99 )-----------( 373      ( 2021 12:33 )             26.7     04-01    137  |  102  |  29<H>  ----------------------------<  92  3.5   |  27  |  1.07    Ca    14.1<HH>      2021 12:33    TPro  9.6<H>  /  Alb  2.6<L>  /  TBili  0.3  /  DBili  x   /  AST  29  /  ALT  15  /  AlkPhos  68  04-01          Urinalysis Basic - ( 2021 12:33 )    Color: Light Yellow / Appearance: Clear / S.021 / pH: x  Gluc: x / Ketone: Negative  / Bili: Negative / Urobili: <2 mg/dL   Blood: x / Protein: Trace / Nitrite: Negative   Leuk Esterase: Negative / RBC: x / WBC x   Sq Epi: x / Non Sq Epi: x / Bacteria: x   PROGRESS NOTE:   Authored by Ira Kong MD  Pager [phone] [facility] / [phone] [facility]    Patient is a 60y old  Male who presents with a chief complaint of neck pain (2021 18:26)      SUBJECTIVE / OVERNIGHT EVENTS:  No acute events overnight.  This morning, pt more awake and alert. Pt cooperative with interview and exam. He does not remember his mother or son's numbers but wants them to be updated. He denies any SOB, chest pain, increased cough. Still with mild neck soreness on palpation    MEDICATIONS  (STANDING):  heparin   Injectable 5000 Unit(s) SubCutaneous every 12 hours  lactated ringers. 1000 milliLiter(s) (200 mL/Hr) IV Continuous <Continuous>    MEDICATIONS  (PRN):  acetaminophen   Tablet .. 650 milliGRAM(s) Oral every 6 hours PRN Temp greater or equal to 38C (100.4F), Mild Pain (1 - 3), Moderate Pain (4 - 6)  lidocaine   Patch 1 Patch Transdermal daily PRN pain      CAPILLARY BLOOD GLUCOSE        I&O's Summary      PHYSICAL EXAM:  Vital Signs Last 24 Hrs  T(C): 36.6 (2021 20:13), Max: 36.6 (2021 15:10)  T(F): 97.9 (2021 20:13), Max: 97.9 (2021 15:10)  HR: 80 (2021 07:00) (74 - 92)  BP: 136/72 (2021 07:00) (104/70 - 136/72)  BP(mean): --  RR: 17 (2021 07:00) (17 - 18)  SpO2: 95% (2021 07:00) (92% - 100%)    Gen: Alert, lying in bed, NAD, cachetic, disheveled  HEENT: NCAT, PERRL, EOMI, clear conjunctiva, no scleral icterus, no erythema or exudates in the oropharynx, mmm, poor denition  Neck: cervical tenderness to palpation, gurgly voice  CV: RRR, S1S2, no m/r/g  Resp: normal respiratory effort, decreased breath sounds on L  Abd: soft, NT, ND  Ext: no edema in b/l LE  Neuro: able to answer questions appropriately  Skin: warm, perfused    LABS:                        8.2    13.99 )-----------( 373      ( 2021 12:33 )             26.7     04-    137  |  102  |  29<H>  ----------------------------<  92  3.5   |  27  |  1.07    Ca    14.1<HH>      2021 12:33    TPro  9.6<H>  /  Alb  2.6<L>  /  TBili  0.3  /  DBili  x   /  AST  29  /  ALT  15  /  AlkPhos  68  04-01          Urinalysis Basic - ( 2021 12:33 )    Color: Light Yellow / Appearance: Clear / S.021 / pH: x  Gluc: x / Ketone: Negative  / Bili: Negative / Urobili: <2 mg/dL   Blood: x / Protein: Trace / Nitrite: Negative   Leuk Esterase: Negative / RBC: x / WBC x   Sq Epi: x / Non Sq Epi: x / Bacteria: x

## 2021-04-02 NOTE — PROGRESS NOTE ADULT - PROBLEM SELECTOR PLAN 4
microcytic, unclear baseline, last hgb 9.8 in 3/2020, hx of sickle cell trait?  -f/u fe studies, B12, folate  -transfuse <7 microcytic, unclear baseline, last hgb 9.8 in 3/2020, hx of sickle cell trait?  -f/u fe studies, B12, folate  -transfuse <7  -f/u sickle cell screen

## 2021-04-03 ENCOUNTER — TRANSCRIPTION ENCOUNTER (OUTPATIENT)
Age: 61
End: 2021-04-03

## 2021-04-03 DIAGNOSIS — Z71.89 OTHER SPECIFIED COUNSELING: ICD-10-CM

## 2021-04-03 PROCEDURE — 99233 SBSQ HOSP IP/OBS HIGH 50: CPT

## 2021-04-03 RX ORDER — PAMIDRONATE DISODIUM 9 MG/ML
90 INJECTION, SOLUTION INTRAVENOUS ONCE
Refills: 0 | Status: COMPLETED | OUTPATIENT
Start: 2021-04-03 | End: 2021-04-03

## 2021-04-03 RX ORDER — SODIUM CHLORIDE 9 MG/ML
1000 INJECTION, SOLUTION INTRAVENOUS
Refills: 0 | Status: DISCONTINUED | OUTPATIENT
Start: 2021-04-03 | End: 2021-04-08

## 2021-04-03 RX ADMIN — SODIUM CHLORIDE 200 MILLILITER(S): 9 INJECTION, SOLUTION INTRAVENOUS at 08:04

## 2021-04-03 RX ADMIN — SODIUM CHLORIDE 100 MILLILITER(S): 9 INJECTION, SOLUTION INTRAVENOUS at 19:22

## 2021-04-03 RX ADMIN — OLANZAPINE 15 MILLIGRAM(S): 15 TABLET, FILM COATED ORAL at 17:05

## 2021-04-03 RX ADMIN — HEPARIN SODIUM 5000 UNIT(S): 5000 INJECTION INTRAVENOUS; SUBCUTANEOUS at 17:05

## 2021-04-03 RX ADMIN — Medication 1 MILLIGRAM(S): at 17:05

## 2021-04-03 RX ADMIN — OLANZAPINE 15 MILLIGRAM(S): 15 TABLET, FILM COATED ORAL at 06:14

## 2021-04-03 RX ADMIN — HEPARIN SODIUM 5000 UNIT(S): 5000 INJECTION INTRAVENOUS; SUBCUTANEOUS at 06:14

## 2021-04-03 RX ADMIN — Medication 100 MILLIGRAM(S): at 22:50

## 2021-04-03 RX ADMIN — PAMIDRONATE DISODIUM 65 MILLIGRAM(S): 9 INJECTION, SOLUTION INTRAVENOUS at 12:58

## 2021-04-03 RX ADMIN — Medication 2 MILLIGRAM(S): at 17:05

## 2021-04-03 RX ADMIN — Medication 1 MILLIGRAM(S): at 06:14

## 2021-04-03 RX ADMIN — HALOPERIDOL DECANOATE 10 MILLIGRAM(S): 100 INJECTION INTRAMUSCULAR at 17:05

## 2021-04-03 RX ADMIN — SODIUM CHLORIDE 100 MILLILITER(S): 9 INJECTION, SOLUTION INTRAVENOUS at 10:37

## 2021-04-03 RX ADMIN — HALOPERIDOL DECANOATE 10 MILLIGRAM(S): 100 INJECTION INTRAMUSCULAR at 06:13

## 2021-04-03 NOTE — DISCHARGE NOTE PROVIDER - NSDCCPCAREPLAN_GEN_ALL_CORE_FT
PRINCIPAL DISCHARGE DIAGNOSIS  Diagnosis: Lung mass  Assessment and Plan of Treatment: We did a scan of your body which showed a large mass in your left lung and smaller masses in your right lung. You did not have any issues breathing. We are concerned that this is cancer. We took a sample of the lung mass which showed _____.       PRINCIPAL DISCHARGE DIAGNOSIS  Diagnosis: Lung mass  Assessment and Plan of Treatment: We did a scan of your body which showed a large mass in your left lung and smaller masses in your right lung. You did not have any issues breathing. We are concerned that this is cancer. ________       PRINCIPAL DISCHARGE DIAGNOSIS  Diagnosis: Lung mass  Assessment and Plan of Treatment: We did a scan of your body which showed a large mass in your left lung and smaller masses in your right lung.  We were  concerned that this is cancer. After discussions with you and your family it was determined that you would be transitioned to hospice care services with goal of making you comfortable after discharge.      SECONDARY DISCHARGE DIAGNOSES  Diagnosis: Advanced care planning/counseling discussion  Assessment and Plan of Treatment: Based on the extensive lung disease found on imaging, the decision was made to transition you to hospice care services.    Diagnosis: Schizoaffective disorder  Assessment and Plan of Treatment: Please continue taking your home olanzapine, trazodone, and benztropine medications as prescribed. Please follow up with Anuel , your mental health services , at [phone] after discharge.

## 2021-04-03 NOTE — PROGRESS NOTE ADULT - PROBLEM SELECTOR PLAN 8
Not on any home DM meds, glu 92 on admission  -CTM BMP Normotensive on admission, not on any home BP meds  -CTM

## 2021-04-03 NOTE — DISCHARGE NOTE PROVIDER - NSDCFUADDAPPT_GEN_ALL_CORE_FT
Please follow-up with your PCP, Dr. Pate and Arin Carrillo, your psychiatry NP, after discharge.     Arin Carrillo (psychiatry NP) [phone]

## 2021-04-03 NOTE — PROGRESS NOTE ADULT - SUBJECTIVE AND OBJECTIVE BOX
PROGRESS NOTE:   Authored by Ira Kong MD  Pager [phone] [facility] / [id number] [facility]    Patient is a 60y old  Male who presents with a chief complaint of neck pain (2021 16:43)      SUBJECTIVE / OVERNIGHT EVENTS:  Overnight pt refused Trazadone. Pt planned for IR bx of lung mass on Monday.  This morning, pt     MEDICATIONS  (STANDING):  benztropine 1 milliGRAM(s) Oral two times a day  haloperidol     Tablet 10 milliGRAM(s) Oral two times a day  heparin   Injectable 5000 Unit(s) SubCutaneous every 12 hours  lactated ringers. 1000 milliLiter(s) (200 mL/Hr) IV Continuous <Continuous>  OLANZapine 15 milliGRAM(s) Oral two times a day  traZODone 100 milliGRAM(s) Oral at bedtime    MEDICATIONS  (PRN):  acetaminophen   Tablet .. 650 milliGRAM(s) Oral every 6 hours PRN Temp greater or equal to 38C (100.4F), Mild Pain (1 - 3), Moderate Pain (4 - 6)  lidocaine   Patch 1 Patch Transdermal daily PRN pain  nicotine  Polacrilex Gum 2 milliGRAM(s) Oral every 2 hours PRN smoking cessation  polyethylene glycol 3350 17 Gram(s) Oral daily PRN Constipation  senna 2 Tablet(s) Oral at bedtime PRN Constipation      CAPILLARY BLOOD GLUCOSE        I&O's Summary      PHYSICAL EXAM:  Vital Signs Last 24 Hrs  T(C): 36.5 (2021 06:15), Max: 36.8 (2021 22:42)  T(F): 97.7 (2021 06:15), Max: 98.2 (2021 22:42)  HR: 91 (2021 06:15) (89 - 91)  BP: 131/64 (2021 06:15) (123/77 - 131/64)  BP(mean): --  RR: 18 (2021 06:15) (18 - 18)  SpO2: 100% (2021 06:15) (100% - 100%)    Gen: Alert, lying in bed, NAD, cachetic, disheveled  HEENT: NCAT, PERRL, EOMI, clear conjunctiva, no scleral icterus, no erythema or exudates in the oropharynx, mmm, poor dentition  Neck: cervical tenderness to palpation, gurgled voice  CV: RRR, S1S2, no m/r/g  Resp: normal respiratory effort, decreased breath sounds on L  Abd: soft, NT, ND  Ext: no edema in b/l LE  Neuro: able to answer questions appropriately  Skin: warm, perfused    LABS:                        9.0    14.51 )-----------( 366      ( 2021 14:02 )             29.0     04-02    136  |  104  |  24<H>  ----------------------------<  107<H>  4.4   |  22  |  0.93    Ca    13.2<HH>      2021 14:02  Phos  2.2     04-02  Mg     1.4     04-02    TPro  8.2  /  Alb  2.0<L>  /  TBili  0.2  /  DBili  x   /  AST  42<H>  /  ALT  13  /  AlkPhos  68  04-02          Urinalysis Basic - ( 2021 12:33 )    Color: Light Yellow / Appearance: Clear / S.021 / pH: x  Gluc: x / Ketone: Negative  / Bili: Negative / Urobili: <2 mg/dL   Blood: x / Protein: Trace / Nitrite: Negative   Leuk Esterase: Negative / RBC: x / WBC x   Sq Epi: x / Non Sq Epi: x / Bacteria: x    RADIOLOGY  < from: CT Abdomen and Pelvis w/ IV Cont (21 @ 11:37) >  IMPRESSION:  Large heterogeneous mass occupying the majority of the left intrathoracic cavity with invasion of the left pulmonary artery, left pulmonary veins, and left atrium. Tumor occludes the left main bronchus.    Probable right pulmonary metastases.      < end of copied text >   PROGRESS NOTE:   Authored by Ira Kong MD  Pager [phone] [facility] / [id number] [facility]    Patient is a 60y old  Male who presents with a chief complaint of neck pain (2021 16:43)      SUBJECTIVE / OVERNIGHT EVENTS:  Overnight pt refused Trazadone. Pt planned for IR bx of lung mass on Monday.  This morning, pt refusing morning labs. Pt unable to find number for his son or mother from his wallet. He denies any SOB, CP. He is eating breakfast at bedside.    MEDICATIONS  (STANDING):  benztropine 1 milliGRAM(s) Oral two times a day  haloperidol     Tablet 10 milliGRAM(s) Oral two times a day  heparin   Injectable 5000 Unit(s) SubCutaneous every 12 hours  lactated ringers. 1000 milliLiter(s) (200 mL/Hr) IV Continuous <Continuous>  OLANZapine 15 milliGRAM(s) Oral two times a day  traZODone 100 milliGRAM(s) Oral at bedtime    MEDICATIONS  (PRN):  acetaminophen   Tablet .. 650 milliGRAM(s) Oral every 6 hours PRN Temp greater or equal to 38C (100.4F), Mild Pain (1 - 3), Moderate Pain (4 - 6)  lidocaine   Patch 1 Patch Transdermal daily PRN pain  nicotine  Polacrilex Gum 2 milliGRAM(s) Oral every 2 hours PRN smoking cessation  polyethylene glycol 3350 17 Gram(s) Oral daily PRN Constipation  senna 2 Tablet(s) Oral at bedtime PRN Constipation      CAPILLARY BLOOD GLUCOSE        I&O's Summary      PHYSICAL EXAM:  Vital Signs Last 24 Hrs  T(C): 36.5 (2021 06:15), Max: 36.8 (2021 22:42)  T(F): 97.7 (2021 06:15), Max: 98.2 (2021 22:42)  HR: 91 (2021 06:15) (89 - 91)  BP: 131/64 (2021 06:15) (123/77 - 131/64)  BP(mean): --  RR: 18 (2021 06:15) (18 - 18)  SpO2: 100% (2021 06:15) (100% - 100%)    Gen: Alert, lying in bed, NAD, cachetic, disheveled  HEENT: NCAT, PERRL, EOMI, clear conjunctiva, no scleral icterus, no erythema or exudates in the oropharynx, mmm, poor dentition  Neck: cervical tenderness to palpation, gurgled voice  CV: RRR, S1S2, no m/r/g  Resp: normal respiratory effort, decreased breath sounds on L  Abd: soft, NT, ND  Ext: no edema in b/l LE  Neuro: able to answer questions appropriately  Skin: warm, perfused    LABS:                        9.0    14.51 )-----------( 366      ( 2021 14:02 )             29.0     04-02    136  |  104  |  24<H>  ----------------------------<  107<H>  4.4   |  22  |  0.93    Ca    13.2<HH>      2021 14:02  Phos  2.2     04-02  Mg     1.4     04-02    TPro  8.2  /  Alb  2.0<L>  /  TBili  0.2  /  DBili  x   /  AST  42<H>  /  ALT  13  /  AlkPhos  68  04-02          Urinalysis Basic - ( 2021 12:33 )    Color: Light Yellow / Appearance: Clear / S.021 / pH: x  Gluc: x / Ketone: Negative  / Bili: Negative / Urobili: <2 mg/dL   Blood: x / Protein: Trace / Nitrite: Negative   Leuk Esterase: Negative / RBC: x / WBC x   Sq Epi: x / Non Sq Epi: x / Bacteria: x    RADIOLOGY  < from: CT Abdomen and Pelvis w/ IV Cont (21 @ 11:37) >  IMPRESSION:  Large heterogeneous mass occupying the majority of the left intrathoracic cavity with invasion of the left pulmonary artery, left pulmonary veins, and left atrium. Tumor occludes the left main bronchus.    Probable right pulmonary metastases.      < end of copied text >

## 2021-04-03 NOTE — PROGRESS NOTE ADULT - PROBLEM SELECTOR PLAN 2
Ca 15 on admission (corrected), with cervical spine pain on exam, likely 2/2 malignancy vs. paraneoplastic, PTH 2 (low), vit D 23 (low), no improvement with fluids  -f/u PTHrP  -c/w LR   -malignancy work up as above  -miralax and senna PRN  -pamidronate 90 IV on 4/3

## 2021-04-03 NOTE — PROVIDER CONTACT NOTE (OTHER) - BACKGROUND
Patient is a 60 year old male admitted for neck pain and hypercalcemia. PMH of substance abuse, sickle cell anemia and HTN.

## 2021-04-03 NOTE — DISCHARGE NOTE PROVIDER - HOSPITAL COURSE
HPI:  60M w/ HTN, DM, schizoaffective disorder, polysubstance abuse (cocaine, alcohol, cigarettes), hx of DVT? previously on Eliquis presents to ED c/o neck pain. Pt is not cooperative with interview and exam. Per pt, he states that he has had significant unintentional weight loss recently and generalized weakness. He denies any CP, SOB, fevers/chills, night sweats, cough. He reports that he is an active smoker. His father had lung cancer. Pt reports auditory hallucinations, but no HI/SI. Patient states to the ED provider that he fell yesterday, was able to get up, no LOC. Patient lives alone at [facility] ([place] [facility] ([facility]) [phone] and follows with a Mental Health Service Team Anuel (mental health services ) [phone]). He has access to his medications via his Mental Health Service Team, last taken them yesterday. Per Anuel, pt does not have any communication with family. He has not had a hospitalization since his inpatient psychiatric stay in 2019. It is unclear when his last PCP visit was.    In the ED, T96.9, HR 86, /89, RR18, 93% on RA, given tylenol and 1L NS    Hospital Course:  Pt's CT head, chest/abd/pelvis showed large heterogeneous mass occupying the majority of the left intrathoracic cavity with invasion of the left pulmonary artery, left pulmonary veins, and left atrium and probable R pulmonary mets. Biopsy of L chest mass done with IR on 4/5.   Pt also with severe hypercalcemia, corrected >15, not improved with IVFs. Pt given IV pamidronate 90.  Pt was continued on his home psychiatric medications: olanzapine, benztropine, trazodone, haldol. Pt seen by psychiatry team. Olazapine decreased to 15 BID.  Unable to obtain numbers for GOC discussion with pt's family. HPI:  60M w/ HTN, DM, schizoaffective disorder, polysubstance abuse (cocaine, alcohol, cigarettes), hx of DVT? previously on Eliquis presents to ED c/o neck pain. Pt is not cooperative with interview and exam. Per pt, he states that he has had significant unintentional weight loss recently and generalized weakness. He denies any CP, SOB, fevers/chills, night sweats, cough. He reports that he is an active smoker. His father had lung cancer. Pt reports auditory hallucinations, but no HI/SI. Patient states to the ED provider that he fell yesterday, was able to get up, no LOC. Patient lives alone at [facility] ([place] [facility] ([facility] [phone] and follows with a Mental Health Service Team Anuel (mental health services ) [phone]). He has access to his medications via his Mental Health Service Team, last taken them yesterday. Per Anuel, pt does not have any communication with family. He has not had a hospitalization since his inpatient psychiatric stay in 2019. It is unclear when his last PCP visit was.    In the ED, T96.9, HR 86, /89, RR18, 93% on RA, given tylenol and 1L NS    Hospital Course:  Pt with severe hypercalcemia, corrected >15, not improved with IVFs. Pt given IV pamidronate 90. Endocrinology was consulted. Vitamin D was corrected. After pamidronate and correcting vitamin D, patient's hypercalcemia improved.     Patient's hospital course was complicated by COVID infection. He required oxygen supplementation, after which he was started on remdesivir and dexamethasone. Patient's remdesivir was held given his refusal for blood work. Patient's respiratory status had increased to requiring 15LPM on NRB, which had improved a few days after initiation.     Pt's CT head, chest/abd/pelvis showed large heterogeneous mass occupying the majority of the left intrathoracic cavity with invasion of the left pulmonary artery, left pulmonary veins, and left atrium and probable R pulmonary mets. The characteristics noted on imaging all suggest a malignant process. The case was discussed with patient, patient's family (mother, sister, and son), and interventional radiology, and it was concluded that attempting to confirm a diagnosis would require sedation, but patient's respiratory status and physiology was so compromised that he would be extremely high risk for complications including respiratory failure requiring intubation. Given the suspected findings of metastatic disease, this illness would be incurable in nature and given the tumoral invasion of his blood vessels/airways/heart, the patient's life expectancy was likely less than 6 months, even if he were to get treatment.    Patient's family's wishes were for comfort measures only and that we pursue hospice. Patient is a protected person, and is covered by [employer]. Palliative care discussed the case with MHLS, and the decision was made to transition the patient to DNR/DNI.    HPI:  60M w/ HTN, DM, schizoaffective disorder, polysubstance abuse (cocaine, alcohol, cigarettes), hx of DVT? previously on Eliquis presents to ED c/o neck pain. Pt is not cooperative with interview and exam. Per pt, he states that he has had significant unintentional weight loss recently and generalized weakness. He denies any CP, SOB, fevers/chills, night sweats, cough. He reports that he is an active smoker. His father had lung cancer. Pt reports auditory hallucinations, but no HI/SI. Patient states to the ED provider that he fell yesterday, was able to get up, no LOC. Patient lives alone at [facility] ([place] [facility] ([facility]) [phone] and follows with a Mental Health Service Team Anuel (mental health services ) [phone]). He has access to his medications via his Mental Health Service Team, last taken them yesterday. Per Anuel, pt does not have any communication with family. He has not had a hospitalization since his inpatient psychiatric stay in 2019. It is unclear when his last PCP visit was.    In the ED, T96.9, HR 86, /89, RR18, 93% on RA, given tylenol and 1L NS    Hospital Course:  Pt with severe hypercalcemia, corrected >15, not improved with IVFs. Pt given IV pamidronate 90. Endocrinology was consulted. Vitamin D was corrected. After pamidronate and correcting vitamin D, patient's hypercalcemia improved.     Patient's hospital course was complicated by COVID infection. He required oxygen supplementation, after which he was started on remdesivir and dexamethasone. Patient's remdesivir was held given his refusal for blood work. Patient's respiratory status had increased to requiring 15LPM on NRB, which had improved a few days after initiation.     Pt's CT head, chest/abd/pelvis showed large heterogeneous mass occupying the majority of the left intrathoracic cavity with invasion of the left pulmonary artery, left pulmonary veins, and left atrium and probable R pulmonary mets. The characteristics noted on imaging all suggest a malignant process. The case was discussed with patient, patient's family (mother, sister, and son), and interventional radiology, and it was concluded that attempting to confirm a diagnosis would require sedation, but patient's respiratory status and physiology was so compromised that he would be extremely high risk for complications including respiratory failure requiring intubation. Given the suspected findings of metastatic disease, this illness would be incurable in nature and given the tumoral invasion of his blood vessels/airways/heart, the patient's life expectancy was likely less than 6 months, even if he were to get treatment.    Patient's family's wishes were for comfort measures only and that we pursue hospice. Patient is a protected person, and is covered by [employer]. Palliative care discussed the case with MHLS, and the decision was made to transition the patient to DNR/DNI. Patient was set-up for hospice services prior to discharge.     On 4/21/21, patient was deemed stable for discharge.    HPI:  60M w/ HTN, DM, schizoaffective disorder, polysubstance abuse (cocaine, alcohol, cigarettes), hx of DVT? previously on Eliquis presents to ED c/o neck pain. Pt is not cooperative with interview and exam. Per pt, he states that he has had significant unintentional weight loss recently and generalized weakness. He denies any CP, SOB, fevers/chills, night sweats, cough. He reports that he is an active smoker. His father had lung cancer. Pt reports auditory hallucinations, but no HI/SI. Patient states to the ED provider that he fell yesterday, was able to get up, no LOC. Patient lives alone at [facility] ([place] [facility] ([facility]) [phone] and follows with a Mental Health Service Team Anuel (mental health services ) [phone]). He has access to his medications via his Mental Health Service Team, last taken them yesterday. Per Anuel, pt does not have any communication with family. He has not had a hospitalization since his inpatient psychiatric stay in 2019. It is unclear when his last PCP visit was.    In the ED, T96.9, HR 86, /89, RR18, 93% on RA, given tylenol and 1L NS    Hospital Course:  Pt with severe hypercalcemia, corrected >15, not improved with IVFs. Pt given IV pamidronate 90. Endocrinology was consulted. Vitamin D was corrected. After pamidronate and correcting vitamin D, patient's hypercalcemia improved.     Patient's hospital course was complicated by COVID infection. He required oxygen supplementation, after which he was started on remdesivir and dexamethasone. Patient's remdesivir was held given his refusal for blood work. Patient's respiratory status had increased to requiring 15LPM on NRB, which had improved a few days after initiation.     Pt's CT head, chest/abd/pelvis showed large heterogeneous mass occupying the majority of the left intrathoracic cavity with invasion of the left pulmonary artery, left pulmonary veins, and left atrium and probable R pulmonary mets. The characteristics noted on imaging all suggest a malignant process. The case was discussed with patient, patient's family (mother, sister, and son), and interventional radiology, and it was concluded that attempting to confirm a diagnosis would require sedation, but patient's respiratory status and physiology was so compromised that he would be extremely high risk for complications including respiratory failure requiring intubation. Given the suspected findings of metastatic disease, this illness would be incurable in nature and given the tumoral invasion of his blood vessels/airways/heart, the patient's life expectancy was likely less than 6 months, even if he were to get treatment.    Patient's family's wishes were for comfort measures only and that we pursue hospice. Patient is a protected person, and is covered by [employer]. Palliative care discussed the case with MHLS, and the decision was made to transition the patient to DNR/DNI. Patient was set-up for hospice services prior to discharge.     On 4/21/21, patient was deemed stable for discharge to SNF with hospice care.

## 2021-04-03 NOTE — PROGRESS NOTE ADULT - PROBLEM SELECTOR PLAN 3
-c/w tylenol PRN  -lidocaine patch PRN Pt says he wants to discuss with family about GOC, unable to find numbers for family  -full code for now  -workup for malignancy as above

## 2021-04-03 NOTE — PROGRESS NOTE ADULT - PROBLEM SELECTOR PLAN 7
Normotensive on admission, not on any home BP meds  -CTM Active smoker: 1ppd cigarette use for "many years", cocaine use, alcohol abuse, marijuana use  -Utox neg  -c/w home nicotine gum

## 2021-04-03 NOTE — DISCHARGE NOTE PROVIDER - PROVIDER TOKENS
FREE:[LAST:[Anuel],PHONE:[phone]],FAX:[(   )    -],ADDRESS:[mental health services ]],PROVIDER:[TOKEN:[[id number]]]

## 2021-04-03 NOTE — DISCHARGE NOTE PROVIDER - CARE PROVIDER_API CALL
Anuel,   mental health services   Phone: [phone]  Fax: (   )    -  Follow Up Time:     Es Pate  Internal Medicine  [address] [place] [address]  Phone: [phone]  Fax: [phone]  Follow Up Time:

## 2021-04-03 NOTE — DISCHARGE NOTE PROVIDER - NSDCMRMEDTOKEN_GEN_ALL_CORE_FT
benztropine 1 mg oral tablet: 1 tab(s) orally 2 times a day x 30 days   Eliquis 5 mg oral tablet: 1 tab(s) orally 2 times a day x 30 days   haloperidol 5 mg oral tablet: 2 tab(s) orally 2 times a day  nicotine: 2 milligram(s) buccally every 2 hours, As Needed  OLANZapine 20 mg oral tablet: 1 tab(s) orally 2 times a day  traZODone 100 mg oral tablet: 1 tab(s) orally once a day (at bedtime)   acetaminophen 325 mg oral tablet: 2 tab(s) orally every 6 hours, As needed, Temp greater or equal to 38C (100.4F), Mild Pain (1 - 3), Moderate Pain (4 - 6)  benztropine 1 mg oral tablet: 1 tab(s) orally 2 times a day  cholecalciferol oral tablet: 1000 unit(s) orally once a day  haloperidol 5 mg oral tablet: 2 tab(s) orally 2 times a day  nicotine: 2 milligram(s) buccally every 2 hours, As Needed  OLANZapine 20 mg oral tablet: 1 tab(s) orally 2 times a day  polyethylene glycol 3350 oral powder for reconstitution: 17 gram(s) orally once a day, As needed, Constipation  senna oral tablet: 2 tab(s) orally once a day (at bedtime), As needed, Constipation  traZODone 100 mg oral tablet: 1 tab(s) orally once a day (at bedtime)

## 2021-04-03 NOTE — PROGRESS NOTE ADULT - PROBLEM SELECTOR PLAN 5
Reported hearing voices in ED. No active SI/HI  -c/w home olanzapine, benztropine, trazodone, haldol (doses confirmed with mental health services )  -psych consult microcytic, unclear baseline, last hgb 9.8 in 3/2020, hx of sickle cell trait?  - fe studies consistent with AOCD  -transfuse <7  -f/u sickle cell screen

## 2021-04-03 NOTE — PROGRESS NOTE ADULT - PROBLEM SELECTOR PLAN 10
DVT ppx: lovenox  Diet: regular  Dispo: pending clinical improvement, PT eval  Code status: full DVT ppx: lovenox  Diet: regular  Dispo: pending clinical improvement, PT eval

## 2021-04-03 NOTE — PROGRESS NOTE ADULT - PROBLEM SELECTOR PLAN 6
Active smoker: 1ppd cigarette use for "many years", cocaine use, alcohol abuse, marijuana use  -Utox neg  -c/w home nicotine gum Reported hearing voices in ED. No active SI/HI  -c/w home olanzapine, benztropine, trazodone, haldol (doses confirmed with mental health services )  -psych consult

## 2021-04-03 NOTE — DISCHARGE NOTE PROVIDER - NSRESEARCHGRANT_OVERRIDEREC_GEN_A_CORE
IMPROVE-DD Application Not Available Active cancer (i.e. undergoing acute, in-hospital cancer treatment)/IMPROVE-DD Application Not Available

## 2021-04-03 NOTE — PROGRESS NOTE ADULT - PROBLEM SELECTOR PLAN 1
concern for malignancy (primary vs. secondary), recent unintentional weight loss, bone pain (cervical spine), active smoker: 1 ppd for "many years", father w/ hx of lung cancer  - CTH/CAP scan - large heterogeneous mass occupying the majority of the left intrathoracic cavity with invasion of the left pulmonary artery, left pulmonary veins, and left atrium and probably R pulmonary mets  -plan for biopsy w/ IR on Monday

## 2021-04-03 NOTE — PROGRESS NOTE ADULT - PROBLEM SELECTOR PLAN 4
microcytic, unclear baseline, last hgb 9.8 in 3/2020, hx of sickle cell trait?  - fe studies consistent with AOCD  -transfuse <7  -f/u sickle cell screen -c/w tylenol PRN  -lidocaine patch PRN

## 2021-04-03 NOTE — PROGRESS NOTE ADULT - ASSESSMENT
60M w/ HTN, DM, schizoaffective disorder, polysubstance abuse (cocaine, alcohol, cigarettes), hx of DVT? previously on Eliquis presents to ED c/o neck pain, found to have a large heterogeneous mass occupying the majority of the left intrathoracic cavity with invasion of the left pulmonary artery, left pulmonary veins, and left atrium and probably R pulmonary mets, concern for new malignancy

## 2021-04-04 LAB
APPEARANCE UR: CLEAR — SIGNIFICANT CHANGE UP
B PERT DNA SPEC QL NAA+PROBE: SIGNIFICANT CHANGE UP
BILIRUB UR-MCNC: NEGATIVE — SIGNIFICANT CHANGE UP
C PNEUM DNA SPEC QL NAA+PROBE: SIGNIFICANT CHANGE UP
COLOR SPEC: SIGNIFICANT CHANGE UP
DIFF PNL FLD: NEGATIVE — SIGNIFICANT CHANGE UP
FLUAV SUBTYP SPEC NAA+PROBE: SIGNIFICANT CHANGE UP
FLUBV RNA SPEC QL NAA+PROBE: SIGNIFICANT CHANGE UP
GLUCOSE UR QL: NEGATIVE — SIGNIFICANT CHANGE UP
HADV DNA SPEC QL NAA+PROBE: SIGNIFICANT CHANGE UP
HCOV 229E RNA SPEC QL NAA+PROBE: SIGNIFICANT CHANGE UP
HCOV HKU1 RNA SPEC QL NAA+PROBE: SIGNIFICANT CHANGE UP
HCOV NL63 RNA SPEC QL NAA+PROBE: SIGNIFICANT CHANGE UP
HCOV OC43 RNA SPEC QL NAA+PROBE: SIGNIFICANT CHANGE UP
HMPV RNA SPEC QL NAA+PROBE: SIGNIFICANT CHANGE UP
HPIV1 RNA SPEC QL NAA+PROBE: SIGNIFICANT CHANGE UP
HPIV2 RNA SPEC QL NAA+PROBE: SIGNIFICANT CHANGE UP
HPIV3 RNA SPEC QL NAA+PROBE: SIGNIFICANT CHANGE UP
HPIV4 RNA SPEC QL NAA+PROBE: SIGNIFICANT CHANGE UP
KETONES UR-MCNC: NEGATIVE — SIGNIFICANT CHANGE UP
LEUKOCYTE ESTERASE UR-ACNC: NEGATIVE — SIGNIFICANT CHANGE UP
NITRITE UR-MCNC: NEGATIVE — SIGNIFICANT CHANGE UP
PH UR: 7.5 — SIGNIFICANT CHANGE UP (ref 5–8)
PROT UR-MCNC: NEGATIVE — SIGNIFICANT CHANGE UP
RAPID RVP RESULT: DETECTED
RSV RNA SPEC QL NAA+PROBE: SIGNIFICANT CHANGE UP
RV+EV RNA SPEC QL NAA+PROBE: SIGNIFICANT CHANGE UP
SARS-COV-2 RNA SPEC QL NAA+PROBE: DETECTED
SP GR SPEC: 1.01 — SIGNIFICANT CHANGE UP (ref 1.01–1.02)
UROBILINOGEN FLD QL: SIGNIFICANT CHANGE UP

## 2021-04-04 PROCEDURE — 99233 SBSQ HOSP IP/OBS HIGH 50: CPT

## 2021-04-04 RX ORDER — SODIUM CHLORIDE 9 MG/ML
500 INJECTION INTRAMUSCULAR; INTRAVENOUS; SUBCUTANEOUS ONCE
Refills: 0 | Status: COMPLETED | OUTPATIENT
Start: 2021-04-04 | End: 2021-04-04

## 2021-04-04 RX ORDER — NICOTINE POLACRILEX 2 MG
1 GUM BUCCAL DAILY
Refills: 0 | Status: DISCONTINUED | OUTPATIENT
Start: 2021-04-04 | End: 2021-04-21

## 2021-04-04 RX ADMIN — OLANZAPINE 15 MILLIGRAM(S): 15 TABLET, FILM COATED ORAL at 17:06

## 2021-04-04 RX ADMIN — SODIUM CHLORIDE 100 MILLILITER(S): 9 INJECTION, SOLUTION INTRAVENOUS at 05:30

## 2021-04-04 RX ADMIN — OLANZAPINE 15 MILLIGRAM(S): 15 TABLET, FILM COATED ORAL at 05:21

## 2021-04-04 RX ADMIN — SODIUM CHLORIDE 1000 MILLILITER(S): 9 INJECTION INTRAMUSCULAR; INTRAVENOUS; SUBCUTANEOUS at 21:51

## 2021-04-04 RX ADMIN — Medication 1 MILLIGRAM(S): at 05:22

## 2021-04-04 RX ADMIN — HALOPERIDOL DECANOATE 10 MILLIGRAM(S): 100 INJECTION INTRAMUSCULAR at 17:05

## 2021-04-04 RX ADMIN — Medication 650 MILLIGRAM(S): at 16:43

## 2021-04-04 RX ADMIN — Medication 1 PATCH: at 19:29

## 2021-04-04 RX ADMIN — Medication 1 MILLIGRAM(S): at 17:05

## 2021-04-04 RX ADMIN — HEPARIN SODIUM 5000 UNIT(S): 5000 INJECTION INTRAVENOUS; SUBCUTANEOUS at 17:06

## 2021-04-04 RX ADMIN — SODIUM CHLORIDE 100 MILLILITER(S): 9 INJECTION, SOLUTION INTRAVENOUS at 21:52

## 2021-04-04 RX ADMIN — HALOPERIDOL DECANOATE 10 MILLIGRAM(S): 100 INJECTION INTRAMUSCULAR at 05:22

## 2021-04-04 RX ADMIN — Medication 1 PATCH: at 12:21

## 2021-04-04 RX ADMIN — Medication 100 MILLIGRAM(S): at 22:34

## 2021-04-04 RX ADMIN — HEPARIN SODIUM 5000 UNIT(S): 5000 INJECTION INTRAVENOUS; SUBCUTANEOUS at 05:21

## 2021-04-04 NOTE — PROGRESS NOTE ADULT - PROBLEM SELECTOR PLAN 6
Reported hearing voices in ED. No active SI/HI  -c/w home olanzapine, benztropine, trazodone, haldol (doses confirmed with mental health services )  -psych consult

## 2021-04-04 NOTE — PROGRESS NOTE ADULT - SUBJECTIVE AND OBJECTIVE BOX
Patient is a 60y old  Male who presents with a chief complaint of neck pain (03 Apr 2021 08:01)      SUBJECTIVE / OVERNIGHT EVENTS: No acute overnight events. Pt's mother called: [phone]. He refused his AM labs; I talked with him about not refusing his labs tomorrow so that he can get his biopsy. He wants to go home, but I convinced him to stay at least till tomorrow.      MEDICATIONS  (STANDING):  benztropine 1 milliGRAM(s) Oral two times a day  haloperidol     Tablet 10 milliGRAM(s) Oral two times a day  heparin   Injectable 5000 Unit(s) SubCutaneous every 12 hours  lactated ringers. 1000 milliLiter(s) (100 mL/Hr) IV Continuous <Continuous>  OLANZapine 15 milliGRAM(s) Oral two times a day  traZODone 100 milliGRAM(s) Oral at bedtime    MEDICATIONS  (PRN):  acetaminophen   Tablet .. 650 milliGRAM(s) Oral every 6 hours PRN Temp greater or equal to 38C (100.4F), Mild Pain (1 - 3), Moderate Pain (4 - 6)  lidocaine   Patch 1 Patch Transdermal daily PRN pain  nicotine  Polacrilex Gum 2 milliGRAM(s) Oral every 2 hours PRN smoking cessation  polyethylene glycol 3350 17 Gram(s) Oral daily PRN Constipation  senna 2 Tablet(s) Oral at bedtime PRN Constipation        CAPILLARY BLOOD GLUCOSE        I&O's Summary      PHYSICAL EXAM  GENERAL: NAD, well-developed  HEAD:  Atraumatic, Normocephalic  EYES: EOMI, PERRLA, conjunctiva and sclera clear  NECK: Supple, No JVD. No tracheal deviation.  CHEST/LUNG: Decreased breath sounds over left chest.  HEART: Regular rate and rhythm; No murmurs, rubs, or gallops  ABDOMEN: Soft, Nontender, Nondistended; Bowel sounds present  EXTREMITIES:  2+ Peripheral Pulses, No clubbing, cyanosis, or edema  PSYCH: AAOx2  SKIN: No rashes or lesions    LABS:                        9.0    14.51 )-----------( 366      ( 02 Apr 2021 14:02 )             29.0     04-02    136  |  104  |  24<H>  ----------------------------<  107<H>  4.4   |  22  |  0.93    Ca    13.2<HH>      02 Apr 2021 14:02  Phos  2.2     04-02  Mg     1.4     04-02    TPro  8.2  /  Alb  2.0<L>  /  TBili  0.2  /  DBili  x   /  AST  42<H>  /  ALT  13  /  AlkPhos  68  04-02              RADIOLOGY & ADDITIONAL TESTS:    Imaging Personally Reviewed:  Consultant(s) Notes Reviewed:    Care Discussed with Consultants/Other Providers:   Patient is a 60y old  Male who presents with a chief complaint of neck pain (2021 08:01)      SUBJECTIVE / OVERNIGHT EVENTS: No acute overnight events. Pt's mother called: [phone]. I explained the current situation and she would want treatment if it would reduce his symptoms. She endorses that his voice has been becoming more guttural since she visited in December. She understands that he will likely die from this lung cancer as her   from the same diagnosis. He refused his AM labs; I talked with him about not refusing his labs tomorrow so that he can get his biopsy. He wants to go home, but I convinced him to stay at least till tomorrow.      MEDICATIONS  (STANDING):  benztropine 1 milliGRAM(s) Oral two times a day  haloperidol     Tablet 10 milliGRAM(s) Oral two times a day  heparin   Injectable 5000 Unit(s) SubCutaneous every 12 hours  lactated ringers. 1000 milliLiter(s) (100 mL/Hr) IV Continuous <Continuous>  OLANZapine 15 milliGRAM(s) Oral two times a day  traZODone 100 milliGRAM(s) Oral at bedtime    MEDICATIONS  (PRN):  acetaminophen   Tablet .. 650 milliGRAM(s) Oral every 6 hours PRN Temp greater or equal to 38C (100.4F), Mild Pain (1 - 3), Moderate Pain (4 - 6)  lidocaine   Patch 1 Patch Transdermal daily PRN pain  nicotine  Polacrilex Gum 2 milliGRAM(s) Oral every 2 hours PRN smoking cessation  polyethylene glycol 3350 17 Gram(s) Oral daily PRN Constipation  senna 2 Tablet(s) Oral at bedtime PRN Constipation        CAPILLARY BLOOD GLUCOSE        I&O's Summary      PHYSICAL EXAM  GENERAL: NAD, well-developed  HEAD:  Atraumatic, Normocephalic  EYES: EOMI, PERRLA, conjunctiva and sclera clear  NECK: Supple, No JVD. No tracheal deviation.  CHEST/LUNG: Decreased breath sounds over left chest.  HEART: Regular rate and rhythm; No murmurs, rubs, or gallops  ABDOMEN: Soft, Nontender, Nondistended; Bowel sounds present  EXTREMITIES:  2+ Peripheral Pulses, No clubbing, cyanosis, or edema  PSYCH: AAOx2  SKIN: No rashes or lesions    LABS:                        9.0    14.51 )-----------( 366      ( 2021 14:02 )             29.0     04-02    136  |  104  |  24<H>  ----------------------------<  107<H>  4.4   |  22  |  0.93    Ca    13.2<HH>      2021 14:02  Phos  2.2     04-02  Mg     1.4     04-02    TPro  8.2  /  Alb  2.0<L>  /  TBili  0.2  /  DBili  x   /  AST  42<H>  /  ALT  13  /  AlkPhos  68  04-02              RADIOLOGY & ADDITIONAL TESTS:    Imaging Personally Reviewed:  Consultant(s) Notes Reviewed:    Care Discussed with Consultants/Other Providers:

## 2021-04-04 NOTE — PROGRESS NOTE ADULT - PROBLEM SELECTOR PLAN 7
Active smoker: 1ppd cigarette use for "many years", cocaine use, alcohol abuse, marijuana use  -Utox neg  -c/w home nicotine gum and nicotine patch

## 2021-04-04 NOTE — PROGRESS NOTE ADULT - PROBLEM SELECTOR PLAN 2
Ca 15 on admission (corrected), with cervical spine pain on exam, likely 2/2 malignancy vs. paraneoplastic, PTH 2 (low), vit D 23 (low), improving with fluids  -f/u PTHrP  -c/w IV fluids  -malignancy work up as above  -miralax and senna PRN  -pamidronate 90 IV on 4/3

## 2021-04-04 NOTE — PROGRESS NOTE ADULT - PROBLEM SELECTOR PLAN 3
Pt says he wants to discuss with family about GOC  -full code for now  -workup for malignancy as above

## 2021-04-04 NOTE — PROGRESS NOTE ADULT - PROBLEM SELECTOR PLAN 5
microcytic, unclear baseline, last hgb 9.8 in 3/2020, hx of sickle cell trait?  - fe studies consistent with AOCD  -transfuse <7  -f/u sickle cell screen

## 2021-04-05 DIAGNOSIS — E11.9 TYPE 2 DIABETES MELLITUS WITHOUT COMPLICATIONS: ICD-10-CM

## 2021-04-05 DIAGNOSIS — I10 ESSENTIAL (PRIMARY) HYPERTENSION: ICD-10-CM

## 2021-04-05 LAB
ALBUMIN SERPL ELPH-MCNC: 2.2 G/DL — LOW (ref 3.3–5)
ALP SERPL-CCNC: 51 U/L — SIGNIFICANT CHANGE UP (ref 40–120)
ALT FLD-CCNC: 11 U/L — SIGNIFICANT CHANGE UP (ref 4–41)
ANION GAP SERPL CALC-SCNC: 7 MMOL/L — SIGNIFICANT CHANGE UP (ref 7–14)
AST SERPL-CCNC: 14 U/L — SIGNIFICANT CHANGE UP (ref 4–40)
BILIRUB SERPL-MCNC: <0.2 MG/DL — SIGNIFICANT CHANGE UP (ref 0.2–1.2)
BLD GP AB SCN SERPL QL: NEGATIVE — SIGNIFICANT CHANGE UP
BUN SERPL-MCNC: 14 MG/DL — SIGNIFICANT CHANGE UP (ref 7–23)
CALCIUM SERPL-MCNC: 11.1 MG/DL — HIGH (ref 8.4–10.5)
CHLORIDE SERPL-SCNC: 102 MMOL/L — SIGNIFICANT CHANGE UP (ref 98–107)
CO2 SERPL-SCNC: 27 MMOL/L — SIGNIFICANT CHANGE UP (ref 22–31)
CREAT SERPL-MCNC: 0.94 MG/DL — SIGNIFICANT CHANGE UP (ref 0.5–1.3)
GLUCOSE SERPL-MCNC: 100 MG/DL — HIGH (ref 70–99)
POTASSIUM SERPL-MCNC: 3.3 MMOL/L — LOW (ref 3.5–5.3)
POTASSIUM SERPL-SCNC: 3.3 MMOL/L — LOW (ref 3.5–5.3)
PROT SERPL-MCNC: 7.8 G/DL — SIGNIFICANT CHANGE UP (ref 6–8.3)
RH IG SCN BLD-IMP: POSITIVE — SIGNIFICANT CHANGE UP
SODIUM SERPL-SCNC: 136 MMOL/L — SIGNIFICANT CHANGE UP (ref 135–145)

## 2021-04-05 PROCEDURE — 99223 1ST HOSP IP/OBS HIGH 75: CPT | Mod: GC

## 2021-04-05 PROCEDURE — 99233 SBSQ HOSP IP/OBS HIGH 50: CPT | Mod: GC

## 2021-04-05 RX ORDER — POTASSIUM PHOSPHATE, MONOBASIC POTASSIUM PHOSPHATE, DIBASIC 236; 224 MG/ML; MG/ML
15 INJECTION, SOLUTION INTRAVENOUS ONCE
Refills: 0 | Status: COMPLETED | OUTPATIENT
Start: 2021-04-05 | End: 2021-04-05

## 2021-04-05 RX ORDER — CHOLECALCIFEROL (VITAMIN D3) 125 MCG
1000 CAPSULE ORAL DAILY
Refills: 0 | Status: DISCONTINUED | OUTPATIENT
Start: 2021-04-05 | End: 2021-04-21

## 2021-04-05 RX ORDER — POTASSIUM CHLORIDE 20 MEQ
40 PACKET (EA) ORAL ONCE
Refills: 0 | Status: COMPLETED | OUTPATIENT
Start: 2021-04-05 | End: 2021-04-05

## 2021-04-05 RX ORDER — MAGNESIUM SULFATE 500 MG/ML
2 VIAL (ML) INJECTION ONCE
Refills: 0 | Status: COMPLETED | OUTPATIENT
Start: 2021-04-05 | End: 2021-04-05

## 2021-04-05 RX ADMIN — Medication 2 MILLIGRAM(S): at 15:28

## 2021-04-05 RX ADMIN — Medication 1 PATCH: at 06:01

## 2021-04-05 RX ADMIN — HEPARIN SODIUM 5000 UNIT(S): 5000 INJECTION INTRAVENOUS; SUBCUTANEOUS at 05:52

## 2021-04-05 RX ADMIN — Medication 1 PATCH: at 12:00

## 2021-04-05 RX ADMIN — POTASSIUM PHOSPHATE, MONOBASIC POTASSIUM PHOSPHATE, DIBASIC 62.5 MILLIMOLE(S): 236; 224 INJECTION, SOLUTION INTRAVENOUS at 10:25

## 2021-04-05 RX ADMIN — HALOPERIDOL DECANOATE 10 MILLIGRAM(S): 100 INJECTION INTRAMUSCULAR at 17:14

## 2021-04-05 RX ADMIN — HEPARIN SODIUM 5000 UNIT(S): 5000 INJECTION INTRAVENOUS; SUBCUTANEOUS at 17:14

## 2021-04-05 RX ADMIN — HALOPERIDOL DECANOATE 10 MILLIGRAM(S): 100 INJECTION INTRAMUSCULAR at 05:52

## 2021-04-05 RX ADMIN — Medication 50 GRAM(S): at 09:31

## 2021-04-05 RX ADMIN — OLANZAPINE 15 MILLIGRAM(S): 15 TABLET, FILM COATED ORAL at 17:14

## 2021-04-05 RX ADMIN — OLANZAPINE 15 MILLIGRAM(S): 15 TABLET, FILM COATED ORAL at 05:52

## 2021-04-05 RX ADMIN — Medication 100 MILLIGRAM(S): at 22:02

## 2021-04-05 RX ADMIN — Medication 1 MILLIGRAM(S): at 17:14

## 2021-04-05 RX ADMIN — Medication 40 MILLIEQUIVALENT(S): at 17:14

## 2021-04-05 RX ADMIN — Medication 1 MILLIGRAM(S): at 05:52

## 2021-04-05 NOTE — CONSULT NOTE ADULT - ATTENDING COMMENTS
Hypercalcemia suspected due to malignancy, already received Pamidronate 2 days ago, anticipate full response within 3 days. Will need treatment of underlying malignancy for long term calcium control. Complex patient high level decision making.    Vladimir Hanna MD  Division of Endocrinology  Pager: [phone]    If after 6PM or before 9AM, or on weekends/holidays, please call endocrine answering service for assistance ([phone]).  For nonurgent matters email [email or web address] for assistance.

## 2021-04-05 NOTE — ED PROVIDER NOTE - PMH
PATIENT HAS AN APPT TOMORROW THAT HE NEEDS TO TAKE HIS RECENT MRI RESULTS WITH HIM. THE RECORDS DEPT MAILED HIM A COPY BUT HE HASNT RECEIVED THEM YET. HE WOULD LIKE FOR US TO PRINT THOSE FOR HIM TO  TODAY.   
Printed and given to patient.   
Anemia    Schizophrenia    Sickle cell anemia    Substance abuse

## 2021-04-05 NOTE — PROGRESS NOTE ADULT - PROBLEM SELECTOR PLAN 2
Ca 15 on admission (corrected), with cervical spine pain on exam, likely 2/2 malignancy vs. paraneoplastic, PTH 2 (low), vit D 23 (low), improving with fluids  -f/u PTHrP  -c/w IV fluids  -malignancy work up as above  -miralax and senna PRN  -pamidronate 90 IV on 4/3 Ca 15 on admission (corrected), with cervical spine pain on exam, likely 2/2 malignancy vs. paraneoplastic, PTH 2 (low), vit D 23 (low), improving with fluids  -f/u PTHrP  -c/w IV fluids  -malignancy work up as above  -miralax and senna PRN  -pamidronate 90 IV on 4/3  -Endocrinology consulted, will f/u

## 2021-04-05 NOTE — CONSULT NOTE ADULT - SUBJECTIVE AND OBJECTIVE BOX
HPI:  60M w/ HTN, DM, schizoaffective disorder, polysubstance abuse (cocaine, alcohol, cigarettes), hx of DVT? previously on Eliquis presents to ED c/o weakness.       Patient lives alone at [facility] ([facility] ([facility]) [phone] and follows with a Mental Health Service Team [place] (mental health services ) [phone]).     Endocrine History: Poor historian.   No hx of hypercalcemia or parathyroid disorders according to the patient. Not on any vitamin D, calcium supplementations or HCTZ at home but does sometimes take centrum multivitamin. Has been having symptoms of constipation and muscle weakness, unclear for how long. No hx of nephrolithiasis, bone pain, or fractures.     PAST MEDICAL & SURGICAL HISTORY:  Schizophrenia  schizoaffective    Substance abuse  cocaine, alcohol, cigarettes, marijuana    Anemia    Sickle cell anemia    HTN (hypertension)  not on any medications?    Diabetes mellitus  type 2, previously on metformin    Deep vein thrombosis (DVT)  previously on Eliquis    No significant past surgical history        FAMILY HISTORY:  FH: lung cancer (Father)        Social History:   Pt lives alone at [facility]. He does not have contact with any of his family members and will not provide contact numbers. His mother Twyla South (mother) [phone] (not working number). He has a son, Liam South. Active smoker: 1ppd cigarette use for "many years", cocaine use, alcohol abuse, marijuana use    Outpatient Medications: Home Medications:  haloperidol 5 mg oral tablet: 2 tab(s) orally 2 times a day (01 Apr 2021 18:39)  nicotine: 2 milligram(s) buccally every 2 hours, As Needed (01 Apr 2021 18:39)  OLANZapine 20 mg oral tablet: 1 tab(s) orally 2 times a day (01 Apr 2021 18:39)  traZODone 100 mg oral tablet: 1 tab(s) orally once a day (at bedtime) (01 Apr 2021 18:39)      MEDICATIONS  (STANDING):  benztropine 1 milliGRAM(s) Oral two times a day  haloperidol     Tablet 10 milliGRAM(s) Oral two times a day  heparin   Injectable 5000 Unit(s) SubCutaneous every 12 hours  lactated ringers. 1000 milliLiter(s) (100 mL/Hr) IV Continuous <Continuous>  nicotine - 21 mG/24Hr(s) Patch 1 patch Transdermal daily  OLANZapine 15 milliGRAM(s) Oral two times a day  potassium chloride    Tablet ER 40 milliEquivalent(s) Oral once  traZODone 100 milliGRAM(s) Oral at bedtime    MEDICATIONS  (PRN):  acetaminophen   Tablet .. 650 milliGRAM(s) Oral every 6 hours PRN Temp greater or equal to 38C (100.4F), Mild Pain (1 - 3), Moderate Pain (4 - 6)  lidocaine   Patch 1 Patch Transdermal daily PRN pain  nicotine  Polacrilex Gum 2 milliGRAM(s) Oral every 2 hours PRN smoking cessation  polyethylene glycol 3350 17 Gram(s) Oral daily PRN Constipation  senna 2 Tablet(s) Oral at bedtime PRN Constipation      Allergies    No Known Allergies    Intolerances      Review of Systems:  Constitutional: No fever, chills   Neuro: No tremors, headache   Cardiovascular: No chest pain, palpitations  Respiratory: No SOB, no cough  GI: No nausea, vomiting, abdominal pain  : No dysuria, polyuria   Skin: no rash, ulcers   Psych: no depression, anxiety   Endocrine: no polyphagia, polydipsia     ALL OTHER SYSTEMS REVIEWED AND NEGATIVE        PHYSICAL EXAM:  VITALS: T(C): 36.8 (04-05-21 @ 05:07)  T(F): 98.2 (04-05-21 @ 05:07), Max: 98.6 (04-04-21 @ 18:22)  HR: 92 (04-05-21 @ 14:30) (87 - 92)  BP: 121/72 (04-05-21 @ 14:30) (90/51 - 121/72)  RR:  (17 - 18)  SpO2:  (99% - 100%)  Wt(kg): --  GENERAL: NAD, well-developed  EYES: No proptosis, anicteric  HEENT:  Atraumatic, Normocephalic, moist mucous membranes  RESPIRATORY: Not in respiratory distress, comfortable in RA  GI: Soft, nontender, non distended.   SKIN: Dry, intact, No rashes or lesions  NEURO: AOx3, moves all extremities spontaneously   PSYCH: Reactive affect, euthymic mood                              7.7    8.21  )-----------( 289      ( 05 Apr 2021 07:12 )             23.9       04-05    136  |  102  |  14  ----------------------------<  100<H>  3.3<L>   |  27  |  0.94    EGFR if : 100  EGFR if non : 87    Ca    11.1<H>      04-05  Mg     1.1     04-05  Phos  1.9     04-05    TPro  7.8  /  Alb  2.2<L>  /  TBili  <0.2  /  DBili  x   /  AST  14  /  ALT  11  /  AlkPhos  51  04-05      Thyroid Function Tests:  04-01 @ 12:33 TSH 2.61 FreeT4 -- T3 -- Anti TPO -- Anti Thyroglobulin Ab -- TSI --                  Radiology:

## 2021-04-05 NOTE — PROGRESS NOTE ADULT - ATTENDING COMMENTS
60yr old man, active smoker, PMH HTN, Type 2 DM, schizoaffective disorder, polysubstance abuse (cocaine, alcohol, cigarettes), hx of DVT? previously on Eliquis p/w neck pain and hearing voices. Found to have 2.9 cm RML nodular opacity and complete opacification of L hemithorax and hypercalcemia, concerning for malignancy.    #R/o malignancy: CT with a large heterogeneous mass occupying majority of the left intrathoracic cavity with invasion of the left pulmonary vasculature and left atrium. Tumor also occludes the left main bronchus and probable right pulmonary metastases. Respiratory status is currently stable. Echo pending. Planing for IR CT guided biopsy today, patient refused this AM but not accepting.     #Hypercalcemia, received pamidronate x1 and has been on IVF. Corrected ca still elevated: 12.5 today. PTH low, vit D deficient, PTHrP still pending. Likely related to malignancy. Endo consulted.     #Anemia, stable. No signs of bleeding. Iron studies with elevated ferritin which is more consistent with anemia of chronic disease however MCV is very low and therefore may consider thalassemia. Outpatient follow up.     # Psych, Reports hearing voices that are telling him to kill himself but denies having suicidal or homicidal ideations. Psych consulted    Plan discussed with [facility]3. 60yr old man, active smoker, PMH HTN, Type 2 DM, schizoaffective disorder, polysubstance abuse (cocaine, alcohol, cigarettes), hx of DVT? previously on Eliquis p/w neck pain and hearing voices. Found to have 2.9 cm RML nodular opacity and complete opacification of L hemithorax and hypercalcemia, concerning for malignancy.    #R/o malignancy: CT with a large heterogeneous mass occupying majority of the left intrathoracic cavity with invasion of the left pulmonary vasculature and left atrium. Tumor also occludes the left main bronchus and probable right pulmonary metastases. Respiratory status is currently stable. Echo pending. Planing for IR CT guided biopsy today, patient refused this AM but not accepting.     #Hypercalcemia, received pamidronate x1 and has been on IVF. Corrected ca still elevated: 12.5 today. PTH low, vit D deficient, PTHrP still pending. Likely related to malignancy. Endo consulted.     #Anemia, stable. No signs of bleeding. Iron studies with elevated ferritin which is more consistent with anemia of chronic disease however MCV is very low and therefore may consider thalassemia vs sickle cell given hx of sickle cell trait. F/u sickle cell work up.     # Psych, Reports hearing voices that are telling him to kill himself but denies having suicidal or homicidal ideations. Psych consulted    Plan discussed with [facility]3. 60yr old man, active smoker, PMH HTN, Type 2 DM, schizoaffective disorder, polysubstance abuse (cocaine, alcohol, cigarettes), hx of DVT? previously on Eliquis p/w neck pain and hearing voices. Found to have 2.9 cm RML nodular opacity and complete opacification of L hemithorax and hypercalcemia, concerning for malignancy.    #R/o malignancy: CT with a large heterogeneous mass occupying majority of the left intrathoracic cavity with invasion of the left pulmonary vasculature and left atrium. Tumor also occludes the left main bronchus and probable right pulmonary metastases. Respiratory status is currently stable. Echo pending. Planing for IR CT guided biopsy today, patient refused this AM but now accepting.     #Hypercalcemia, received pamidronate x1 and has been on IVF. Corrected ca still elevated: 12.5 today. PTH low, vit D deficient, PTHrP still pending. Likely related to malignancy. Endo consulted.     #Anemia, stable. No signs of bleeding. Iron studies with elevated ferritin which is more consistent with anemia of chronic disease however MCV is very low and therefore may consider thalassemia vs sickle cell given hx of sickle cell trait. F/u sickle cell work up.     # Psych, Reports hearing voices that are telling him to kill himself but denies having suicidal or homicidal ideations. Psych consulted    Plan discussed with [facility]3.

## 2021-04-05 NOTE — CONSULT NOTE ADULT - ASSESSMENT
60M w/ HTN, DM, schizoaffective disorder, polysubstance abuse (cocaine, alcohol, cigarettes), hx of DVT? previously on Eliquis presents to ED c/o weakness.   Consulted for hypercalcemia.

## 2021-04-05 NOTE — PROGRESS NOTE ADULT - SUBJECTIVE AND OBJECTIVE BOX
Gomez Lane MD PGY-1  Internal Medicine  Pager [phone] / [phone]    INTERVAL HPI / OVERNIGHT EVENTS:  -    SUBJECTIVE: Patient seen and examined at bedside.   -    INCOMPLETE ROS   CONSTITUTIONAL: No weakness, fevers or chills  EYES/ENT: No visual changes;  No vertigo or throat pain   NECK: No pain or stiffness  RESPIRATORY: No cough, wheezing, hemoptysis; No shortness of breath  CARDIOVASCULAR: No chest pain or palpitations  GASTROINTESTINAL: No abdominal or epigastric pain. No nausea, vomiting, or hematemesis; No diarrhea or constipation. No melena or hematochezia.  GENITOURINARY: No dysuria, frequency or hematuria  NEUROLOGICAL: No numbness or weakness  SKIN: No itching, rashes    OBJECTIVE:    VITAL SIGNS:  ICU Vital Signs Last 24 Hrs  T(C): 36.8 (2021 05:07), Max: 38.3 (2021 15:00)  T(F): 98.2 (2021 05:07), Max: 101 (2021 15:00)  HR: 87 (2021 09:45) (87 - 118)  BP: 110/58 (2021 09:45) (90/51 - 126/72)  BP(mean): --  ABP: --  ABP(mean): --  RR: 18 (2021 05:07) (18 - 18)  SpO2: 100% (2021 05:07) (100% - 100%)        - @ 07:01  -  -05 @ 12:19  --------------------------------------------------------  IN: 0 mL / OUT: 300 mL / NET: -300 mL      CAPILLARY BLOOD GLUCOSE          PHYSICAL EXAM: INCOMPLETE PHYSICAL EXAM     General: NAD  HEENT: NC/AT; PERRL, clear conjunctiva  Neck: supple  Respiratory: CTA b/l  Cardiovascular: +S1/S2; RRR  Abdomen: soft, NT/ND; +BS x4  Extremities: WWP, 2+ peripheral pulses b/l; no LE edema  Skin: normal color and turgor; no rash  Neurological:    MEDICATIONS:  MEDICATIONS  (STANDING):  benztropine 1 milliGRAM(s) Oral two times a day  haloperidol     Tablet 10 milliGRAM(s) Oral two times a day  heparin   Injectable 5000 Unit(s) SubCutaneous every 12 hours  lactated ringers. 1000 milliLiter(s) (100 mL/Hr) IV Continuous <Continuous>  nicotine - 21 mG/24Hr(s) Patch 1 patch Transdermal daily  OLANZapine 15 milliGRAM(s) Oral two times a day  potassium chloride    Tablet ER 40 milliEquivalent(s) Oral once  traZODone 100 milliGRAM(s) Oral at bedtime    MEDICATIONS  (PRN):  acetaminophen   Tablet .. 650 milliGRAM(s) Oral every 6 hours PRN Temp greater or equal to 38C (100.4F), Mild Pain (1 - 3), Moderate Pain (4 - 6)  lidocaine   Patch 1 Patch Transdermal daily PRN pain  nicotine  Polacrilex Gum 2 milliGRAM(s) Oral every 2 hours PRN smoking cessation  polyethylene glycol 3350 17 Gram(s) Oral daily PRN Constipation  senna 2 Tablet(s) Oral at bedtime PRN Constipation      ALLERGIES:  Allergies    No Known Allergies    Intolerances        LABS:                        7.7    8.21  )-----------( 289      ( 2021 07:12 )             23.9     Hemoglobin: 7.7 g/dL ( @ 07:12)  Hemoglobin: 9.0 g/dL ( @ 14:02)  Hemoglobin: 8.2 g/dL ( @ 12:33)    CBC Full  -  ( 2021 07:12 )  WBC Count : 8.21 K/uL  RBC Count : 3.61 M/uL  Hemoglobin : 7.7 g/dL  Hematocrit : 23.9 %  Platelet Count - Automated : 289 K/uL  Mean Cell Volume : 66.2 fL  Mean Cell Hemoglobin : 21.3 pg  Mean Cell Hemoglobin Concentration : 32.2 gm/dL  Auto Neutrophil # : x  Auto Lymphocyte # : x  Auto Monocyte # : x  Auto Eosinophil # : x  Auto Basophil # : x  Auto Neutrophil % : x  Auto Lymphocyte % : x  Auto Monocyte % : x  Auto Eosinophil % : x  Auto Basophil % : x        136  |  102  |  14  ----------------------------<  100<H>  3.3<L>   |  27  |  0.94    Ca    11.1<H>      2021 07:12  Phos  1.9     04-05  Mg     1.1     04-05    TPro  7.8  /  Alb  2.2<L>  /  TBili  <0.2  /  DBili  x   /  AST  14  /  ALT  11  /  AlkPhos  51  04-05    Creatinine Trend: 0.94<--, 0.93<--, 1.07<--  LIVER FUNCTIONS - ( 2021 07:12 )  Alb: 2.2 g/dL / Pro: 7.8 g/dL / ALK PHOS: 51 U/L / ALT: 11 U/L / AST: 14 U/L / GGT: x           PT/INR - ( 2021 07:12 )   PT: 15.9 sec;   INR: 1.41 ratio         PTT - ( 2021 07:12 )  PTT:32.7 sec    hs Troponin:            Urinalysis Basic - ( 2021 16:28 )    Color: Light Yellow / Appearance: Clear / S.013 / pH: x  Gluc: x / Ketone: Negative  / Bili: Negative / Urobili: <2 mg/dL   Blood: x / Protein: Negative / Nitrite: Negative   Leuk Esterase: Negative / RBC: x / WBC x   Sq Epi: x / Non Sq Epi: x / Bacteria: x      CSF:                      EKG:   MICROBIOLOGY:    IMAGING:      Labs, imaging, EKG personally reviewed    RADIOLOGY & ADDITIONAL TESTS: Reviewed. Gomez Lane MD PGY-1  Internal Medicine  Pager [phone] / [phone]    INTERVAL HPI / OVERNIGHT EVENTS:  -COVID positive  -BP 90/50 -> given 500cc bolus -> responded  -refused blood cultures, refused chest xray    SUBJECTIVE: Patient seen and examined at bedside. Patient stating that he does not want his biopsy and that he is worried about the pain, and that he wants to go home. Patient not communicating any symptoms with me during this encounter.    OBJECTIVE:    VITAL SIGNS:  ICU Vital Signs Last 24 Hrs  T(C): 36.8 (2021 05:07), Max: 38.3 (2021 15:00)  T(F): 98.2 (2021 05:07), Max: 101 (2021 15:00)  HR: 87 (2021 09:45) (87 - 118)  BP: 110/58 (2021 09:45) (90/51 - 126/72)  BP(mean): --  ABP: --  ABP(mean): --  RR: 18 (2021 05:07) (18 - 18)  SpO2: 100% (2021 05:07) (100% - 100%)        -05 @ 07:01  -  04-05 @ 12:19  --------------------------------------------------------  IN: 0 mL / OUT: 300 mL / NET: -300 mL       PHYSICAL EXAM:     GENERAL: NAD, well-developed  HEAD:  Atraumatic, Normocephalic  EYES: EOMI, PERRLA, conjunctiva and sclera clear  NECK: Supple, No JVD. No tracheal deviation.  CHEST/LUNG: Decreased breath sounds over left chest.  HEART: Regular rate and rhythm; No murmurs, rubs, or gallops  ABDOMEN: Soft, Nontender, Nondistended; Bowel sounds present  EXTREMITIES:  2+ Peripheral Pulses, No clubbing, cyanosis, or edema  SKIN: No rashes or lesions    MEDICATIONS:  MEDICATIONS  (STANDING):  benztropine 1 milliGRAM(s) Oral two times a day  haloperidol     Tablet 10 milliGRAM(s) Oral two times a day  heparin   Injectable 5000 Unit(s) SubCutaneous every 12 hours  lactated ringers. 1000 milliLiter(s) (100 mL/Hr) IV Continuous <Continuous>  nicotine - 21 mG/24Hr(s) Patch 1 patch Transdermal daily  OLANZapine 15 milliGRAM(s) Oral two times a day  potassium chloride    Tablet ER 40 milliEquivalent(s) Oral once  traZODone 100 milliGRAM(s) Oral at bedtime    MEDICATIONS  (PRN):  acetaminophen   Tablet .. 650 milliGRAM(s) Oral every 6 hours PRN Temp greater or equal to 38C (100.4F), Mild Pain (1 - 3), Moderate Pain (4 - 6)  lidocaine   Patch 1 Patch Transdermal daily PRN pain  nicotine  Polacrilex Gum 2 milliGRAM(s) Oral every 2 hours PRN smoking cessation  polyethylene glycol 3350 17 Gram(s) Oral daily PRN Constipation  senna 2 Tablet(s) Oral at bedtime PRN Constipation      ALLERGIES:  Allergies    No Known Allergies    Intolerances        LABS:                        7.7    8.21  )-----------( 289      ( 2021 07:12 )             23.9     Hemoglobin: 7.7 g/dL ( @ 07:12)  Hemoglobin: 9.0 g/dL ( @ 14:02)  Hemoglobin: 8.2 g/dL ( @ 12:33)    CBC Full  -  ( 2021 07:12 )  WBC Count : 8.21 K/uL  RBC Count : 3.61 M/uL  Hemoglobin : 7.7 g/dL  Hematocrit : 23.9 %  Platelet Count - Automated : 289 K/uL  Mean Cell Volume : 66.2 fL  Mean Cell Hemoglobin : 21.3 pg  Mean Cell Hemoglobin Concentration : 32.2 gm/dL  Auto Neutrophil # : x  Auto Lymphocyte # : x  Auto Monocyte # : x  Auto Eosinophil # : x  Auto Basophil # : x  Auto Neutrophil % : x  Auto Lymphocyte % : x  Auto Monocyte % : x  Auto Eosinophil % : x  Auto Basophil % : x        136  |  102  |  14  ----------------------------<  100<H>  3.3<L>   |  27  |  0.94    Ca    11.1<H>      2021 07:12  Phos  1.9       Mg     1.1         TPro  7.8  /  Alb  2.2<L>  /  TBili  <0.2  /  DBili  x   /  AST  14  /  ALT  11  /  AlkPhos  51      Creatinine Trend: 0.94<--, 0.93<--, 1.07<--  LIVER FUNCTIONS - ( 2021 07:12 )  Alb: 2.2 g/dL / Pro: 7.8 g/dL / ALK PHOS: 51 U/L / ALT: 11 U/L / AST: 14 U/L / GGT: x           PT/INR - ( 2021 07:12 )   PT: 15.9 sec;   INR: 1.41 ratio         PTT - ( 2021 07:12 )  PTT:32.7 sec     Urinalysis Basic - ( 2021 16:28 )    Color: Light Yellow / Appearance: Clear / S.013 / pH: x  Gluc: x / Ketone: Negative  / Bili: Negative / Urobili: <2 mg/dL   Blood: x / Protein: Negative / Nitrite: Negative   Leuk Esterase: Negative / RBC: x / WBC x   Sq Epi: x / Non Sq Epi: x / Bacteria: x       Labs, imaging, EKG personally reviewed    RADIOLOGY & ADDITIONAL TESTS: Reviewed.

## 2021-04-05 NOTE — PROGRESS NOTE ADULT - PROBLEM SELECTOR PLAN 1
concern for malignancy (primary vs. secondary), recent unintentional weight loss, bone pain (cervical spine), active smoker: 1 ppd for "many years", father w/ hx of lung cancer  - CTH/CAP scan - large heterogeneous mass occupying the majority of the left intrathoracic cavity with invasion of the left pulmonary artery, left pulmonary veins, and left atrium and probably R pulmonary mets  -plan for biopsy w/ IR on Monday concern for malignancy (primary vs. secondary), recent unintentional weight loss, bone pain (cervical spine), active smoker: 1 ppd for "many years", father w/ hx of lung cancer  - CTH/CAP scan - large heterogeneous mass occupying the majority of the left intrathoracic cavity with invasion of the left pulmonary artery, left pulmonary veins, and left atrium and probably R pulmonary mets  -patient refused IR biopsy 4/5, will re-address at another time  -resumed diet for now

## 2021-04-06 PROCEDURE — 99233 SBSQ HOSP IP/OBS HIGH 50: CPT | Mod: GC

## 2021-04-06 RX ORDER — OLANZAPINE 15 MG/1
20 TABLET, FILM COATED ORAL AT BEDTIME
Refills: 0 | Status: DISCONTINUED | OUTPATIENT
Start: 2021-04-06 | End: 2021-04-07

## 2021-04-06 RX ORDER — HALOPERIDOL DECANOATE 100 MG/ML
5 INJECTION INTRAMUSCULAR EVERY 6 HOURS
Refills: 0 | Status: DISCONTINUED | OUTPATIENT
Start: 2021-04-06 | End: 2021-04-07

## 2021-04-06 RX ADMIN — Medication 1 MILLIGRAM(S): at 06:10

## 2021-04-06 RX ADMIN — HEPARIN SODIUM 5000 UNIT(S): 5000 INJECTION INTRAVENOUS; SUBCUTANEOUS at 06:11

## 2021-04-06 RX ADMIN — HALOPERIDOL DECANOATE 10 MILLIGRAM(S): 100 INJECTION INTRAMUSCULAR at 06:11

## 2021-04-06 RX ADMIN — Medication 100 MILLIGRAM(S): at 23:47

## 2021-04-06 RX ADMIN — SODIUM CHLORIDE 100 MILLILITER(S): 9 INJECTION, SOLUTION INTRAVENOUS at 06:11

## 2021-04-06 RX ADMIN — OLANZAPINE 15 MILLIGRAM(S): 15 TABLET, FILM COATED ORAL at 06:10

## 2021-04-06 NOTE — BH CONSULTATION LIAISON PROGRESS NOTE - NSBHASSESSMENTFT_PSY_ALL_CORE
60M single, disabled by mental illness, no dependents, resides w/ supportive housing system PMHx of HTN and diabetes w/ PPHx of schizoaffective disorder, polysubstance abuse (Cocaine, EtOH) w/ multiple past psychiatric hospitalizations (Including [facility] last in 2019) now presenting to ED c/o neck pain found to have a new lung mass w/ associated unintentional weight loss in setting of choric smoker and course complicated by hypercalcemia. Psychiatry consulted for patient's endorsement of command AH to hurt himself. CVM reviewed from previous hospitalizations per all documentation long-stand h/o psychosis with auditory hallucinations and paranoid ideation/delusions and h/o being uncooperative, hostile, agitated, and severely internally preoccupied when psychiatrically decompensated. Patient has had > 20 hospitalizations throughout various facilities since 1999 last at [facility] in 2019 for auditory hallucinations to hurt himself and others.    Patient seen lying restless and agitated stating he wants to go home and appears to be very labile. Called by primary team for follow-up and medication adjustment in the setting of increased agitation and possible necessity for capacity to consent/refuse for ongoing malignancy work-up in setting of lung mass.  Interview limited 2/2 agitated behavior, however, patient denies SI/HI/VH/AH at this time.     PLAN:  - Constant observation as w/ intermittent aggression/agitation and elopement risk; No capacity to leave AMA  - C/w Haldol 10mg PO BID, Cogentin 1mg PO BID, and Trazodone 100mg qHS  - INCREASE Zyprexa back to previous home dose to 20mg PO BID starting this evening  - HOLD psychiatric medications IF excessive sedation  - Target normalization of Ca2+ levels as can cause symptoms of psychosis   - Haldol 2mg PO/IV/IM q 6 hours PRN for agitation and Ativan 1mg PO/IV/IM q 6 hours PRN for SEVERE agitation  - HOLD antipsychotic if QTc >500  - Psychiatry to follow-up tomorrow for ongoing care

## 2021-04-06 NOTE — PROGRESS NOTE ADULT - SUBJECTIVE AND OBJECTIVE BOX
Gomez Lane MD PGY-1  Internal Medicine  Pager [phone] / [phone]    INTERVAL HPI / OVERNIGHT EVENTS:  -    SUBJECTIVE: Patient seen and examined at bedside.   -    INCOMPLETE ROS   CONSTITUTIONAL: No weakness, fevers or chills  EYES/ENT: No visual changes;  No vertigo or throat pain   NECK: No pain or stiffness  RESPIRATORY: No cough, wheezing, hemoptysis; No shortness of breath  CARDIOVASCULAR: No chest pain or palpitations  GASTROINTESTINAL: No abdominal or epigastric pain. No nausea, vomiting, or hematemesis; No diarrhea or constipation. No melena or hematochezia.  GENITOURINARY: No dysuria, frequency or hematuria  NEUROLOGICAL: No numbness or weakness  SKIN: No itching, rashes    OBJECTIVE:    VITAL SIGNS:  ICU Vital Signs Last 24 Hrs  T(C): 36.8 (2021 06:07), Max: 36.8 (2021 22:00)  T(F): 98.3 (2021 06:07), Max: 98.3 (2021 22:00)  HR: 92 (2021 06:07) (92 - 94)  BP: 113/68 (2021 06:07) (101/66 - 121/72)  BP(mean): --  ABP: --  ABP(mean): --  RR: 18 (2021 06:07) (17 - 18)  SpO2: 98% (2021 06:07) (98% - 100%)        04-05 @ 07:01  -  04-06 @ 07:00  --------------------------------------------------------  IN: 1000 mL / OUT: 1100 mL / NET: -100 mL      CAPILLARY BLOOD GLUCOSE          PHYSICAL EXAM: INCOMPLETE PHYSICAL EXAM     General: NAD  HEENT: NC/AT; PERRL, clear conjunctiva  Neck: supple  Respiratory: CTA b/l  Cardiovascular: +S1/S2; RRR  Abdomen: soft, NT/ND; +BS x4  Extremities: WWP, 2+ peripheral pulses b/l; no LE edema  Skin: normal color and turgor; no rash  Neurological:    MEDICATIONS:  MEDICATIONS  (STANDING):  benztropine 1 milliGRAM(s) Oral two times a day  cholecalciferol 1000 Unit(s) Oral daily  haloperidol     Tablet 10 milliGRAM(s) Oral two times a day  heparin   Injectable 5000 Unit(s) SubCutaneous every 12 hours  lactated ringers. 1000 milliLiter(s) (100 mL/Hr) IV Continuous <Continuous>  nicotine - 21 mG/24Hr(s) Patch 1 patch Transdermal daily  OLANZapine 15 milliGRAM(s) Oral two times a day  traZODone 100 milliGRAM(s) Oral at bedtime    MEDICATIONS  (PRN):  acetaminophen   Tablet .. 650 milliGRAM(s) Oral every 6 hours PRN Temp greater or equal to 38C (100.4F), Mild Pain (1 - 3), Moderate Pain (4 - 6)  haloperidol    Injectable 5 milliGRAM(s) IntraMuscular every 6 hours PRN severe agitation  lidocaine   Patch 1 Patch Transdermal daily PRN pain  nicotine  Polacrilex Gum 2 milliGRAM(s) Oral every 2 hours PRN smoking cessation  polyethylene glycol 3350 17 Gram(s) Oral daily PRN Constipation  senna 2 Tablet(s) Oral at bedtime PRN Constipation      ALLERGIES:  Allergies    No Known Allergies    Intolerances        LABS:                        7.7    8.21  )-----------( 289      ( 2021 07:12 )             23.9     Hemoglobin: 7.7 g/dL ( @ 07:12)  Hemoglobin: 9.0 g/dL ( @ 14:02)    CBC Full  -  ( 2021 07:12 )  WBC Count : 8.21 K/uL  RBC Count : 3.61 M/uL  Hemoglobin : 7.7 g/dL  Hematocrit : 23.9 %  Platelet Count - Automated : 289 K/uL  Mean Cell Volume : 66.2 fL  Mean Cell Hemoglobin : 21.3 pg  Mean Cell Hemoglobin Concentration : 32.2 gm/dL  Auto Neutrophil # : x  Auto Lymphocyte # : x  Auto Monocyte # : x  Auto Eosinophil # : x  Auto Basophil # : x  Auto Neutrophil % : x  Auto Lymphocyte % : x  Auto Monocyte % : x  Auto Eosinophil % : x  Auto Basophil % : x        136  |  102  |  14  ----------------------------<  100<H>  3.3<L>   |  27  |  0.94    Ca    11.1<H>      2021 07:12  Phos  1.9     04-05  Mg     1.1     04-05    TPro  7.8  /  Alb  2.2<L>  /  TBili  <0.2  /  DBili  x   /  AST  14  /  ALT  11  /  AlkPhos  51  04-05    Creatinine Trend: 0.94<--, 0.93<--, 1.07<--  LIVER FUNCTIONS - ( 2021 07:12 )  Alb: 2.2 g/dL / Pro: 7.8 g/dL / ALK PHOS: 51 U/L / ALT: 11 U/L / AST: 14 U/L / GGT: x           PT/INR - ( 2021 07:12 )   PT: 15.9 sec;   INR: 1.41 ratio         PTT - ( 2021 07:12 )  PTT:32.7 sec    hs Troponin:            Urinalysis Basic - ( 2021 16:28 )    Color: Light Yellow / Appearance: Clear / S.013 / pH: x  Gluc: x / Ketone: Negative  / Bili: Negative / Urobili: <2 mg/dL   Blood: x / Protein: Negative / Nitrite: Negative   Leuk Esterase: Negative / RBC: x / WBC x   Sq Epi: x / Non Sq Epi: x / Bacteria: x      CSF:                      EKG:   MICROBIOLOGY:    IMAGING:      Labs, imaging, EKG personally reviewed    RADIOLOGY & ADDITIONAL TESTS: Reviewed. Gomez Lane MD PGY-1  Internal Medicine  Pager [phone] / [phone]    INTERVAL HPI / OVERNIGHT EVENTS:  -no acute events overnight  -patient refused telemetry, refused AM labs    SUBJECTIVE: Patient seen and examined at bedside. ROS somewhat limited d/t patient's schizoaffective disorder. He does report some difficulty breathing and mild cough. He stated that he did not want the biopsy. After further discussion, patient stated that he was afraid of the pain from biopsy. He agreed to biopsy during the AM encounter after he understood that we would treat any pain 2/2 biopsy.    OBJECTIVE:    VITAL SIGNS:  ICU Vital Signs Last 24 Hrs  T(C): 36.8 (2021 06:07), Max: 36.8 (2021 22:00)  T(F): 98.3 (2021 06:07), Max: 98.3 (2021 22:00)  HR: 92 (2021 06:07) (92 - 94)  BP: 113/68 (2021 06:07) (101/66 - 121/72)  BP(mean): --  ABP: --  ABP(mean): --  RR: 18 (2021 06:07) (17 - 18)  SpO2: 98% (2021 06:07) (98% - 100%)      04-05 @ 07:01  -  04-06 @ 07:00  --------------------------------------------------------  IN: 1000 mL / OUT: 1100 mL / NET: -100 mL      PHYSICAL EXAM:     GENERAL: NAD, well-developed  HEAD:  Atraumatic, Normocephalic  EYES: PERRLA, conjunctiva and sclera clear  NECK: Supple, No JVD. No tracheal deviation.  CHEST/LUNG: Decreased breath sounds over left chest, expiratory rhonchi bilaterally  HEART: Regular rate and rhythm; No murmurs, rubs, or gallops  ABDOMEN: Soft, Nontender, Nondistended; Bowel sounds present  EXTREMITIES:  2+ Peripheral Pulses, No clubbing, cyanosis, or edema  SKIN: No rashes or lesions    MEDICATIONS:  MEDICATIONS  (STANDING):  benztropine 1 milliGRAM(s) Oral two times a day  cholecalciferol 1000 Unit(s) Oral daily  haloperidol     Tablet 10 milliGRAM(s) Oral two times a day  heparin   Injectable 5000 Unit(s) SubCutaneous every 12 hours  lactated ringers. 1000 milliLiter(s) (100 mL/Hr) IV Continuous <Continuous>  nicotine - 21 mG/24Hr(s) Patch 1 patch Transdermal daily  OLANZapine 15 milliGRAM(s) Oral two times a day  traZODone 100 milliGRAM(s) Oral at bedtime    MEDICATIONS  (PRN):  acetaminophen   Tablet .. 650 milliGRAM(s) Oral every 6 hours PRN Temp greater or equal to 38C (100.4F), Mild Pain (1 - 3), Moderate Pain (4 - 6)  haloperidol    Injectable 5 milliGRAM(s) IntraMuscular every 6 hours PRN severe agitation  lidocaine   Patch 1 Patch Transdermal daily PRN pain  nicotine  Polacrilex Gum 2 milliGRAM(s) Oral every 2 hours PRN smoking cessation  polyethylene glycol 3350 17 Gram(s) Oral daily PRN Constipation  senna 2 Tablet(s) Oral at bedtime PRN Constipation      ALLERGIES:  Allergies    No Known Allergies    Intolerances        LABS:                        7.7    8.21  )-----------( 289      ( 2021 07:12 )             23.9     Hemoglobin: 7.7 g/dL ( @ 07:12)  Hemoglobin: 9.0 g/dL ( @ 14:02)    CBC Full  -  ( 2021 07:12 )  WBC Count : 8.21 K/uL  RBC Count : 3.61 M/uL  Hemoglobin : 7.7 g/dL  Hematocrit : 23.9 %  Platelet Count - Automated : 289 K/uL  Mean Cell Volume : 66.2 fL  Mean Cell Hemoglobin : 21.3 pg  Mean Cell Hemoglobin Concentration : 32.2 gm/dL  Auto Neutrophil # : x  Auto Lymphocyte # : x  Auto Monocyte # : x  Auto Eosinophil # : x  Auto Basophil # : x  Auto Neutrophil % : x  Auto Lymphocyte % : x  Auto Monocyte % : x  Auto Eosinophil % : x  Auto Basophil % : x        136  |  102  |  14  ----------------------------<  100<H>  3.3<L>   |  27  |  0.94    Ca    11.1<H>      2021 07:12  Phos  1.9     04-05  Mg     1.1     04-05    TPro  7.8  /  Alb  2.2<L>  /  TBili  <0.2  /  DBili  x   /  AST  14  /  ALT  11  /  AlkPhos  51  04-05    Creatinine Trend: 0.94<--, 0.93<--, 1.07<--  LIVER FUNCTIONS - ( 2021 07:12 )  Alb: 2.2 g/dL / Pro: 7.8 g/dL / ALK PHOS: 51 U/L / ALT: 11 U/L / AST: 14 U/L / GGT: x           PT/INR - ( 2021 07:12 )   PT: 15.9 sec;   INR: 1.41 ratio         PTT - ( 2021 07:12 )  PTT:32.7 sec    hs Troponin:            Urinalysis Basic - ( 2021 16:28 )    Color: Light Yellow / Appearance: Clear / S.013 / pH: x  Gluc: x / Ketone: Negative  / Bili: Negative / Urobili: <2 mg/dL   Blood: x / Protein: Negative / Nitrite: Negative   Leuk Esterase: Negative / RBC: x / WBC x   Sq Epi: x / Non Sq Epi: x / Bacteria: x         Labs, imaging, EKG personally reviewed    RADIOLOGY & ADDITIONAL TESTS: Reviewed.

## 2021-04-06 NOTE — PROGRESS NOTE ADULT - PROBLEM SELECTOR PLAN 1
concern for malignancy (primary vs. secondary), recent unintentional weight loss, bone pain (cervical spine), active smoker: 1 ppd for "many years", father w/ hx of lung cancer  - CTH/CAP scan - large heterogeneous mass occupying the majority of the left intrathoracic cavity with invasion of the left pulmonary artery, left pulmonary veins, and left atrium and probably R pulmonary mets  -patient refused IR biopsy 4/5, will re-address at another time  -resumed diet for now concern for malignancy (primary vs. secondary), recent unintentional weight loss, bone pain (cervical spine), active smoker: 1 ppd for "many years", father w/ hx of lung cancer  - CTH/CAP scan - large heterogeneous mass occupying the majority of the left intrathoracic cavity with invasion of the left pulmonary artery, left pulmonary veins, and left atrium and probably R pulmonary mets  -patient refused IR biopsy 4/5, assented on 4/6, however an hour after the encounter, patient became increasingly aggressive with staff; emergency consult subsequently placed to Psychiatry.  -resumed diet for now

## 2021-04-06 NOTE — CHART NOTE - NSCHARTNOTEFT_GEN_A_CORE
Pre-Interventional Radiology Procedure Note    60y    Male    Procedure: CT guided biopsy of left chest mass    Diagnosis/Indication: Patient is a 60y old  Male who presents with a chief complaint of neck pain (05 Apr 2021 16:57)      Interventional Radiology Attending Physician:    Ordering Attending Physician:    PAST MEDICAL & SURGICAL HISTORY:  Schizophrenia  schizoaffective    Substance abuse  cocaine, alcohol, cigarettes, marijuana    Anemia    Sickle cell anemia    HTN (hypertension)  not on any medications?    Diabetes mellitus  type 2, previously on metformin    Deep vein thrombosis (DVT)  previously on Eliquis    No significant past surgical history         CBC Full  -  ( 05 Apr 2021 07:12 )  WBC Count : 8.21 K/uL  RBC Count : 3.61 M/uL  Hemoglobin : 7.7 g/dL  Hematocrit : 23.9 %  Platelet Count - Automated : 289 K/uL  Mean Cell Volume : 66.2 fL  Mean Cell Hemoglobin : 21.3 pg  Mean Cell Hemoglobin Concentration : 32.2 gm/dL  Auto Neutrophil # : x  Auto Lymphocyte # : x  Auto Monocyte # : x  Auto Eosinophil # : x  Auto Basophil # : x  Auto Neutrophil % : x  Auto Lymphocyte % : x  Auto Monocyte % : x  Auto Eosinophil % : x  Auto Basophil % : x    04-05    136  |  102  |  14  ----------------------------<  100<H>  3.3<L>   |  27  |  0.94    Ca    11.1<H>      05 Apr 2021 07:12  Phos  1.9     04-05  Mg     1.1     04-05    TPro  7.8  /  Alb  2.2<L>  /  TBili  <0.2  /  DBili  x   /  AST  14  /  ALT  11  /  AlkPhos  51  04-05    PT/INR - ( 05 Apr 2021 07:12 )   PT: 15.9 sec;   INR: 1.41 ratio         PTT - ( 05 Apr 2021 07:12 )  PTT:32.7 sec

## 2021-04-06 NOTE — BH CONSULTATION LIAISON PROGRESS NOTE - NSBHFUPINTERVALHXFT_PSY_A_CORE
Patient seen lying restless and agitated stating he wants to go home and appears to be very labile. Called by primary team for follow-up and medication adjustment in the setting of increased agitation and possible necessity for capacity to consent/refuse for ongoing malignancy work-up in setting of lung mass.  Interview limited 2/2 agitated behavior, however, patient denies SI/HI/VH/AH at this time.

## 2021-04-06 NOTE — PROGRESS NOTE ADULT - ATTENDING COMMENTS
Patient seen and examined. Case discussed with the medical team on rounds. I agree with the findings and the plan above.    60yr old man, active smoker, PMH HTN, Type 2 DM, schizoaffective disorder, polysubstance abuse (cocaine, alcohol, cigarettes), hx of DVT? previously on Eliquis p/w neck pain and hearing voices. Found to have 2.9 cm RML nodular opacity and complete opacification of L hemithorax and hypercalcemia, concerning for malignancy.    #R/o malignancy: CT with a large heterogeneous mass occupying majority of the left intrathoracic cavity with invasion of the left pulmonary vasculature and left atrium. Tumor also occludes the left main bronchus and probable right pulmonary metastases. Respiratory status is currently stable. Echo pending. Planing for IR CT guided biopsy today, however patient declines tests. Any kind of cancer targeting therapy would be extraordinarily challenging if indeed this ends up being a malignancy as it appears.  #Hypercalcemia, received pamidronate x1 and has been on IVF. Corrected ca still elevated: Improving. PTH low, vit D deficient, PTHrP still pending. Likely related to malignancy. Endo consulted.   #Anemia, stable. No signs of bleeding. Iron studies with elevated ferritin which is more consistent with anemia of chronic disease however MCV is very low and therefore may consider thalassemia vs sickle cell given hx of sickle cell trait.  # Psych, Reports hearing voices that are telling him to kill himself but denies having suicidal or homicidal ideations. Psych consulted  Continue the rest of the work up and management as stated above.

## 2021-04-06 NOTE — PROGRESS NOTE ADULT - PROBLEM SELECTOR PLAN 2
Ca 15 on admission (corrected), with cervical spine pain on exam, likely 2/2 malignancy vs. paraneoplastic, PTH 2 (low), vit D 23 (low), improving with fluids  -f/u PTHrP  -c/w IV fluids  -malignancy work up as above  -miralax and senna PRN  -pamidronate 90 IV on 4/3  -Endocrinology consulted, will f/u Ca 15 on admission (corrected), with cervical spine pain on exam, likely 2/2 malignancy vs. paraneoplastic, PTH 2 (low), vit D 23 (low), improving with fluids  -f/u PTHrP  -c/w IV fluids  -malignancy work up as above  -miralax and senna PRN  -pamidronate 90 IV on 4/3  -Endocrinology consulted, recs appreciated

## 2021-04-06 NOTE — PROGRESS NOTE ADULT - PROBLEM SELECTOR PLAN 6
Reported hearing voices in ED. No active SI/HI  -c/w home olanzapine, benztropine, trazodone, haldol (doses confirmed with mental health services )  -psych consult Reported hearing voices in ED. No active SI/HI  -c/w home olanzapine, benztropine, trazodone, haldol (doses confirmed with mental health services )  -emergency psych consult placed 4/6 AM for psychiatric medication management in patient with aggressive behavior to staff on AM of 4/6, will follow-up

## 2021-04-07 PROCEDURE — 99233 SBSQ HOSP IP/OBS HIGH 50: CPT | Mod: GC

## 2021-04-07 PROCEDURE — 99232 SBSQ HOSP IP/OBS MODERATE 35: CPT

## 2021-04-07 PROCEDURE — 93010 ELECTROCARDIOGRAM REPORT: CPT

## 2021-04-07 RX ORDER — OLANZAPINE 15 MG/1
20 TABLET, FILM COATED ORAL EVERY 12 HOURS
Refills: 0 | Status: DISCONTINUED | OUTPATIENT
Start: 2021-04-07 | End: 2021-04-21

## 2021-04-07 RX ORDER — HALOPERIDOL DECANOATE 100 MG/ML
2 INJECTION INTRAMUSCULAR EVERY 6 HOURS
Refills: 0 | Status: DISCONTINUED | OUTPATIENT
Start: 2021-04-07 | End: 2021-04-21

## 2021-04-07 RX ADMIN — HALOPERIDOL DECANOATE 10 MILLIGRAM(S): 100 INJECTION INTRAMUSCULAR at 06:20

## 2021-04-07 RX ADMIN — HALOPERIDOL DECANOATE 10 MILLIGRAM(S): 100 INJECTION INTRAMUSCULAR at 17:12

## 2021-04-07 RX ADMIN — Medication 1 MILLIGRAM(S): at 17:12

## 2021-04-07 RX ADMIN — OLANZAPINE 20 MILLIGRAM(S): 15 TABLET, FILM COATED ORAL at 17:12

## 2021-04-07 RX ADMIN — Medication 1 MILLIGRAM(S): at 06:20

## 2021-04-07 NOTE — PROGRESS NOTE ADULT - ATTENDING COMMENTS
Hypercalcemia suspected due to malignancy, received Pamidronate on 4/3/21. patient has been refusing labs and all workup, yelling at me at time of encounter that he wants to leave. Explained that he will need treatment of underlying malignancy for long term calcium control or else would be at risk for recurrence of hypercalcemia in 2-3 weeks. Recommend psych followup, patient with poor insight into his condition. Complex patient high level decision making.    Vladimir Hanna MD  Division of Endocrinology  Pager: [phone]    If after 6PM or before 9AM, or on weekends/holidays, please call endocrine answering service for assistance ([phone]).  For nonurgent matters email [email or web address] for assistance.

## 2021-04-07 NOTE — PROGRESS NOTE ADULT - SUBJECTIVE AND OBJECTIVE BOX
Gomez Lane MD PGY-1  Internal Medicine  Pager [phone] / [phone]    INTERVAL HPI / OVERNIGHT EVENTS:  -    SUBJECTIVE: Patient seen and examined at bedside.   -    INCOMPLETE ROS   CONSTITUTIONAL: No weakness, fevers or chills  EYES/ENT: No visual changes;  No vertigo or throat pain   NECK: No pain or stiffness  RESPIRATORY: No cough, wheezing, hemoptysis; No shortness of breath  CARDIOVASCULAR: No chest pain or palpitations  GASTROINTESTINAL: No abdominal or epigastric pain. No nausea, vomiting, or hematemesis; No diarrhea or constipation. No melena or hematochezia.  GENITOURINARY: No dysuria, frequency or hematuria  NEUROLOGICAL: No numbness or weakness  SKIN: No itching, rashes    OBJECTIVE:    VITAL SIGNS:  ICU Vital Signs Last 24 Hrs  T(C): --  T(F): --  HR: --  BP: --  BP(mean): --  ABP: --  ABP(mean): --  RR: --  SpO2: --        CAPILLARY BLOOD GLUCOSE          PHYSICAL EXAM: INCOMPLETE PHYSICAL EXAM     General: NAD  HEENT: NC/AT; PERRL, clear conjunctiva  Neck: supple  Respiratory: CTA b/l  Cardiovascular: +S1/S2; RRR  Abdomen: soft, NT/ND; +BS x4  Extremities: WWP, 2+ peripheral pulses b/l; no LE edema  Skin: normal color and turgor; no rash  Neurological:    MEDICATIONS:  MEDICATIONS  (STANDING):  benztropine 1 milliGRAM(s) Oral two times a day  cholecalciferol 1000 Unit(s) Oral daily  haloperidol     Tablet 10 milliGRAM(s) Oral two times a day  heparin   Injectable 5000 Unit(s) SubCutaneous every 12 hours  lactated ringers. 1000 milliLiter(s) (100 mL/Hr) IV Continuous <Continuous>  nicotine - 21 mG/24Hr(s) Patch 1 patch Transdermal daily  OLANZapine 20 milliGRAM(s) Oral at bedtime  traZODone 100 milliGRAM(s) Oral at bedtime    MEDICATIONS  (PRN):  acetaminophen   Tablet .. 650 milliGRAM(s) Oral every 6 hours PRN Temp greater or equal to 38C (100.4F), Mild Pain (1 - 3), Moderate Pain (4 - 6)  haloperidol    Injectable 5 milliGRAM(s) IntraMuscular every 6 hours PRN severe agitation  lidocaine   Patch 1 Patch Transdermal daily PRN pain  nicotine  Polacrilex Gum 2 milliGRAM(s) Oral every 2 hours PRN smoking cessation  polyethylene glycol 3350 17 Gram(s) Oral daily PRN Constipation  senna 2 Tablet(s) Oral at bedtime PRN Constipation      ALLERGIES:  Allergies    No Known Allergies    Intolerances        LABS:    Hemoglobin: 7.7 g/dL (04-05 @ 07:12)  Hemoglobin: 9.0 g/dL (04-02 @ 14:02)            Creatinine Trend: 0.94<--, 0.93<--, 1.07<--        hs Troponin:              CSF:                      EKG:   MICROBIOLOGY:    IMAGING:      Labs, imaging, EKG personally reviewed    RADIOLOGY & ADDITIONAL TESTS: Reviewed. Gomez Lane MD PGY-1  Internal Medicine  Pager [phone] / [phone]    INTERVAL HPI / OVERNIGHT EVENTS:  -NAEON  -refusing labs, vitals    SUBJECTIVE: Patient seen and examined at bedside. ROS somewhat limited 2/2 psychiatric illness.   -Patient denies sob, cp, abdominal pain  -patient reports some constipation this morning, but reports he was able to have a BM after some effort.  -patient states he wants to go home    OBJECTIVE:    VITAL SIGNS:  ICU Vital Signs Last 24 Hrs  T(C): --  T(F): --  HR: --  BP: --  BP(mean): --  ABP: --  ABP(mean): --  RR: --  SpO2: --     PHYSICAL EXAM:      GENERAL: NAD, well-developed  HEAD:  Atraumatic, Normocephalic  EYES: PERRLA, conjunctiva and sclera clear  NECK: Supple, No JVD. No tracheal deviation.  CHEST/LUNG: Decreased breath sounds over left chest, expiratory rhonchi bilaterally  HEART: Regular rate and rhythm; No murmurs, rubs, or gallops  ABDOMEN: Soft, Nontender, Nondistended; Bowel sounds present  EXTREMITIES:  2+ Peripheral Pulses, No clubbing, cyanosis, or edema  SKIN: No rashes or lesions    MEDICATIONS:  MEDICATIONS  (STANDING):  benztropine 1 milliGRAM(s) Oral two times a day  cholecalciferol 1000 Unit(s) Oral daily  haloperidol     Tablet 10 milliGRAM(s) Oral two times a day  heparin   Injectable 5000 Unit(s) SubCutaneous every 12 hours  lactated ringers. 1000 milliLiter(s) (100 mL/Hr) IV Continuous <Continuous>  nicotine - 21 mG/24Hr(s) Patch 1 patch Transdermal daily  OLANZapine 20 milliGRAM(s) Oral at bedtime  traZODone 100 milliGRAM(s) Oral at bedtime    MEDICATIONS  (PRN):  acetaminophen   Tablet .. 650 milliGRAM(s) Oral every 6 hours PRN Temp greater or equal to 38C (100.4F), Mild Pain (1 - 3), Moderate Pain (4 - 6)  haloperidol    Injectable 5 milliGRAM(s) IntraMuscular every 6 hours PRN severe agitation  lidocaine   Patch 1 Patch Transdermal daily PRN pain  nicotine  Polacrilex Gum 2 milliGRAM(s) Oral every 2 hours PRN smoking cessation  polyethylene glycol 3350 17 Gram(s) Oral daily PRN Constipation  senna 2 Tablet(s) Oral at bedtime PRN Constipation      ALLERGIES:  Allergies    No Known Allergies    Intolerances        LABS:    Hemoglobin: 7.7 g/dL (04-05 @ 07:12)  Hemoglobin: 9.0 g/dL (04-02 @ 14:02)     Creatinine Trend: 0.94<--, 0.93<--, 1.07<--     Labs, imaging, EKG personally reviewed    RADIOLOGY & ADDITIONAL TESTS: Reviewed.

## 2021-04-07 NOTE — BH CONSULTATION LIAISON PROGRESS NOTE - NSBHINDICATION_PSY_ALL_CORE
Aggression/Agitation w/ elopement risk no capacity to leave AMA
Aggression/Agitation w/ elopement risk no capacity to leave AMA

## 2021-04-07 NOTE — PROGRESS NOTE ADULT - ASSESSMENT
60M w/ HTN, DM, schizoaffective disorder, polysubstance abuse (cocaine, alcohol, cigarettes), hx of DVT? previously on Eliquis presents to ED c/o weakness.   Consulted for hypercalcemia.       Problem/Recommendation - 1:  Problem: Hypercalcemia. Recommendation: Corrected ca 15.2 on admission now improved to 12.5 s/p IVF and pamidronate 90 mg IV on 4/3. 25 vitamin D 23.7, TSh 2.61, PTH 2. Non PTH mediated hypercalcemia in setting of lung mass, suspect 2/2 malignancy. Definitive treatment would be treating underlying cause as bisphosphonate would only treat the hypercalcemia for a few weeks.   Complicated by refusal of labs so unable to trend calcium.   -should continue to improve as peak effect of bisphosphate 2-4 days. Trend daily CMP   -continue IVF as tolerated   -pending PTHrP  -send 1,25 vitamin D   -replete with cholecalciferol 1000 IU qday.    Problem/Recommendation - 2:  ·  Problem: Type 2 diabetes mellitus without complication, without long-term current use of insulin.  Recommendation: Not on DM meds at home  -send a1c.     Problem/Recommendation - 3:  ·  Problem: Essential hypertension.  Recommendation: Controlled not on bp meds  goal <130/80.  60M w/ HTN, DM, schizoaffective disorder, polysubstance abuse (cocaine, alcohol, cigarettes), hx of DVT? previously on Eliquis presents to ED c/o weakness.   Consulted for hypercalcemia.       Problem/Recommendation - 1:  Problem: Hypercalcemia. Recommendation: Corrected ca 15.2 on admission now improved to 12.5 s/p IVF and pamidronate 90 mg IV on 4/3. 25 vitamin D 23.7, TSh 2.61, PTH 2. Non PTH mediated hypercalcemia in setting of lung mass, suspect 2/2 malignancy. Definitive treatment would be treating underlying cause as bisphosphonate would only treat the hypercalcemia for a few weeks.   Complicated by refusal of labs so unable to trend calcium. Explained that calcium would likely worsen as pamidronate effects wears off in a couple of weeks and that biopsy of lung mass is needed as further workup.   -should continue to improve as peak effect of bisphosphate 2-4 days. Trend daily CMP   -continue IVF as tolerated   -pending PTHrP  -send 1,25 vitamin D   -replete with cholecalciferol 1000 IU qday.    Problem/Recommendation - 2:  ·  Problem: Type 2 diabetes mellitus without complication, without long-term current use of insulin.  Recommendation: Not on DM meds at home  -send a1c.     Problem/Recommendation - 3:  ·  Problem: Essential hypertension.  Recommendation: Controlled not on bp meds  goal <130/80.

## 2021-04-07 NOTE — BH CONSULTATION LIAISON PROGRESS NOTE - NSBHFUPINTERVALHXFT_PSY_A_CORE
Case discussed with medical attending Dr. Art, patient seen in follow up for agitation and med management. No acute events overnight, no PRN medication required or received. This afternoon, patient seen sitting comfortably in bed eating lunch. Patient says he feels good today, notes that "the food here is terrible." Patient states that he is in the hospital, year is 2021. When asked why he is in the hospital, patient says "to do something," is able to acknowledge that he has a lung mass, says that med/surg teams have told him he could have surgery. Patient says he is not interested in surgery. When asked to explain the risks of both the surgical procedure and declining surgery, patient is unable to do so. No SI/HI or AH/VH elicited.  Case discussed with medical attending Dr. Art, patient seen in follow up for agitation and med management. No acute events overnight, no PRN medication required or received. This afternoon, patient seen sitting comfortably in bed eating lunch. Patient says he feels good today, notes that "the food here is terrible." Patient states that he is in the hospital, year is 2021. When asked why he is in the hospital, patient says "to do something," is able to acknowledge that he has a lung mass, says that med/surg teams have told him he could have surgery. Patient says he is not interested in surgery. When asked to explain the indication/risk/benefit of the surgical procedure and risks of declining surgery, patient is unable to do so. No SI/HI or AH/VH elicited. Denies CAH.

## 2021-04-07 NOTE — PROGRESS NOTE ADULT - PROBLEM SELECTOR PLAN 2
Ca 15 on admission (corrected), with cervical spine pain on exam, likely 2/2 malignancy vs. paraneoplastic, PTH 2 (low), vit D 23 (low), improving with fluids  -f/u PTHrP  -c/w IV fluids  -malignancy work up as above  -miralax and senna PRN  -pamidronate 90 IV on 4/3  -Endocrinology consulted, recs appreciated Ca 15 on admission (corrected), with cervical spine pain on exam, likely 2/2 malignancy vs. paraneoplastic, PTH 2 (low), vit D 23 (low), improving with fluids  -f/u PTHrP  -c/w IV fluids  -malignancy work up as above  -miralax and senna PRN  -pamidronate 90 IV on 4/3  -Endocrinology consulted, recs appreciated  -patient refusing labs, unable to assess status of hyperCa++

## 2021-04-07 NOTE — BH CONSULTATION LIAISON PROGRESS NOTE - CURRENT MEDICATION
MEDICATIONS  (STANDING):  benztropine 1 milliGRAM(s) Oral two times a day  cholecalciferol 1000 Unit(s) Oral daily  haloperidol     Tablet 10 milliGRAM(s) Oral two times a day  heparin   Injectable 5000 Unit(s) SubCutaneous every 12 hours  lactated ringers. 1000 milliLiter(s) (100 mL/Hr) IV Continuous <Continuous>  nicotine - 21 mG/24Hr(s) Patch 1 patch Transdermal daily  OLANZapine 20 milliGRAM(s) Oral at bedtime  traZODone 100 milliGRAM(s) Oral at bedtime    MEDICATIONS  (PRN):  acetaminophen   Tablet .. 650 milliGRAM(s) Oral every 6 hours PRN Temp greater or equal to 38C (100.4F), Mild Pain (1 - 3), Moderate Pain (4 - 6)  haloperidol    Injectable 5 milliGRAM(s) IntraMuscular every 6 hours PRN severe agitation  lidocaine   Patch 1 Patch Transdermal daily PRN pain  nicotine  Polacrilex Gum 2 milliGRAM(s) Oral every 2 hours PRN smoking cessation  polyethylene glycol 3350 17 Gram(s) Oral daily PRN Constipation  senna 2 Tablet(s) Oral at bedtime PRN Constipation  
MEDICATIONS  (STANDING):  benztropine 1 milliGRAM(s) Oral two times a day  cholecalciferol 1000 Unit(s) Oral daily  haloperidol     Tablet 10 milliGRAM(s) Oral two times a day  heparin   Injectable 5000 Unit(s) SubCutaneous every 12 hours  lactated ringers. 1000 milliLiter(s) (100 mL/Hr) IV Continuous <Continuous>  nicotine - 21 mG/24Hr(s) Patch 1 patch Transdermal daily  OLANZapine 20 milliGRAM(s) Oral every 12 hours  traZODone 100 milliGRAM(s) Oral at bedtime    MEDICATIONS  (PRN):  acetaminophen   Tablet .. 650 milliGRAM(s) Oral every 6 hours PRN Temp greater or equal to 38C (100.4F), Mild Pain (1 - 3), Moderate Pain (4 - 6)  haloperidol    Injectable 5 milliGRAM(s) IntraMuscular every 6 hours PRN severe agitation  lidocaine   Patch 1 Patch Transdermal daily PRN pain  nicotine  Polacrilex Gum 2 milliGRAM(s) Oral every 2 hours PRN smoking cessation  polyethylene glycol 3350 17 Gram(s) Oral daily PRN Constipation  senna 2 Tablet(s) Oral at bedtime PRN Constipation

## 2021-04-07 NOTE — BH CONSULTATION LIAISON PROGRESS NOTE - NSBHCHARTREVIEWVS_PSY_A_CORE FT
Vital Signs Last 24 Hrs  T(C): 36.8 (06 Apr 2021 06:07), Max: 36.8 (05 Apr 2021 22:00)  T(F): 98.3 (06 Apr 2021 06:07), Max: 98.3 (05 Apr 2021 22:00)  HR: 92 (06 Apr 2021 06:07) (92 - 94)  BP: 113/68 (06 Apr 2021 06:07) (101/66 - 113/68)  BP(mean): --  RR: 18 (06 Apr 2021 06:07) (18 - 18)  SpO2: 98% (06 Apr 2021 06:07) (98% - 100%)
Vital Signs Last 24 Hrs  T(C): --  T(F): --  HR: --  BP: --  BP(mean): --  RR: --  SpO2: --

## 2021-04-07 NOTE — CHART NOTE - NSCHARTNOTEFT_GEN_A_CORE
Patient seen in f/u for agitation by psychiatry team PA on 4/6. This AM, spoke with Team 3 to f/u. Per team, psych recs from 4/6 appreciated, patient not requiring additional f/u at this time. Reviewed recommendations for Zyprexa 15 BID (home dose) vs. increasing to 20 BID if patient displays increased agitation and is not sedated, qtc < 500. PRN recommendations include Haldol 2mg/Ativan 1mg PO/IM q6hrs for agitation. Please contact [facility] psych with any further questions/concerns. Patient seen in f/u for agitation by psychiatry team PA on 4/6. This AM, spoke with Team 3 to f/u. Per team, psych recs from 4/6 appreciated, patient not requiring additional f/u at this time. Reviewed recommendations for Zyprexa 15 BID vs. increasing to 20 BID (home dose) if patient displays increased agitation and is not sedated, qtc < 500. PRN recommendations include Haldol 2mg/Ativan 1mg PO/IM q6hrs for agitation. Please contact CL psych with any further questions/concerns.

## 2021-04-07 NOTE — PROGRESS NOTE ADULT - PROBLEM SELECTOR PLAN 3
Pt says he wants to discuss with family about GOC  -full code for now  -workup for malignancy as above -full code for now  -workup for malignancy as above  -discussion w/ family on 4/7 as above (see problem #1)

## 2021-04-07 NOTE — PROGRESS NOTE ADULT - SUBJECTIVE AND OBJECTIVE BOX
Chief Complaint: hypercalcemia     History: No complaints today but refuses to answer upon further questioning of why he is refusing labs. Only saids that he is fine and leave him alone.     MEDICATIONS  (STANDING):  benztropine 1 milliGRAM(s) Oral two times a day  cholecalciferol 1000 Unit(s) Oral daily  haloperidol     Tablet 10 milliGRAM(s) Oral two times a day  heparin   Injectable 5000 Unit(s) SubCutaneous every 12 hours  lactated ringers. 1000 milliLiter(s) (100 mL/Hr) IV Continuous <Continuous>  nicotine - 21 mG/24Hr(s) Patch 1 patch Transdermal daily  OLANZapine 20 milliGRAM(s) Oral every 12 hours  traZODone 100 milliGRAM(s) Oral at bedtime    MEDICATIONS  (PRN):  acetaminophen   Tablet .. 650 milliGRAM(s) Oral every 6 hours PRN Temp greater or equal to 38C (100.4F), Mild Pain (1 - 3), Moderate Pain (4 - 6)  haloperidol    Injectable 5 milliGRAM(s) IntraMuscular every 6 hours PRN severe agitation  lidocaine   Patch 1 Patch Transdermal daily PRN pain  nicotine  Polacrilex Gum 2 milliGRAM(s) Oral every 2 hours PRN smoking cessation  polyethylene glycol 3350 17 Gram(s) Oral daily PRN Constipation  senna 2 Tablet(s) Oral at bedtime PRN Constipation      Allergies    No Known Allergies    Intolerances        ROS: Refuses     PHYSICAL EXAM:  VITALS: T(C): --  T(F): --  HR: --  BP: --  RR: --  SpO2: --  Wt(kg): --  GENERAL: NAD, well-groomed, well-developed  EYES: No proptosis,  anicteric  HEENT:  Atraumatic, Normocephalic, moist mucous membranes  MUSCULOSKELETAL: Full range of motion, normal strength  NEURO: AOx1, moves all extremities spontaenuously   PSYCH:  reactive affect, euthymic mood          04-05    136  |  102  |  14  ----------------------------<  100<H>  3.3<L>   |  27  |  0.94    EGFR if : 100  EGFR if non : 87    Ca    11.1<H>      04-05  Mg     1.1     04-05  Phos  1.9     04-05    TPro  7.8  /  Alb  2.2<L>  /  TBili  <0.2  /  DBili  x   /  AST  14  /  ALT  11  /  AlkPhos  51  04-05          Thyroid Function Tests:  04-01 @ 12:33 TSH 2.61 FreeT4 -- T3 -- Anti TPO -- Anti Thyroglobulin Ab -- TSI --

## 2021-04-07 NOTE — BH CONSULTATION LIAISON PROGRESS NOTE - CASE SUMMARY
Chart reviewed, pt. seen and evaluated with Dr. Rueda, I agree with above assessment and plan, pt. calm, cooperative, AAOX2-3 (self, hospital, 2021), pt. seems limited and thought process is concrete, denies acute psychotic sxs, denies CAH, denies SI and HI. Plan as above, monitor EKG for qtc, case d/w Dr. Art.

## 2021-04-07 NOTE — PROGRESS NOTE ADULT - PROBLEM SELECTOR PLAN 1
concern for malignancy (primary vs. secondary), recent unintentional weight loss, bone pain (cervical spine), active smoker: 1 ppd for "many years", father w/ hx of lung cancer  - CTH/CAP scan - large heterogeneous mass occupying the majority of the left intrathoracic cavity with invasion of the left pulmonary artery, left pulmonary veins, and left atrium and probably R pulmonary mets  -patient refused IR biopsy 4/5, assented on 4/6, however an hour after the encounter, patient became increasingly aggressive with staff; emergency consult subsequently placed to Psychiatry.  -resumed diet for now concern for malignancy (primary vs. secondary), recent unintentional weight loss, bone pain (cervical spine), active smoker: 1 ppd for "many years", father w/ hx of lung cancer  - CTH/CAP scan - large heterogeneous mass occupying the majority of the left intrathoracic cavity with invasion of the left pulmonary artery, left pulmonary veins, and left atrium and probably R pulmonary mets  -patient refused IR biopsy 4/5, assented on 4/6, however an hour after the encounter, patient became increasingly aggressive with staff; emergency consult subsequently placed to Psychiatry.  -resumed diet for now  -discussed case w/ family, who would like to have a biopsy to confirm diagnosis, and then pursue hospice given the prognosis (mass invading pulmonary vessels, left atrium). Discussed case with Interventional Radiology, recommending against biopsy given the significant risk of biopsy under sedation i/s/o mass invading pulmonary vessels. Furthermore, given likely prognosis, biopsy likely not indicated. Will discuss with family on 4/8.

## 2021-04-07 NOTE — BH CONSULTATION LIAISON PROGRESS NOTE - NSBHASSESSMENTFT_PSY_ALL_CORE
60M single, disabled by mental illness, no dependents, resides w/ supportive housing system PMHx of HTN and diabetes w/ PPHx of schizoaffective disorder, polysubstance abuse (Cocaine, EtOH) w/ multiple past psychiatric hospitalizations (Including [facility] last in 2019) now presenting to ED c/o neck pain found to have a new lung mass w/ associated unintentional weight loss in setting of choric smoker and course complicated by hypercalcemia. Psychiatry consulted for patient's endorsement of command AH to hurt himself. CVM reviewed from previous hospitalizations per all documentation long-stand h/o psychosis with auditory hallucinations and paranoid ideation/delusions and h/o being uncooperative, hostile, agitated, and severely internally preoccupied when psychiatrically decompensated. Patient has had > 20 hospitalizations throughout various facilities since 1999 last at [facility] in 2019 for auditory hallucinations to hurt himself and others.    On evaluation today, patient seen at bedside, noted to be eating lunch calmly, maintains good behavioral control, no agitation noted throughout interview. Patient has previously become agitated during this admission, requiring IM PRN medications, also noted to be declining PO psychotropic medications. Recommend continuing to offer PO standing antipsychotics, if patient is behavioral dysregulated/acutely agitated and refusing, may offer IM dose in lieu of PO as outlined in plan below. No SI/HI or AH/VH elicited today.     PLAN:  - Constant observation as w/ intermittent aggression/agitation and elopement risk; No capacity to leave AMA  - Continue Cogentin 1mg PO BID, and Trazodone 100mg qHS  - C/w Haldol 10mg PO BID, if patient is acutely agitated and refusing PO med, can use Haldol 10mg IM in lieu of PO dose- ***if patient receives IM Haldol, please administer Cogentin 1mg IM for EPS prophylaxis as patient has history of EPS***  - C/w Zyprexa 20mg PO BID, if patient is acutely agitated and refusing PO med, can use Zyprexa 10mg IM in lieu of PO dose   - HOLD psychiatric medications IF excessive sedation, if qtc > 500  - Target normalization of Ca2+ levels as can cause symptoms of psychosis   - Haldol 2mg PO/IV/IM q 6 hours PRN for agitation and Ativan 1mg PO/IV/IM q 6 hours PRN for SEVERE agitation  - Case discussed with medicine attending Dr. rAt 60M single, disabled by mental illness, no dependents, resides w/ supportive housing system PMHx of HTN and diabetes w/ PPHx of schizoaffective disorder, polysubstance abuse (Cocaine, EtOH) w/ multiple past psychiatric hospitalizations (Including [facility] last in 2019) now presenting to ED c/o neck pain found to have a new lung mass w/ associated unintentional weight loss in setting of choric smoker and course complicated by hypercalcemia. Psychiatry consulted for patient's endorsement of command AH to hurt himself. CVM reviewed from previous hospitalizations per all documentation long-stand h/o psychosis with auditory hallucinations and paranoid ideation/delusions and h/o being uncooperative, hostile, agitated, and severely internally preoccupied when psychiatrically decompensated. Patient has had > 20 hospitalizations throughout various facilities since 1999 last at [facility] in 2019 for auditory hallucinations to hurt himself and others.    On evaluation today, patient seen at bedside, noted to be eating lunch calmly, maintains good behavioral control, no agitation noted throughout interview. Patient has previously become agitated during this admission, requiring IM PRN medications, also noted to be declining PO psychotropic medications. Recommend continuing to offer PO standing antipsychotics, if patient is behavioral dysregulated/acutely agitated and refusing, may offer IM dose in lieu of PO as outlined in plan below. No SI/HI or AH/VH elicited today.     PLAN:  - Recommend constant observation as w/ intermittent aggression/agitation and elopement risk; No capacity to leave AMA, however will defer the final decision to team. Pt. adamantly denies SI and HI.   - Continue Cogentin 1mg PO BID, and Trazodone 100mg qHS  - C/w Haldol 10mg PO BID, if patient is acutely agitated and refusing PO med, can use Haldol 10mg IM in lieu of PO dose- ***if patient receives IM Haldol, please administer Cogentin 1mg IM for EPS prophylaxis as patient is on standing cogentin****   - C/w Zyprexa 20mg PO BID (home dose), if patient is acutely agitated and refusing PO med, can use Zyprexa 10mg IM in lieu of PO dose   - HOLD psychiatric medications IF excessive sedation, if qtc > 500  - Target normalization of Ca2+ levels as can cause symptoms of psychosis   - Haldol 2mg PO/IV/IM q 6 hours PRN for agitation and Ativan 1mg PO/IV/IM q 6 hours PRN for SEVERE agitation  - Case discussed with medicine attending Dr. Art

## 2021-04-07 NOTE — BH CONSULTATION LIAISON PROGRESS NOTE - NSBHCONSFOLLOWNEEDS_PSY_ALL_CORE
Needs further psych safety assessment prior to discharge
Needs further psych safety assessment prior to discharge

## 2021-04-07 NOTE — PROGRESS NOTE ADULT - PROBLEM SELECTOR PLAN 6
Reported hearing voices in ED. No active SI/HI  -c/w home olanzapine, benztropine, trazodone, haldol (doses confirmed with mental health services )  -emergency psych consult placed 4/6 AM for psychiatric medication management in patient with aggressive behavior to staff on AM of 4/6, will follow-up Reported hearing voices in ED. No active SI/HI  -c/w home olanzapine, benztropine, trazodone, haldol (doses confirmed with mental health services )  -emergency psych consult placed 4/6 AM for psychiatric medication management in patient with aggressive behavior to staff on AM of 4/6, will follow-up  -psych recs appreciated 4/7

## 2021-04-07 NOTE — PROGRESS NOTE ADULT - ATTENDING COMMENTS
Patient seen and examined. Case discussed with the medical team on rounds. I agree with the findings and the plan above.    60yr old man, active smoker, PMH HTN, Type 2 DM, schizoaffective disorder, polysubstance abuse (cocaine, alcohol, cigarettes), hx of DVT? previously on Eliquis p/w neck pain and hearing voices. Found to have 2.9 cm RML nodular opacity and complete opacification of L hemithorax and hypercalcemia, concerning for malignancy.    As per nurse, this morning patient defecated on the floors and urinated into plastic water cups    #Massive Lung mass, likely malignancy: CT with a large heterogeneous mass occupying majority of the left intrathoracic cavity with invasion of the left pulmonary vasculature and left atrium. Tumor also occludes the left main bronchus and probable right pulmonary metastases. Respiratory status is currently stable. Planed for IR CT guided biopsy today, however patient declines tests. Any kind of cancer targeting therapy would be extraordinarily challenging if indeed this ends up being a malignancy as it appears.     #Hypercalcemia 2/2 possible lung mass, presumably malignancy, received pamidronate x1 and has been on IVF. Corrected ca still elevated: Improving. PTH low, vit D deficient, PTHrP still pending. Likely related to malignancy. Endo consulted.     #Anemia, stable. No signs of bleeding. Iron studies with elevated ferritin which is more consistent with anemia of chronic disease however MCV is very low and therefore may consider thalassemia vs sickle cell given hx of sickle cell trait.    # Psych, Reportedly heared voices that are telling him to kill himself but denies having suicidal or homicidal ideations. Psych consulted

## 2021-04-08 LAB
ANION GAP SERPL CALC-SCNC: 9 MMOL/L — SIGNIFICANT CHANGE UP (ref 7–14)
BUN SERPL-MCNC: 13 MG/DL — SIGNIFICANT CHANGE UP (ref 7–23)
CALCIUM SERPL-MCNC: 9.6 MG/DL — SIGNIFICANT CHANGE UP (ref 8.4–10.5)
CHLORIDE SERPL-SCNC: 101 MMOL/L — SIGNIFICANT CHANGE UP (ref 98–107)
CO2 SERPL-SCNC: 24 MMOL/L — SIGNIFICANT CHANGE UP (ref 22–31)
CREAT SERPL-MCNC: 0.76 MG/DL — SIGNIFICANT CHANGE UP (ref 0.5–1.3)
GLUCOSE SERPL-MCNC: 167 MG/DL — HIGH (ref 70–99)
HCT VFR BLD CALC: 21.5 % — LOW (ref 39–50)
HGB BLD-MCNC: 6.9 G/DL — CRITICAL LOW (ref 13–17)
MAGNESIUM SERPL-MCNC: 1.4 MG/DL — LOW (ref 1.6–2.6)
MCHC RBC-ENTMCNC: 21.6 PG — LOW (ref 27–34)
MCHC RBC-ENTMCNC: 32.1 GM/DL — SIGNIFICANT CHANGE UP (ref 32–36)
MCV RBC AUTO: 67.4 FL — LOW (ref 80–100)
NRBC # BLD: 0 /100 WBCS — SIGNIFICANT CHANGE UP
NRBC # FLD: 0 K/UL — SIGNIFICANT CHANGE UP
PHOSPHATE SERPL-MCNC: 1.8 MG/DL — LOW (ref 2.5–4.5)
PLATELET # BLD AUTO: 299 K/UL — SIGNIFICANT CHANGE UP (ref 150–400)
POTASSIUM SERPL-MCNC: 3.5 MMOL/L — SIGNIFICANT CHANGE UP (ref 3.5–5.3)
POTASSIUM SERPL-SCNC: 3.5 MMOL/L — SIGNIFICANT CHANGE UP (ref 3.5–5.3)
RBC # BLD: 3.19 M/UL — LOW (ref 4.2–5.8)
RBC # FLD: 18.5 % — HIGH (ref 10.3–14.5)
SODIUM SERPL-SCNC: 134 MMOL/L — LOW (ref 135–145)
WBC # BLD: 10.62 K/UL — HIGH (ref 3.8–10.5)
WBC # FLD AUTO: 10.62 K/UL — HIGH (ref 3.8–10.5)

## 2021-04-08 PROCEDURE — 99233 SBSQ HOSP IP/OBS HIGH 50: CPT | Mod: GC

## 2021-04-08 RX ADMIN — Medication 100 MILLIGRAM(S): at 21:51

## 2021-04-08 RX ADMIN — Medication 1 MILLIGRAM(S): at 18:17

## 2021-04-08 RX ADMIN — OLANZAPINE 20 MILLIGRAM(S): 15 TABLET, FILM COATED ORAL at 06:11

## 2021-04-08 RX ADMIN — OLANZAPINE 20 MILLIGRAM(S): 15 TABLET, FILM COATED ORAL at 18:17

## 2021-04-08 RX ADMIN — HALOPERIDOL DECANOATE 10 MILLIGRAM(S): 100 INJECTION INTRAMUSCULAR at 18:17

## 2021-04-08 RX ADMIN — Medication 1 MILLIGRAM(S): at 09:29

## 2021-04-08 RX ADMIN — HEPARIN SODIUM 5000 UNIT(S): 5000 INJECTION INTRAVENOUS; SUBCUTANEOUS at 06:11

## 2021-04-08 RX ADMIN — HALOPERIDOL DECANOATE 10 MILLIGRAM(S): 100 INJECTION INTRAMUSCULAR at 06:09

## 2021-04-08 RX ADMIN — Medication 1 MILLIGRAM(S): at 06:09

## 2021-04-08 RX ADMIN — Medication 1 MILLIGRAM(S): at 20:21

## 2021-04-08 NOTE — PROGRESS NOTE ADULT - PROBLEM SELECTOR PLAN 6
Reported hearing voices in ED. No active SI/HI  -c/w home olanzapine, benztropine, trazodone, haldol (doses confirmed with mental health services )  -emergency psych consult placed 4/6 AM for psychiatric medication management in patient with aggressive behavior to staff on AM of 4/6, will follow-up  -psych recs appreciated 4/7

## 2021-04-08 NOTE — PROGRESS NOTE ADULT - PROBLEM SELECTOR PLAN 5
microcytic, unclear baseline, last hgb 9.8 in 3/2020, hx of sickle cell trait?  - fe studies consistent with AOCD  -transfuse <7  -f/u sickle cell screen microcytic, unclear baseline, last hgb 9.8 in 3/2020, hx of sickle cell trait?  -HDS  -fe studies consistent with AOCD  -f/u sickle cell screen

## 2021-04-08 NOTE — PROGRESS NOTE ADULT - SUBJECTIVE AND OBJECTIVE BOX
Gomez Lane MD PGY-1  Internal Medicine  Pager [phone] / [phone]    INTERVAL HPI / OVERNIGHT EVENTS:  -    SUBJECTIVE: Patient seen and examined at bedside.   -    INCOMPLETE ROS   CONSTITUTIONAL: No weakness, fevers or chills  EYES/ENT: No visual changes;  No vertigo or throat pain   NECK: No pain or stiffness  RESPIRATORY: No cough, wheezing, hemoptysis; No shortness of breath  CARDIOVASCULAR: No chest pain or palpitations  GASTROINTESTINAL: No abdominal or epigastric pain. No nausea, vomiting, or hematemesis; No diarrhea or constipation. No melena or hematochezia.  GENITOURINARY: No dysuria, frequency or hematuria  NEUROLOGICAL: No numbness or weakness  SKIN: No itching, rashes    OBJECTIVE:    VITAL SIGNS:  ICU Vital Signs Last 24 Hrs  T(C): --  T(F): --  HR: --  BP: --  BP(mean): --  ABP: --  ABP(mean): --  RR: --  SpO2: --        CAPILLARY BLOOD GLUCOSE          PHYSICAL EXAM: INCOMPLETE PHYSICAL EXAM     General: NAD  HEENT: NC/AT; PERRL, clear conjunctiva  Neck: supple  Respiratory: CTA b/l  Cardiovascular: +S1/S2; RRR  Abdomen: soft, NT/ND; +BS x4  Extremities: WWP, 2+ peripheral pulses b/l; no LE edema  Skin: normal color and turgor; no rash  Neurological:    MEDICATIONS:  MEDICATIONS  (STANDING):  benztropine 1 milliGRAM(s) Oral two times a day  cholecalciferol 1000 Unit(s) Oral daily  haloperidol     Tablet 10 milliGRAM(s) Oral two times a day  heparin   Injectable 5000 Unit(s) SubCutaneous every 12 hours  lactated ringers. 1000 milliLiter(s) (100 mL/Hr) IV Continuous <Continuous>  nicotine - 21 mG/24Hr(s) Patch 1 patch Transdermal daily  OLANZapine 20 milliGRAM(s) Oral every 12 hours  traZODone 100 milliGRAM(s) Oral at bedtime    MEDICATIONS  (PRN):  acetaminophen   Tablet .. 650 milliGRAM(s) Oral every 6 hours PRN Temp greater or equal to 38C (100.4F), Mild Pain (1 - 3), Moderate Pain (4 - 6)  haloperidol    Injectable 2 milliGRAM(s) IntraMuscular every 6 hours PRN Agitation  lidocaine   Patch 1 Patch Transdermal daily PRN pain  LORazepam   Injectable 1 milliGRAM(s) IntraMuscular every 6 hours PRN SEVERE agitation  nicotine  Polacrilex Gum 2 milliGRAM(s) Oral every 2 hours PRN smoking cessation  polyethylene glycol 3350 17 Gram(s) Oral daily PRN Constipation  senna 2 Tablet(s) Oral at bedtime PRN Constipation      ALLERGIES:  Allergies    No Known Allergies    Intolerances        LABS:    Hemoglobin: 7.7 g/dL (04-05 @ 07:12)            Creatinine Trend: 0.94<--, 0.93<--, 1.07<--        hs Troponin:              CSF:                      EKG:   MICROBIOLOGY:    IMAGING:      Labs, imaging, EKG personally reviewed    RADIOLOGY & ADDITIONAL TESTS: Reviewed. Gomez Lane MD PGY-1  Internal Medicine  Pager [phone] / [phone]    INTERVAL HPI / OVERNIGHT EVENTS:  -naeon    SUBJECTIVE: Patient seen and examined at bedside. ROS limited 2/2 psychiatric illness. Patient states that he is leaving today. He denies all medical symptoms, including fevers, chills, cp, sob, abd pain, n/v/d.    OBJECTIVE:    VITAL SIGNS:  Vital Signs Last 24 Hrs  T(C): --  T(F): --  HR: --  BP: --  BP(mean): --  ABP: --  ABP(mean): --  RR: --  SpO2: --     PHYSICAL EXAM:     GENERAL: NAD, well-developed  HEAD:  Atraumatic, Normocephalic  EYES: PERRLA, conjunctiva and sclera clear  NECK: Supple, No JVD. No tracheal deviation.  CHEST/LUNG: Decreased breath sounds over left chest, no crackles  HEART: Regular rate and rhythm; No murmurs, rubs, or gallops  ABDOMEN: Soft, Nontender, Nondistended; Bowel sounds present  EXTREMITIES:  2+ Peripheral Pulses, No clubbing, cyanosis, or edema  SKIN: No rashes or lesions      MEDICATIONS:  MEDICATIONS  (STANDING):  benztropine 1 milliGRAM(s) Oral two times a day  cholecalciferol 1000 Unit(s) Oral daily  haloperidol     Tablet 10 milliGRAM(s) Oral two times a day  heparin   Injectable 5000 Unit(s) SubCutaneous every 12 hours  lactated ringers. 1000 milliLiter(s) (100 mL/Hr) IV Continuous <Continuous>  nicotine - 21 mG/24Hr(s) Patch 1 patch Transdermal daily  OLANZapine 20 milliGRAM(s) Oral every 12 hours  traZODone 100 milliGRAM(s) Oral at bedtime    MEDICATIONS  (PRN):  acetaminophen   Tablet .. 650 milliGRAM(s) Oral every 6 hours PRN Temp greater or equal to 38C (100.4F), Mild Pain (1 - 3), Moderate Pain (4 - 6)  haloperidol    Injectable 2 milliGRAM(s) IntraMuscular every 6 hours PRN Agitation  lidocaine   Patch 1 Patch Transdermal daily PRN pain  LORazepam   Injectable 1 milliGRAM(s) IntraMuscular every 6 hours PRN SEVERE agitation  nicotine  Polacrilex Gum 2 milliGRAM(s) Oral every 2 hours PRN smoking cessation  polyethylene glycol 3350 17 Gram(s) Oral daily PRN Constipation  senna 2 Tablet(s) Oral at bedtime PRN Constipation      ALLERGIES:  Allergies    No Known Allergies    Intolerances     LABS:    Hemoglobin: 7.7 g/dL ( @ 07:12)     Creatinine Trend: 0.94<--, 0.93<--, 1.07<--    EK/7 QTc < 500    Labs, imaging, EKG personally reviewed    RADIOLOGY & ADDITIONAL TESTS: Reviewed.

## 2021-04-08 NOTE — PROGRESS NOTE ADULT - PROBLEM SELECTOR PLAN 2
Ca 15 on admission (corrected), with cervical spine pain on exam, likely 2/2 malignancy vs. paraneoplastic, PTH 2 (low), vit D 23 (low), improving with fluids  -f/u PTHrP  -c/w IV fluids  -malignancy work up as above  -miralax and senna PRN  -pamidronate 90 IV on 4/3  -Endocrinology consulted, recs appreciated  -patient refusing labs, unable to assess status of hyperCa++

## 2021-04-08 NOTE — PROGRESS NOTE ADULT - PROBLEM SELECTOR PLAN 1
concern for malignancy (primary vs. secondary), recent unintentional weight loss, bone pain (cervical spine), active smoker: 1 ppd for "many years", father w/ hx of lung cancer  - CTH/CAP scan - large heterogeneous mass occupying the majority of the left intrathoracic cavity with invasion of the left pulmonary artery, left pulmonary veins, and left atrium and probably R pulmonary mets  -patient refused IR biopsy 4/5, assented on 4/6, however an hour after the encounter, patient became increasingly aggressive with staff; emergency consult subsequently placed to Psychiatry.  -resumed diet for now  -discussed case w/ family, who would like to have a biopsy to confirm diagnosis, and then pursue hospice given the prognosis (mass invading pulmonary vessels, left atrium). Discussed case with Interventional Radiology, recommending against biopsy given the significant risk of biopsy under sedation i/s/o mass invading pulmonary vessels. Furthermore, given likely prognosis, biopsy likely not indicated. Will discuss with family on 4/8.

## 2021-04-08 NOTE — PROGRESS NOTE ADULT - ATTENDING COMMENTS
60M, active smoker, PMH HTN, Type 2 DM, schizoaffective disorder, polysubstance abuse (cocaine, alcohol, cigarettes), hx of DVT? previously on Eliquis p/w neck pain and hearing voices. Found to have 2.9 cm RML nodular opacity and complete opacification of L hemithorax and hypercalcemia, concerning for malignancy.    #Massive Lung mass, likely malignancy: CT with a large heterogeneous mass occupying majority of the left intrathoracic cavity with invasion of the left pulmonary vasculature and left atrium. Tumor also occludes the left main bronchus and probable right pulmonary metastases. Respiratory status is currently stable. Was planned for IR guided biopsy however patient refused. Team discussed with IR again - recommending against biopsy given the significant risk of biopsy under sedation i/s/o mass invading pulmonary vessels. GOC ongoing with family.     #Hypercalcemia 2/2 possible lung mass, presumably malignancy, received pamidronate x1 and has been on IVF. Corrected ca still elevated: Improving. PTH low, vit D deficient, PTHrP still pending. Likely related to malignancy. Endo consulted.     #Anemia, stable. No signs of bleeding. Iron studies with elevated ferritin which is more consistent with anemia of chronic disease however MCV is very low and therefore may consider thalassemia vs sickle cell given hx of sickle cell trait.    # Psych, Reportedly heard voices that are telling him to kill himself but denies having suicidal or homicidal ideations. Psych consulted.    Rest of care as above.

## 2021-04-08 NOTE — PROGRESS NOTE ADULT - PROBLEM SELECTOR PLAN 3
-full code for now  -workup for malignancy as above  -discussion w/ family on 4/7 as above (see problem #1)

## 2021-04-09 LAB
ALBUMIN SERPL ELPH-MCNC: 2.3 G/DL — LOW (ref 3.3–5)
ALBUMIN SERPL ELPH-MCNC: 2.3 G/DL — LOW (ref 3.3–5)
ALBUMIN SERPL ELPH-MCNC: 2.4 G/DL — LOW (ref 3.3–5)
ALP SERPL-CCNC: 174 U/L — HIGH (ref 40–120)
ALP SERPL-CCNC: 348 U/L — HIGH (ref 40–120)
ALP SERPL-CCNC: 348 U/L — HIGH (ref 40–120)
ALT FLD-CCNC: 131 U/L — HIGH (ref 4–41)
ALT FLD-CCNC: 131 U/L — HIGH (ref 4–41)
ALT FLD-CCNC: 63 U/L — HIGH (ref 4–41)
ANION GAP SERPL CALC-SCNC: 7 MMOL/L — SIGNIFICANT CHANGE UP (ref 7–14)
ANION GAP SERPL CALC-SCNC: 7 MMOL/L — SIGNIFICANT CHANGE UP (ref 7–14)
AST SERPL-CCNC: 128 U/L — HIGH (ref 4–40)
AST SERPL-CCNC: 240 U/L — HIGH (ref 4–40)
AST SERPL-CCNC: 240 U/L — HIGH (ref 4–40)
BASOPHILS # BLD AUTO: 0.06 K/UL — SIGNIFICANT CHANGE UP (ref 0–0.2)
BASOPHILS NFR BLD AUTO: 0.3 % — SIGNIFICANT CHANGE UP (ref 0–2)
BILIRUB DIRECT SERPL-MCNC: <0.2 MG/DL — SIGNIFICANT CHANGE UP (ref 0–0.2)
BILIRUB INDIRECT FLD-MCNC: >0 MG/DL — SIGNIFICANT CHANGE UP (ref 0–1)
BILIRUB SERPL-MCNC: 0.2 MG/DL — SIGNIFICANT CHANGE UP (ref 0.2–1.2)
BLD GP AB SCN SERPL QL: NEGATIVE — SIGNIFICANT CHANGE UP
BLOOD GAS VENOUS COMPREHENSIVE RESULT: SIGNIFICANT CHANGE UP
BUN SERPL-MCNC: 13 MG/DL — SIGNIFICANT CHANGE UP (ref 7–23)
BUN SERPL-MCNC: 14 MG/DL — SIGNIFICANT CHANGE UP (ref 7–23)
CALCIUM SERPL-MCNC: 9 MG/DL — SIGNIFICANT CHANGE UP (ref 8.4–10.5)
CALCIUM SERPL-MCNC: 9.2 MG/DL — SIGNIFICANT CHANGE UP (ref 8.4–10.5)
CHLORIDE SERPL-SCNC: 103 MMOL/L — SIGNIFICANT CHANGE UP (ref 98–107)
CHLORIDE SERPL-SCNC: 103 MMOL/L — SIGNIFICANT CHANGE UP (ref 98–107)
CK MB CFR SERPL CALC: 2.3 NG/ML — SIGNIFICANT CHANGE UP
CO2 SERPL-SCNC: 28 MMOL/L — SIGNIFICANT CHANGE UP (ref 22–31)
CO2 SERPL-SCNC: 28 MMOL/L — SIGNIFICANT CHANGE UP (ref 22–31)
CREAT SERPL-MCNC: 0.76 MG/DL — SIGNIFICANT CHANGE UP (ref 0.5–1.3)
CREAT SERPL-MCNC: 0.87 MG/DL — SIGNIFICANT CHANGE UP (ref 0.5–1.3)
CREAT SERPL-MCNC: 0.87 MG/DL — SIGNIFICANT CHANGE UP (ref 0.5–1.3)
EOSINOPHIL # BLD AUTO: 0.11 K/UL — SIGNIFICANT CHANGE UP (ref 0–0.5)
EOSINOPHIL NFR BLD AUTO: 0.6 % — SIGNIFICANT CHANGE UP (ref 0–6)
GLUCOSE BLDC GLUCOMTR-MCNC: 243 MG/DL — HIGH (ref 70–99)
GLUCOSE SERPL-MCNC: 209 MG/DL — HIGH (ref 70–99)
GLUCOSE SERPL-MCNC: 224 MG/DL — HIGH (ref 70–99)
HCT VFR BLD CALC: 22.2 % — LOW (ref 39–50)
HCT VFR BLD CALC: 24.1 % — LOW (ref 39–50)
HGB BLD-MCNC: 6.6 G/DL — CRITICAL LOW (ref 13–17)
HGB BLD-MCNC: 7.4 G/DL — LOW (ref 13–17)
IANC: 14.26 K/UL — HIGH (ref 1.5–8.5)
IMM GRANULOCYTES NFR BLD AUTO: 0.7 % — SIGNIFICANT CHANGE UP (ref 0–1.5)
INR BLD: 1.4 RATIO — HIGH (ref 0.88–1.16)
LYMPHOCYTES # BLD AUTO: 11.6 % — LOW (ref 13–44)
LYMPHOCYTES # BLD AUTO: 2.02 K/UL — SIGNIFICANT CHANGE UP (ref 1–3.3)
MAGNESIUM SERPL-MCNC: 1.5 MG/DL — LOW (ref 1.6–2.6)
MAGNESIUM SERPL-MCNC: 1.6 MG/DL — SIGNIFICANT CHANGE UP (ref 1.6–2.6)
MCHC RBC-ENTMCNC: 20.8 PG — LOW (ref 27–34)
MCHC RBC-ENTMCNC: 21.6 PG — LOW (ref 27–34)
MCHC RBC-ENTMCNC: 29.7 GM/DL — LOW (ref 32–36)
MCHC RBC-ENTMCNC: 30.7 GM/DL — LOW (ref 32–36)
MCV RBC AUTO: 69.8 FL — LOW (ref 80–100)
MCV RBC AUTO: 70.5 FL — LOW (ref 80–100)
MONOCYTES # BLD AUTO: 0.81 K/UL — SIGNIFICANT CHANGE UP (ref 0–0.9)
MONOCYTES NFR BLD AUTO: 4.7 % — SIGNIFICANT CHANGE UP (ref 2–14)
NEUTROPHILS # BLD AUTO: 14.26 K/UL — HIGH (ref 1.8–7.4)
NEUTROPHILS NFR BLD AUTO: 82.1 % — HIGH (ref 43–77)
NRBC # BLD: 0 /100 WBCS — SIGNIFICANT CHANGE UP
NRBC # BLD: 0 /100 WBCS — SIGNIFICANT CHANGE UP
NRBC # FLD: 0 K/UL — SIGNIFICANT CHANGE UP
NRBC # FLD: 0 K/UL — SIGNIFICANT CHANGE UP
PHOSPHATE SERPL-MCNC: 2.4 MG/DL — LOW (ref 2.5–4.5)
PHOSPHATE SERPL-MCNC: 3.4 MG/DL — SIGNIFICANT CHANGE UP (ref 2.5–4.5)
PLATELET # BLD AUTO: 271 K/UL — SIGNIFICANT CHANGE UP (ref 150–400)
PLATELET # BLD AUTO: 301 K/UL — SIGNIFICANT CHANGE UP (ref 150–400)
POTASSIUM SERPL-MCNC: 4.1 MMOL/L — SIGNIFICANT CHANGE UP (ref 3.5–5.3)
POTASSIUM SERPL-MCNC: 4.1 MMOL/L — SIGNIFICANT CHANGE UP (ref 3.5–5.3)
POTASSIUM SERPL-SCNC: 4.1 MMOL/L — SIGNIFICANT CHANGE UP (ref 3.5–5.3)
POTASSIUM SERPL-SCNC: 4.1 MMOL/L — SIGNIFICANT CHANGE UP (ref 3.5–5.3)
PROT SERPL-MCNC: 8.8 G/DL — HIGH (ref 6–8.3)
PROTHROM AB SERPL-ACNC: 15.7 SEC — HIGH (ref 10.6–13.6)
RBC # BLD: 3.18 M/UL — LOW (ref 4.2–5.8)
RBC # BLD: 3.42 M/UL — LOW (ref 4.2–5.8)
RBC # FLD: 18.8 % — HIGH (ref 10.3–14.5)
RBC # FLD: 18.9 % — HIGH (ref 10.3–14.5)
RH IG SCN BLD-IMP: POSITIVE — SIGNIFICANT CHANGE UP
SODIUM SERPL-SCNC: 138 MMOL/L — SIGNIFICANT CHANGE UP (ref 135–145)
SODIUM SERPL-SCNC: 138 MMOL/L — SIGNIFICANT CHANGE UP (ref 135–145)
TROPONIN T, HIGH SENSITIVITY RESULT: 30 NG/L — SIGNIFICANT CHANGE UP
WBC # BLD: 14.7 K/UL — HIGH (ref 3.8–10.5)
WBC # BLD: 17.39 K/UL — HIGH (ref 3.8–10.5)
WBC # FLD AUTO: 14.7 K/UL — HIGH (ref 3.8–10.5)
WBC # FLD AUTO: 17.39 K/UL — HIGH (ref 3.8–10.5)

## 2021-04-09 PROCEDURE — 99358 PROLONG SERVICE W/O CONTACT: CPT

## 2021-04-09 PROCEDURE — 99233 SBSQ HOSP IP/OBS HIGH 50: CPT | Mod: GC

## 2021-04-09 PROCEDURE — 99222 1ST HOSP IP/OBS MODERATE 55: CPT

## 2021-04-09 RX ORDER — DEXAMETHASONE 0.5 MG/5ML
6 ELIXIR ORAL DAILY
Refills: 0 | Status: COMPLETED | OUTPATIENT
Start: 2021-04-09 | End: 2021-04-19

## 2021-04-09 RX ORDER — HALOPERIDOL DECANOATE 100 MG/ML
2 INJECTION INTRAMUSCULAR ONCE
Refills: 0 | Status: COMPLETED | OUTPATIENT
Start: 2021-04-09 | End: 2021-04-09

## 2021-04-09 RX ORDER — DEXTROSE 50 % IN WATER 50 %
25 SYRINGE (ML) INTRAVENOUS ONCE
Refills: 0 | Status: DISCONTINUED | OUTPATIENT
Start: 2021-04-09 | End: 2021-04-20

## 2021-04-09 RX ORDER — REMDESIVIR 5 MG/ML
INJECTION INTRAVENOUS
Refills: 0 | Status: DISCONTINUED | OUTPATIENT
Start: 2021-04-09 | End: 2021-04-11

## 2021-04-09 RX ORDER — INSULIN LISPRO 100/ML
VIAL (ML) SUBCUTANEOUS AT BEDTIME
Refills: 0 | Status: DISCONTINUED | OUTPATIENT
Start: 2021-04-09 | End: 2021-04-10

## 2021-04-09 RX ORDER — DEXTROSE 50 % IN WATER 50 %
12.5 SYRINGE (ML) INTRAVENOUS ONCE
Refills: 0 | Status: DISCONTINUED | OUTPATIENT
Start: 2021-04-09 | End: 2021-04-12

## 2021-04-09 RX ORDER — REMDESIVIR 5 MG/ML
200 INJECTION INTRAVENOUS EVERY 24 HOURS
Refills: 0 | Status: DISCONTINUED | OUTPATIENT
Start: 2021-04-09 | End: 2021-04-11

## 2021-04-09 RX ORDER — GLUCAGON INJECTION, SOLUTION 0.5 MG/.1ML
1 INJECTION, SOLUTION SUBCUTANEOUS ONCE
Refills: 0 | Status: DISCONTINUED | OUTPATIENT
Start: 2021-04-09 | End: 2021-04-12

## 2021-04-09 RX ORDER — DEXTROSE 50 % IN WATER 50 %
15 SYRINGE (ML) INTRAVENOUS ONCE
Refills: 0 | Status: DISCONTINUED | OUTPATIENT
Start: 2021-04-09 | End: 2021-04-12

## 2021-04-09 RX ORDER — DEXTROSE 50 % IN WATER 50 %
25 SYRINGE (ML) INTRAVENOUS ONCE
Refills: 0 | Status: DISCONTINUED | OUTPATIENT
Start: 2021-04-09 | End: 2021-04-12

## 2021-04-09 RX ORDER — INSULIN LISPRO 100/ML
VIAL (ML) SUBCUTANEOUS
Refills: 0 | Status: DISCONTINUED | OUTPATIENT
Start: 2021-04-09 | End: 2021-04-10

## 2021-04-09 RX ADMIN — Medication 2 MILLIGRAM(S): at 09:22

## 2021-04-09 RX ADMIN — HALOPERIDOL DECANOATE 10 MILLIGRAM(S): 100 INJECTION INTRAMUSCULAR at 06:06

## 2021-04-09 RX ADMIN — HALOPERIDOL DECANOATE 2 MILLIGRAM(S): 100 INJECTION INTRAMUSCULAR at 10:00

## 2021-04-09 RX ADMIN — Medication 1000 UNIT(S): at 14:01

## 2021-04-09 RX ADMIN — Medication 1 PATCH: at 14:01

## 2021-04-09 RX ADMIN — Medication 1 PATCH: at 19:16

## 2021-04-09 RX ADMIN — Medication 1 MILLIGRAM(S): at 02:54

## 2021-04-09 RX ADMIN — Medication 2 MILLIGRAM(S): at 19:17

## 2021-04-09 RX ADMIN — Medication 1 MILLIGRAM(S): at 06:08

## 2021-04-09 RX ADMIN — OLANZAPINE 20 MILLIGRAM(S): 15 TABLET, FILM COATED ORAL at 06:06

## 2021-04-09 RX ADMIN — Medication 1 MILLIGRAM(S): at 18:56

## 2021-04-09 RX ADMIN — HALOPERIDOL DECANOATE 10 MILLIGRAM(S): 100 INJECTION INTRAMUSCULAR at 18:56

## 2021-04-09 RX ADMIN — OLANZAPINE 20 MILLIGRAM(S): 15 TABLET, FILM COATED ORAL at 18:57

## 2021-04-09 RX ADMIN — Medication 1 MILLIGRAM(S): at 10:39

## 2021-04-09 RX ADMIN — Medication 2 MILLIGRAM(S): at 14:02

## 2021-04-09 NOTE — CHART NOTE - NSCHARTNOTEFT_GEN_A_CORE
4/9 - [facility] conversation w/ patient's mother (Twyla South), patient's sister (Ira South), and patient's son (Liam South).     Patient's family understands that the risk of biopsy for patient's mass outweighs the benefits, and agree with not pursuing a biopsy. Patient's family (Colt, Ira, and Liam) all confirmed that they would like to have patient placed on Comfort Measures Only. They would also like to pursue hospice care for the patient.     Gomez Lane MD

## 2021-04-09 NOTE — CHART NOTE - NSCHARTNOTEFT_GEN_A_CORE
Patient is known/protected by LS therefore EOL decisions must be approved through formal MOLST checklist. As of right now, patient will be need to remain FULL CODE. Appreciate the family's wishes for the patient's care at this stage and that the patient is refusing all care himself. Formal process must be undertaken despite the expressed desire of those involved since patient has no formal HCP designated. Full Consult will follow on Monday and the case will be presented then.    Extensive time spent coordinating with Medicine, SW, and LS. See Chart note form Palliative SW for further details.    PALLIATIVE MEDICINE COORDINATION OF CARE DOCUMENTATION: [x] Inpatient Consult   Non-Face-to-Face prolonged service provided that relates to (face-to-face) care that has or will occur and ongoing patient management, including one or more of the following: - Reviewed documentation from other physicians and other health care professional services - Reviewed medical records and diagnostic / radiology study results - Coordination with patient's support system  ************************************************************************  MEDICATION REVIEW:  acetaminophen   Tablet .. 650 milliGRAM(s) Oral every 6 hours PRN  benztropine 1 milliGRAM(s) Oral two times a day  cholecalciferol 1000 Unit(s) Oral daily  haloperidol     Tablet 10 milliGRAM(s) Oral two times a day  haloperidol    Injectable 2 milliGRAM(s) IntraMuscular every 6 hours PRN  heparin   Injectable 5000 Unit(s) SubCutaneous every 12 hours  lidocaine   Patch 1 Patch Transdermal daily PRN  LORazepam   Injectable 1 milliGRAM(s) IntraMuscular every 6 hours PRN  nicotine  Polacrilex Gum 2 milliGRAM(s) Oral every 2 hours PRN  nicotine - 21 mG/24Hr(s) Patch 1 patch Transdermal daily  OLANZapine 20 milliGRAM(s) Oral every 12 hours  polyethylene glycol 3350 17 Gram(s) Oral daily PRN  senna 2 Tablet(s) Oral at bedtime PRN  traZODone 100 milliGRAM(s) Oral at bedtime    ISTOP REFERENCE: [id number]  - PRN usage: NO PRN'S  ------------------------------------------------------------------------  COORDINATION OF CARE:  - Palliative Care consulted for: GOC  - Patient assessed: 4/12/2021  - Patient previously seen by Palliative Care service: NO    ADVANCE CARE PLANNING  - Code status: FULL  - MOLST reviewed in chart: NONE  - HCP/ Surrogate: None  - GOC document found in Alpha: NONE  - HCP/ Living will/ Other advanced directives in Alpha: NONE  ------------------------------------------------------------------------  CARE PROVIDER DOCUMENTATION:  - SW/CM notes: Remains medically active  - IR notes: recommending against biopsy given the significant risk of biopsy under sedation i/s/o mass invading pulmonary vessels  - Medicine notes: presumed malignancy complicated by hypercalcemia and anemia     PLAN OF CARE  - Known admissions in past year: 0  - Current admit date: 4/9/2021  - LOS: 8  - LACE score: 13  - Current dispo plan: TO BE DETERMINED  ------------------------------------------------------------------------  - Time Spent/Chart reviewed: 35 Minutes [including time used to gather, review and transfer data]  - Start: 1:45PM  - End: 2:20PM    Prolonged services rendered, as part of this patient's care provided by Palliative Medicine, include: i. chart review for provider and ancillary service documentation, ii. pertinent diagnostics including laboratory and imaging studies, iii. medication review including PRN use, iv. admission history including previous palliative care encounters and GOC notes, v. advance care planning documents including HCP and MOLST forms in Alpha. Part of Palliative Medicine extended evaluation and management also involves coordination of care with our IDT, the primary and consulting omar, and unit CM/SW and Hospice if eligible. Recommendations based on the information gathered and discussed are outline in the AP of our notes. Patient is known/protected by LS therefore EOL decisions must be approved through formal MOLST checklist. As of right now, patient will be need to remain FULL CODE. Appreciate the family's wishes for the patient's care at this stage and that the patient is refusing all care himself. Formal process must be undertaken despite the expressed desire of those involved since patient has no formal HCP designated. Full Consult will follow on Monday and the case will be presented then. Extensive time spent coordinating with Medicine, SW, and LS. See Chart note form Palliative SW for further details.    PALLIATIVE MEDICINE COORDINATION OF CARE DOCUMENTATION: [x] Inpatient Consult   Non-Face-to-Face prolonged service provided that relates to (face-to-face) care that has or will occur and ongoing patient management, including one or more of the following: - Reviewed documentation from other physicians and other health care professional services - Reviewed medical records and diagnostic / radiology study results - Coordination with patient's support system  ************************************************************************  MEDICATION REVIEW:  acetaminophen   Tablet .. 650 milliGRAM(s) Oral every 6 hours PRN  benztropine 1 milliGRAM(s) Oral two times a day  cholecalciferol 1000 Unit(s) Oral daily  haloperidol     Tablet 10 milliGRAM(s) Oral two times a day  haloperidol    Injectable 2 milliGRAM(s) IntraMuscular every 6 hours PRN  heparin   Injectable 5000 Unit(s) SubCutaneous every 12 hours  lidocaine   Patch 1 Patch Transdermal daily PRN  LORazepam   Injectable 1 milliGRAM(s) IntraMuscular every 6 hours PRN  nicotine  Polacrilex Gum 2 milliGRAM(s) Oral every 2 hours PRN  nicotine - 21 mG/24Hr(s) Patch 1 patch Transdermal daily  OLANZapine 20 milliGRAM(s) Oral every 12 hours  polyethylene glycol 3350 17 Gram(s) Oral daily PRN  senna 2 Tablet(s) Oral at bedtime PRN  traZODone 100 milliGRAM(s) Oral at bedtime    ISTOP REFERENCE: [id number]  - PRN usage: NO PRN'S  ------------------------------------------------------------------------  COORDINATION OF CARE:  - Palliative Care consulted for: GOC  - Patient assessed: 4/12/2021  - Patient previously seen by Palliative Care service: NO    ADVANCE CARE PLANNING  - Code status: FULL  - MOLST reviewed in chart: NONE  - HCP/ Surrogate: None  - GOC document found in Alpha: NONE  - HCP/ Living will/ Other advanced directives in Alpha: NONE  ------------------------------------------------------------------------  CARE PROVIDER DOCUMENTATION:  - SW/CM notes: Remains medically active  - IR notes: recommending against biopsy given the significant risk of biopsy under sedation i/s/o mass invading pulmonary vessels  - Medicine notes: presumed malignancy complicated by hypercalcemia and anemia     PLAN OF CARE  - Known admissions in past year: 0  - Current admit date: 4/9/2021  - LOS: 8  - LACE score: 13  - Current dispo plan: TO BE DETERMINED  ------------------------------------------------------------------------  - Time Spent/Chart reviewed: 35 Minutes [including time used to gather, review and transfer data]  - Start: 1:45PM  - End: 2:20PM    Prolonged services rendered, as part of this patient's care provided by Palliative Medicine, include: i. chart review for provider and ancillary service documentation, ii. pertinent diagnostics including laboratory and imaging studies, iii. medication review including PRN use, iv. admission history including previous palliative care encounters and GOC notes, v. advance care planning documents including HCP and MOLST forms in Alpha. Part of Palliative Medicine extended evaluation and management also involves coordination of care with our IDT, the primary and consulting omar, and unit CM/SW and Hospice if eligible. Recommendations based on the information gathered and discussed are outline in the AP of our notes.

## 2021-04-09 NOTE — CHART NOTE - NSCHARTNOTEFT_GEN_A_CORE
Palliative care consulted for goals of care. Pt resides in [facility] which is licensed by the office of [facility] health facility.   End of life decision making including code status, withholding of life sustaining treatment, withdrawal of life sustaining treatment and transition of care to hospice  care can not be decided upon by the surrogate decision makers.   Checklist # 3 adult hospital patient without medical decision making capacity who do not have a health care proxy and decision maker is public health law surrogate needs to be completed and submitted to [employer] for review. Brittney Ovalle esq. from [facility] aware of case and will review checklist upon receipt. Medical team updated.

## 2021-04-09 NOTE — PROGRESS NOTE ADULT - SUBJECTIVE AND OBJECTIVE BOX
HPI:  60M w/ HTN, Type 2 DM-not on medication, schizoaffective disorder, polysubstance abuse (cocaine, alcohol, cigarettes), hx of DVT? previously on Eliquis presented to ED c/o weakness and found to have intrathoracic mass involving pulmonary vasculature, hypercalecmia and COVID +. Patient lives alone at [facility] ([facility] ([facility]) [phone] and follows with a Mental Health Service Team [place] (mental health services ) [phone]).     Endocrine History:   Patient is poor historian. No hx of hypercalcemia or parathyroid disorders according to the patient. Not on any vitamin D, calcium supplementations or HCTZ at home but does sometimes take centrum multivitamin. Has been having symptoms of constipation and muscle weakness, unclear for how long. No hx of nephrolithiasis, bone pain, or fractures.     Interval History:  Patient just awoke from nap and is calm but is not electing to talk or answer any questions.      Outpatient Medications: Home Medications:  haloperidol 5 mg oral tablet: 2 tab(s) orally 2 times a day (01 Apr 2021 18:39)  nicotine: 2 milligram(s) buccally every 2 hours, As Needed (01 Apr 2021 18:39)  OLANZapine 20 mg oral tablet: 1 tab(s) orally 2 times a day (01 Apr 2021 18:39)  traZODone 100 mg oral tablet: 1 tab(s) orally once a day (at bedtime) (01 Apr 2021 18:39)      MEDICATIONS  (STANDING):  benztropine 1 milliGRAM(s) Oral two times a day  haloperidol     Tablet 10 milliGRAM(s) Oral two times a day  heparin   Injectable 5000 Unit(s) SubCutaneous every 12 hours  lactated ringers. 1000 milliLiter(s) (100 mL/Hr) IV Continuous <Continuous>  nicotine - 21 mG/24Hr(s) Patch 1 patch Transdermal daily  OLANZapine 15 milliGRAM(s) Oral two times a day  potassium chloride    Tablet ER 40 milliEquivalent(s) Oral once  traZODone 100 milliGRAM(s) Oral at bedtime    MEDICATIONS  (PRN):  acetaminophen   Tablet .. 650 milliGRAM(s) Oral every 6 hours PRN Temp greater or equal to 38C (100.4F), Mild Pain (1 - 3), Moderate Pain (4 - 6)  lidocaine   Patch 1 Patch Transdermal daily PRN pain  nicotine  Polacrilex Gum 2 milliGRAM(s) Oral every 2 hours PRN smoking cessation  polyethylene glycol 3350 17 Gram(s) Oral daily PRN Constipation  senna 2 Tablet(s) Oral at bedtime PRN Constipation      Allergies    No Known Allergies        Review of Systems: unable to obtain    Vital Signs Last 24 Hrs  HR: 119 (09 Apr 2021 10:36) (119 - 119)  BP: 152/86 (09 Apr 2021 10:36) (152/86 - 152/86)  RR: 20 (09 Apr 2021 10:36) (20 - 20)  SpO2: 96% (09 Apr 2021 10:36) (96% - 96%)  GENERAL: NAD  HEENT:  Atraumatic, Normocephalic  RESPIRATORY: Not in respiratory distress, comfortable on NC  NEURO: Moves all extremities spontaneously     04-09    138  |  103  |  13  ----------------------------<  209<H>  4.1   |  28  |  0.76    Ca    9.2      09 Apr 2021 10:27  Corrected Ca=10.5  Phos  2.4     04-09  Mg     1.6     04-09    TPro  8.8<H>  /  Alb  2.4<L>  /  TBili  0.2/  AST  128<H>  /  ALT  63<H>  /  AlkPhos  174<H>  04-09

## 2021-04-09 NOTE — PROGRESS NOTE ADULT - PROBLEM SELECTOR PLAN 10
DVT ppx: lovenox  Diet: regular  Dispo: pending clinical improvement, PT eval DVT ppx: lovenox  Diet: regular  Dispo: likely hospice

## 2021-04-09 NOTE — RAPID RESPONSE TEAM SUMMARY - NSSITUATIONBACKGROUNDRRT_GEN_ALL_CORE
60M w/ HTN, DM, schizoaffective disorder, polysubstance abuse (cocaine, alcohol, cigarettes), hx of DVT? previously on Eliquis presents to ED c/o neck pain, found to have a large heterogeneous mass occupying the majority of the left intrathoracic cavity with invasion of the left pulmonary artery, left pulmonary veins, and left atrium and probably R pulmonary mets, concern for new malignancy.     Patient was noted by RN to have AMS compared to her report from day nurse. Pt found to be tachycardic to 130s, lethargic, able to FOSTER and follow simple commands. POCT glucose was elevated. Full set of labs were sent. Pt already on NC, which was started earlier today for unclear reason. While not hypoxic pt developed increased WOB and was placed on bipap with improvement in WOB. Of note, pt family wants pt to be DNR/DNI/hospice but end of life decisions can only be made by the office of mental hygiene per palliative care note, therefore pt is FULL CODE. MICU was notified of potential intubation if pt did not improve. Labs revealed hgb 6.6 from AM which was never repleted. 1u pRBC ordered overhead and started. Once on bipap, BP noted to drop 10-15 points but then remained steady. There was concern for increased secretions s/p suctioning thus additional fluids were initially started but stopped. Vitals changed to q4 and RN advised to monitor secretions as they may prohibit further bipap. PE was considered given new o2 requirement, tachycardia, and malignancy but CTA was deferred in setting of advanced infiltrating cancer causing high bleeding risk which would be contraindication for AC. After improvement in WOB to baseline, vitals and mental status stable, RRT was ended.

## 2021-04-09 NOTE — PROGRESS NOTE ADULT - ATTENDING COMMENTS
60M, active smoker, PMH HTN, Type 2 DM, schizoaffective disorder, polysubstance abuse (cocaine, alcohol, cigarettes), hx of DVT? previously on Eliquis p/w neck pain and hearing voices. Found to have a large heterogeneous mass occupying majority of the left intrathoracic cavity with invasion of the left pulmonary vasculature and left atrium. Tumor also occludes the left main bronchus and probable right pulmonary metastases. Was planned for IR guided biopsy however patient refused. IR now recommending against biopsy given the significant risk of biopsy under sedation i/s/o mass invading pulmonary vessels. Patient with poor insight, no capacity to make decisions, team had GOC with family - NOK son Liam, mother Twyla, sister Ira - code status DNR/DNI, comfort measures, hospice referral. Rest of care as above. 60M, active smoker, PMH HTN, Type 2 DM, schizoaffective disorder, polysubstance abuse (cocaine, alcohol, cigarettes), hx of DVT? previously on Eliquis p/w neck pain and hearing voices. Found to have a large heterogeneous mass occupying majority of the left intrathoracic cavity with invasion of the left pulmonary vasculature and left atrium. Tumor also occludes the left main bronchus and probable right pulmonary metastases. Was planned for IR guided biopsy however patient refused. IR now recommending against biopsy given the significant risk of biopsy under sedation i/s/o mass invading pulmonary vessels. Hypercalcemic in setting of malignancy, hemoglobin downtrending (no active s/s bleeding, possibly dilutional patient on fluids for many days). Patient wants to leave, however has poor insight, refusing blood draws / transfusions. Palliative care following - family ([place] son Liam, mother Twyla, sister Ira) okay with hospice referral, comfort measures, however need to clarify whether patient is protected. FULL CODE at this time pending clarification of protection status. Rest of care as above.

## 2021-04-09 NOTE — PROGRESS NOTE ADULT - PROBLEM SELECTOR PLAN 1
concern for malignancy (primary vs. secondary), recent unintentional weight loss, bone pain (cervical spine), active smoker: 1 ppd for "many years", father w/ hx of lung cancer  - CTH/CAP scan - large heterogeneous mass occupying the majority of the left intrathoracic cavity with invasion of the left pulmonary artery, left pulmonary veins, and left atrium and probably R pulmonary mets  -patient refused IR biopsy 4/5, assented on 4/6, however an hour after the encounter, patient became increasingly aggressive with staff; emergency consult subsequently placed to Psychiatry.  -resumed diet for now  -discussed case w/ family, who would like to have a biopsy to confirm diagnosis, and then pursue hospice given the prognosis (mass invading pulmonary vessels, left atrium). Discussed case with Interventional Radiology, recommending against biopsy given the significant risk of biopsy under sedation i/s/o mass invading pulmonary vessels. Furthermore, given likely prognosis, biopsy likely not indicated. Will discuss with family on 4/8. concern for malignancy (primary vs. secondary), recent unintentional weight loss, bone pain (cervical spine), active smoker: 1 ppd for "many years", father w/ hx of lung cancer  - CTH/CAP scan - large heterogeneous mass occupying the majority of the left intrathoracic cavity with invasion of the left pulmonary artery, left pulmonary veins, and left atrium and probably R pulmonary mets  -patient refused IR biopsy 4/5, assented on 4/6, however an hour after the encounter, patient became increasingly aggressive with staff; emergency consult subsequently placed to Psychiatry.  -resumed diet for now  -discussed case w/ family, who would like to have a biopsy to confirm diagnosis, and then pursue hospice given the prognosis (mass invading pulmonary vessels, left atrium). Discussed case with Interventional Radiology, recommending against biopsy given the significant risk of biopsy under sedation i/s/o mass invading pulmonary vessels. Furthermore, given likely prognosis, biopsy likely not indicated. Will discuss with family on 4/8.  -GOC conversation w/ family 4/9

## 2021-04-09 NOTE — PROGRESS NOTE ADULT - SUBJECTIVE AND OBJECTIVE BOX
Gomez Lane MD PGY-1  Internal Medicine  Pager [phone] / [phone]    INTERVAL HPI / OVERNIGHT EVENTS:  -    SUBJECTIVE: Patient seen and examined at bedside.   -    INCOMPLETE ROS   CONSTITUTIONAL: No weakness, fevers or chills  EYES/ENT: No visual changes;  No vertigo or throat pain   NECK: No pain or stiffness  RESPIRATORY: No cough, wheezing, hemoptysis; No shortness of breath  CARDIOVASCULAR: No chest pain or palpitations  GASTROINTESTINAL: No abdominal or epigastric pain. No nausea, vomiting, or hematemesis; No diarrhea or constipation. No melena or hematochezia.  GENITOURINARY: No dysuria, frequency or hematuria  NEUROLOGICAL: No numbness or weakness  SKIN: No itching, rashes    OBJECTIVE:    VITAL SIGNS:  ICU Vital Signs Last 24 Hrs  T(C): --  T(F): --  HR: 119 (09 Apr 2021 10:36) (119 - 119)  BP: 152/86 (09 Apr 2021 10:36) (152/86 - 152/86)  BP(mean): --  ABP: --  ABP(mean): --  RR: 20 (09 Apr 2021 10:36) (20 - 20)  SpO2: 96% (09 Apr 2021 10:36) (96% - 96%)        04-08 @ 07:01  -  04-09 @ 07:00  --------------------------------------------------------  IN: 0 mL / OUT: 400 mL / NET: -400 mL    04-09 @ 07:01  -  04-09 @ 11:23  --------------------------------------------------------  IN: 0 mL / OUT: 100 mL / NET: -100 mL      CAPILLARY BLOOD GLUCOSE          PHYSICAL EXAM: INCOMPLETE PHYSICAL EXAM     General: NAD  HEENT: NC/AT; PERRL, clear conjunctiva  Neck: supple  Respiratory: CTA b/l  Cardiovascular: +S1/S2; RRR  Abdomen: soft, NT/ND; +BS x4  Extremities: WWP, 2+ peripheral pulses b/l; no LE edema  Skin: normal color and turgor; no rash  Neurological:    MEDICATIONS:  MEDICATIONS  (STANDING):  benztropine 1 milliGRAM(s) Oral two times a day  cholecalciferol 1000 Unit(s) Oral daily  haloperidol     Tablet 10 milliGRAM(s) Oral two times a day  heparin   Injectable 5000 Unit(s) SubCutaneous every 12 hours  nicotine - 21 mG/24Hr(s) Patch 1 patch Transdermal daily  OLANZapine 20 milliGRAM(s) Oral every 12 hours  traZODone 100 milliGRAM(s) Oral at bedtime    MEDICATIONS  (PRN):  acetaminophen   Tablet .. 650 milliGRAM(s) Oral every 6 hours PRN Temp greater or equal to 38C (100.4F), Mild Pain (1 - 3), Moderate Pain (4 - 6)  haloperidol    Injectable 2 milliGRAM(s) IntraMuscular every 6 hours PRN Agitation  lidocaine   Patch 1 Patch Transdermal daily PRN pain  LORazepam   Injectable 1 milliGRAM(s) IntraMuscular every 6 hours PRN SEVERE agitation  nicotine  Polacrilex Gum 2 milliGRAM(s) Oral every 2 hours PRN smoking cessation  polyethylene glycol 3350 17 Gram(s) Oral daily PRN Constipation  senna 2 Tablet(s) Oral at bedtime PRN Constipation      ALLERGIES:  Allergies    No Known Allergies    Intolerances        LABS:                        6.9    10.62 )-----------( 299      ( 08 Apr 2021 12:39 )             21.5     Hemoglobin: 6.9 g/dL (04-08 @ 12:39)  Hemoglobin: 7.7 g/dL (04-05 @ 07:12)    CBC Full  -  ( 08 Apr 2021 12:39 )  WBC Count : 10.62 K/uL  RBC Count : 3.19 M/uL  Hemoglobin : 6.9 g/dL  Hematocrit : 21.5 %  Platelet Count - Automated : 299 K/uL  Mean Cell Volume : 67.4 fL  Mean Cell Hemoglobin : 21.6 pg  Mean Cell Hemoglobin Concentration : 32.1 gm/dL  Auto Neutrophil # : x  Auto Lymphocyte # : x  Auto Monocyte # : x  Auto Eosinophil # : x  Auto Basophil # : x  Auto Neutrophil % : x  Auto Lymphocyte % : x  Auto Monocyte % : x  Auto Eosinophil % : x  Auto Basophil % : x    04-09    138  |  103  |  13  ----------------------------<  209<H>  4.1   |  28  |  0.76    Ca    9.2      09 Apr 2021 10:27  Phos  2.4     04-09  Mg     1.6     04-09    TPro  8.8<H>  /  Alb  2.4<L>  /  TBili  0.2  /  DBili  x   /  AST  128<H>  /  ALT  63<H>  /  AlkPhos  174<H>  04-09    Creatinine Trend: 0.76<--, 0.76<--, 0.94<--, 0.93<--, 1.07<--  LIVER FUNCTIONS - ( 09 Apr 2021 10:27 )  Alb: 2.4 g/dL / Pro: 8.8 g/dL / ALK PHOS: 174 U/L / ALT: 63 U/L / AST: 128 U/L / GGT: x               hs Troponin:              CSF:                      EKG:   MICROBIOLOGY:    IMAGING:      Labs, imaging, EKG personally reviewed    RADIOLOGY & ADDITIONAL TESTS: Reviewed. Gomez Lane MD PGY-1  Internal Medicine  Pager [phone] / [phone]    INTERVAL HPI / OVERNIGHT EVENTS:  -naeon    SUBJECTIVE: Patient seen and examined at bedside. ROS limited d/t psychiatric illness, further limited today 2/2 worsening SOB.  -endorses worsening shortness of breath, endorses some coughing and weakness.    OBJECTIVE:    VITAL SIGNS:  ICU Vital Signs Last 24 Hrs  T(C): --  T(F): --  HR: 119 (09 Apr 2021 10:36) (119 - 119)  BP: 152/86 (09 Apr 2021 10:36) (152/86 - 152/86)  BP(mean): --  ABP: --  ABP(mean): --  RR: 20 (09 Apr 2021 10:36) (20 - 20)  SpO2: 96% (09 Apr 2021 10:36) (96% - 96%)        04-08 @ 07:01  -  04-09 @ 07:00  --------------------------------------------------------  IN: 0 mL / OUT: 400 mL / NET: -400 mL    04-09 @ 07:01  -  04-09 @ 11:23  --------------------------------------------------------  IN: 0 mL / OUT: 100 mL / NET: -100 mL       PHYSICAL EXAM:     GENERAL: Lying in bed, appears uncomfortable, tachypneic  HEAD:  Atraumatic, Normocephalic  EYES: PERRLA, conjunctiva and sclera clear  NECK: Supple, No JVD. No tracheal deviation.  CHEST/LUNG: Decreased breath sounds over left chest, no crackles, tachypnea, on nasal cannula   HEART: Regular rate and rhythm; No murmurs, rubs, or gallops  ABDOMEN: Soft, Nontender, Nondistended; Bowel sounds present  EXTREMITIES:  2+ Peripheral Pulses, No clubbing, cyanosis, or edema  SKIN: No rashes or lesions    MEDICATIONS:  MEDICATIONS  (STANDING):  benztropine 1 milliGRAM(s) Oral two times a day  cholecalciferol 1000 Unit(s) Oral daily  haloperidol     Tablet 10 milliGRAM(s) Oral two times a day  heparin   Injectable 5000 Unit(s) SubCutaneous every 12 hours  nicotine - 21 mG/24Hr(s) Patch 1 patch Transdermal daily  OLANZapine 20 milliGRAM(s) Oral every 12 hours  traZODone 100 milliGRAM(s) Oral at bedtime    MEDICATIONS  (PRN):  acetaminophen   Tablet .. 650 milliGRAM(s) Oral every 6 hours PRN Temp greater or equal to 38C (100.4F), Mild Pain (1 - 3), Moderate Pain (4 - 6)  haloperidol    Injectable 2 milliGRAM(s) IntraMuscular every 6 hours PRN Agitation  lidocaine   Patch 1 Patch Transdermal daily PRN pain  LORazepam   Injectable 1 milliGRAM(s) IntraMuscular every 6 hours PRN SEVERE agitation  nicotine  Polacrilex Gum 2 milliGRAM(s) Oral every 2 hours PRN smoking cessation  polyethylene glycol 3350 17 Gram(s) Oral daily PRN Constipation  senna 2 Tablet(s) Oral at bedtime PRN Constipation      ALLERGIES:  Allergies    No Known Allergies    Intolerances        LABS:                        6.9    10.62 )-----------( 299      ( 08 Apr 2021 12:39 )             21.5     Hemoglobin: 6.9 g/dL (04-08 @ 12:39)  Hemoglobin: 7.7 g/dL (04-05 @ 07:12)    CBC Full  -  ( 08 Apr 2021 12:39 )  WBC Count : 10.62 K/uL  RBC Count : 3.19 M/uL  Hemoglobin : 6.9 g/dL  Hematocrit : 21.5 %  Platelet Count - Automated : 299 K/uL  Mean Cell Volume : 67.4 fL  Mean Cell Hemoglobin : 21.6 pg  Mean Cell Hemoglobin Concentration : 32.1 gm/dL  Auto Neutrophil # : x  Auto Lymphocyte # : x  Auto Monocyte # : x  Auto Eosinophil # : x  Auto Basophil # : x  Auto Neutrophil % : x  Auto Lymphocyte % : x  Auto Monocyte % : x  Auto Eosinophil % : x  Auto Basophil % : x    04-09    138  |  103  |  13  ----------------------------<  209<H>  4.1   |  28  |  0.76    Ca    9.2      09 Apr 2021 10:27  Phos  2.4     04-09  Mg     1.6     04-09    TPro  8.8<H>  /  Alb  2.4<L>  /  TBili  0.2  /  DBili  x   /  AST  128<H>  /  ALT  63<H>  /  AlkPhos  174<H>  04-09    Creatinine Trend: 0.76<--, 0.76<--, 0.94<--, 0.93<--, 1.07<--  LIVER FUNCTIONS - ( 09 Apr 2021 10:27 )  Alb: 2.4 g/dL / Pro: 8.8 g/dL / ALK PHOS: 174 U/L / ALT: 63 U/L / AST: 128 U/L / GGT: x              Labs, imaging, EKG personally reviewed    RADIOLOGY & ADDITIONAL TESTS: Reviewed.

## 2021-04-09 NOTE — PROGRESS NOTE ADULT - PROBLEM SELECTOR PLAN 5
microcytic, unclear baseline, last hgb 9.8 in 3/2020, hx of sickle cell trait?  -HDS  -fe studies consistent with AOCD  -f/u sickle cell screen microcytic, unclear baseline, last hgb 9.8 in 3/2020, hx of sickle cell trait?  -HDS, however Hb downtrending, no need for tx as comfort measures only  -fe studies consistent with AOCD

## 2021-04-09 NOTE — PROGRESS NOTE ADULT - ASSESSMENT
60M w/ HTN, Type 2 DM-not on medication, schizoaffective disorder, polysubstance abuse (cocaine, alcohol, cigarettes), a/w weakness and found to have intrathoracic mass involving pulmonary vasculature, hypercalecmia and COVID +.    #hypercalcemia  -improved since admission-corrected Ca now 1-.5 s/p Pamidronate 90mg IV on 4/3.   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; PTHrP pending  -will need treatment of underlying malignancy for long term calcium control; effects of Pamidronate will wear off in a few weeks  -continue IVF as tolerated  -continue to trend serum Ca, Alb  -continue cholecalciferol 1000IU daily for low 25-OH Vit D    #Type 2 diabetes  -patient not on medication for diabetes as outpatient  -glucose on chem alzco=897  -check HbA1c  -check FS qac and qhs with LOW supplemental scale before meals and at bedtime    #HTN  -goal bp <130/80-has been within goal, bp today elevated-monitor and if consistently above goal will need tx    Briseida Owen MD  Division of Endocrinology    If after 6PM or before 9AM, or on weekends/holidays, please call endocrine answering service for assistance ([phone]).  For nonurgent matters email [email or web address] for assistance.

## 2021-04-09 NOTE — PROGRESS NOTE ADULT - PROBLEM SELECTOR PLAN 3
-full code for now  -workup for malignancy as above  -discussion w/ family on 4/7 as above (see problem #1) -full code for now  -workup for malignancy as above  -patient's family (son, mother, sister) agrees with CMO and hospice ([facility] conversation 4/9)  -will need to clarify protection status  -will f/u Palliative

## 2021-04-10 LAB
BLOOD GAS VENOUS COMPREHENSIVE RESULT: SIGNIFICANT CHANGE UP
GLUCOSE BLDC GLUCOMTR-MCNC: 158 MG/DL — HIGH (ref 70–99)
GLUCOSE BLDC GLUCOMTR-MCNC: 172 MG/DL — HIGH (ref 70–99)
NT-PROBNP SERPL-SCNC: 9934 PG/ML — HIGH

## 2021-04-10 PROCEDURE — 99233 SBSQ HOSP IP/OBS HIGH 50: CPT | Mod: GC

## 2021-04-10 RX ORDER — INSULIN LISPRO 100/ML
VIAL (ML) SUBCUTANEOUS EVERY 6 HOURS
Refills: 0 | Status: DISCONTINUED | OUTPATIENT
Start: 2021-04-10 | End: 2021-04-11

## 2021-04-10 RX ADMIN — OLANZAPINE 20 MILLIGRAM(S): 15 TABLET, FILM COATED ORAL at 11:51

## 2021-04-10 RX ADMIN — Medication 2 MILLIGRAM(S): at 11:51

## 2021-04-10 RX ADMIN — Medication 1 MILLIGRAM(S): at 22:56

## 2021-04-10 RX ADMIN — Medication 1000 UNIT(S): at 11:51

## 2021-04-10 RX ADMIN — Medication 100 MILLIGRAM(S): at 22:57

## 2021-04-10 RX ADMIN — Medication 1 PATCH: at 12:01

## 2021-04-10 RX ADMIN — HALOPERIDOL DECANOATE 10 MILLIGRAM(S): 100 INJECTION INTRAMUSCULAR at 11:51

## 2021-04-10 RX ADMIN — Medication 6 MILLIGRAM(S): at 06:21

## 2021-04-10 RX ADMIN — Medication 1 MILLIGRAM(S): at 11:51

## 2021-04-10 RX ADMIN — OLANZAPINE 20 MILLIGRAM(S): 15 TABLET, FILM COATED ORAL at 22:56

## 2021-04-10 RX ADMIN — HALOPERIDOL DECANOATE 10 MILLIGRAM(S): 100 INJECTION INTRAMUSCULAR at 22:56

## 2021-04-10 RX ADMIN — Medication 2 MILLIGRAM(S): at 18:47

## 2021-04-10 RX ADMIN — Medication 1: at 06:04

## 2021-04-10 NOTE — PROVIDER CONTACT NOTE (OTHER) - ASSESSMENT
Pt sitting on toilet. No s/s of acute increase in WOB at this time. Pt oxygen assessed at 84%. Multiple attempts to place oxygen therapy unsuccessful.

## 2021-04-10 NOTE — PROVIDER CONTACT NOTE (OTHER) - SITUATION
Pt walking to bathroom, appeared to have difficulty ambulating and SOB.  Pt uncooperative with oxygen therapy.

## 2021-04-10 NOTE — PROGRESS NOTE ADULT - PROBLEM SELECTOR PLAN 1
concern for malignancy (primary vs. secondary), recent unintentional weight loss, bone pain (cervical spine), active smoker: 1 ppd for "many years", father w/ hx of lung cancer  - CTH/CAP scan - large heterogeneous mass occupying the majority of the left intrathoracic cavity with invasion of the left pulmonary artery, left pulmonary veins, and left atrium and probably R pulmonary mets  -patient refused IR biopsy 4/5, assented on 4/6, however an hour after the encounter, patient became increasingly aggressive with staff; emergency consult subsequently placed to Psychiatry.  -resumed diet for now  -discussed case w/ family, who would like to have a biopsy to confirm diagnosis, and then pursue hospice given the prognosis (mass invading pulmonary vessels, left atrium). Discussed case with Interventional Radiology, recommending against biopsy given the significant risk of biopsy under sedation i/s/o mass invading pulmonary vessels. Furthermore, given likely prognosis, biopsy likely not indicated. Will discuss with family on 4/8.  -GOC conversation w/ family 4/9

## 2021-04-10 NOTE — PROGRESS NOTE ADULT - PROBLEM SELECTOR PLAN 10
DVT ppx: lovenox  Diet: regular  Dispo: likely hospice DVT ppx: hsq  Diet: regular  Dispo: pending d/w LS

## 2021-04-10 NOTE — PROGRESS NOTE ADULT - ATTENDING COMMENTS
60M, active smoker, PMH HTN, Type 2 DM, schizoaffective disorder, polysubstance drug abuse (cocaine, alcohol, cigarettes), hx of DVT? previously on Eliquis p/w neck pain and hearing voices. Found to have a large heterogeneous mass occupying majority of the left intrathoracic cavity with invasion of the left pulmonary vasculature and left atrium. Tumor also occludes the left main bronchus and probable right pulmonary metastases. Was planned for IR guided biopsy however patient refused. IR now recommending against biopsy given the significant risk of biopsy under sedation i/s/o mass invading pulmonary vessels. Hypercalcemic in setting of malignancy, hemoglobin downtrending (no active s/s bleeding, possibly dilutional patient on fluids for many days). Patient wants to leave, however has poor insight, refusing blood draws / transfusions. Palliative care following - family (AUDREY son Liam, mother Twyla, sister Ira) prefer hospice referral, comfort measures, however need to clarify whether patient is protected-- await investigation w/ help of pal care team.     Today Pt refusing meds and exam today. SPO2 >5% on 3L NC on my exam. If refuses to keep O2 on could decompensate. Continues to be at high risk for life threatening hemorrhage given location of tumor. Now with COVID + and O2 requirement on rem/dex. Continue with aggressive psych meds to improve compliance to medical therapy for now and FULL CODE for now HOWEVER if protection status can be clarified will honor pt and family wishes for comfort care.

## 2021-04-10 NOTE — PROVIDER CONTACT NOTE (CRITICAL VALUE NOTIFICATION) - BACKGROUND
HTN, DM, Schizo-affective disorder, concern for malignancy, COVID +
Pt was a RR, blood transfusion running,
HTN, DM, Schitzoaffective disorder, concern for malignancy, COVID +
Pt. 60 yr, M, dx with neck pain, found to have 2.9 cm right midlung nodular opacity  and complete opacification of L hemithorax with concern of new malignancy.

## 2021-04-10 NOTE — PROVIDER CONTACT NOTE (CRITICAL VALUE NOTIFICATION) - ASSESSMENT
Anxious, A&O x1, elopment risk, constant observation, pt refusing tele, VS, some PO medications and intravenous medications, denies chest pain or SOB at this time, pt let RN do vitals at 11 am and currently refused vitals. Pt stating "I just want to go home, doctors are lying to me".
Anxious, Alert and oriented to name, mildly anxious. patient on BiPAP with oxygen saturation 100%. no acute signs/symptoms of distress present at this time.
Vital signs are WNL, afebrile, O2 saturation 96%on 4LNC. Pt. intermittently displayed signs of anxiety, restlessness.
on BIPAP

## 2021-04-10 NOTE — PROGRESS NOTE ADULT - SUBJECTIVE AND OBJECTIVE BOX
Gomez Lane MD PGY-1  Internal Medicine  Pager [phone] / [phone]    INTERVAL HPI / OVERNIGHT EVENTS:  -    SUBJECTIVE: Patient seen and examined at bedside.   -    INCOMPLETE ROS   CONSTITUTIONAL: No weakness, fevers or chills  EYES/ENT: No visual changes;  No vertigo or throat pain   NECK: No pain or stiffness  RESPIRATORY: No cough, wheezing, hemoptysis; No shortness of breath  CARDIOVASCULAR: No chest pain or palpitations  GASTROINTESTINAL: No abdominal or epigastric pain. No nausea, vomiting, or hematemesis; No diarrhea or constipation. No melena or hematochezia.  GENITOURINARY: No dysuria, frequency or hematuria  NEUROLOGICAL: No numbness or weakness  SKIN: No itching, rashes    OBJECTIVE:    VITAL SIGNS:  ICU Vital Signs Last 24 Hrs  T(C): 36.1 (09 Apr 2021 22:01), Max: 36.1 (09 Apr 2021 22:01)  T(F): 96.9 (09 Apr 2021 22:01), Max: 96.9 (09 Apr 2021 22:01)  HR: 85 (10 Apr 2021 06:55) (85 - 119)  BP: 118/68 (09 Apr 2021 22:01) (118/68 - 152/86)  BP(mean): --  ABP: --  ABP(mean): --  RR: 19 (09 Apr 2021 22:01) (18 - 20)  SpO2: 98% (10 Apr 2021 06:55) (96% - 100%)        04-09 @ 07:01  -  04-10 @ 07:00  --------------------------------------------------------  IN: 0 mL / OUT: 100 mL / NET: -100 mL      CAPILLARY BLOOD GLUCOSE      POCT Blood Glucose.: 172 mg/dL (10 Apr 2021 05:02)      PHYSICAL EXAM: INCOMPLETE PHYSICAL EXAM     General: NAD  HEENT: NC/AT; PERRL, clear conjunctiva  Neck: supple  Respiratory: CTA b/l  Cardiovascular: +S1/S2; RRR  Abdomen: soft, NT/ND; +BS x4  Extremities: WWP, 2+ peripheral pulses b/l; no LE edema  Skin: normal color and turgor; no rash  Neurological:    MEDICATIONS:  MEDICATIONS  (STANDING):  benztropine 1 milliGRAM(s) Oral two times a day  cholecalciferol 1000 Unit(s) Oral daily  dexAMETHasone  Injectable 6 milliGRAM(s) IV Push daily  dextrose 40% Gel 15 Gram(s) Oral once  dextrose 50% Injectable 25 Gram(s) IV Push once  dextrose 50% Injectable 12.5 Gram(s) IV Push once  dextrose 50% Injectable 25 Gram(s) IV Push once  glucagon  Injectable 1 milliGRAM(s) IntraMuscular once  haloperidol     Tablet 10 milliGRAM(s) Oral two times a day  heparin   Injectable 5000 Unit(s) SubCutaneous every 12 hours  insulin lispro (ADMELOG) corrective regimen sliding scale   SubCutaneous every 6 hours  nicotine - 21 mG/24Hr(s) Patch 1 patch Transdermal daily  OLANZapine 20 milliGRAM(s) Oral every 12 hours  remdesivir  IVPB 200 milliGRAM(s) IV Intermittent every 24 hours  remdesivir  IVPB   IV Intermittent   traZODone 100 milliGRAM(s) Oral at bedtime    MEDICATIONS  (PRN):  acetaminophen   Tablet .. 650 milliGRAM(s) Oral every 6 hours PRN Temp greater or equal to 38C (100.4F), Mild Pain (1 - 3), Moderate Pain (4 - 6)  haloperidol    Injectable 2 milliGRAM(s) IntraMuscular every 6 hours PRN Agitation  lidocaine   Patch 1 Patch Transdermal daily PRN pain  LORazepam   Injectable 1 milliGRAM(s) IntraMuscular every 6 hours PRN SEVERE agitation  nicotine  Polacrilex Gum 2 milliGRAM(s) Oral every 2 hours PRN smoking cessation  polyethylene glycol 3350 17 Gram(s) Oral daily PRN Constipation  senna 2 Tablet(s) Oral at bedtime PRN Constipation      ALLERGIES:  Allergies    No Known Allergies    Intolerances        LABS:                        7.4    17.39 )-----------( 301      ( 09 Apr 2021 22:26 )             24.1     Hemoglobin: 7.4 g/dL (04-09 @ 22:26)  Hemoglobin: 6.6 g/dL (04-09 @ 10:27)  Hemoglobin: 6.9 g/dL (04-08 @ 12:39)    CBC Full  -  ( 09 Apr 2021 22:26 )  WBC Count : 17.39 K/uL  RBC Count : 3.42 M/uL  Hemoglobin : 7.4 g/dL  Hematocrit : 24.1 %  Platelet Count - Automated : 301 K/uL  Mean Cell Volume : 70.5 fL  Mean Cell Hemoglobin : 21.6 pg  Mean Cell Hemoglobin Concentration : 30.7 gm/dL  Auto Neutrophil # : 14.26 K/uL  Auto Lymphocyte # : 2.02 K/uL  Auto Monocyte # : 0.81 K/uL  Auto Eosinophil # : 0.11 K/uL  Auto Basophil # : 0.06 K/uL  Auto Neutrophil % : 82.1 %  Auto Lymphocyte % : 11.6 %  Auto Monocyte % : 4.7 %  Auto Eosinophil % : 0.6 %  Auto Basophil % : 0.3 %    04-09    138  |  103  |  14  ----------------------------<  224<H>  4.1   |  28  |  0.87    Ca    9.0      09 Apr 2021 22:26  Phos  3.4     04-09  Mg     1.5     04-09    TPro  8.8<H>  /  Alb  2.3<L>  /  TBili  0.2  /  DBili  <0.2  /  AST  240<H>  /  ALT  131<H>  /  AlkPhos  348<H>  04-09    Creatinine Trend: 0.87<--, 0.76<--, 0.76<--, 0.94<--, 0.93<--, 1.07<--  LIVER FUNCTIONS - ( 09 Apr 2021 22:26 )  Alb: 2.3 g/dL / Pro: 8.8 g/dL / ALK PHOS: 348 U/L / ALT: 131 U/L / AST: 240 U/L / GGT: x           PT/INR - ( 09 Apr 2021 22:28 )   PT: 15.7 sec;   INR: 1.40 ratio             hs Troponin:                30 <<== 04-09-21 @ 22:26        01:50 - VBG - pH: 7.30  | pCO2: 62    | pO2: 139   | Lactate: 1.3    22:20 - VBG - pH: 7.14  | pCO2: 92    | pO2: 28    | Lactate: 3.0          CSF:                      EKG:   MICROBIOLOGY:    IMAGING:      Labs, imaging, EKG personally reviewed    RADIOLOGY & ADDITIONAL TESTS: Reviewed. Gomez Lane MD PGY-1  Internal Medicine  Pager [phone] / [phone]    INTERVAL HPI / OVERNIGHT EVENTS:  -RRT overnight for inc WOB (not hypoxia) a/w tachycardia - hypercapnic on STAT VBG, improved after BiPAP. s/p 1u pRBC (hgb 7.4 during RRT, prior to completion of transfusion), remdesivir, dexamethasone 6mg.   -in AM, BiPAP off w/ pt sat 100% on RA  -refused blood draw this AM     SUBJECTIVE: Patient seen and examined at bedside. Limited d/t schizoaffective d/o.  -endorses improved shortness of breath  -denies cough, cp, abd pain  -obtaining further ROS limited d/t patient's lack of relevant response, responding to most questions that he is going home.     OBJECTIVE:    VITAL SIGNS:  ICU Vital Signs Last 24 Hrs  T(C): 36.1 (09 Apr 2021 22:01), Max: 36.1 (09 Apr 2021 22:01)  T(F): 96.9 (09 Apr 2021 22:01), Max: 96.9 (09 Apr 2021 22:01)  HR: 85 (10 Apr 2021 06:55) (85 - 119)  BP: 118/68 (09 Apr 2021 22:01) (118/68 - 152/86)  BP(mean): --  ABP: --  ABP(mean): --  RR: 19 (09 Apr 2021 22:01) (18 - 20)  SpO2: 98% (10 Apr 2021 06:55) (96% - 100%)        04-09 @ 07:01  -  04-10 @ 07:00  --------------------------------------------------------  IN: 0 mL / OUT: 100 mL / NET: -100 mL         POCT Blood Glucose.: 172 mg/dL (10 Apr 2021 05:02)      PHYSICAL EXAM: *limited as patient demanding that he not be touched*    GENERAL: Lying in bed, appears uncomfortable  HEAD:  Atraumatic, Normocephalic  EYES: PERRLA, conjunctiva and sclera clear  NECK: Supple, No JVD. No tracheal deviation.  CHEST/LUNG: *only allowed examiner to auscultate through bed blanket* decreased breath sounds over left chest, no crackles appreciated on limited exam through blanket, on room air during this encounter  HEART: Regular rate and rhythm; No murmurs, rubs, or gallops  ABDOMEN: Soft, Nontender, Nondistended; Bowel sounds present  EXTREMITIES:  2+ Peripheral Pulses, No clubbing, cyanosis, or edema  SKIN: No rashes or lesions      MEDICATIONS:  MEDICATIONS  (STANDING):  benztropine 1 milliGRAM(s) Oral two times a day  cholecalciferol 1000 Unit(s) Oral daily  dexAMETHasone  Injectable 6 milliGRAM(s) IV Push daily  dextrose 40% Gel 15 Gram(s) Oral once  dextrose 50% Injectable 25 Gram(s) IV Push once  dextrose 50% Injectable 12.5 Gram(s) IV Push once  dextrose 50% Injectable 25 Gram(s) IV Push once  glucagon  Injectable 1 milliGRAM(s) IntraMuscular once  haloperidol     Tablet 10 milliGRAM(s) Oral two times a day  heparin   Injectable 5000 Unit(s) SubCutaneous every 12 hours  insulin lispro (ADMELOG) corrective regimen sliding scale   SubCutaneous every 6 hours  nicotine - 21 mG/24Hr(s) Patch 1 patch Transdermal daily  OLANZapine 20 milliGRAM(s) Oral every 12 hours  remdesivir  IVPB 200 milliGRAM(s) IV Intermittent every 24 hours  remdesivir  IVPB   IV Intermittent   traZODone 100 milliGRAM(s) Oral at bedtime    MEDICATIONS  (PRN):  acetaminophen   Tablet .. 650 milliGRAM(s) Oral every 6 hours PRN Temp greater or equal to 38C (100.4F), Mild Pain (1 - 3), Moderate Pain (4 - 6)  haloperidol    Injectable 2 milliGRAM(s) IntraMuscular every 6 hours PRN Agitation  lidocaine   Patch 1 Patch Transdermal daily PRN pain  LORazepam   Injectable 1 milliGRAM(s) IntraMuscular every 6 hours PRN SEVERE agitation  nicotine  Polacrilex Gum 2 milliGRAM(s) Oral every 2 hours PRN smoking cessation  polyethylene glycol 3350 17 Gram(s) Oral daily PRN Constipation  senna 2 Tablet(s) Oral at bedtime PRN Constipation      ALLERGIES:  Allergies    No Known Allergies    Intolerances        LABS:                        7.4    17.39 )-----------( 301      ( 09 Apr 2021 22:26 )             24.1     Hemoglobin: 7.4 g/dL (04-09 @ 22:26)  Hemoglobin: 6.6 g/dL (04-09 @ 10:27)  Hemoglobin: 6.9 g/dL (04-08 @ 12:39)    CBC Full  -  ( 09 Apr 2021 22:26 )  WBC Count : 17.39 K/uL  RBC Count : 3.42 M/uL  Hemoglobin : 7.4 g/dL  Hematocrit : 24.1 %  Platelet Count - Automated : 301 K/uL  Mean Cell Volume : 70.5 fL  Mean Cell Hemoglobin : 21.6 pg  Mean Cell Hemoglobin Concentration : 30.7 gm/dL  Auto Neutrophil # : 14.26 K/uL  Auto Lymphocyte # : 2.02 K/uL  Auto Monocyte # : 0.81 K/uL  Auto Eosinophil # : 0.11 K/uL  Auto Basophil # : 0.06 K/uL  Auto Neutrophil % : 82.1 %  Auto Lymphocyte % : 11.6 %  Auto Monocyte % : 4.7 %  Auto Eosinophil % : 0.6 %  Auto Basophil % : 0.3 %    04-09    138  |  103  |  14  ----------------------------<  224<H>  4.1   |  28  |  0.87    Ca    9.0      09 Apr 2021 22:26  Phos  3.4     04-09  Mg     1.5     04-09    TPro  8.8<H>  /  Alb  2.3<L>  /  TBili  0.2  /  DBili  <0.2  /  AST  240<H>  /  ALT  131<H>  /  AlkPhos  348<H>  04-09    Creatinine Trend: 0.87<--, 0.76<--, 0.76<--, 0.94<--, 0.93<--, 1.07<--  LIVER FUNCTIONS - ( 09 Apr 2021 22:26 )  Alb: 2.3 g/dL / Pro: 8.8 g/dL / ALK PHOS: 348 U/L / ALT: 131 U/L / AST: 240 U/L / GGT: x           PT/INR - ( 09 Apr 2021 22:28 )   PT: 15.7 sec;   INR: 1.40 ratio       30 <<== 04-09-21 @ 22:26       01:50 - VBG - pH: 7.30  | pCO2: 62    | pO2: 139   | Lactate: 1.3    22:20 - VBG - pH: 7.14  | pCO2: 92    | pO2: 28    | Lactate: 3.0          Labs, imaging, EKG personally reviewed    RADIOLOGY & ADDITIONAL TESTS: Reviewed.

## 2021-04-10 NOTE — PROVIDER CONTACT NOTE (CRITICAL VALUE NOTIFICATION) - ACTION/TREATMENT ORDERED:
MD made aware, will continue to monitor
No action at this time. Will continue to monitor.
Provider made aware, will recheck VBG at 11:30 as per order
none ordered

## 2021-04-10 NOTE — PROGRESS NOTE ADULT - PROBLEM SELECTOR PLAN 3
-full code for now  -workup for malignancy as above  -patient's family (son, mother, sister) agrees with CMO and hospice ([facility] conversation 4/9)  -will need to clarify protection status  -will f/u Palliative -full code for now  -workup for malignancy as above  -patient's family (son, mother, sister) agrees with CMO and hospice ([facility] conversation 4/9); however, patient known/protected by MHLS therefore EOL decisions must be approved through formal MOLST checklist, further discussion with Mental Hygiene Legal Services are required prior to further EOL planning  -will f/u Palliative after their discussion w/ MHLS on 4/12

## 2021-04-10 NOTE — PROGRESS NOTE ADULT - PROBLEM SELECTOR PLAN 5
microcytic, unclear baseline, last hgb 9.8 in 3/2020, hx of sickle cell trait?  -HDS, however Hb downtrending, no need for tx as comfort measures only  -fe studies consistent with AOCD microcytic, unclear baseline, last hgb 9.8 in 3/2020, hx of sickle cell trait?  -fe studies consistent with AOCD  -hgb down-trending, malignancy likely contributing  -s/p 1u pRBC 4/9

## 2021-04-11 LAB — GLUCOSE BLDC GLUCOMTR-MCNC: 231 MG/DL — HIGH (ref 70–99)

## 2021-04-11 PROCEDURE — 99233 SBSQ HOSP IP/OBS HIGH 50: CPT | Mod: GC

## 2021-04-11 RX ORDER — INSULIN LISPRO 100/ML
VIAL (ML) SUBCUTANEOUS
Refills: 0 | Status: DISCONTINUED | OUTPATIENT
Start: 2021-04-11 | End: 2021-04-12

## 2021-04-11 RX ADMIN — HALOPERIDOL DECANOATE 10 MILLIGRAM(S): 100 INJECTION INTRAMUSCULAR at 12:02

## 2021-04-11 RX ADMIN — Medication 1 MILLIGRAM(S): at 12:02

## 2021-04-11 RX ADMIN — Medication 100 MILLIGRAM(S): at 21:41

## 2021-04-11 RX ADMIN — Medication 1000 UNIT(S): at 12:02

## 2021-04-11 RX ADMIN — HALOPERIDOL DECANOATE 10 MILLIGRAM(S): 100 INJECTION INTRAMUSCULAR at 22:28

## 2021-04-11 RX ADMIN — Medication 6 MILLIGRAM(S): at 06:29

## 2021-04-11 RX ADMIN — Medication 1 MILLIGRAM(S): at 22:28

## 2021-04-11 RX ADMIN — OLANZAPINE 20 MILLIGRAM(S): 15 TABLET, FILM COATED ORAL at 22:28

## 2021-04-11 RX ADMIN — OLANZAPINE 20 MILLIGRAM(S): 15 TABLET, FILM COATED ORAL at 12:03

## 2021-04-11 NOTE — PROGRESS NOTE ADULT - ATTENDING COMMENTS
60M extensive psych and drug abuse, smoker hx new dx lung mass w/ vascular invasion, refusing all interventions + treatments since admission, now COVID+ on rem/dex and NRB. Cannot assess oxygenation status as pt refuses to wear both O2 and pulse ox. Awaiting formal palliative care/mental hygeine legal services to assist with MOLST checklist given his legal status as protected by state. Full code per chart. C/w psych med mgmt, COVID care, supportive care until GOC clarified. Family wants comfort.     d/w resident MD

## 2021-04-11 NOTE — PROVIDER CONTACT NOTE (OTHER) - BACKGROUND
Hx: HTN, DM, S/P 1 unit PRBC. RRT on night 4/9/21, Mental health history, substance abuse, Lung Mass

## 2021-04-11 NOTE — PROGRESS NOTE ADULT - SUBJECTIVE AND OBJECTIVE BOX
Devon Berumen MD  PGY 3 Department of Internal Medicine  Pager: [phone]/ [phone]    Patient is a 60y old  Male who presents with a chief complaint of neck pain (10 Apr 2021 07:13)      SUBJECTIVE / OVERNIGHT EVENTS: Pt seen and examined. No acute overnight events. Pt reported no fever, chills, CP, SOB, abdominal pain, N/V/D, dysuria, or new joint aches.     MEDICATIONS  (STANDING):  benztropine 1 milliGRAM(s) Oral two times a day  cholecalciferol 1000 Unit(s) Oral daily  dexAMETHasone  Injectable 6 milliGRAM(s) IV Push daily  dextrose 40% Gel 15 Gram(s) Oral once  dextrose 50% Injectable 25 Gram(s) IV Push once  dextrose 50% Injectable 12.5 Gram(s) IV Push once  dextrose 50% Injectable 25 Gram(s) IV Push once  glucagon  Injectable 1 milliGRAM(s) IntraMuscular once  haloperidol     Tablet 10 milliGRAM(s) Oral two times a day  heparin   Injectable 5000 Unit(s) SubCutaneous every 12 hours  insulin lispro (ADMELOG) corrective regimen sliding scale   SubCutaneous three times a day before meals  nicotine - 21 mG/24Hr(s) Patch 1 patch Transdermal daily  OLANZapine 20 milliGRAM(s) Oral every 12 hours  remdesivir  IVPB 200 milliGRAM(s) IV Intermittent every 24 hours  remdesivir  IVPB   IV Intermittent   traZODone 100 milliGRAM(s) Oral at bedtime    MEDICATIONS  (PRN):  acetaminophen   Tablet .. 650 milliGRAM(s) Oral every 6 hours PRN Temp greater or equal to 38C (100.4F), Mild Pain (1 - 3), Moderate Pain (4 - 6)  haloperidol    Injectable 2 milliGRAM(s) IntraMuscular every 6 hours PRN Agitation  lidocaine   Patch 1 Patch Transdermal daily PRN pain  LORazepam   Injectable 1 milliGRAM(s) IntraMuscular every 6 hours PRN SEVERE agitation  nicotine  Polacrilex Gum 2 milliGRAM(s) Oral every 2 hours PRN smoking cessation  polyethylene glycol 3350 17 Gram(s) Oral daily PRN Constipation  senna 2 Tablet(s) Oral at bedtime PRN Constipation      I&O's Summary    10 Apr 2021 07:01  -  11 Apr 2021 07:00  --------------------------------------------------------  IN: 0 mL / OUT: 300 mL / NET: -300 mL        Vital Signs Last 24 Hrs  T(C): 36.6 (11 Apr 2021 06:24), Max: 37 (10 Apr 2021 16:00)  T(F): 97.9 (11 Apr 2021 06:24), Max: 98.6 (10 Apr 2021 16:00)  HR: 107 (11 Apr 2021 06:24) (82 - 107)  BP: 134/85 (11 Apr 2021 06:24) (112/67 - 146/90)  BP(mean): --  RR: 18 (11 Apr 2021 06:24) (18 - 20)  SpO2: 99% (11 Apr 2021 06:24) (84% - 100%)    CAPILLARY BLOOD GLUCOSE      POCT Blood Glucose.: 231 mg/dL (11 Apr 2021 01:13)  POCT Blood Glucose.: 158 mg/dL (10 Apr 2021 11:30)      PHYSICAL EXAM:  GENERAL: NAD,   HEAD:  Atraumatic, Normocephalic  EYES: EOMI, PERRL, conjunctiva and sclera clear  NECK: No JVD  CHEST/LUNG: Clear to auscultation bilaterally; No wheeze  HEART: Regular rate and rhythm; No murmurs, rubs, or gallops  ABDOMEN: Soft, Nontender, Nondistended; Bowel sounds present  EXTREMITIES:  2+ Peripheral Pulses, No clubbing, cyanosis, or edema  PSYCH: AAOx3  NEUROLOGY: non-focal  SKIN: No rashes or lesions    LABS:                        7.4    17.39 )-----------( 301      ( 09 Apr 2021 22:26 )             24.1     Auto Eosinophil # 0.11  / Auto Eosinophil % 0.6   / Auto Neutrophil # 14.26 / Auto Neutrophil % 82.1  / BANDS % x                            6.6    14.70 )-----------( 271      ( 09 Apr 2021 10:27 )             22.2     Auto Eosinophil # x     / Auto Eosinophil % x     / Auto Neutrophil # x     / Auto Neutrophil % x     / BANDS % x        04-09    138  |  103  |  14  ----------------------------<  224<H>  4.1   |  28  |  0.87  04-09    138  |  103  |  13  ----------------------------<  209<H>  4.1   |  28  |  0.76    Ca    9.0      09 Apr 2021 22:26  Mg     1.5     04-09  Phos  3.4     04-09  TPro  8.8<H>  /  Alb  2.3<L>  /  TBili  0.2  /  DBili  <0.2  /  AST  240<H>  /  ALT  131<H>  /  AlkPhos  348<H>  04-09  TPro  8.8<H>  /  Alb  2.4<L>  /  TBili  0.2  /  DBili  x   /  AST  128<H>  /  ALT  63<H>  /  AlkPhos  174<H>  04-09    PT/INR - ( 09 Apr 2021 22:28 )   PT: 15.7 sec;   INR: 1.40 ratio           CARDIAC MARKERS ( 09 Apr 2021 22:26 )  x     / x     / x     / x     / 2.3 ng/mL              RADIOLOGY & ADDITIONAL TESTS:    Imaging Personally Reviewed:    Consultant(s) Notes Reviewed:      Care Discussed with Consultants/Other Providers:

## 2021-04-11 NOTE — PROGRESS NOTE ADULT - PROBLEM SELECTOR PLAN 3
-full code for now  -workup for malignancy as above  -patient's family (son, mother, sister) agrees with CMO and hospice ([facility] conversation 4/9); however, patient known/protected by MHLS therefore EOL decisions must be approved through formal MOLST checklist, further discussion with Mental Hygiene Legal Services are required prior to further EOL planning  -will f/u Palliative after their discussion w/ MHLS on 4/12

## 2021-04-11 NOTE — PROGRESS NOTE ADULT - PROBLEM SELECTOR PLAN 5
microcytic, unclear baseline, last hgb 9.8 in 3/2020, hx of sickle cell trait?  -fe studies consistent with AOCD  -hgb down-trending, malignancy likely contributing  -s/p 1u pRBC 4/9

## 2021-04-12 DIAGNOSIS — Z51.5 ENCOUNTER FOR PALLIATIVE CARE: ICD-10-CM

## 2021-04-12 DIAGNOSIS — U07.1 COVID-19: ICD-10-CM

## 2021-04-12 DIAGNOSIS — R53.81 OTHER MALAISE: ICD-10-CM

## 2021-04-12 DIAGNOSIS — Z71.89 OTHER SPECIFIED COUNSELING: ICD-10-CM

## 2021-04-12 DIAGNOSIS — G93.49 OTHER ENCEPHALOPATHY: ICD-10-CM

## 2021-04-12 LAB
GLUCOSE BLDC GLUCOMTR-MCNC: 144 MG/DL — HIGH (ref 70–99)
GLUCOSE BLDC GLUCOMTR-MCNC: 177 MG/DL — HIGH (ref 70–99)
GLUCOSE BLDC GLUCOMTR-MCNC: 221 MG/DL — HIGH (ref 70–99)

## 2021-04-12 PROCEDURE — 99233 SBSQ HOSP IP/OBS HIGH 50: CPT

## 2021-04-12 PROCEDURE — 99223 1ST HOSP IP/OBS HIGH 75: CPT

## 2021-04-12 PROCEDURE — 99232 SBSQ HOSP IP/OBS MODERATE 35: CPT

## 2021-04-12 RX ORDER — INSULIN LISPRO 100/ML
VIAL (ML) SUBCUTANEOUS AT BEDTIME
Refills: 0 | Status: DISCONTINUED | OUTPATIENT
Start: 2021-04-12 | End: 2021-04-20

## 2021-04-12 RX ORDER — DEXTROSE 50 % IN WATER 50 %
15 SYRINGE (ML) INTRAVENOUS ONCE
Refills: 0 | Status: DISCONTINUED | OUTPATIENT
Start: 2021-04-12 | End: 2021-04-20

## 2021-04-12 RX ORDER — GLUCAGON INJECTION, SOLUTION 0.5 MG/.1ML
1 INJECTION, SOLUTION SUBCUTANEOUS ONCE
Refills: 0 | Status: DISCONTINUED | OUTPATIENT
Start: 2021-04-12 | End: 2021-04-20

## 2021-04-12 RX ORDER — INSULIN LISPRO 100/ML
VIAL (ML) SUBCUTANEOUS
Refills: 0 | Status: DISCONTINUED | OUTPATIENT
Start: 2021-04-12 | End: 2021-04-20

## 2021-04-12 RX ADMIN — Medication 1 PATCH: at 19:01

## 2021-04-12 RX ADMIN — HALOPERIDOL DECANOATE 10 MILLIGRAM(S): 100 INJECTION INTRAMUSCULAR at 13:22

## 2021-04-12 RX ADMIN — Medication 2: at 13:05

## 2021-04-12 RX ADMIN — HALOPERIDOL DECANOATE 10 MILLIGRAM(S): 100 INJECTION INTRAMUSCULAR at 22:53

## 2021-04-12 RX ADMIN — OLANZAPINE 20 MILLIGRAM(S): 15 TABLET, FILM COATED ORAL at 13:22

## 2021-04-12 RX ADMIN — Medication 1 PATCH: at 13:23

## 2021-04-12 RX ADMIN — Medication 100 MILLIGRAM(S): at 22:53

## 2021-04-12 RX ADMIN — Medication 1000 UNIT(S): at 13:23

## 2021-04-12 RX ADMIN — OLANZAPINE 20 MILLIGRAM(S): 15 TABLET, FILM COATED ORAL at 22:53

## 2021-04-12 RX ADMIN — Medication 2 MILLIGRAM(S): at 13:23

## 2021-04-12 RX ADMIN — Medication 1 MILLIGRAM(S): at 13:22

## 2021-04-12 NOTE — CONSULT NOTE ADULT - PROBLEM SELECTOR RECOMMENDATION 6
.  Patient's lack capacity to make complex medical decisions for himself. Because of \[facility]\"" connection, decisions regarding EOL care or withholding care must be approved  -patient's son would be surrogate decision maker and patient's mother is also involved in his care. They initially wanted to pursue biopsy but upon learning that it would cause undue harm to the patient, they opted for a symptom-directed plan of care and wished for DNR/DNI status and hospice referral

## 2021-04-12 NOTE — CONSULT NOTE ADULT - PROBLEM SELECTOR RECOMMENDATION 9
Corrected ca 15.2 on admission now improved to 12.5 s/p IVF and pamidronate 90 mg IV on 4/3. 25 vitamin D 23.7, TSh 2.61, PTH 2. Non PTH mediated hypercalcemia in setting of lung mass, suspect 2/2 malignancy. Definitive treatment would be treating underlying cause as bisphosphonate would only treat the hypercalcemia for a few weeks.   -should continue to improve as peak effect of bisphosphate 2-4 days. Trend daily CMP   -continue IVF as tolerated   -pending PTHrP  -send 1,25 vitamin D   -replete with cholecalciferol 1000 IU qday.
.  Patient lacks capacity due to his underlying mental illness  -c/w enhanced supervision and frequent reorientation  -does not have an understanding of his medical conditions and has consistently refused all most aspects of his care  -patient tends to get agitated when medical/nursing care is trying to be completed

## 2021-04-12 NOTE — PROGRESS NOTE ADULT - ATTENDING COMMENTS
60M, active smoker, PMH HTN, Type 2 DM, schizoaffective disorder, polysubstance abuse (cocaine, alcohol, cigarettes), hx of DVT? previously on Eliquis p/w neck pain and hearing voices. Found to have a large heterogeneous mass occupying majority of the left intrathoracic cavity with invasion of the left pulmonary vasculature and left atrium. Tumor also occludes the left main bronchus and probable right pulmonary metastases. IR recommending against biopsy given the significant risk of biopsy under sedation i/s/o mass invading pulmonary vessels. Also COVID positive requiring supplemental oxygen - c/w dexamethasone, d/c remdesevir as patient refusing blood draws and cannot monitor renal/hepatic function. Patient with poor insight, and does not have decision making capacity, d/w family (NOK son Liam, mother Twyla, sister Ira) who are okay with hospice referral, comfort measures, however patient requires MOLST checklist given [facility] connection - appreciate palliative care input.

## 2021-04-12 NOTE — CONSULT NOTE ADULT - ASSESSMENT
61yo M with PMH of Schizoaffective Disorder, Polysubstance Use Disorder, HTN, T2DM, and h/o DVT/PE p/w neck pain and found to have large lung mass with aggressive features signifying likely metastatic cancer and now c/b by respiratory failure due to COVID. Palliative consulted for complex medical decision making in the setting of life-limiting illness.    ·	family is amenable to symptom-directed approach and hospice referral but MOLST checklist is required given [facility] connection  ·	despite patient not having biopsy confirmed metastatic malignancy, there is certainly an incurable fatal process underway given the aggressive features noted on imaging (L main bronchus occlusion by tumor, tumoral invasion of L pulmonary artery, L pulmonary vein, and L atrium, evidence of R sided metastases) --> patient's prognosis for this disease process would likely be weeks to months  ·	course complicated by respiratory failure due to COVID, now O2 requirements have escalated to NRB --> if this respiratory failure progresses, patient's death could occur more imminently in the range of days    Completing MOLST Checklist #3 (Adult Hospital Patient Without Medical Decision-Making Capacity Who Do Not Have a Health Care Proxy, and Decision-Maker is Public Health Law Surrogate). Patient is extremely high risk to pursue biopsy of lung mass and given new worsening respiratory failure/COVID+/tumoral invasion, risks far outweigh the benefits. In spite of not having biopsy confirmed malignancy, radiographic imaging all but confirms that there is a metastatic malignancy evolving in the patient's chest with aggressive features. By nature, this malignancy will be incurable and the patient would be a poor candidate for DMT even if he was not in respiratory failure. 59yo M with PMH of Schizoaffective Disorder, Polysubstance Use Disorder, HTN, T2DM, and h/o DVT/PE p/w neck pain and found to have large lung mass with aggressive features signifying likely metastatic cancer and now c/b by respiratory failure due to COVID. Palliative consulted for complex medical decision making in the setting of life-limiting illness.    ·	family is amenable to symptom-directed approach and hospice referral but MOLST checklist is required given [facility] connection  ·	despite patient not having biopsy confirmed metastatic malignancy, there is certainly an incurable fatal process underway given the aggressive features noted on imaging (L main bronchus occlusion by tumor, tumoral invasion of L pulmonary artery, L pulmonary vein, and L atrium, evidence of R sided metastases) --> patient's prognosis for this disease process would likely be weeks to months  ·	course complicated by respiratory failure due to COVID, now O2 requirements have escalated to NRB --> if this respiratory failure progresses, patient's death could occur more imminently in the range of days    Plan to complete MOLST Checklist #3 (Adult Hospital Patient Without Medical Decision-Making Capacity Who Do Not Have a Health Care Proxy, and Decision-Maker is Public Health Law Surrogate) and submit to [facility] for approval. Patient lacks capacity to designate a health care agent and does not have the capacity to understand that he has a life-limiting illness. Patient's family (son and mother) are available and willing to act as his surrogate decision makers. They have been fully informed about the patient's current medical illnesses and have opted to pursue a patient-centered/symptom-directed plan of care.      Patient is extremely high risk to pursue biopsy of lung mass and given new worsening respiratory failure/COVID+/tumoral invasion, risks far outweigh the benefits. In spite of not having biopsy confirmed malignancy, radiographic imaging all but confirms that there is a metastatic malignancy evolving in the patient's chest with aggressive features. By nature, this malignancy will be incurable and the patient would be a poor candidate for DMT even if he was not in respiratory failure.    The request for DNR/DNI status has been made as CPR/Intubation would cause undue burden and harm in patient that likely has an illness that will result in his death in less than 6 months. Imaging studies have all but confirmed that patient has metastatic cancer but the logistics of pursuing a biopsy which requires sedation is extremely risk and even the pursuit of finalizing a diagnosis will likely be extraordinary burden to patient that is already having difficulty accepting even the most trivial aspects of his medical care.  59yo M with PMH of Schizoaffective Disorder, Polysubstance Use Disorder, HTN, T2DM, and h/o DVT/PE p/w neck pain and found to have large lung mass with aggressive features signifying likely metastatic cancer and now c/b by respiratory failure due to COVID. Palliative consulted for complex medical decision making in the setting of life-limiting illness.    ·	family is amenable to symptom-directed approach and hospice referral but MOLST checklist is required given [facility] connection  ·	despite patient not having biopsy confirmed metastatic malignancy, there is certainly an incurable fatal process underway given the aggressive features noted on imaging (L main bronchus occlusion by tumor, tumoral invasion of L pulmonary artery, L pulmonary vein, and L atrium, evidence of R sided metastases) --> patient's prognosis for this disease process would likely be weeks to months  ·	course complicated by respiratory failure due to COVID, now O2 requirements have escalated to NRB --> if this respiratory failure progresses, patient's death could occur more imminently in the range of days    Plan to complete MOLST Checklist #3 (Adult Hospital Patient Without Medical Decision-Making Capacity Who Do Not Have a Health Care Proxy, and Decision-Maker is Public Health Law Surrogate) and submit to [facility] for approval. Patient lacks capacity to designate a health care agent and does not have the capacity to understand that he has a life-limiting illness. Patient's family (son and mother) are available and willing to act as his surrogate decision makers. They have been fully informed about the patient's current medical illnesses and have opted to pursue a patient-centered/symptom-directed plan of care.      Patient is extremely high risk to pursue biopsy of lung mass and given new worsening respiratory failure/COVID+/tumoral invasion, risks far outweigh the benefits. In spite of not having biopsy confirmed malignancy, radiographic imaging all but confirms that there is a metastatic malignancy evolving in the patient's chest with aggressive features. By nature, this malignancy will be incurable and the patient would be a poor candidate for DMT even if he was not in respiratory failure.    The request for DNR/DNI status has been made as CPR/Intubation would cause undue burden and harm in patient that likely has an illness that will result in his death in less than 6 months. Imaging studies have all but confirmed that the patient has metastatic cancer but the logistics of pursuing a biopsy (which requires sedation) is extremely risky and even the pursuit of finalizing a diagnosis will likely be an extraordinary burden to a patient that is already having difficulty accepting even the most trivial aspects of his medical care.

## 2021-04-12 NOTE — PROGRESS NOTE ADULT - SUBJECTIVE AND OBJECTIVE BOX
Gomez Lane MD PGY-1  Internal Medicine  Pager [phone] / [phone]    INTERVAL HPI / OVERNIGHT EVENTS:  -NAEON    SUBJECTIVE: Patient seen and examined at bedside. ROS limited -- patient requests to be left alone. Does not report, endorse, or deny any symptoms.    OBJECTIVE:    VITAL SIGNS:  ICU Vital Signs Last 24 Hrs  T(C): 37.1 (11 Apr 2021 22:12), Max: 37.1 (11 Apr 2021 22:12)  T(F): 98.8 (11 Apr 2021 22:12), Max: 98.8 (11 Apr 2021 22:12)  HR: 96 (11 Apr 2021 22:12) (96 - 96)  BP: 111/67 (11 Apr 2021 22:12) (111/67 - 111/67)  BP(mean): --  ABP: --  ABP(mean): --  RR: 18 (11 Apr 2021 22:12) (18 - 18)  SpO2: 100% (11 Apr 2021 22:12) (100% - 100%)        04-11 @ 07:01  -  04-12 @ 07:00  --------------------------------------------------------  IN: 0 mL / OUT: 1200 mL / NET: -1200 mL      CAPILLARY BLOOD GLUCOSE      POCT Blood Glucose.: 144 mg/dL (12 Apr 2021 08:40)      PHYSICAL EXAM: limited d/t patient refusing physical exam    General: NAD, resting comfortably in bed  HEENT: NC/AT; PERRL, clear conjunctiva  Respiratory: non-labored breathing  Cardiovascular: refused exam  Abdomen: refused exam   Extremities: refused exam  Skin: normal color and turgor; no rash    MEDICATIONS:  MEDICATIONS  (STANDING):  benztropine 1 milliGRAM(s) Oral two times a day  cholecalciferol 1000 Unit(s) Oral daily  dexAMETHasone  Injectable 6 milliGRAM(s) IV Push daily  dextrose 40% Gel 15 Gram(s) Oral once  dextrose 50% Injectable 25 Gram(s) IV Push once  dextrose 50% Injectable 12.5 Gram(s) IV Push once  dextrose 50% Injectable 25 Gram(s) IV Push once  glucagon  Injectable 1 milliGRAM(s) IntraMuscular once  haloperidol     Tablet 10 milliGRAM(s) Oral two times a day  heparin   Injectable 5000 Unit(s) SubCutaneous every 12 hours  insulin lispro (ADMELOG) corrective regimen sliding scale   SubCutaneous three times a day before meals  nicotine - 21 mG/24Hr(s) Patch 1 patch Transdermal daily  OLANZapine 20 milliGRAM(s) Oral every 12 hours  traZODone 100 milliGRAM(s) Oral at bedtime    MEDICATIONS  (PRN):  acetaminophen   Tablet .. 650 milliGRAM(s) Oral every 6 hours PRN Temp greater or equal to 38C (100.4F), Mild Pain (1 - 3), Moderate Pain (4 - 6)  haloperidol    Injectable 2 milliGRAM(s) IntraMuscular every 6 hours PRN Agitation  lidocaine   Patch 1 Patch Transdermal daily PRN pain  LORazepam   Injectable 1 milliGRAM(s) IntraMuscular every 6 hours PRN SEVERE agitation  nicotine  Polacrilex Gum 2 milliGRAM(s) Oral every 2 hours PRN smoking cessation  polyethylene glycol 3350 17 Gram(s) Oral daily PRN Constipation  senna 2 Tablet(s) Oral at bedtime PRN Constipation      ALLERGIES:  Allergies    No Known Allergies    Intolerances        LABS:    Hemoglobin: 7.4 g/dL (04-09 @ 22:26)  Hemoglobin: 6.6 g/dL (04-09 @ 10:27)  Hemoglobin: 6.9 g/dL (04-08 @ 12:39)       Creatinine Trend: 0.87<--, 0.76<--, 0.76<--, 0.94<--, 0.93<--, 1.07<--       Labs, imaging, EKG personally reviewed    RADIOLOGY & ADDITIONAL TESTS: Reviewed. Gomez Lane MD PGY-1  Internal Medicine  Pager [phone] / [phone]    INTERVAL HPI / OVERNIGHT EVENTS:  -NAEON    SUBJECTIVE: Patient seen and examined at bedside. ROS limited -- patient requests to be left alone. Does not report, endorse, or deny any symptoms.    OBJECTIVE:    VITAL SIGNS:  ICU Vital Signs Last 24 Hrs  T(C): 37.1 (11 Apr 2021 22:12), Max: 37.1 (11 Apr 2021 22:12)  T(F): 98.8 (11 Apr 2021 22:12), Max: 98.8 (11 Apr 2021 22:12)  HR: 96 (11 Apr 2021 22:12) (96 - 96)  BP: 111/67 (11 Apr 2021 22:12) (111/67 - 111/67)  BP(mean): --  ABP: --  ABP(mean): --  RR: 18 (11 Apr 2021 22:12) (18 - 18)  SpO2: 100% (11 Apr 2021 22:12) (100% - 100%)        04-11 @ 07:01  -  04-12 @ 07:00  --------------------------------------------------------  IN: 0 mL / OUT: 1200 mL / NET: -1200 mL      CAPILLARY BLOOD GLUCOSE      POCT Blood Glucose.: 144 mg/dL (12 Apr 2021 08:40)      PHYSICAL EXAM: limited d/t patient refusing physical exam    General: NAD, resting comfortably in bed  HEENT: NC/AT; PERRL, clear conjunctiva  Respiratory: non-labored breathing, NRB off, resting on chin  Cardiovascular: refused exam  Abdomen: refused exam   Extremities: refused exam  Skin: normal color and turgor; no rash    MEDICATIONS:  MEDICATIONS  (STANDING):  benztropine 1 milliGRAM(s) Oral two times a day  cholecalciferol 1000 Unit(s) Oral daily  dexAMETHasone  Injectable 6 milliGRAM(s) IV Push daily  dextrose 40% Gel 15 Gram(s) Oral once  dextrose 50% Injectable 25 Gram(s) IV Push once  dextrose 50% Injectable 12.5 Gram(s) IV Push once  dextrose 50% Injectable 25 Gram(s) IV Push once  glucagon  Injectable 1 milliGRAM(s) IntraMuscular once  haloperidol     Tablet 10 milliGRAM(s) Oral two times a day  heparin   Injectable 5000 Unit(s) SubCutaneous every 12 hours  insulin lispro (ADMELOG) corrective regimen sliding scale   SubCutaneous three times a day before meals  nicotine - 21 mG/24Hr(s) Patch 1 patch Transdermal daily  OLANZapine 20 milliGRAM(s) Oral every 12 hours  traZODone 100 milliGRAM(s) Oral at bedtime    MEDICATIONS  (PRN):  acetaminophen   Tablet .. 650 milliGRAM(s) Oral every 6 hours PRN Temp greater or equal to 38C (100.4F), Mild Pain (1 - 3), Moderate Pain (4 - 6)  haloperidol    Injectable 2 milliGRAM(s) IntraMuscular every 6 hours PRN Agitation  lidocaine   Patch 1 Patch Transdermal daily PRN pain  LORazepam   Injectable 1 milliGRAM(s) IntraMuscular every 6 hours PRN SEVERE agitation  nicotine  Polacrilex Gum 2 milliGRAM(s) Oral every 2 hours PRN smoking cessation  polyethylene glycol 3350 17 Gram(s) Oral daily PRN Constipation  senna 2 Tablet(s) Oral at bedtime PRN Constipation      ALLERGIES:  Allergies    No Known Allergies    Intolerances        LABS:    Hemoglobin: 7.4 g/dL (04-09 @ 22:26)  Hemoglobin: 6.6 g/dL (04-09 @ 10:27)  Hemoglobin: 6.9 g/dL (04-08 @ 12:39)       Creatinine Trend: 0.87<--, 0.76<--, 0.76<--, 0.94<--, 0.93<--, 1.07<--       Labs, imaging, EKG personally reviewed    RADIOLOGY & ADDITIONAL TESTS: Reviewed.

## 2021-04-12 NOTE — CHART NOTE - NSCHARTNOTEFT_GEN_A_CORE
Palliative care consulted to assist with goals of care. Palliative Care in communication with Brittney Ovalle esq. from the office of [employer] ([facility]). Pt resides in a facility, [facility],  under the authority of [facility] and therefore requires approval from [facility] for withdrawal or withholding of life sustaining treatments. Pt's surrogate decision makers have been in communication with medical team and wish for a comfort centric approach requesting DNR/DNI and no workup for presumed malignancy.    Checklist # 3 and medical supporting documentation submitted to Brittney Ovalle for review.   Review in progress.   Awaiting decision.   Pt to remain a FULL CODE while [facility] is reviewing checklist and supporting medical documentation.

## 2021-04-12 NOTE — CONSULT NOTE ADULT - PROBLEM SELECTOR RECOMMENDATION 3
Controlled not on bp meds  goal <130/80
.  Virtual certainty that this is cancer despite not having biopsy-confirmed pathology  -the characteristics noted on imaging all suggest this is cancer: large, heterogeneous, occupies the majority of the L thoracic cavity, and there is tumoral invasion of the left sides of the patient's airway, major pulmonary blood vessels, and heart, evidence of metastatic disease to R lung  -attempting to confirm diagnosis will require sedation but patient's respiratory status and physiology is so compromised that he would be extremely high risk for complications including respiratory failure requiring intubation  -given the suspected findings of metastatic disease, this illness would be incurable in nature and given the tumoral invasion of his blood vessels/airway/heart the patient's life expectancy is likely less than 6 months even if he were to get treatment

## 2021-04-12 NOTE — CONSULT NOTE ADULT - CONSULT REASON
Complex Medical Decision Making/GOC in the setting of Presumed Metastatic CA, Respiratory Failure, and Schizoaffective Disorder
Hypercalcemia
left thoracic mass bx

## 2021-04-12 NOTE — PROGRESS NOTE ADULT - SUBJECTIVE AND OBJECTIVE BOX
Chief Complaint: Hypercalcemia, hyperglycemia    History: sleepy at time of visit  now on dexamethasone for COVID with some hyperglycemia  intermittently receiving fingersticks and correction doses  Last labs on 4/9/21    MEDICATIONS  (STANDING):  benztropine 1 milliGRAM(s) Oral two times a day  cholecalciferol 1000 Unit(s) Oral daily  dexAMETHasone  Injectable 6 milliGRAM(s) IV Push daily  dextrose 50% Injectable 25 Gram(s) IV Push once  haloperidol     Tablet 10 milliGRAM(s) Oral two times a day  heparin   Injectable 5000 Unit(s) SubCutaneous every 12 hours  insulin lispro (ADMELOG) corrective regimen sliding scale   SubCutaneous three times a day before meals  nicotine - 21 mG/24Hr(s) Patch 1 patch Transdermal daily  OLANZapine 20 milliGRAM(s) Oral every 12 hours  traZODone 100 milliGRAM(s) Oral at bedtime    MEDICATIONS  (PRN):  acetaminophen   Tablet .. 650 milliGRAM(s) Oral every 6 hours PRN Temp greater or equal to 38C (100.4F), Mild Pain (1 - 3), Moderate Pain (4 - 6)  haloperidol    Injectable 2 milliGRAM(s) IntraMuscular every 6 hours PRN Agitation  lidocaine   Patch 1 Patch Transdermal daily PRN pain  LORazepam   Injectable 1 milliGRAM(s) IntraMuscular every 6 hours PRN SEVERE agitation  nicotine  Polacrilex Gum 2 milliGRAM(s) Oral every 2 hours PRN smoking cessation  polyethylene glycol 3350 17 Gram(s) Oral daily PRN Constipation  senna 2 Tablet(s) Oral at bedtime PRN Constipation      Allergies    No Known Allergies    Intolerances      Review of Systems:    UNABLE TO OBTAIN    PHYSICAL EXAM:  VITALS: T(C): 37.1 (04-11-21 @ 22:12)  T(F): 98.8 (04-11-21 @ 22:12), Max: 98.8 (04-11-21 @ 22:12)  HR: 96 (04-11-21 @ 22:12) (96 - 96)  BP: 111/67 (04-11-21 @ 22:12) (111/67 - 111/67)  RR:  (18 - 18)  SpO2:  (100% - 100%)  Wt(kg): --  GENERAL: thin male, sleepy   EYES: No proptosis, no lid lag, anicteric  HEENT:  Atraumatic, Normocephalic, moist mucous membranes  RESPIRATORY: On nonrebreather mask, no wheezing  SKIN: Dry, intact, No rashes or lesions      CAPILLARY BLOOD GLUCOSE      POCT Blood Glucose.: 221 mg/dL (12 Apr 2021 12:15)  POCT Blood Glucose.: 144 mg/dL (12 Apr 2021 08:40)      04-09    138  |  103  |  14  ----------------------------<  224<H>  4.1   |  28  |  0.87    EGFR if : 107  EGFR if non : 92    Ca    9.0      04-09  Mg     1.5     04-09  Phos  3.4     04-09    TPro  8.8<H>  /  Alb  2.3<L>  /  TBili  0.2  /  DBili  <0.2  /  AST  240<H>  /  ALT  131<H>  /  AlkPhos  348<H>  04-09          Thyroid Function Tests:  04-01 @ 12:33 TSH 2.61 FreeT4 -- T3 -- Anti TPO -- Anti Thyroglobulin Ab -- TSI --

## 2021-04-12 NOTE — PROGRESS NOTE ADULT - PROBLEM SELECTOR PLAN 4
found to be COVID+ during hospital admission; subsequently w/ desat, started on oxygen supplementation with remdesivir and dexamethasone  -remdesivir discontinued 4/11 i/s/o pt refusing labs

## 2021-04-12 NOTE — PROGRESS NOTE ADULT - PROBLEM SELECTOR PLAN 2
Ca 15 on admission (corrected), with cervical spine pain on exam, likely 2/2 malignancy vs. paraneoplastic, PTH 2 (low), vit D 23 (low), improving with fluids  -f/u PTHrP  -c/w IV fluids  -malignancy work up as above  -miralax and senna PRN  -pamidronate 90 IV on 4/3  -Endocrinology consulted, recs appreciated  -patient refusing labs, unable to assess status of hyperCa++, most recent labs show improvement of hyperCa++

## 2021-04-12 NOTE — CONSULT NOTE ADULT - PROBLEM SELECTOR RECOMMENDATION 7
.  Complex medical decision making / symptom management in the setting of presumed metastatic cancer and psychiatric illness.    Will continue to follow for ongoing GOC discussion / titration of palliative regimen.   Emotional support provided, questions answered.  Active Psychosocial Referrals: CHRISTINE SESAY    For new or uncontrolled symptoms, please page the Palliative Medicine team ([phone] #[phone]).   The service is available 24/7 (including nights & weekends) to provide symptom management recommendations over the phone as appropriate

## 2021-04-12 NOTE — CONSULT NOTE ADULT - PROBLEM SELECTOR RECOMMENDATION 5
.  Longstanding history of mental illness c/b polysubstance use disorder  -still lacks capacity and prone to behavioral disturbance despite complex psychotropic medication regimen  -Psych following

## 2021-04-12 NOTE — PROGRESS NOTE ADULT - ASSESSMENT
60M w/ HTN, Type 2 DM-not on medication, schizoaffective disorder, polysubstance abuse (cocaine, alcohol, cigarettes), a/w weakness and found to have intrathoracic mass involving pulmonary vasculature, hypercalecmia and COVID +.    #hypercalcemia  -improved since admission-corrected Ca 10.4 on last labs 4/9/21, s/p Pamidronate 90mg IV on 4/3/21.   -this is non-PTH mediated hypercalcemia (PTH 2) in setting of thoracic mass-likely hypercalcemia of malignancy; PTHrP pending  -will need treatment of underlying malignancy for long term calcium control; effects of Pamidronate will wear off in a few weeks  -continue to trend serum Ca, Alb if patient agrees to labs  -continue cholecalciferol 1000IU daily for low 25-OH Vit D    #Type 2 diabetes  -patient not on medication for diabetes as outpatient  -glucose now higher due to dexamethasone  -check HbA1c with next labs  -check FS qac and qhs with and can increase to moderate correction scale before meals and moderate bedtime scale    #HTN  -goal bp <130/80-has been close to goal, monitor and if consistently above goal will need tx    Vladimir Hanna MD  Division of Endocrinology  Pager: [phone]    If after 6PM or before 9AM, or on weekends/holidays, please call endocrine answering service for assistance ([phone]).  For nonurgent matters email [email or web address] for assistance.

## 2021-04-12 NOTE — CHART NOTE - NSCHARTNOTESELECT_GEN_ALL_CORE
Event Note
GOC/Event Note
Palliative Care/Event Note
Palliative Care/Event Note
Palliative Care SW/Event Note
Psychiatry/Event Note

## 2021-04-12 NOTE — PROGRESS NOTE ADULT - PROBLEM SELECTOR PLAN 1
concern for malignancy (primary vs. secondary), recent unintentional weight loss, bone pain (cervical spine), active smoker: 1 ppd for "many years", father w/ hx of lung cancer  - CTH/CAP scan - large heterogeneous mass occupying the majority of the left intrathoracic cavity with invasion of the left pulmonary artery, left pulmonary veins, and left atrium and probably R pulmonary mets  -patient refused IR biopsy 4/5, assented on 4/6, however an hour after the encounter, patient became increasingly aggressive with staff; emergency consult subsequently placed to Psychiatry.  -resumed diet for now  -discussed case w/ family, who would like to have a biopsy to confirm diagnosis, and then pursue hospice given the prognosis (mass invading pulmonary vessels, left atrium). Discussed case with Interventional Radiology, recommending against biopsy given the significant risk of biopsy under sedation i/s/o mass invading pulmonary vessels. Furthermore, given likely prognosis, biopsy likely not indicated. Will discuss with family on 4/8.  -f/ w/ Palliative 4/12 regarding discussion w/ LS

## 2021-04-12 NOTE — CONSULT NOTE ADULT - NSPROBSELRECBLANK_7_GEN
Prep Survey      Responses   Facility patient discharged from?  [facility]   Is patient eligible?  Yes   Discharge diagnosis  Sepsis secondary to acute cholecystitis, present on admission, status post laparoscopic cholecystectomy on 1/12/2020, treated   Does the patient have one of the following disease processes/diagnoses(primary or secondary)?  General Surgery   Does the patient have Home health ordered?  No   Is there a DME ordered?  Yes   What DME was ordered?  [facility]    Prep survey completed?  Yes          Tania Kent RN        
DISPLAY PLAN FREE TEXT

## 2021-04-12 NOTE — CONSULT NOTE ADULT - PROBLEM SELECTOR RECOMMENDATION 2
.  PPSV = 40%, requires assistance for many ADLs  -c/w supportive care
Not on DM meds at home  -send a1c

## 2021-04-12 NOTE — CONSULT NOTE ADULT - PROBLEM SELECTOR RECOMMENDATION 4
.  Found to be positive on 4/4  -about 7 days into his illness, the patient has become persistently hypoxic requiring increasing supplemental O2  -Remdesivir/Dexamethsone as per primary team

## 2021-04-12 NOTE — CONSULT NOTE ADULT - SUBJECTIVE AND OBJECTIVE BOX
[facility] Geriatrics and Palliative Care  Joel Caldwell, Palliative Care Attending  Contact Info: Page [phone] (including Nights/Weekend), message on Microsoft Teams (Joel Caldwell), or leave VM at Palliative Office [phone] (Non-Urgent)    HPI:  60M w/ HTN, DM, schizoaffective disorder, polysubstance abuse (cocaine, alcohol, cigarettes), hx of DVT? previously on Eliquis presents to ED c/o neck pain. Pt is not cooperative with interview and exam. Per pt, he states that he has had significant unintentional weight loss recently and generalized weakness. He denies any CP, SOB, fevers/chills, night sweats, cough. He reports that he is an active smoker. His father had lung cancer. Pt reports auditory hallucinations, but no HI/SI. Patient states to the ED provider that he fell yesterday, was able to get up, no LOC. Patient lives alone at [facility] (Tino Tefoilo ([facility]) [phone] and follows with a Mental Health Service Team Anuel (mental health services ) [phone]). He has access to his medications via his Mental Health Service Team, last taken them yesterday. Per Anuel, pt does not have any communication with family. He has not had a hospitalization since his inpatient psychiatric stay in 2019. It is unclear when his last PCP visit was.In the ED, T96.9, HR 86, /89, RR18, 93% on RA, given tylenol and 1L NS (01 Apr 2021 18:26)    Patient seen at bedside. Sleeping fetal position with NRB in place, overtly tachypneic. Patient is arousable but refusing to interact or answer questions. Collateral obtained from PCA at bedside    PERTINENT PM/SXH:   Schizophrenia    Substance abuse    Anemia    Sickle cell anemia    HTN (hypertension)    Diabetes mellitus    Deep vein thrombosis (DVT)      No significant past surgical history      FAMILY HISTORY:  FH: lung cancer (Father)      ITEMS NOT CHECKED ARE NOT PRESENT    SOCIAL HISTORY:   Significant other/partner:  []  Children:  []  Episcopal/Spirituality:  Substance hx:  []   Tobacco hx:  []   Alcohol hx: []   Home Opioid hx:  [] I-Stop Reference No:  Living Situation: []Home  []Long term care  []Rehab []Other    ADVANCE DIRECTIVES:    DNR Yes    []MOLST  []Living Will  DECISION MAKER(s):  [] Health Care Proxy(s)  [] Surrogate(s)  [] Guardian           Name(s):              Phone Number(s):    BASELINE (I)ADL(s) (prior to admission):  [place]: []Total  [] Moderate []Dependent    ALLERGIES:  Allergies    No Known Allergies    Intolerances    MEDICATIONS  (STANDING):  benztropine 1 milliGRAM(s) Oral two times a day  cholecalciferol 1000 Unit(s) Oral daily  dexAMETHasone  Injectable 6 milliGRAM(s) IV Push daily  dextrose 40% Gel 15 Gram(s) Oral once  dextrose 50% Injectable 25 Gram(s) IV Push once  dextrose 50% Injectable 12.5 Gram(s) IV Push once  dextrose 50% Injectable 25 Gram(s) IV Push once  glucagon  Injectable 1 milliGRAM(s) IntraMuscular once  haloperidol     Tablet 10 milliGRAM(s) Oral two times a day  heparin   Injectable 5000 Unit(s) SubCutaneous every 12 hours  insulin lispro (ADMELOG) corrective regimen sliding scale   SubCutaneous three times a day before meals  nicotine - 21 mG/24Hr(s) Patch 1 patch Transdermal daily  OLANZapine 20 milliGRAM(s) Oral every 12 hours  traZODone 100 milliGRAM(s) Oral at bedtime    MEDICATIONS  (PRN):  acetaminophen   Tablet .. 650 milliGRAM(s) Oral every 6 hours PRN Temp greater or equal to 38C (100.4F), Mild Pain (1 - 3), Moderate Pain (4 - 6)  haloperidol    Injectable 2 milliGRAM(s) IntraMuscular every 6 hours PRN Agitation  lidocaine   Patch 1 Patch Transdermal daily PRN pain  LORazepam   Injectable 1 milliGRAM(s) IntraMuscular every 6 hours PRN SEVERE agitation  nicotine  Polacrilex Gum 2 milliGRAM(s) Oral every 2 hours PRN smoking cessation  polyethylene glycol 3350 17 Gram(s) Oral daily PRN Constipation  senna 2 Tablet(s) Oral at bedtime PRN Constipation    PRESENT SYMPTOMS: []Unable to obtain due to poor mentation/encephalopathy  Source if other than patient:  []Family   []Team     Pain: [ ] yes [ ] no  QOL impact -   Location -                    Aggravating Factors -  Quality -  Radiation -  Timing -  Severity (0-10 scale) -   Minimal Acceptable Level (0-10 scale) -    PAIN AD Score:  [email or web address] (press ctrl +  left click to view)    Dyspnea:                           []Mild  []Moderate []Severe  Anxiety:                             []Mild []Moderate []Severe  Fatigue:                             []Mild []Moderate []Severe  Nausea:                             []Mild []Moderate []Severe  Loss of Appetite:              []Mild []Moderate []Severe  Constipation:                    []Mild []Moderate []Severe    Other Symptoms:  []All other review of systems negative     Palliative Performance Status Version 2:  %    http://npcrc.org/files/news/palliative_performance_scale_ppsv2.pdf    PHYSICAL EXAM:  GENERAL:  []Alert  []Oriented x   []Lethargic  []Cachexia  []Unarousable  []Verbal  []Non-Verbal  Behavioral:   [] Anxiety  [] Delirium [] Agitation [] Other  HEENT:  []Normal   []Dry mouth   []ET Tube/Trach  []Oral lesions  PULMONARY:   []Clear []Tachypnea  []Audible excessive secretions   []Rhonchi        []Right []Left []Bilateral  []Crackles        []Right []Left []Bilateral  []Wheezing     []Right []Left []Bilateral  CARDIOVASCULAR:    []Regular []Irregular []Tachy  []Issac []Murmur []Other  GASTROINTESTINAL:  []Soft  []Distended   []+BS  []Non tender []Tender  []PEG []OGT/ NGT  Last BM:     GENITOURINARY:  []Normal [] Incontinent   []Oliguria/Anuria   []Wesley  MUSCULOSKELETAL:   []Normal   []Weakness  []Bed/Wheelchair bound []Edema  NEUROLOGIC:   []No focal deficits  [] Cognitive impairment  [] Dysphagia []Dysarthria [] Paresis []Other   SKIN:   []Normal   []Pressure ulcer(s)  []Rash    CRITICAL CARE:  [ ] Shock Present  [ ]Septic [ ]Cardiogenic [ ]Neurologic [ ]Hypovolemic  [ ]  Vasopressors [ ]  Inotropes   [ ] Respiratory failure present [ ] Mechanical Ventilation [ ] Non-invasive ventilatory support [ ] High-Flow  [ ] Acute  [ ] Chronic [ ] Hypoxic  [ ] Hypercarbic [ ] Other  [ ] Other organ failure    Vital Signs Last 24 Hrs  T(C): 37.1 (11 Apr 2021 22:12), Max: 37.1 (11 Apr 2021 22:12)  T(F): 98.8 (11 Apr 2021 22:12), Max: 98.8 (11 Apr 2021 22:12)  HR: 96 (11 Apr 2021 22:12) (96 - 96)  BP: 111/67 (11 Apr 2021 22:12) (111/67 - 111/67)  BP(mean): --  RR: 18 (11 Apr 2021 22:12) (18 - 18)  SpO2: 100% (11 Apr 2021 22:12) (100% - 100%) I&O's Summary    11 Apr 2021 07:01  -  12 Apr 2021 07:00  --------------------------------------------------------  IN: 0 mL / OUT: 1200 mL / NET: -1200 mL        LABS:            RADIOLOGY & ADDITIONAL STUDIES:      PROTEIN CALORIE MALNUTRITION PRESENT: [ ]mild [ ]moderate [ ]severe [ ]underweight [ ]morbid obesity  []PPSV2 < or = to 30% []significant weight loss  []poor nutritional intake []catabolic state []anasarca     Albumin, Serum: 2.3 g/dL (04-09-21 @ 22:26)  Artificial Nutrition []     REFERRALS:   []Chaplaincy  []Hospice  []Child Life  [x]Social Work  []Case management []Holistic Therapy     Goals of Care Document:   Care Coordination Assessment 201 [C. Provider] (04-05-21 @ 16:38)   Progress Notes - Care Coordination [C. Provider] (04-12-21 @ 09:18)   [facility] Geriatrics and Palliative Care  Joel Caldwell, Palliative Care Attending  Contact Info: Page [phone] (including Nights/Weekend), message on Microsoft Teams (Joel Caldwell), or leave VM at Palliative Office [phone] (Non-Urgent)    HPI:  60M w/ HTN, DM, schizoaffective disorder, polysubstance abuse (cocaine, alcohol, cigarettes), hx of DVT? previously on Eliquis presents to ED c/o neck pain. Pt is not cooperative with interview and exam. Per pt, he states that he has had significant unintentional weight loss recently and generalized weakness. He denies any CP, SOB, fevers/chills, night sweats, cough. He reports that he is an active smoker. His father had lung cancer. Pt reports auditory hallucinations, but no HI/SI. Patient states to the ED provider that he fell yesterday, was able to get up, no LOC. Patient lives alone at [facility] (Tino Red ([facility] [phone] and follows with a Mental Health Service Team Anuel (mental health services ) [phone]). He has access to his medications via his Mental Health Service Team, last taken them yesterday. Per Anuel, pt does not have any communication with family. He has not had a hospitalization since his inpatient psychiatric stay in 2019. It is unclear when his last PCP visit was.In the ED, T96.9, HR 86, /89, RR18, 93% on RA, given tylenol and 1L NS (01 Apr 2021 18:26)    Patient seen at bedside. Sleeping fetal position with NRB in place, overtly tachypneic. Patient is arousable but refusing to interact or answer questions. Collateral obtained from PCA at bedside, chart, medical team and family. Patient has been routinely refusing care. Has large lung mass that is invading his pulmonary vessels and is now COVID+. Patient has had declining respiratory failure for the past few days.    PERTINENT PM/SXH:   Schizophrenia  Substance abuse  Anemia  Sickle cell anemia  HTN (hypertension)  Diabetes mellitus  Deep vein thrombosis (DVT)  No significant past surgical history    FAMILY HISTORY:  FH: lung cancer (Father)    ITEMS NOT CHECKED ARE NOT PRESENT    SOCIAL HISTORY:   Significant other/partner:  []  Children:  [x]  Christianity/Spirituality:  Substance hx:  [x]   Tobacco hx:  [x]   Alcohol hx: [x]   Home Opioid hx:  [] I-Stop Reference No:  Living Situation: []Home  []Long term care  []Rehab []Other    ADVANCE DIRECTIVES:    DNR Yes    []MOLST  []Living Will  DECISION MAKER(s):  [] Health Care Proxy(s)  [] Surrogate(s)  [] Guardian           Name(s):              Phone Number(s):    BASELINE (I)ADL(s) (prior to admission):  [place]: []Total  [] Moderate []Dependent    ALLERGIES:  Allergies    No Known Allergies    Intolerances    MEDICATIONS  (STANDING):  benztropine 1 milliGRAM(s) Oral two times a day  cholecalciferol 1000 Unit(s) Oral daily  dexAMETHasone  Injectable 6 milliGRAM(s) IV Push daily  dextrose 40% Gel 15 Gram(s) Oral once  dextrose 50% Injectable 25 Gram(s) IV Push once  dextrose 50% Injectable 12.5 Gram(s) IV Push once  dextrose 50% Injectable 25 Gram(s) IV Push once  glucagon  Injectable 1 milliGRAM(s) IntraMuscular once  haloperidol     Tablet 10 milliGRAM(s) Oral two times a day  heparin   Injectable 5000 Unit(s) SubCutaneous every 12 hours  insulin lispro (ADMELOG) corrective regimen sliding scale   SubCutaneous three times a day before meals  nicotine - 21 mG/24Hr(s) Patch 1 patch Transdermal daily  OLANZapine 20 milliGRAM(s) Oral every 12 hours  traZODone 100 milliGRAM(s) Oral at bedtime    MEDICATIONS  (PRN):  acetaminophen   Tablet .. 650 milliGRAM(s) Oral every 6 hours PRN Temp greater or equal to 38C (100.4F), Mild Pain (1 - 3), Moderate Pain (4 - 6)  haloperidol    Injectable 2 milliGRAM(s) IntraMuscular every 6 hours PRN Agitation  lidocaine   Patch 1 Patch Transdermal daily PRN pain  LORazepam   Injectable 1 milliGRAM(s) IntraMuscular every 6 hours PRN SEVERE agitation  nicotine  Polacrilex Gum 2 milliGRAM(s) Oral every 2 hours PRN smoking cessation  polyethylene glycol 3350 17 Gram(s) Oral daily PRN Constipation  senna 2 Tablet(s) Oral at bedtime PRN Constipation    PRESENT SYMPTOMS: []Unable to obtain due to poor mentation/encephalopathy  Source if other than patient:  []Family   []Team     Pain: [ ] yes [ ] no  QOL impact -   Location -                    Aggravating Factors -  Quality -  Radiation -  Timing -  Severity (0-10 scale) -   Minimal Acceptable Level (0-10 scale) -    PAIN AD Score:  [email or web address]/[email or web address] (press ctrl +  left click to view)    Dyspnea:                           []Mild  []Moderate []Severe  Anxiety:                             []Mild []Moderate []Severe  Fatigue:                             []Mild []Moderate []Severe  Nausea:                             []Mild []Moderate []Severe  Loss of Appetite:              []Mild []Moderate []Severe  Constipation:                    []Mild []Moderate []Severe    Other Symptoms:  []All other review of systems negative     Palliative Performance Status Version 2:  %    http://npcrc.org/files/news/palliative_performance_scale_ppsv2.pdf    PHYSICAL EXAM:  GENERAL:  []Alert  []Oriented x   []Lethargic  []Cachexia  []Unarousable  []Verbal  []Non-Verbal  Behavioral:   [] Anxiety  [] Delirium [] Agitation [] Other  HEENT:  []Normal   []Dry mouth   []ET Tube/Trach  []Oral lesions  PULMONARY:   []Clear []Tachypnea  []Audible excessive secretions   []Rhonchi        []Right []Left []Bilateral  []Crackles        []Right []Left []Bilateral  []Wheezing     []Right []Left []Bilateral  CARDIOVASCULAR:    []Regular []Irregular []Tachy  []Issac []Murmur []Other  GASTROINTESTINAL:  []Soft  []Distended   []+BS  []Non tender []Tender  []PEG []OGT/ NGT  Last BM:     GENITOURINARY:  []Normal [] Incontinent   []Oliguria/Anuria   []Wesley  MUSCULOSKELETAL:   []Normal   []Weakness  []Bed/Wheelchair bound []Edema  NEUROLOGIC:   []No focal deficits  [] Cognitive impairment  [] Dysphagia []Dysarthria [] Paresis []Other   SKIN:   []Normal   []Pressure ulcer(s)  []Rash    CRITICAL CARE:  [ ] Shock Present  [ ]Septic [ ]Cardiogenic [ ]Neurologic [ ]Hypovolemic  [ ]  Vasopressors [ ]  Inotropes   [ ] Respiratory failure present [ ] Mechanical Ventilation [ ] Non-invasive ventilatory support [ ] High-Flow  [ ] Acute  [ ] Chronic [ ] Hypoxic  [ ] Hypercarbic [ ] Other  [ ] Other organ failure    Vital Signs Last 24 Hrs  T(C): 37.1 (11 Apr 2021 22:12), Max: 37.1 (11 Apr 2021 22:12)  T(F): 98.8 (11 Apr 2021 22:12), Max: 98.8 (11 Apr 2021 22:12)  HR: 96 (11 Apr 2021 22:12) (96 - 96)  BP: 111/67 (11 Apr 2021 22:12) (111/67 - 111/67)  BP(mean): --  RR: 18 (11 Apr 2021 22:12) (18 - 18)  SpO2: 100% (11 Apr 2021 22:12) (100% - 100%) I&O's Summary    11 Apr 2021 07:01  -  12 Apr 2021 07:00  --------------------------------------------------------  IN: 0 mL / OUT: 1200 mL / NET: -1200 mL        LABS:            RADIOLOGY & ADDITIONAL STUDIES:      PROTEIN CALORIE MALNUTRITION PRESENT: [ ]mild [ ]moderate [ ]severe [ ]underweight [ ]morbid obesity  []PPSV2 < or = to 30% []significant weight loss  []poor nutritional intake []catabolic state []anasarca     Albumin, Serum: 2.3 g/dL (04-09-21 @ 22:26)  Artificial Nutrition []     REFERRALS:   []Chaplaincy  []Hospice  []Child Life  [x]Social Work  []Case management []Holistic Therapy     Goals of Care Document:   Care Coordination Assessment 201 [C. Provider] (04-05-21 @ 16:38)   Progress Notes - Care Coordination [C. Provider] (04-12-21 @ 09:18)   [facility] Geriatrics and Palliative Care  Joel Caldwell, Palliative Care Attending  Contact Info: Page [phone] (including Nights/Weekend), message on Microsoft Teams (Joel Caldwell), or leave VM at Palliative Office [phone] (Non-Urgent)    HPI:  60M w/ HTN, DM, schizoaffective disorder, polysubstance abuse (cocaine, alcohol, cigarettes), hx of DVT? previously on Eliquis presents to ED c/o neck pain. Pt is not cooperative with interview and exam. Per pt, he states that he has had significant unintentional weight loss recently and generalized weakness. He denies any CP, SOB, fevers/chills, night sweats, cough. He reports that he is an active smoker. His father had lung cancer. Pt reports auditory hallucinations, but no HI/SI. Patient states to the ED provider that he fell yesterday, was able to get up, no LOC. Patient lives alone at [facility] (Tino Red ([facility]) [phone] and follows with a Mental Health Service Team Anuel (mental health services ) [phone]). He has access to his medications via his Mental Health Service Team, last taken them yesterday. Per Anuel, pt does not have any communication with family. He has not had a hospitalization since his inpatient psychiatric stay in 2019. It is unclear when his last PCP visit was.In the ED, T96.9, HR 86, /89, RR18, 93% on RA, given tylenol and 1L NS (01 Apr 2021 18:26)    Patient seen at bedside. Sleeping fetal position with NRB in place, overtly tachypneic. Patient is arousable but refusing to interact or answer questions. Collateral obtained from PCA at bedside, chart, medical team and family. Patient has been routinely refusing care. Has large lung mass that is invading his pulmonary vessels and is now COVID+. Patient has had declining respiratory failure for the past few days.    PERTINENT PM/SXH:   Schizophrenia  Substance abuse  Anemia  Sickle cell anemia  HTN (hypertension)  Diabetes mellitus  Deep vein thrombosis (DVT)  No significant past surgical history    FAMILY HISTORY:  FH: lung cancer (Father)    ITEMS NOT CHECKED ARE NOT PRESENT    SOCIAL HISTORY:   Significant other/partner:  []  Children:  [x]  Jainism/Spirituality:  Substance hx:  [x]   Tobacco hx:  [x]   Alcohol hx: [x]   Home Opioid hx:  [] I-Stop Reference No: [id number]  -no active Rx's  Living Situation: []Home  []Long term care  []Rehab [x]Other: Geriatric/Social Housing    ADVANCE DIRECTIVES:    []MOLST  []Living Will  DECISION MAKER(s):  [] Health Care Proxy(s)  [] Surrogate(s)  [] Guardian           Name(s):              Phone Number(s):    BASELINE (I)ADL(s) (prior to admission):  [place]: []Total  [x] Moderate []Dependent    ALLERGIES:  No Known Allergies    MEDICATIONS  (STANDING):  benztropine 1 milliGRAM(s) Oral two times a day  cholecalciferol 1000 Unit(s) Oral daily  dexAMETHasone  Injectable 6 milliGRAM(s) IV Push daily  dextrose 40% Gel 15 Gram(s) Oral once  dextrose 50% Injectable 25 Gram(s) IV Push once  dextrose 50% Injectable 12.5 Gram(s) IV Push once  dextrose 50% Injectable 25 Gram(s) IV Push once  glucagon  Injectable 1 milliGRAM(s) IntraMuscular once  haloperidol     Tablet 10 milliGRAM(s) Oral two times a day  heparin   Injectable 5000 Unit(s) SubCutaneous every 12 hours  insulin lispro (ADMELOG) corrective regimen sliding scale   SubCutaneous three times a day before meals  nicotine - 21 mG/24Hr(s) Patch 1 patch Transdermal daily  OLANZapine 20 milliGRAM(s) Oral every 12 hours  traZODone 100 milliGRAM(s) Oral at bedtime    MEDICATIONS  (PRN):  acetaminophen   Tablet .. 650 milliGRAM(s) Oral every 6 hours PRN Temp greater or equal to 38C (100.4F), Mild Pain (1 - 3), Moderate Pain (4 - 6)  haloperidol    Injectable 2 milliGRAM(s) IntraMuscular every 6 hours PRN Agitation  lidocaine   Patch 1 Patch Transdermal daily PRN pain  LORazepam   Injectable 1 milliGRAM(s) IntraMuscular every 6 hours PRN SEVERE agitation  nicotine  Polacrilex Gum 2 milliGRAM(s) Oral every 2 hours PRN smoking cessation  polyethylene glycol 3350 17 Gram(s) Oral daily PRN Constipation  senna 2 Tablet(s) Oral at bedtime PRN Constipation    PRESENT SYMPTOMS: [x]Unable to obtain due to poor mentation/encephalopathy  Source if other than patient:  []Family   []Team     Pain: [ ] yes [x] no  QOL impact -   Location -                    Aggravating Factors -  Quality -  Radiation -  Timing -  Severity (0-10 scale) -   Minimal Acceptable Level (0-10 scale) -    PAIN AD Score: 1  [email or web address] (press ctrl +  left click to view)    Dyspnea:                           []Mild  []Moderate []Severe  Anxiety:                             []Mild []Moderate []Severe  Fatigue:                             []Mild []Moderate []Severe  Nausea:                             []Mild []Moderate []Severe  Loss of Appetite:              []Mild []Moderate []Severe  Constipation:                    []Mild []Moderate []Severe    Other Symptoms:  []All other review of systems negative     Palliative Performance Status Version 2:  40%    http://npcrc.org/files/news/palliative_performance_scale_ppsv2.pdf    PHYSICAL EXAM:  Vital Signs Last 24 Hrs  T(C): 37.1 (11 Apr 2021 22:12), Max: 37.1 (11 Apr 2021 22:12)  T(F): 98.8 (11 Apr 2021 22:12), Max: 98.8 (11 Apr 2021 22:12)  HR: 96 (11 Apr 2021 22:12) (96 - 96)  BP: 111/67 (11 Apr 2021 22:12) (111/67 - 111/67)  BP(mean): --  RR: 18 (11 Apr 2021 22:12) (18 - 18)  SpO2: 100% (11 Apr 2021 22:12) (100% - 100%) I&O's Summary    11 Apr 2021 07:01  -  12 Apr 2021 07:00  --------------------------------------------------------  IN: 0 mL / OUT: 1200 mL / NET: -1200 mL    GENERAL:  []Alert  []Oriented x   [x]Lethargic  [x]Cachexia  []Unarousable  []Verbal  []Non-Verbal  Behavioral:   [] Anxiety  [x] Delirium [x] Agitation [] Other  HEENT:  []Normal   [x]Dry mouth   []ET Tube/Trach  []Oral lesions  PULMONARY/CARDIOVASCULAR/GASTROINTESTINAL/SKIN/NEUROLOGIC: REFUSED EXAM  GENITOURINARY:  [x]Normal [] Incontinent   []Oliguria/Anuria   []Wesley  MUSCULOSKELETAL:   []Normal   [x]Weakness  []Bed/Wheelchair bound []Edema    CRITICAL CARE:  [ ] Shock Present  [ ]Septic [ ]Cardiogenic [ ]Neurologic [ ]Hypovolemic  [ ]  Vasopressors [ ]  Inotropes   [x] Respiratory failure present [ ] Mechanical Ventilation [ ] Non-invasive ventilatory support [ ] High-Flow  [x] Acute  [ ] Chronic [x] Hypoxic  [ ] Hypercarbic [ ] Other  [ ] Other organ failure    LABS: Refusing    RADIOLOGY & ADDITIONAL STUDIES:  < from: CT Abdomen and Pelvis w/ IV Cont (04.02.21 @ 11:37) >  LUNGS AND LARGE AIRWAYS: Large heterogeneous mass occupying the majority of the left intrathoracic cavity with invasion of the left pulmonary artery, left pulmonary veins, and left atrium. Tumor occludes the left main bronchus. Probable component of mucus impacted airways and atelectasis. Masses in the right lung, probably metastases. For example:  *  Right upper lobe pulmonary nodule (series 2, image 79), measuring 2.6 x 1.6 cm.  PLEURA: No pleural effusion.  VESSELS: Within normal limits.  HEART: Heart size is normal. No pericardial effusion.  MEDIASTINUM AND MONO: Mediastinal extension of tumor.  CHEST WALL AND LOWER NECK: Enlarged left internal mammary node (series 2, image 59), measuring 1.9 x 1.3 cm.    ABDOMEN AND PELVIS:  LIVER: Within normal limits.  BILE DUCTS: Normal caliber.  GALLBLADDER: Within normal limits.  SPLEEN: Within normal limits.  PANCREAS: Within normal limits.  ADRENALS: Within normal limits.  KIDNEYS/URETERS: Within normal limits.    BLADDER: Within normal limits.  REPRODUCTIVE ORGANS: Prostate within normal limits.    BOWEL: No bowel obstruction.  PERITONEUM: No ascites.  VESSELS: Atherosclerotic changes.  RETROPERITONEUM/LYMPH NODES: No lymphadenopathy.  ABDOMINAL WALL: Subcutaneous edema.  BONES: Degenerative changes, most severe in the left hip. Bilateral L5 pars defects.    IMPRESSION:  Large heterogeneous mass occupying the majority of the left intrathoracic cavity with invasion of the left pulmonary artery, left pulmonary veins, and left atrium. Tumor occludes the left main bronchus. Probable right pulmonary metastases.    < from: CT Head No Cont (04.02.21 @ 11:36) >  There is no acute intracranial hemorrhage, mass effect, shift of the midline structures, herniation, extra-axial fluid collection, or hydrocephalus.  There is diffuse cerebral volume loss with prominence of the sulci, fissures, and cisternal spaces which is normal for the patient's age. There is mild deep and periventricular white matter hypoattenuation statistically compatible with microvascular changes given calcific atherosclerotic disease of the intracranial arteries.  The paranasal sinuses and mastoid air cells are clear. The calvarium is intact. The imaged orbits are unremarkable.  IMPRESSION: No acute intracranial hemorrhage, mass effect, or shift of the midline structures.  Similar-appearing mild chronic white matter microvascular type changes.    PROTEIN CALORIE MALNUTRITION PRESENT: [ ]mild [ ]moderate [x]severe [ ]underweight [ ]morbid obesity  []PPSV2 < or = to 30% [x]significant weight loss  [x]poor nutritional intake [x]catabolic state []anasarca     Albumin, Serum: 2.3 g/dL (04-09-21 @ 22:26)  Artificial Nutrition []     REFERRALS:   []Chaplaincy  []Hospice  []Child Life  [x]Social Work  []Case management []Holistic Therapy     Goals of Care Document:   Care Coordination Assessment 201 [C. Provider] (04-05-21 @ 16:38)   Progress Notes - Care Coordination [C. Provider] (04-12-21 @ 09:18)   [facility] Geriatrics and Palliative Care  Joel Caldwell, Palliative Care Attending  Contact Info: Page [phone] (including Nights/Weekend), message on Microsoft Teams (Joel Caldwell), or leave VM at Palliative Office [phone] (Non-Urgent)    HPI:  60M w/ HTN, DM, schizoaffective disorder, polysubstance abuse (cocaine, alcohol, cigarettes), hx of DVT? previously on Eliquis presents to ED c/o neck pain. Pt is not cooperative with interview and exam. Per pt, he states that he has had significant unintentional weight loss recently and generalized weakness. He denies any CP, SOB, fevers/chills, night sweats, cough. He reports that he is an active smoker. His father had lung cancer. Pt reports auditory hallucinations, but no HI/SI. Patient states to the ED provider that he fell yesterday, was able to get up, no LOC. Patient lives alone at [facility] (Tino Red ([facility] [phone] and follows with a Mental Health Service Team Anuel (mental health services ) [phone]). He has access to his medications via his Mental Health Service Team, last taken them yesterday. Per Anuel, pt does not have any communication with family. He has not had a hospitalization since his inpatient psychiatric stay in 2019. It is unclear when his last PCP visit was.In the ED, T96.9, HR 86, /89, RR18, 93% on RA, given tylenol and 1L NS (01 Apr 2021 18:26)    Patient seen at bedside. Sleeping fetal position with NRB in place, overtly tachypneic. Patient is arousable but refusing to interact or answer questions. Collateral obtained from PCA at bedside, chart, medical team and family. Patient has been routinely refusing care. Has large lung mass that is invading his pulmonary vessels and is now COVID+. Patient has had declining respiratory failure for the past few days.    PERTINENT PM/SXH:   Schizophrenia  Substance abuse  Anemia  Sickle cell anemia  HTN (hypertension)  Diabetes mellitus  Deep vein thrombosis (DVT)  No significant past surgical history    FAMILY HISTORY:  FH: lung cancer (Father)    ITEMS NOT CHECKED ARE NOT PRESENT    SOCIAL HISTORY:   Significant other/partner:  []  Children:  [x]  Adventism/Spirituality:  Substance hx:  [x]   Tobacco hx:  [x]   Alcohol hx: [x]   Home Opioid hx:  [] I-Stop Reference No: [id number]  -no active Rx's  Living Situation: []Home  []Long term care  []Rehab [x]Other: Geriatric/Social Housing    ADVANCE DIRECTIVES:    []MOLST  []Living Will  DECISION MAKER(s):  [] Health Care Proxy(s)  [x] Surrogate(s)  [] Guardian           Name(s): Liam Villar (son) Phone Number(s): [phone]    BASELINE (I)ADL(s) (prior to admission):  [place]: []Total  [x] Moderate []Dependent    ALLERGIES:  No Known Allergies    MEDICATIONS  (STANDING):  benztropine 1 milliGRAM(s) Oral two times a day  cholecalciferol 1000 Unit(s) Oral daily  dexAMETHasone  Injectable 6 milliGRAM(s) IV Push daily  dextrose 40% Gel 15 Gram(s) Oral once  dextrose 50% Injectable 25 Gram(s) IV Push once  dextrose 50% Injectable 12.5 Gram(s) IV Push once  dextrose 50% Injectable 25 Gram(s) IV Push once  glucagon  Injectable 1 milliGRAM(s) IntraMuscular once  haloperidol     Tablet 10 milliGRAM(s) Oral two times a day  heparin   Injectable 5000 Unit(s) SubCutaneous every 12 hours  insulin lispro (ADMELOG) corrective regimen sliding scale   SubCutaneous three times a day before meals  nicotine - 21 mG/24Hr(s) Patch 1 patch Transdermal daily  OLANZapine 20 milliGRAM(s) Oral every 12 hours  traZODone 100 milliGRAM(s) Oral at bedtime    MEDICATIONS  (PRN):  acetaminophen   Tablet .. 650 milliGRAM(s) Oral every 6 hours PRN Temp greater or equal to 38C (100.4F), Mild Pain (1 - 3), Moderate Pain (4 - 6)  haloperidol    Injectable 2 milliGRAM(s) IntraMuscular every 6 hours PRN Agitation  lidocaine   Patch 1 Patch Transdermal daily PRN pain  LORazepam   Injectable 1 milliGRAM(s) IntraMuscular every 6 hours PRN SEVERE agitation  nicotine  Polacrilex Gum 2 milliGRAM(s) Oral every 2 hours PRN smoking cessation  polyethylene glycol 3350 17 Gram(s) Oral daily PRN Constipation  senna 2 Tablet(s) Oral at bedtime PRN Constipation    PRESENT SYMPTOMS: [x]Unable to obtain due to poor mentation/encephalopathy  Source if other than patient:  []Family   []Team     Pain: [ ] yes [x] no  QOL impact -   Location -                    Aggravating Factors -  Quality -  Radiation -  Timing -  Severity (0-10 scale) -   Minimal Acceptable Level (0-10 scale) -    PAIN AD Score: 1  [email or web address]/painad.pdf (press ctrl +  left click to view)    Dyspnea:                           []Mild  []Moderate []Severe  Anxiety:                             []Mild []Moderate []Severe  Fatigue:                             []Mild []Moderate []Severe  Nausea:                             []Mild []Moderate []Severe  Loss of Appetite:              []Mild []Moderate []Severe  Constipation:                    []Mild []Moderate []Severe    Other Symptoms:  []All other review of systems negative     Palliative Performance Status Version 2:  40%    http://[email or web address].org/files/news/palliative_performance_scale_[email or web address].pdf    PHYSICAL EXAM:  Vital Signs Last 24 Hrs  T(C): 37.1 (11 Apr 2021 22:12), Max: 37.1 (11 Apr 2021 22:12)  T(F): 98.8 (11 Apr 2021 22:12), Max: 98.8 (11 Apr 2021 22:12)  HR: 96 (11 Apr 2021 22:12) (96 - 96)  BP: 111/67 (11 Apr 2021 22:12) (111/67 - 111/67)  BP(mean): --  RR: 18 (11 Apr 2021 22:12) (18 - 18)  SpO2: 100% (11 Apr 2021 22:12) (100% - 100%) I&O's Summary    11 Apr 2021 07:01  -  12 Apr 2021 07:00  --------------------------------------------------------  IN: 0 mL / OUT: 1200 mL / NET: -1200 mL    GENERAL:  []Alert  []Oriented x   [x]Lethargic  [x]Cachexia  []Unarousable  []Verbal  []Non-Verbal  Behavioral:   [] Anxiety  [x] Delirium [x] Agitation [] Other  HEENT:  []Normal   [x]Dry mouth   []ET Tube/Trach  []Oral lesions  PULMONARY/CARDIOVASCULAR/GASTROINTESTINAL/SKIN/NEUROLOGIC: REFUSED EXAM  GENITOURINARY:  [x]Normal [] Incontinent   []Oliguria/Anuria   []Wesley  MUSCULOSKELETAL:   []Normal   [x]Weakness  []Bed/Wheelchair bound []Edema    CRITICAL CARE:  [ ] Shock Present  [ ]Septic [ ]Cardiogenic [ ]Neurologic [ ]Hypovolemic  [ ]  Vasopressors [ ]  Inotropes   [x] Respiratory failure present [ ] Mechanical Ventilation [ ] Non-invasive ventilatory support [ ] High-Flow  [x] Acute  [ ] Chronic [x] Hypoxic  [ ] Hypercarbic [ ] Other  [ ] Other organ failure    LABS: Refusing    RADIOLOGY & ADDITIONAL STUDIES:  < from: CT Abdomen and Pelvis w/ IV Cont (04.02.21 @ 11:37) >  LUNGS AND LARGE AIRWAYS: Large heterogeneous mass occupying the majority of the left intrathoracic cavity with invasion of the left pulmonary artery, left pulmonary veins, and left atrium. Tumor occludes the left main bronchus. Probable component of mucus impacted airways and atelectasis. Masses in the right lung, probably metastases. For example:  *  Right upper lobe pulmonary nodule (series 2, image 79), measuring 2.6 x 1.6 cm.  PLEURA: No pleural effusion.  VESSELS: Within normal limits.  HEART: Heart size is normal. No pericardial effusion.  MEDIASTINUM AND MONO: Mediastinal extension of tumor.  CHEST WALL AND LOWER NECK: Enlarged left internal mammary node (series 2, image 59), measuring 1.9 x 1.3 cm.    ABDOMEN AND PELVIS:  LIVER: Within normal limits.  BILE DUCTS: Normal caliber.  GALLBLADDER: Within normal limits.  SPLEEN: Within normal limits.  PANCREAS: Within normal limits.  ADRENALS: Within normal limits.  KIDNEYS/URETERS: Within normal limits.    BLADDER: Within normal limits.  REPRODUCTIVE ORGANS: Prostate within normal limits.    BOWEL: No bowel obstruction.  PERITONEUM: No ascites.  VESSELS: Atherosclerotic changes.  RETROPERITONEUM/LYMPH NODES: No lymphadenopathy.  ABDOMINAL WALL: Subcutaneous edema.  BONES: Degenerative changes, most severe in the left hip. Bilateral L5 pars defects.    IMPRESSION:  Large heterogeneous mass occupying the majority of the left intrathoracic cavity with invasion of the left pulmonary artery, left pulmonary veins, and left atrium. Tumor occludes the left main bronchus. Probable right pulmonary metastases.    < from: CT Head No Cont (04.02.21 @ 11:36) >  There is no acute intracranial hemorrhage, mass effect, shift of the midline structures, herniation, extra-axial fluid collection, or hydrocephalus.  There is diffuse cerebral volume loss with prominence of the sulci, fissures, and cisternal spaces which is normal for the patient's age. There is mild deep and periventricular white matter hypoattenuation statistically compatible with microvascular changes given calcific atherosclerotic disease of the intracranial arteries.  The paranasal sinuses and mastoid air cells are clear. The calvarium is intact. The imaged orbits are unremarkable.  IMPRESSION: No acute intracranial hemorrhage, mass effect, or shift of the midline structures.  Similar-appearing mild chronic white matter microvascular type changes.    PROTEIN CALORIE MALNUTRITION PRESENT: [ ]mild [ ]moderate [x]severe [ ]underweight [ ]morbid obesity  []PPSV2 < or = to 30% [x]significant weight loss  [x]poor nutritional intake [x]catabolic state []anasarca     Albumin, Serum: 2.3 g/dL (04-09-21 @ 22:26)  Artificial Nutrition []     REFERRALS:   []Chaplaincy  []Hospice  []Child Life  [x]Social Work  []Case management []Holistic Therapy     Goals of Care Document:   Care Coordination Assessment 201 [C. Provider] (04-05-21 @ 16:38)   Progress Notes - Care Coordination [C. Provider] (04-12-21 @ 09:18)   [facility] Geriatrics and Palliative Care  Joel Caldwell, Palliative Care Attending  Contact Info: Page [phone] (including Nights/Weekend), message on Microsoft Teams (Joel Caldwell), or leave VM at Palliative Office [phone] (Non-Urgent)    HPI:  60M w/ HTN, DM, schizoaffective disorder, polysubstance abuse (cocaine, alcohol, cigarettes), hx of DVT? previously on Eliquis presents to ED c/o neck pain. Pt is not cooperative with interview and exam. Per pt, he states that he has had significant unintentional weight loss recently and generalized weakness. He denies any CP, SOB, fevers/chills, night sweats, cough. He reports that he is an active smoker. His father had lung cancer. Pt reports auditory hallucinations, but no HI/SI. Patient states to the ED provider that he fell yesterday, was able to get up, no LOC. Patient lives alone at [facility] (Tino Red ([facility]) [phone] and follows with a Mental Health Service Team Anuel (mental health services ) [phone]). He has access to his medications via his Mental Health Service Team, last taken them yesterday. Per Anuel, pt does not have any communication with family. He has not had a hospitalization since his inpatient psychiatric stay in 2019. It is unclear when his last PCP visit was.In the ED, T96.9, HR 86, /89, RR18, 93% on RA, given tylenol and 1L NS (01 Apr 2021 18:26)    Patient seen at bedside. Sleeping fetal position with NRB in place, overtly tachypneic. Patient is arousable but refusing to interact or answer questions. Collateral obtained from PCA at bedside, chart, medical team and family. Patient has been routinely refusing care. Has large lung mass that is invading his pulmonary vessels and is now COVID+. Patient has had declining respiratory failure for the past few days.    PERTINENT PM/SXH:   Schizophrenia  Substance abuse  Anemia  Sickle cell anemia  HTN (hypertension)  Diabetes mellitus  Deep vein thrombosis (DVT)  No significant past surgical history    FAMILY HISTORY:  FH: lung cancer (Father)    ITEMS NOT CHECKED ARE NOT PRESENT    SOCIAL HISTORY:   Significant other/partner:  []  Children:  [x]  Christianity/Spirituality:  Substance hx:  [x]   Tobacco hx:  [x]   Alcohol hx: [x]   Home Opioid hx:  [] I-Stop Reference No: [id number]  -no active Rx's  Living Situation: []Home  []Long term care  []Rehab [x]Other: Geriatric/Social Housing    ADVANCE DIRECTIVES:    []MOLST  []Living Will  DECISION MAKER(s):  [] Health Care Proxy(s)  [x] Surrogate(s)  [] Guardian           Name(s): Liam Villar (son) Phone Number(s): [phone]    BASELINE (I)ADL(s) (prior to admission):  [place]: []Total  [x] Moderate []Dependent    ALLERGIES:  No Known Allergies    MEDICATIONS  (STANDING):  benztropine 1 milliGRAM(s) Oral two times a day  cholecalciferol 1000 Unit(s) Oral daily  dexAMETHasone  Injectable 6 milliGRAM(s) IV Push daily  dextrose 40% Gel 15 Gram(s) Oral once  dextrose 50% Injectable 25 Gram(s) IV Push once  dextrose 50% Injectable 12.5 Gram(s) IV Push once  dextrose 50% Injectable 25 Gram(s) IV Push once  glucagon  Injectable 1 milliGRAM(s) IntraMuscular once  haloperidol     Tablet 10 milliGRAM(s) Oral two times a day  heparin   Injectable 5000 Unit(s) SubCutaneous every 12 hours  insulin lispro (ADMELOG) corrective regimen sliding scale   SubCutaneous three times a day before meals  nicotine - 21 mG/24Hr(s) Patch 1 patch Transdermal daily  OLANZapine 20 milliGRAM(s) Oral every 12 hours  traZODone 100 milliGRAM(s) Oral at bedtime    MEDICATIONS  (PRN):  acetaminophen   Tablet .. 650 milliGRAM(s) Oral every 6 hours PRN Temp greater or equal to 38C (100.4F), Mild Pain (1 - 3), Moderate Pain (4 - 6)  haloperidol    Injectable 2 milliGRAM(s) IntraMuscular every 6 hours PRN Agitation  lidocaine   Patch 1 Patch Transdermal daily PRN pain  LORazepam   Injectable 1 milliGRAM(s) IntraMuscular every 6 hours PRN SEVERE agitation  nicotine  Polacrilex Gum 2 milliGRAM(s) Oral every 2 hours PRN smoking cessation  polyethylene glycol 3350 17 Gram(s) Oral daily PRN Constipation  senna 2 Tablet(s) Oral at bedtime PRN Constipation    PRESENT SYMPTOMS: [x]Unable to obtain due to poor mentation/encephalopathy  Source if other than patient:  []Family   []Team     Pain: [ ] yes [x] no  PAIN AD Score: 1  [email or web address]/painad.pdf (press ctrl +  left click to view)    Dyspnea:                           []Mild  [x]Moderate []Severe  Anxiety:                             []Mild []Moderate []Severe  Fatigue:                             []Mild []Moderate []Severe  Nausea:                             []Mild []Moderate []Severe  Loss of Appetite:              []Mild []Moderate []Severe  Constipation:                    []Mild []Moderate []Severe    Other Symptoms:  []All other review of systems negative     Palliative Performance Status Version 2:  40%    http://npcrc.org/files/news/palliative_performance_scale_[email or web address].pdf    PHYSICAL EXAM:  Vital Signs Last 24 Hrs  T(C): 37.1 (11 Apr 2021 22:12), Max: 37.1 (11 Apr 2021 22:12)  T(F): 98.8 (11 Apr 2021 22:12), Max: 98.8 (11 Apr 2021 22:12)  HR: 96 (11 Apr 2021 22:12) (96 - 96)  BP: 111/67 (11 Apr 2021 22:12) (111/67 - 111/67)  BP(mean): --  RR: 18 (11 Apr 2021 22:12) (18 - 18)  SpO2: 100% (11 Apr 2021 22:12) (100% - 100%) I&O's Summary    11 Apr 2021 07:01  -  12 Apr 2021 07:00  --------------------------------------------------------  IN: 0 mL / OUT: 1200 mL / NET: -1200 mL    GENERAL:  []Alert  []Oriented x   [x]Lethargic  [x]Cachexia  []Unarousable  []Verbal  []Non-Verbal  Behavioral:   [] Anxiety  [x] Delirium [x] Agitation [] Other  HEENT:  []Normal   [x]Dry mouth   []ET Tube/Trach  []Oral lesions  PULMONARY/CARDIOVASCULAR/GASTROINTESTINAL/SKIN/NEUROLOGIC: REFUSED EXAM  GENITOURINARY:  [x]Normal [] Incontinent   []Oliguria/Anuria   []Wesley  MUSCULOSKELETAL:   []Normal   [x]Weakness  []Bed/Wheelchair bound []Edema    CRITICAL CARE:  [ ] Shock Present  [ ]Septic [ ]Cardiogenic [ ]Neurologic [ ]Hypovolemic  [ ]  Vasopressors [ ]  Inotropes   [x] Respiratory failure present [ ] Mechanical Ventilation [ ] Non-invasive ventilatory support [ ] High-Flow  [x] Acute  [ ] Chronic [x] Hypoxic  [ ] Hypercarbic [ ] Other  [ ] Other organ failure    LABS: Refusing    RADIOLOGY & ADDITIONAL STUDIES:  < from: CT Abdomen and Pelvis w/ IV Cont (04.02.21 @ 11:37) >  LUNGS AND LARGE AIRWAYS: Large heterogeneous mass occupying the majority of the left intrathoracic cavity with invasion of the left pulmonary artery, left pulmonary veins, and left atrium. Tumor occludes the left main bronchus. Probable component of mucus impacted airways and atelectasis. Masses in the right lung, probably metastases. For example:  *  Right upper lobe pulmonary nodule (series 2, image 79), measuring 2.6 x 1.6 cm.  PLEURA: No pleural effusion.  VESSELS: Within normal limits.  HEART: Heart size is normal. No pericardial effusion.  MEDIASTINUM AND MONO: Mediastinal extension of tumor.  CHEST WALL AND LOWER NECK: Enlarged left internal mammary node (series 2, image 59), measuring 1.9 x 1.3 cm.  ABDOMEN AND PELVIS:  LIVER: Within normal limits.  BILE DUCTS: Normal caliber.  GALLBLADDER: Within normal limits.  SPLEEN: Within normal limits.  PANCREAS: Within normal limits.  ADRENALS: Within normal limits.  KIDNEYS/URETERS: Within normal limits.  BLADDER: Within normal limits.  REPRODUCTIVE ORGANS: Prostate within normal limits.  BOWEL: No bowel obstruction.  PERITONEUM: No ascites.  VESSELS: Atherosclerotic changes.  RETROPERITONEUM/LYMPH NODES: No lymphadenopathy.  ABDOMINAL WALL: Subcutaneous edema.  BONES: Degenerative changes, most severe in the left hip. Bilateral L5 pars defects.    IMPRESSION:  Large heterogeneous mass occupying the majority of the left intrathoracic cavity with invasion of the left pulmonary artery, left pulmonary veins, and left atrium. Tumor occludes the left main bronchus. Probable right pulmonary metastases.    < from: CT Head No Cont (04.02.21 @ 11:36) >  There is no acute intracranial hemorrhage, mass effect, shift of the midline structures, herniation, extra-axial fluid collection, or hydrocephalus.  There is diffuse cerebral volume loss with prominence of the sulci, fissures, and cisternal spaces which is normal for the patient's age. There is mild deep and periventricular white matter hypoattenuation statistically compatible with microvascular changes given calcific atherosclerotic disease of the intracranial arteries.  The paranasal sinuses and mastoid air cells are clear. The calvarium is intact. The imaged orbits are unremarkable.  IMPRESSION: No acute intracranial hemorrhage, mass effect, or shift of the midline structures.  Similar-appearing mild chronic white matter microvascular type changes.    PROTEIN CALORIE MALNUTRITION PRESENT: [ ]mild [ ]moderate [x]severe [ ]underweight [ ]morbid obesity  []PPSV2 < or = to 30% [x]significant weight loss  [x]poor nutritional intake [x]catabolic state []anasarca     Albumin, Serum: 2.3 g/dL (04-09-21 @ 22:26)  Artificial Nutrition []     REFERRALS:   []Chaplaincy  []Hospice  []Child Life  [x]Social Work  []Case management []Holistic Therapy     Goals of Care Document:   Care Coordination Assessment 201 [C. Provider] (04-05-21 @ 16:38)   Progress Notes - Care Coordination [C. Provider] (04-12-21 @ 09:18)

## 2021-04-13 LAB
GLUCOSE BLDC GLUCOMTR-MCNC: 177 MG/DL — HIGH (ref 70–99)
GLUCOSE BLDC GLUCOMTR-MCNC: 236 MG/DL — HIGH (ref 70–99)

## 2021-04-13 PROCEDURE — 99233 SBSQ HOSP IP/OBS HIGH 50: CPT

## 2021-04-13 PROCEDURE — 99497 ADVNCD CARE PLAN 30 MIN: CPT | Mod: 25

## 2021-04-13 RX ADMIN — Medication 1 MILLIGRAM(S): at 22:35

## 2021-04-13 RX ADMIN — HALOPERIDOL DECANOATE 10 MILLIGRAM(S): 100 INJECTION INTRAMUSCULAR at 22:35

## 2021-04-13 RX ADMIN — OLANZAPINE 20 MILLIGRAM(S): 15 TABLET, FILM COATED ORAL at 22:35

## 2021-04-13 RX ADMIN — Medication 4: at 18:05

## 2021-04-13 RX ADMIN — HALOPERIDOL DECANOATE 10 MILLIGRAM(S): 100 INJECTION INTRAMUSCULAR at 11:40

## 2021-04-13 RX ADMIN — OLANZAPINE 20 MILLIGRAM(S): 15 TABLET, FILM COATED ORAL at 11:39

## 2021-04-13 RX ADMIN — Medication 1 MILLIGRAM(S): at 11:39

## 2021-04-13 RX ADMIN — Medication 100 MILLIGRAM(S): at 22:35

## 2021-04-13 RX ADMIN — Medication 1000 UNIT(S): at 11:39

## 2021-04-13 RX ADMIN — HEPARIN SODIUM 5000 UNIT(S): 5000 INJECTION INTRAVENOUS; SUBCUTANEOUS at 05:35

## 2021-04-13 RX ADMIN — HEPARIN SODIUM 5000 UNIT(S): 5000 INJECTION INTRAVENOUS; SUBCUTANEOUS at 17:02

## 2021-04-13 RX ADMIN — Medication 6 MILLIGRAM(S): at 05:35

## 2021-04-13 NOTE — PROGRESS NOTE ADULT - PROBLEM SELECTOR PLAN 7
Reported hearing voices in ED. No active SI/HI  -c/w home olanzapine, benztropine, trazodone, haldol (doses confirmed with mental health services )  -psych recs appreciated 4/7

## 2021-04-13 NOTE — PROGRESS NOTE ADULT - PROBLEM SELECTOR PLAN 7
.  Complex medical decision making in the setting of presumed metastatic cancer and psychiatric illness.    Emotional support provided, questions answered.  Active Psychosocial Referrals: CHRISTINE SESAY    For new or uncontrolled symptoms, please page the Palliative Medicine team ([facility] #[phone]).   The service is available 24/7 (including nights & weekends) to provide symptom management recommendations over the phone as appropriate

## 2021-04-13 NOTE — PROGRESS NOTE ADULT - PROBLEM SELECTOR PLAN 1
concern for malignancy (primary vs. secondary), recent unintentional weight loss, bone pain (cervical spine), active smoker: 1 ppd for "many years", father w/ hx of lung cancer  - CTH/CAP scan - large heterogeneous mass occupying the majority of the left intrathoracic cavity with invasion of the left pulmonary artery, left pulmonary veins, and left atrium and probably R pulmonary mets  -pt refusing biopsy  -discussed case w/ family, who initially wanted biopsy to confirm diagnosis, however, discussed case with IR, recommending against biopsy given the significant risk of biopsy under sedation i/s/o mass invading pulmonary vessels. Furthermore, given likely prognosis, biopsy likely not indicated. Discussed with family on 4/8.  -f/u w/ Palliative re: d/w RIDGE

## 2021-04-13 NOTE — PROGRESS NOTE ADULT - PROBLEM SELECTOR PLAN 3
.  Suspicion for metastatic cancer despite lack of biopsy-confirmed pathology  -the characteristics noted on imaging all suggest there is a malignant process: large, heterogeneous, occupies the majority of the L thoracic cavity, and there is tumoral invasion of the left sides of the patient's airway/major pulmonary blood vessels/heart, evidence of metastatic disease to R lung  -attempting to confirm diagnosis will require sedation but patient's respiratory status and physiology is so compromised that he would be extremely high risk for complications including respiratory failure requiring intubation  -given the suspected findings of metastatic disease, this illness would be incurable in nature and given the tumoral invasion of his blood vessels/airway/heart the patient's life expectancy is likely less than 6 months even if he were to get treatment

## 2021-04-13 NOTE — PROVIDER CONTACT NOTE (OTHER) - ASSESSMENT
Pt oriented x1-2 garbled/hoarse speech. Pt irritable and easily agitated. Pt appears comfortable in bed. Patient denies any signs/symptoms of distress and is not displaying any s/s of hypo/hyperglycemia.

## 2021-04-13 NOTE — PROGRESS NOTE ADULT - PROBLEM SELECTOR PLAN 5
.  Longstanding history of mental illness c/b polysubstance use disorder  -still lacks capacity and prone to behavioral disturbance despite complex psychotropic medication regimen  -previous Psych input noted

## 2021-04-13 NOTE — PROGRESS NOTE ADULT - SUBJECTIVE AND OBJECTIVE BOX
[facility] Geriatrics and Palliative Care  Joel Caldwell, Palliative Care Attending  Contact Info: Page [phone] (including Nights/Weekend), message on Microsoft Teams (Joel Caldwell), or leave VM at Palliative Office [phone] (Non-Urgent)    SUBJECTIVE AND OBJECTIVE:  INTERVAL HPI/OVERNIGHT EVENTS: No acute events overnight. Patient appears calm and comfortable on exam. Denies any pain and feels that his breathing is ok. Patient required no additional PRNs in past 24hrs.    Allergies  No Known Allergies    MEDICATIONS  (STANDING):  benztropine 1 milliGRAM(s) Oral two times a day  cholecalciferol 1000 Unit(s) Oral daily  dexAMETHasone  Injectable 6 milliGRAM(s) IV Push daily  dextrose 40% Gel 15 Gram(s) Oral once  dextrose 50% Injectable 25 Gram(s) IV Push once  glucagon  Injectable 1 milliGRAM(s) IntraMuscular once  haloperidol     Tablet 10 milliGRAM(s) Oral two times a day  heparin   Injectable 5000 Unit(s) SubCutaneous every 12 hours  insulin lispro (ADMELOG) corrective regimen sliding scale   SubCutaneous three times a day before meals  insulin lispro (ADMELOG) corrective regimen sliding scale   SubCutaneous at bedtime  nicotine - 21 mG/24Hr(s) Patch 1 patch Transdermal daily  OLANZapine 20 milliGRAM(s) Oral every 12 hours  traZODone 100 milliGRAM(s) Oral at bedtime    MEDICATIONS  (PRN):  acetaminophen   Tablet .. 650 milliGRAM(s) Oral every 6 hours PRN Temp greater or equal to 38C (100.4F), Mild Pain (1 - 3), Moderate Pain (4 - 6)  haloperidol    Injectable 2 milliGRAM(s) IntraMuscular every 6 hours PRN Agitation  lidocaine   Patch 1 Patch Transdermal daily PRN pain  LORazepam   Injectable 1 milliGRAM(s) IntraMuscular every 6 hours PRN SEVERE agitation  nicotine  Polacrilex Gum 2 milliGRAM(s) Oral every 2 hours PRN smoking cessation  polyethylene glycol 3350 17 Gram(s) Oral daily PRN Constipation  senna 2 Tablet(s) Oral at bedtime PRN Constipation      ITEMS UNCHECKED ARE NOT PRESENT    PRESENT SYMPTOMS: []Unable to obtain due to poor mentation   Source if other than patient:  []Family   []Team     Pain: [ ] yes [x no  QOL impact -   Location -                    Aggravating factors -  Quality -  Radiation -  Timing -  Severity (0-10 scale):  Minimal acceptable level (0-10 scale):    PAIN AD Score: 0  [email or web address] (press ctrl +  left click to view)    Dyspnea:                           []Mild  []Moderate []Severe  Anxiety:                             []Mild []Moderate []Severe  Fatigue:                             []Mild []Moderate []Severe  Nausea:                             []Mild []Moderate []Severe  Loss of appetite:              []Mild []Moderate []Severe  Constipation:                    []Mild []Moderate []Severe    Other Symptoms:  [xAll other review of systems negative     Palliative Performance Status Version 2: 40%    Vital Signs Last 24 Hrs  T(C): --  T(F): --  HR: 110 (13 Apr 2021 05:34) (110 - 110)  BP: --  BP(mean): --  RR: 19 (13 Apr 2021 05:34) (19 - 19)  SpO2: 100% (13 Apr 2021 05:34) (100% - 100%) I&O's Summary    GENERAL:  [x]Alert  []Oriented x   []Lethargic  [x]Cachexia  []Unarousable  [x]Verbal  []Non-Verbal  Behavioral:   [] Anxiety  [x] Delirium [x] Agitation [] Other  HEENT:  []Normal   [x]Dry mouth   []ET Tube/Trach  []Oral lesions  PULMONARY/CARDIOVASCULAR/GASTROINTESTINAL/SKIN/NEUROLOGIC: REFUSED EXAM  GENITOURINARY:  [x]Normal [] Incontinent   []Oliguria/Anuria   [][place]  MUSCULOSKELETAL:   []Normal   [x]Weakness  []Bed/Wheelchair bound []Edema    CRITICAL CARE:  [ ] Shock Present  [ ]Septic [ ]Cardiogenic [ ]Neurologic [ ]Hypovolemic  [ ]  Vasopressors [ ]  Inotropes   [x] Respiratory failure present [ ] Mechanical Ventilation [ ] Non-invasive ventilatory support [ ] High-Flow  [x] Acute  [ ] Chronic [x] Hypoxic  [ ] Hypercarbic [ ] Other  [ ] Other organ failure    LABS: None new    RADIOLOGY & ADDITIONAL STUDIES: None new    PROTEIN CALORIE MALNUTRITION PRESENT: [ ]mild [ ]moderate [ ]severe [ ]underweight [ ]morbid obesity  [] PPSV2 < or = 30% [] significant weight loss [] poor nutritional intake [] anasarca [] catabolic state   Albumin, Serum: 2.3 g/dL (04-09-21 @ 22:26)   Artificial Nutrition []     REFERRALS:   []Chaplaincy  []Hospice  []Child Life  [x]Social Work  []Case management []Holistic Therapy []Physical Therapy []Dietary     Goals of Care Document: SUSU Viramontes (04-13-21 @ 11:06)  Goals of Care Conversation:   Participants:  · Participants  Family; Staff  · Child(carlito)  son Liam Villar  · Provider  Dr. Caldwell  ·   Shalini Vargas  · Other  Brittney Ovalle [facility]    Advance Directives:  · Does patient have Advance Directive  No  · Does Patient Have a Surrogate  Yes  · Surrogate's Name  Liam Villar  · Surrogate's Phone Number  [phone]  · Caregiver:  declines    Conversation Discussion:  · Conversation  MOLST Discussed  · Conversation Details  Referral to palliative care for complex decision making in the setting of presumed new malignancy. Pt's surrogate decision makers, pt's son and mother, are in favor of comfort centric approach and do not wish to pursue cancer work-up or cancer directed treatment. Surrogate do not wish for resuscitation including CRP or mechanical ventilation at the end of life and prefer a natural passing. Surrogate requested DNR/DNI.  Patient resides in a facility under the authority of [facility] therefore end of life decision making has to be approved by the office of [employer]. Checklist and supporting documentation was submitted to [facility] and fact time coordinated by [facility]. manager, DREW Diallo.   [facility] does not have an objection to the advanced directives requested by the patient family.   MOLST completed and placed on pt's medical record.   Pt is a DNR/DNI  [facility] does not object to hospice care once pt's medical course stabilizes and pt is medically optimized. Goal is for Mr. South to return to his residence with hospice care    Personal Advance Directives Treatment Guidelines:   Treatment Guidelines:  · Treatment Guidelines  DNR Order    MOLST:  · Completed  13-Apr-2021    Location of Discussion:   Duration of Advanced Care Planning Meeting:  · Time spent (in minutes)  20       Electronic Signatures:  Shalini Vargas (MSW)  (Signed 13-Apr-2021 11:28)  	Authored: Goals of Care Conversation, Personal Advance Directives Treatment Guidelines  Joel Caldwell)  (Signed 13-Apr-2021 15:00)  	Authored: Location of Discussion      Last Updated: 13-Apr-2021 15:00 by Joel Caldwell)      Care Coordination Document: Progress Notes - Care Coordination [C. Provider] (04-13-21 @ 09:44)                                       Progress Notes    PROGRESS NOTE  Date & Time of Note   2021-04-13 09:00    Notes    Notes: SW AD facilitated FT call with  Brittney Ovalle esq. from the office of  [employer] ([employer]). Pt verbalized agreement with FT call.  SW  to follow.       Electronically signed by:  Analia Diallo  Electronically signed on:  2021-04-13  09:44

## 2021-04-13 NOTE — PROVIDER CONTACT NOTE (OTHER) - ASSESSMENT
patient is alert and oriented x1/2, calm but not cooperating with assessments and interventions. PCA is at bedside for 1:1 observation. Patient agreed to take majority of psych meds.

## 2021-04-13 NOTE — PROGRESS NOTE ADULT - PROBLEM SELECTOR PLAN 6
.  In addition to the EM visit, an advance care planning meeting was performed.  Start time: 10:20AM  End time: 10:40AM  Total time: 20min  A face to face meeting to discuss advance care planning was held today regarding: ANDREA ESCOBEDO  Primary decision maker: Patient is unable to participate in decision making  Alternate/surrogate: Liam Villar  Discussed advance directives including, but not limited to, healthcare proxy and code status.  Decision regarding code status: DNR/DNI  Documentation completed today: KENDELL    See [facility] note    Due to visitation restrictions in the hospital in light of COVID pandemic, all discussions with the patient/ patient's healthcare proxy or surrogate have been done via telehealth. This is to prevent spread of infection within the hospital and out in the community. Total time discussing advance care planning, goals of care discussions, and discussions about code status and hospice = 16 mins.

## 2021-04-13 NOTE — PROVIDER CONTACT NOTE (OTHER) - BACKGROUND
Patient is a 60 year old male admitted on 4/1 for neck pain. History of schizoaffective disorder, polysubstance abuse, diabetes and hypertension.

## 2021-04-13 NOTE — PROGRESS NOTE ADULT - CONVERSATION DETAILS
See GOC note from Palliative SW re: KENDELL checklist approvaland further details. Patient's would be surrogate decision maker request DNR/DNI status and hospice referral. No objections from MHLS to DNR/DNI, no further malignancy work-up, and hospice referral after review of documentation. Continue with current level of care without escalation and the plan should be for home hospice referral after completion of patient's COVID treatment.

## 2021-04-13 NOTE — PROGRESS NOTE ADULT - SUBJECTIVE AND OBJECTIVE BOX
Gomez Lane MD PGY-1  Internal Medicine  Pager [phone] / [phone]    INTERVAL HPI / OVERNIGHT EVENTS:  -NAEON    SUBJECTIVE: Patient seen and examined at bedside. ROS limited -- patient requests to be left alone.   -states that his difficulty breathing has improved today  -denies chest pain, abdominal pain  -does not report, endorse, or deny any symptoms.      OBJECTIVE:    VITAL SIGNS:  ICU Vital Signs Last 24 Hrs  T(C): --  T(F): --  HR: 110 (13 Apr 2021 05:34) (110 - 110)  BP: --  BP(mean): --  ABP: --  ABP(mean): --  RR: 19 (13 Apr 2021 05:34) (19 - 19)  SpO2: 100% (13 Apr 2021 05:34) (100% - 100%)        CAPILLARY BLOOD GLUCOSE      POCT Blood Glucose.: 177 mg/dL (12 Apr 2021 22:59)      PHYSICAL EXAM: limited d/t patient refusing physical exam    General: NAD, resting comfortably in bed  HEENT: NC/AT; PERRL, clear conjunctiva  Respiratory: non-labored breathing, NRB off, resting on chin  Cardiovascular: refused exam  Abdomen: refused exam   Extremities: refused exam  Skin: normal color and turgor; no rash      MEDICATIONS:  MEDICATIONS  (STANDING):  benztropine 1 milliGRAM(s) Oral two times a day  cholecalciferol 1000 Unit(s) Oral daily  dexAMETHasone  Injectable 6 milliGRAM(s) IV Push daily  dextrose 40% Gel 15 Gram(s) Oral once  dextrose 50% Injectable 25 Gram(s) IV Push once  glucagon  Injectable 1 milliGRAM(s) IntraMuscular once  haloperidol     Tablet 10 milliGRAM(s) Oral two times a day  heparin   Injectable 5000 Unit(s) SubCutaneous every 12 hours  insulin lispro (ADMELOG) corrective regimen sliding scale   SubCutaneous three times a day before meals  insulin lispro (ADMELOG) corrective regimen sliding scale   SubCutaneous at bedtime  nicotine - 21 mG/24Hr(s) Patch 1 patch Transdermal daily  OLANZapine 20 milliGRAM(s) Oral every 12 hours  traZODone 100 milliGRAM(s) Oral at bedtime    MEDICATIONS  (PRN):  acetaminophen   Tablet .. 650 milliGRAM(s) Oral every 6 hours PRN Temp greater or equal to 38C (100.4F), Mild Pain (1 - 3), Moderate Pain (4 - 6)  haloperidol    Injectable 2 milliGRAM(s) IntraMuscular every 6 hours PRN Agitation  lidocaine   Patch 1 Patch Transdermal daily PRN pain  LORazepam   Injectable 1 milliGRAM(s) IntraMuscular every 6 hours PRN SEVERE agitation  nicotine  Polacrilex Gum 2 milliGRAM(s) Oral every 2 hours PRN smoking cessation  polyethylene glycol 3350 17 Gram(s) Oral daily PRN Constipation  senna 2 Tablet(s) Oral at bedtime PRN Constipation      ALLERGIES:  Allergies    No Known Allergies    Intolerances       LABS:    Hemoglobin: 7.4 g/dL (04-09 @ 22:26)  Hemoglobin: 6.6 g/dL (04-09 @ 10:27)  Hemoglobin: 6.9 g/dL (04-08 @ 12:39)    Creatinine Trend: 0.87<--, 0.76<--, 0.76<--, 0.94<--, 0.93<--, 1.07<--       Labs, imaging, EKG personally reviewed    RADIOLOGY & ADDITIONAL TESTS: Reviewed.

## 2021-04-13 NOTE — PROGRESS NOTE ADULT - PROBLEM SELECTOR PLAN 3
-full code for now  -workup for malignancy as above  -patient's family (son, mother, sister) agrees with CMO and hospice ([facility] conversation 4/9); however, patient known/protected by MHLS therefore EOL decisions must be approved through formal MOLST checklist, further discussion with [employer] are required prior to further EOL planning  -will f/u Palliative after their discussion w/ MHLS

## 2021-04-13 NOTE — PROVIDER CONTACT NOTE (OTHER) - SITUATION
Patient is refusing vital signs at this time. Allowed the nurse to check his fingerstick. Also refusing BIPAP machine but continues to wear his nonrebreather.

## 2021-04-13 NOTE — PROGRESS NOTE ADULT - ASSESSMENT
59yo M with PMH of Schizoaffective Disorder, Polysubstance Use Disorder, HTN, T2DM, and h/o DVT/PE p/w neck pain and found to have large lung mass with aggressive features signifying likely metastatic cancer and now c/b by respiratory failure due to COVID. Palliative consulted for complex medical decision making in the setting of life-limiting illness.    ·	MOLST checklist approved, new MOLST completed w/ DNR/DNI; can also defer malignancy work-up  ·	c/w reasonable/non-invasive medical care and plan for eventual hospice referral as COVID resolves    See Consult Note from 4/12 for rationale behind submitting MOLST Checklist to [facility].  Goals are established; Symptoms are managed. Palliative Care will SIGN OFF.  Please reconsult with any new issues or concerns (including nights/weekends): Page [phone]

## 2021-04-13 NOTE — PROGRESS NOTE ADULT - ATTENDING COMMENTS
60M, active smoker, PMH HTN, Type 2 DM, schizoaffective disorder, polysubstance abuse (cocaine, alcohol, cigarettes), hx of DVT? previously on Eliquis p/w neck pain and hearing voices. Found to have a large heterogeneous mass occupying majority of the left intrathoracic cavity with invasion of the left pulmonary vasculature and left atrium. Tumor also occludes the left main bronchus and probable right pulmonary metastases. IR recommending against biopsy given the significant risk of biopsy under sedation i/s/o mass invading pulmonary vessels. Also COVID positive requiring supplemental oxygen - c/w dexamethasone, d/c remdesevir as patient refusing blood draws and cannot monitor renal/hepatic function. Patient with poor insight, and does not have decision making capacity, d/w family (NOK son Liam, mother Twyla, sister Ira) who are okay with hospice referral, comfort measures. Patient required MOLST checklist given [facility] connection - appreciate palliative care input - patient now DNR/DNI, no further work up for malignancy.

## 2021-04-13 NOTE — GOALS OF CARE CONVERSATION - ADVANCED CARE PLANNING - CONVERSATION DETAILS
Referral to palliative care for complex decision making in the setting of presumed new malignancy. Pt's surrogate decision makers, pt's son and mother, are in favor of comfort centric approach and do not wish to pursue cancer work-up or cancer directed treatment. Surrogate do not wish for resuscitation including CRP or mechanical ventilation at the end of life and prefer a natural passing. Surrogate requested DNR/DNI.  Patient resides in a facility under the authority of [facility] therefore end of life decision making has to be approved by the office of [employer]. Checklist and supporting documentation was submitted to [facility] and fact time coordinated by  . manager, DREW Diallo.   [facility] does not have an objection to the advanced directives requested by the patient family.   MOLST completed and placed on pt's medical record.   Pt is a DNR/DNI  MHLS does not object to hospice care once pt's medical course stabilizes and pt is medically optimized. Goal is for Mr. South to return to his residence with hospice care

## 2021-04-13 NOTE — PROGRESS NOTE ADULT - PROBLEM SELECTOR PLAN 1
.  Patient lacks capacity due to his underlying mental illness  -c/w enhanced supervision and frequent reorientation  -does not have an understanding of his medical conditions and has consistently refused most aspects of his care

## 2021-04-14 LAB
GLUCOSE BLDC GLUCOMTR-MCNC: 146 MG/DL — HIGH (ref 70–99)
GLUCOSE BLDC GLUCOMTR-MCNC: 228 MG/DL — HIGH (ref 70–99)
GLUCOSE BLDC GLUCOMTR-MCNC: 253 MG/DL — HIGH (ref 70–99)
PTH RELATED PROT SERPL-MCNC: <2 PMOL/L — SIGNIFICANT CHANGE UP

## 2021-04-14 PROCEDURE — 99232 SBSQ HOSP IP/OBS MODERATE 35: CPT | Mod: GC

## 2021-04-14 PROCEDURE — 99232 SBSQ HOSP IP/OBS MODERATE 35: CPT

## 2021-04-14 RX ADMIN — Medication 6 MILLIGRAM(S): at 06:41

## 2021-04-14 RX ADMIN — Medication 100 MILLIGRAM(S): at 22:13

## 2021-04-14 RX ADMIN — HALOPERIDOL DECANOATE 10 MILLIGRAM(S): 100 INJECTION INTRAMUSCULAR at 22:13

## 2021-04-14 RX ADMIN — Medication 1 MILLIGRAM(S): at 22:13

## 2021-04-14 RX ADMIN — OLANZAPINE 20 MILLIGRAM(S): 15 TABLET, FILM COATED ORAL at 22:13

## 2021-04-14 NOTE — PROGRESS NOTE ADULT - ATTENDING COMMENTS
60M, active smoker, PMH HTN, Type 2 DM, schizoaffective disorder, polysubstance abuse (cocaine, alcohol, cigarettes), hx of DVT? previously on Eliquis p/w neck pain and hearing voices. Found to have a large heterogeneous mass occupying majority of the left intrathoracic cavity with invasion of the left pulmonary vasculature and left atrium. Tumor also occludes the left main bronchus and probable right pulmonary metastases. IR recommending against biopsy given the significant risk of biopsy under sedation i/s/o mass invading pulmonary vessels. Also COVID positive requiring supplemental oxygen - c/w dexamethasone, d/c remdesevir as patient refusing blood draws and cannot monitor renal/hepatic function. Patient with poor insight, and does not have decision making capacity, d/w family (NOK son Liam, mother Twyla, sister Ira) who are okay with hospice referral, comfort measures. Patient required MOLST checklist given [facility] connection - appreciate palliative care input - patient now DNR/DNI, no further work up for malignancy.

## 2021-04-14 NOTE — PROGRESS NOTE ADULT - PROBLEM SELECTOR PLAN 3
-full code for now  -workup for malignancy as above  -patient's family (son, mother, sister) agrees with CMO and hospice ([facility] conversation 4/9); however, patient known/protected by MHLS therefore EOL decisions must be approved through formal MOLST checklist, further discussion with [employer] are required prior to further EOL planning  -will f/u Palliative after their discussion w/ MHLS -full code for now  -workup for malignancy as above  -patient's family (son, mother, sister) agrees with CMO and hospice ([facility] conversation 4/9); however, patient known/protected by MHLS therefore EOL decisions must be approved through formal MOLST checklist, further discussion with [employer] are required prior to further EOL planning  -patient DNR/DNI, c/w reasonable medical treatments, pursue hospice

## 2021-04-14 NOTE — PROGRESS NOTE ADULT - PROBLEM SELECTOR PLAN 1
concern for malignancy (primary vs. secondary), recent unintentional weight loss, bone pain (cervical spine), active smoker: 1 ppd for "many years", father w/ hx of lung cancer  - CTH/CAP scan - large heterogeneous mass occupying the majority of the left intrathoracic cavity with invasion of the left pulmonary artery, left pulmonary veins, and left atrium and probably R pulmonary mets  -pt refusing biopsy  -discussed case w/ family, who initially wanted biopsy to confirm diagnosis, however, discussed case with IR, recommending against biopsy given the significant risk of biopsy under sedation i/s/o mass invading pulmonary vessels. Furthermore, given likely prognosis, biopsy likely not indicated. Discussed with family on 4/8.  -f/u w/ Palliative re: d/w RIDGE concern for malignancy (primary vs. secondary), recent unintentional weight loss, bone pain (cervical spine), active smoker: 1 ppd for "many years", father w/ hx of lung cancer  - CTH/CAP scan - large heterogeneous mass occupying the majority of the left intrathoracic cavity with invasion of the left pulmonary artery, left pulmonary veins, and left atrium and probably R pulmonary mets  -pt refusing biopsy  -discussed case w/ family, who initially wanted biopsy to confirm diagnosis, however, discussed case with IR, recommending against biopsy given the significant risk of biopsy under sedation i/s/o mass invading pulmonary vessels. Furthermore, given likely prognosis, biopsy likely not indicated. Discussed with family on 4/8.  -Palliative recs appreciated  -patient DNR/DNI, c/w reasonable medical treatments, pursue hospice

## 2021-04-14 NOTE — PROVIDER CONTACT NOTE (OTHER) - ASSESSMENT
No signs of hypoglycemia or hyperglycemia noted at time. Pt ate lunch and dinner. AM blood glucose is No signs of hypoglycemia or hyperglycemia noted at time. Pt ate lunch and dinner. Last blood glucose is 253

## 2021-04-14 NOTE — PROGRESS NOTE ADULT - SUBJECTIVE AND OBJECTIVE BOX
Chief Complaint: Hypercalcemia, hyperglycemia    History: Patient seen at bedside. Nonrebreather mask in place.  Patient reports tolerating po.  Asked if he would accept blood draw to follow up calcium and refused.  Intermittently refusing fingersticks and insulin doses    MEDICATIONS  (STANDING):  benztropine 1 milliGRAM(s) Oral two times a day  cholecalciferol 1000 Unit(s) Oral daily  dexAMETHasone  Injectable 6 milliGRAM(s) IV Push daily  dextrose 40% Gel 15 Gram(s) Oral once  dextrose 50% Injectable 25 Gram(s) IV Push once  glucagon  Injectable 1 milliGRAM(s) IntraMuscular once  haloperidol     Tablet 10 milliGRAM(s) Oral two times a day  heparin   Injectable 5000 Unit(s) SubCutaneous every 12 hours  insulin lispro (ADMELOG) corrective regimen sliding scale   SubCutaneous three times a day before meals  insulin lispro (ADMELOG) corrective regimen sliding scale   SubCutaneous at bedtime  nicotine - 21 mG/24Hr(s) Patch 1 patch Transdermal daily  OLANZapine 20 milliGRAM(s) Oral every 12 hours  traZODone 100 milliGRAM(s) Oral at bedtime    MEDICATIONS  (PRN):  acetaminophen   Tablet .. 650 milliGRAM(s) Oral every 6 hours PRN Temp greater or equal to 38C (100.4F), Mild Pain (1 - 3), Moderate Pain (4 - 6)  haloperidol    Injectable 2 milliGRAM(s) IntraMuscular every 6 hours PRN Agitation  lidocaine   Patch 1 Patch Transdermal daily PRN pain  LORazepam   Injectable 1 milliGRAM(s) IntraMuscular every 6 hours PRN SEVERE agitation  nicotine  Polacrilex Gum 2 milliGRAM(s) Oral every 2 hours PRN smoking cessation  polyethylene glycol 3350 17 Gram(s) Oral daily PRN Constipation  senna 2 Tablet(s) Oral at bedtime PRN Constipation      Allergies    No Known Allergies    Intolerances      Review of Systems:  ALL OTHER SYSTEMS REVIEWED AND NEGATIVE      PHYSICAL EXAM:  VITALS: T(C): 36.7 (04-14-21 @ 10:30)  T(F): 98 (04-14-21 @ 10:30), Max: 98.2 (04-13-21 @ 17:32)  HR: 109 (04-14-21 @ 10:30) (100 - 138)  BP: 129/84 (04-14-21 @ 10:30) (103/73 - 129/84)  RR:  (17 - 100)  SpO2:  (97% - 100%)  Wt(kg): --  GENERAL: thin male, appears comfortable  EYES: No proptosis, no lid lag, anicteric  HEENT:  Atraumatic, Normocephalic, moist mucous membranes  RESPIRATORY: nonlabored appearing respirations, nonrebreather mask in place.  PSYCH: Alert and awake, poor insight    CAPILLARY BLOOD GLUCOSE      POCT Blood Glucose.: 228 mg/dL (14 Apr 2021 13:40)  POCT Blood Glucose.: 146 mg/dL (14 Apr 2021 09:01)  POCT Blood Glucose.: 177 mg/dL (13 Apr 2021 21:22)  POCT Blood Glucose.: 236 mg/dL (13 Apr 2021 17:49)                  Thyroid Function Tests:  04-01 @ 12:33 TSH 2.61 FreeT4 -- T3 -- Anti TPO -- Anti Thyroglobulin Ab -- TSI --

## 2021-04-14 NOTE — PROVIDER CONTACT NOTE (OTHER) - BACKGROUND
No signs of hypoglycemia or hyperglycemia noted at time. Pt ate lunch and dinner. AM blood glucose is No signs of hypoglycemia or hyperglycemia noted at time. Pt ate lunch and dinner. Last blood glucose is 253.

## 2021-04-14 NOTE — PROGRESS NOTE ADULT - SUBJECTIVE AND OBJECTIVE BOX
Gomez Lane MD PGY-1  Internal Medicine  Pager [phone] / [phone]    INTERVAL HPI / OVERNIGHT EVENTS:  -    SUBJECTIVE: Patient seen and examined at bedside.   -    INCOMPLETE ROS   CONSTITUTIONAL: No weakness, fevers or chills  EYES/ENT: No visual changes;  No vertigo or throat pain   NECK: No pain or stiffness  RESPIRATORY: No cough, wheezing, hemoptysis; No shortness of breath  CARDIOVASCULAR: No chest pain or palpitations  GASTROINTESTINAL: No abdominal or epigastric pain. No nausea, vomiting, or hematemesis; No diarrhea or constipation. No melena or hematochezia.  GENITOURINARY: No dysuria, frequency or hematuria  NEUROLOGICAL: No numbness or weakness  SKIN: No itching, rashes    OBJECTIVE:    VITAL SIGNS:  ICU Vital Signs Last 24 Hrs  T(C): 36.7 (14 Apr 2021 10:30), Max: 36.8 (13 Apr 2021 17:32)  T(F): 98 (14 Apr 2021 10:30), Max: 98.2 (13 Apr 2021 17:32)  HR: 109 (14 Apr 2021 10:30) (100 - 138)  BP: 129/84 (14 Apr 2021 10:30) (103/73 - 129/84)  BP(mean): --  ABP: --  ABP(mean): --  RR: 18 (14 Apr 2021 10:30) (17 - 100)  SpO2: 99% (14 Apr 2021 10:30) (97% - 100%)        04-14 @ 07:01  -  04-14 @ 11:30  --------------------------------------------------------  IN: 0 mL / OUT: 250 mL / NET: -250 mL      CAPILLARY BLOOD GLUCOSE      POCT Blood Glucose.: 146 mg/dL (14 Apr 2021 09:01)      PHYSICAL EXAM: INCOMPLETE PHYSICAL EXAM     General: NAD  HEENT: NC/AT; PERRL, clear conjunctiva  Neck: supple  Respiratory: CTA b/l  Cardiovascular: +S1/S2; RRR  Abdomen: soft, NT/ND; +BS x4  Extremities: WWP, 2+ peripheral pulses b/l; no LE edema  Skin: normal color and turgor; no rash  Neurological:    MEDICATIONS:  MEDICATIONS  (STANDING):  benztropine 1 milliGRAM(s) Oral two times a day  cholecalciferol 1000 Unit(s) Oral daily  dexAMETHasone  Injectable 6 milliGRAM(s) IV Push daily  dextrose 40% Gel 15 Gram(s) Oral once  dextrose 50% Injectable 25 Gram(s) IV Push once  glucagon  Injectable 1 milliGRAM(s) IntraMuscular once  haloperidol     Tablet 10 milliGRAM(s) Oral two times a day  heparin   Injectable 5000 Unit(s) SubCutaneous every 12 hours  insulin lispro (ADMELOG) corrective regimen sliding scale   SubCutaneous three times a day before meals  insulin lispro (ADMELOG) corrective regimen sliding scale   SubCutaneous at bedtime  nicotine - 21 mG/24Hr(s) Patch 1 patch Transdermal daily  OLANZapine 20 milliGRAM(s) Oral every 12 hours  traZODone 100 milliGRAM(s) Oral at bedtime    MEDICATIONS  (PRN):  acetaminophen   Tablet .. 650 milliGRAM(s) Oral every 6 hours PRN Temp greater or equal to 38C (100.4F), Mild Pain (1 - 3), Moderate Pain (4 - 6)  haloperidol    Injectable 2 milliGRAM(s) IntraMuscular every 6 hours PRN Agitation  lidocaine   Patch 1 Patch Transdermal daily PRN pain  LORazepam   Injectable 1 milliGRAM(s) IntraMuscular every 6 hours PRN SEVERE agitation  nicotine  Polacrilex Gum 2 milliGRAM(s) Oral every 2 hours PRN smoking cessation  polyethylene glycol 3350 17 Gram(s) Oral daily PRN Constipation  senna 2 Tablet(s) Oral at bedtime PRN Constipation      ALLERGIES:  Allergies    No Known Allergies    Intolerances        LABS:    Hemoglobin: 7.4 g/dL (04-09 @ 22:26)            Creatinine Trend: 0.87<--, 0.76<--, 0.76<--, 0.94<--, 0.93<--, 1.07<--        hs Troponin:              CSF:                      EKG:   MICROBIOLOGY:    IMAGING:      Labs, imaging, EKG personally reviewed    RADIOLOGY & ADDITIONAL TESTS: Reviewed. Gomez Lane MD PGY-1  Internal Medicine  Pager [phone] / [phone]    INTERVAL HPI / OVERNIGHT EVENTS:  -NAEON    SUBJECTIVE: Patient seen and examined at bedside. ROS limited d/t psychiatric illness.  -patient states that he is feeling okay, reports improvement in difficulty breathing, denies fevers, cp, abd pain, focal weakness.    OBJECTIVE:    VITAL SIGNS:  ICU Vital Signs Last 24 Hrs  T(C): 36.7 (14 Apr 2021 10:30), Max: 36.8 (13 Apr 2021 17:32)  T(F): 98 (14 Apr 2021 10:30), Max: 98.2 (13 Apr 2021 17:32)  HR: 109 (14 Apr 2021 10:30) (100 - 138)  BP: 129/84 (14 Apr 2021 10:30) (103/73 - 129/84)  BP(mean): --  ABP: --  ABP(mean): --  RR: 18 (14 Apr 2021 10:30) (17 - 100)  SpO2: 99% (14 Apr 2021 10:30) (97% - 100%)        04-14 @ 07:01  -  04-14 @ 11:30  --------------------------------------------------------  IN: 0 mL / OUT: 250 mL / NET: -250 mL      CAPILLARY BLOOD GLUCOSE      POCT Blood Glucose.: 146 mg/dL (14 Apr 2021 09:01)    PHYSICAL EXAM: limited d/t patient allowing a limited exam    General: NAD  HEENT: NC/AT; PERRL, clear conjunctiva  Respiratory: tachypneic, auscultation of anterior fields: decreased breath sounds on left, scattered fine crackles on right, non-labored breathing, NRB resting on chin  Cardiovascular: regular rate, no m/r/g  Abdomen: no ttp, bowel sounds x4  Extremities: no edema  Skin: normal color and turgor; no rash     MEDICATIONS:  MEDICATIONS  (STANDING):  benztropine 1 milliGRAM(s) Oral two times a day  cholecalciferol 1000 Unit(s) Oral daily  dexAMETHasone  Injectable 6 milliGRAM(s) IV Push daily  dextrose 40% Gel 15 Gram(s) Oral once  dextrose 50% Injectable 25 Gram(s) IV Push once  glucagon  Injectable 1 milliGRAM(s) IntraMuscular once  haloperidol     Tablet 10 milliGRAM(s) Oral two times a day  heparin   Injectable 5000 Unit(s) SubCutaneous every 12 hours  insulin lispro (ADMELOG) corrective regimen sliding scale   SubCutaneous three times a day before meals  insulin lispro (ADMELOG) corrective regimen sliding scale   SubCutaneous at bedtime  nicotine - 21 mG/24Hr(s) Patch 1 patch Transdermal daily  OLANZapine 20 milliGRAM(s) Oral every 12 hours  traZODone 100 milliGRAM(s) Oral at bedtime    MEDICATIONS  (PRN):  acetaminophen   Tablet .. 650 milliGRAM(s) Oral every 6 hours PRN Temp greater or equal to 38C (100.4F), Mild Pain (1 - 3), Moderate Pain (4 - 6)  haloperidol    Injectable 2 milliGRAM(s) IntraMuscular every 6 hours PRN Agitation  lidocaine   Patch 1 Patch Transdermal daily PRN pain  LORazepam   Injectable 1 milliGRAM(s) IntraMuscular every 6 hours PRN SEVERE agitation  nicotine  Polacrilex Gum 2 milliGRAM(s) Oral every 2 hours PRN smoking cessation  polyethylene glycol 3350 17 Gram(s) Oral daily PRN Constipation  senna 2 Tablet(s) Oral at bedtime PRN Constipation      ALLERGIES:  Allergies    No Known Allergies    Intolerances     LABS:    Hemoglobin: 7.4 g/dL (04-09 @ 22:26)     Creatinine Trend: 0.87<--, 0.76<--, 0.76<--, 0.94<--, 0.93<--, 1.07<--     Labs, imaging, EKG personally reviewed    RADIOLOGY & ADDITIONAL TESTS: Reviewed.

## 2021-04-14 NOTE — PROGRESS NOTE ADULT - ASSESSMENT
60M w/ HTN, Type 2 DM-not on medication, schizoaffective disorder, polysubstance abuse (cocaine, alcohol, cigarettes), a/w weakness and found to have intrathoracic mass involving pulmonary vasculature, hypercalecmia and COVID +.    #hypercalcemia  -improved since admission-corrected Ca 10.4 on last labs 4/9/21, s/p Pamidronate 90mg IV on 4/3/21.   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  -continue cholecalciferol 1000IU daily for low 25-OH Vit D.    #Type 2 diabetes  -patient not on medication for diabetes as outpatient, unable to order HbA1c thus far due to lab check refusal  -glucose now higher due to dexamethasone for COVID  -check HbA1c with next labs  -check FS qac and qhs with and can continue moderate Admelog correction scale before meals and moderate bedtime scale if patient willing to accept doses.  -Consistent carb diet  -DC plan based on HbA1c if able to obtain    #HTN  -goal bp <130/80-has been at goal    Vladimir Hanna MD  Division of Endocrinology  Pager: [phone]    If after 6PM or before 9AM, or on weekends/holidays, please call endocrine answering service for assistance ([phone]).  For nonurgent matters email [email or web address] for assistance.

## 2021-04-15 LAB — GLUCOSE BLDC GLUCOMTR-MCNC: 228 MG/DL — HIGH (ref 70–99)

## 2021-04-15 PROCEDURE — 99232 SBSQ HOSP IP/OBS MODERATE 35: CPT | Mod: GC

## 2021-04-15 PROCEDURE — 99232 SBSQ HOSP IP/OBS MODERATE 35: CPT

## 2021-04-15 RX ADMIN — Medication 2 MILLIGRAM(S): at 11:23

## 2021-04-15 RX ADMIN — Medication 1 MILLIGRAM(S): at 11:23

## 2021-04-15 RX ADMIN — Medication 2 MILLIGRAM(S): at 23:45

## 2021-04-15 RX ADMIN — OLANZAPINE 20 MILLIGRAM(S): 15 TABLET, FILM COATED ORAL at 11:23

## 2021-04-15 RX ADMIN — Medication 1 MILLIGRAM(S): at 22:02

## 2021-04-15 RX ADMIN — Medication 1000 UNIT(S): at 11:23

## 2021-04-15 RX ADMIN — Medication 6 MILLIGRAM(S): at 06:41

## 2021-04-15 RX ADMIN — Medication 100 MILLIGRAM(S): at 22:03

## 2021-04-15 RX ADMIN — HALOPERIDOL DECANOATE 10 MILLIGRAM(S): 100 INJECTION INTRAMUSCULAR at 11:23

## 2021-04-15 RX ADMIN — HALOPERIDOL DECANOATE 10 MILLIGRAM(S): 100 INJECTION INTRAMUSCULAR at 22:02

## 2021-04-15 RX ADMIN — HEPARIN SODIUM 5000 UNIT(S): 5000 INJECTION INTRAVENOUS; SUBCUTANEOUS at 06:40

## 2021-04-15 NOTE — PROGRESS NOTE ADULT - ASSESSMENT
60M w/ HTN, DM, schizoaffective disorder, polysubstance abuse (cocaine, alcohol, cigarettes), hx of DVT? previously on Eliquis presents to ED c/o neck pain, found to have a large heterogeneous mass occupying the majority of the left intrathoracic cavity with invasion of the left pulmonary artery, left pulmonary veins, and left atrium and probably R pulmonary mets, concern for new malignancy 60M w/ HTN, DM, schizoaffective disorder, polysubstance abuse (cocaine, alcohol, cigarettes), hx of DVT? previously on Eliquis presents to ED c/o neck pain, found to have a large heterogeneous mass occupying the majority of the left intrathoracic cavity with invasion of the left pulmonary artery, left pulmonary veins, and left atrium and probably R pulmonary mets, concern for new malignancy.

## 2021-04-15 NOTE — PROGRESS NOTE ADULT - SUBJECTIVE AND OBJECTIVE BOX
Chief Complaint: Hypercalcemia, hyperglycemia    History:   Patient reports tolerating po.  Asked if he would accept blood draw to follow up calcium and refused.  Intermittently refusing fingersticks and insulin doses    MEDICATIONS  (STANDING):  benztropine 1 milliGRAM(s) Oral two times a day  cholecalciferol 1000 Unit(s) Oral daily  dexAMETHasone  Injectable 6 milliGRAM(s) IV Push daily  dextrose 40% Gel 15 Gram(s) Oral once  dextrose 50% Injectable 25 Gram(s) IV Push once  glucagon  Injectable 1 milliGRAM(s) IntraMuscular once  haloperidol     Tablet 10 milliGRAM(s) Oral two times a day  heparin   Injectable 5000 Unit(s) SubCutaneous every 12 hours  insulin lispro (ADMELOG) corrective regimen sliding scale   SubCutaneous three times a day before meals  insulin lispro (ADMELOG) corrective regimen sliding scale   SubCutaneous at bedtime  nicotine - 21 mG/24Hr(s) Patch 1 patch Transdermal daily  OLANZapine 20 milliGRAM(s) Oral every 12 hours  traZODone 100 milliGRAM(s) Oral at bedtime    MEDICATIONS  (PRN):  acetaminophen   Tablet .. 650 milliGRAM(s) Oral every 6 hours PRN Temp greater or equal to 38C (100.4F), Mild Pain (1 - 3), Moderate Pain (4 - 6)  haloperidol    Injectable 2 milliGRAM(s) IntraMuscular every 6 hours PRN Agitation  lidocaine   Patch 1 Patch Transdermal daily PRN pain  nicotine  Polacrilex Gum 2 milliGRAM(s) Oral every 2 hours PRN smoking cessation  polyethylene glycol 3350 17 Gram(s) Oral daily PRN Constipation  senna 2 Tablet(s) Oral at bedtime PRN Constipation      Allergies    No Known Allergies    Intolerances      Review of Systems:  ALL OTHER SYSTEMS REVIEWED AND NEGATIVE        PHYSICAL EXAM:  VITALS: T(C): 36.5 (04-15-21 @ 06:39)  T(F): 97.7 (04-15-21 @ 06:39), Max: 98.6 (04-14-21 @ 22:07)  HR: 110 (04-15-21 @ 06:39) (101 - 110)  BP: 135/86 (04-15-21 @ 06:39) (102/81 - 135/86)  RR:  (17 - 19)  SpO2:  (94% - 98%)  Wt(kg): --  ENERAL: thin male, appears comfortable  EYES: No proptosis, no lid lag, anicteric  HEENT:  Atraumatic, Normocephalic, moist mucous membranes  RESPIRATORY: nonlabored appearing respirations, off oxygen  PSYCH: Alert and awake, poor insight    CAPILLARY BLOOD GLUCOSE      POCT Blood Glucose.: 253 mg/dL (14 Apr 2021 17:44)  POCT Blood Glucose.: 228 mg/dL (14 Apr 2021 13:40)                  Thyroid Function Tests:  04-01 @ 12:33 TSH 2.61 FreeT4 -- T3 -- Anti TPO -- Anti Thyroglobulin Ab -- TSI --

## 2021-04-15 NOTE — PROGRESS NOTE ADULT - ASSESSMENT
60M w/ HTN, Type 2 DM-not on medication, schizoaffective disorder, polysubstance abuse (cocaine, alcohol, cigarettes), a/w weakness and found to have intrathoracic mass involving pulmonary vasculature, hypercalecmia and COVID +.    #hypercalcemia  -improved since admission-corrected Ca 10.4 on last labs 4/9/21, s/p Pamidronate 90mg IV on 4/3/21.   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  -will need treatment of underlying malignancy for long term calcium control; effects of Pamidronate will wear off in a few weeks  -continue to trend serum Ca, Alb if patient agrees to labs - no labs drawn past 6 days, would need to repeat calcium level as soon as patient agrees.  -continue cholecalciferol 1000IU daily for low 25-OH Vit D.    #Type 2 diabetes  -patient not on medication for diabetes as outpatient, unable to order HbA1c thus far due to lab check refusal  -glucose now higher due to dexamethasone for COVID  -check HbA1c with next labs  -check FS qac and qhs with and can continue moderate Admelog correction scale before meals and moderate bedtime scale if patient willing to accept doses.  -Consistent carb diet  -DC plan based on HbA1c if able to obtain    #HTN  -goal bp <130/80-has been at goal    Vladimir Hanna MD  Division of Endocrinology  Pager: [phone]    If after 6PM or before 9AM, or on weekends/holidays, please call endocrine answering service for assistance ([phone]).  For nonurgent matters email [email or web address] for assistance.

## 2021-04-15 NOTE — PROVIDER CONTACT NOTE (OTHER) - SITUATION
pt refusing RN assessment,  F/S. when asked pt stated "I am alright".  RN asked again and pt became upset.

## 2021-04-15 NOTE — PROGRESS NOTE ADULT - PROBLEM SELECTOR PLAN 3
-full code for now  -workup for malignancy as above  -patient's family (son, mother, sister) agrees with CMO and hospice ([facility] conversation 4/9); however, patient known/protected by MHLS therefore EOL decisions must be approved through formal MOLST checklist, further discussion with Mental Hygiene Legal Services are required prior to further EOL planning  -patient DNR/DNI, c/w reasonable medical treatments, pursue hospice

## 2021-04-15 NOTE — PROGRESS NOTE ADULT - PROBLEM SELECTOR PLAN 1
concern for malignancy (primary vs. secondary), recent unintentional weight loss, bone pain (cervical spine), active smoker: 1 ppd for "many years", father w/ hx of lung cancer  - CTH/CAP scan - large heterogeneous mass occupying the majority of the left intrathoracic cavity with invasion of the left pulmonary artery, left pulmonary veins, and left atrium and probably R pulmonary mets  -pt refusing biopsy  -discussed case w/ family, who initially wanted biopsy to confirm diagnosis, however, discussed case with IR, recommending against biopsy given the significant risk of biopsy under sedation i/s/o mass invading pulmonary vessels. Furthermore, given likely prognosis, biopsy likely not indicated. Discussed with family on 4/8.  -Palliative recs appreciated  -patient DNR/DNI, c/w reasonable medical treatments, pursue hospice

## 2021-04-15 NOTE — PROGRESS NOTE ADULT - ATTENDING COMMENTS
60M, active smoker, PMH HTN, Type 2 DM, schizoaffective disorder, polysubstance abuse (cocaine, alcohol, cigarettes), hx of DVT? previously on Eliquis p/w neck pain and hearing voices. Found to have a large heterogeneous mass occupying majority of the left intrathoracic cavity with invasion of the left pulmonary vasculature and left atrium. Tumor also occludes the left main bronchus and probable right pulmonary metastases. IR recommending against biopsy given the significant risk of biopsy under sedation i/s/o mass invading pulmonary vessels. Also COVID positive requiring supplemental oxygen - c/w dexamethasone, d/c remdesevir as patient refusing blood draws and cannot monitor renal/hepatic function. Patient with poor insight, and does not have decision making capacity, d/w family (NOK son Liam, mother Twyla, sister Ira) who are okay with hospice referral, comfort measures. Patient required MOLST checklist given [facility] connection - appreciate palliative care input - patient now DNR/DNI, no further work up for malignancy. Hospice referral once dexamethasone completed.

## 2021-04-15 NOTE — PROGRESS NOTE ADULT - PROBLEM SELECTOR PLAN 10
DVT ppx: hsq  Diet: regular  Dispo: pending d/w LS DVT ppx: hsq  Diet: regular  Dispo: DNR/DNI. Fam interested in hospice. Case discussed with Mental Hygiene services. Pt will complete steroid treatment for covid then will investigate hospice.

## 2021-04-15 NOTE — HOSPICE CARE NOTE - CONVESATION DETAILS
This writer spoke with [facility] where pt resides. He lives in an independent unit and does not receive help with ADL's or medication supervision. Given the fact that pt is confused and dually inc. pt cannot return to his former situation. Suggested to  that possibly a 24hr Medicaid aide could be placed. Hospice would be able to take the case when the 24hr aide was in place. 
Referral was received about 2PM. Gathered necessary information and found that pt was known to mental health and [facility] would need documentation before accepting the pt. Shalini Vargas PCT SW stated that this referral should be held until Monday due to the need to get further information.

## 2021-04-15 NOTE — PROGRESS NOTE ADULT - SUBJECTIVE AND OBJECTIVE BOX
Patient is a 60y old  Male who presents with a chief complaint of neck pain (15 Apr 2021 13:18)      INTERVAL HPI/OVERNIGHT EVENTS:  Pt refusing     REVIEW OF SYSTEMS:  Gen: No fever, normal appetite  Eyes: No eye irritation or discharge  ENT: No ear pain, congestion, sore throat  Resp: No cough or trouble breathing  Cardiovascular: No chest pain or palpitation  Gastroenteric: No nausea/vomiting, diarrhea, constipation  :  No change in urine output; no dysuria  MS: No joint or muscle pain  Skin: No rashes  Neuro: No headache; no abnormal movements  Remainder negative, except as per the HPI    MEDICATIONS  (STANDING):  benztropine 1 milliGRAM(s) Oral two times a day  cholecalciferol 1000 Unit(s) Oral daily  dexAMETHasone  Injectable 6 milliGRAM(s) IV Push daily  dextrose 40% Gel 15 Gram(s) Oral once  dextrose 50% Injectable 25 Gram(s) IV Push once  glucagon  Injectable 1 milliGRAM(s) IntraMuscular once  haloperidol     Tablet 10 milliGRAM(s) Oral two times a day  heparin   Injectable 5000 Unit(s) SubCutaneous every 12 hours  insulin lispro (ADMELOG) corrective regimen sliding scale   SubCutaneous three times a day before meals  insulin lispro (ADMELOG) corrective regimen sliding scale   SubCutaneous at bedtime  nicotine - 21 mG/24Hr(s) Patch 1 patch Transdermal daily  OLANZapine 20 milliGRAM(s) Oral every 12 hours  traZODone 100 milliGRAM(s) Oral at bedtime    MEDICATIONS  (PRN):  acetaminophen   Tablet .. 650 milliGRAM(s) Oral every 6 hours PRN Temp greater or equal to 38C (100.4F), Mild Pain (1 - 3), Moderate Pain (4 - 6)  haloperidol    Injectable 2 milliGRAM(s) IntraMuscular every 6 hours PRN Agitation  lidocaine   Patch 1 Patch Transdermal daily PRN pain  nicotine  Polacrilex Gum 2 milliGRAM(s) Oral every 2 hours PRN smoking cessation  polyethylene glycol 3350 17 Gram(s) Oral daily PRN Constipation  senna 2 Tablet(s) Oral at bedtime PRN Constipation      Vital Signs Last 24 Hrs  T(C): 36.5 (15 Apr 2021 06:39), Max: 37 (14 Apr 2021 22:07)  T(F): 97.7 (15 Apr 2021 06:39), Max: 98.6 (14 Apr 2021 22:07)  HR: 105 (15 Apr 2021 14:22) (101 - 110)  BP: 122/88 (15 Apr 2021 14:22) (102/81 - 135/86)  BP(mean): --  RR: 26 (15 Apr 2021 14:22) (17 - 26)  SpO2: 90% (15 Apr 2021 14:22) (90% - 100%)  I&O's Summary    14 Apr 2021 07:01  -  15 Apr 2021 07:00  --------------------------------------------------------  IN: 0 mL / OUT: 250 mL / NET: -250 mL    15 Apr 2021 07:01  -  15 Apr 2021 15:18  --------------------------------------------------------  IN: 200 mL / OUT: 200 mL / NET: 0 mL        PHYSICAL EXAM:  General: WN/WD NAD  HEENT: PERRLA, EOMI, moist mucous membranes  Neurology: A&Ox3, nonfocal, FOSTER x 4  Respiratory: CTA B/L, normal respiratory effort, no wheezes, crackles, rales  CV: RRR, S1S2, no murmurs, rubs or gallops  Abdominal: Soft, NT, ND +BS, Last BM  Extremities: No edema, + peripheral pulses  Incisions:   Tubes:    LABS:    Hb Trend: 7.4<--, 6.6<--  WBC Trend: 17.39<--, 14.70<--  Plt Trend: 301<--, 271<--                      CAPILLARY BLOOD GLUCOSE      POCT Blood Glucose.: 253 mg/dL (14 Apr 2021 17:44)              RADIOLOGY & ADDITIONAL TESTS:    Imaging Personally Reviewed:  [ ] YES  [ ] NO [ ] No New Imaging Last 24hrs    Consultant(s) Notes Reviewed:  [x ] YES  [ ] NO    Care Discussed with Consultants/Other Providers [ x] YES  [ ] NO Patient is a 60y old  Male who presents with a chief complaint of neck pain (15 Apr 2021 13:18)      INTERVAL HPI/OVERNIGHT EVENTS:  Pt refusing labs and vitals today. Refusing to answer questions.    REVIEW OF SYSTEMS:  Unable to obtain 2/2 patient refusal.    MEDICATIONS  (STANDING):  benztropine 1 milliGRAM(s) Oral two times a day  cholecalciferol 1000 Unit(s) Oral daily  dexAMETHasone  Injectable 6 milliGRAM(s) IV Push daily  dextrose 40% Gel 15 Gram(s) Oral once  dextrose 50% Injectable 25 Gram(s) IV Push once  glucagon  Injectable 1 milliGRAM(s) IntraMuscular once  haloperidol     Tablet 10 milliGRAM(s) Oral two times a day  heparin   Injectable 5000 Unit(s) SubCutaneous every 12 hours  insulin lispro (ADMELOG) corrective regimen sliding scale   SubCutaneous three times a day before meals  insulin lispro (ADMELOG) corrective regimen sliding scale   SubCutaneous at bedtime  nicotine - 21 mG/24Hr(s) Patch 1 patch Transdermal daily  OLANZapine 20 milliGRAM(s) Oral every 12 hours  traZODone 100 milliGRAM(s) Oral at bedtime    MEDICATIONS  (PRN):  acetaminophen   Tablet .. 650 milliGRAM(s) Oral every 6 hours PRN Temp greater or equal to 38C (100.4F), Mild Pain (1 - 3), Moderate Pain (4 - 6)  haloperidol    Injectable 2 milliGRAM(s) IntraMuscular every 6 hours PRN Agitation  lidocaine   Patch 1 Patch Transdermal daily PRN pain  nicotine  Polacrilex Gum 2 milliGRAM(s) Oral every 2 hours PRN smoking cessation  polyethylene glycol 3350 17 Gram(s) Oral daily PRN Constipation  senna 2 Tablet(s) Oral at bedtime PRN Constipation      Vital Signs Last 24 Hrs  T(C): 36.5 (15 Apr 2021 06:39), Max: 37 (14 Apr 2021 22:07)  T(F): 97.7 (15 Apr 2021 06:39), Max: 98.6 (14 Apr 2021 22:07)  HR: 105 (15 Apr 2021 14:22) (101 - 110)  BP: 122/88 (15 Apr 2021 14:22) (102/81 - 135/86)  BP(mean): --  RR: 26 (15 Apr 2021 14:22) (17 - 26)  SpO2: 90% (15 Apr 2021 14:22) (90% - 100%)  I&O's Summary    14 Apr 2021 07:01  -  15 Apr 2021 07:00  --------------------------------------------------------  IN: 0 mL / OUT: 250 mL / NET: -250 mL    15 Apr 2021 07:01  -  15 Apr 2021 15:18  --------------------------------------------------------  IN: 200 mL / OUT: 200 mL / NET: 0 mL        PHYSICAL EXAM:  General: Cachectic, calm NAD  Head: Atraumatic  Eyes: EOM grossly in tact, no scleral icterus  ENT: moist mucous membranes  Neurology: Unable to assess 2/2 patient refusal to answer, nonfocal, FOSTER x 4  Respiratory: Increased respiratory effort  CV: Extremities appear warm and well perfused  Abdominal: non-distended  Skin: No rashes      LABS:    Hb Trend: 7.4<--, 6.6<--  WBC Trend: 17.39<--, 14.70<--  Plt Trend: 301<--, 271<--                      CAPILLARY BLOOD GLUCOSE      POCT Blood Glucose.: 253 mg/dL (14 Apr 2021 17:44)              RADIOLOGY & ADDITIONAL TESTS:    Imaging Personally Reviewed:  [ ] YES  [ ] NO [x ] No New Imaging Last 24hrs    Consultant(s) Notes Reviewed:  [x ] YES  [ ] NO    Care Discussed with Consultants/Other Providers [ x] YES  [ ] NO

## 2021-04-16 LAB — GLUCOSE BLDC GLUCOMTR-MCNC: 224 MG/DL — HIGH (ref 70–99)

## 2021-04-16 PROCEDURE — 99232 SBSQ HOSP IP/OBS MODERATE 35: CPT | Mod: GC

## 2021-04-16 NOTE — PROGRESS NOTE ADULT - PROBLEM SELECTOR PLAN 10
DVT ppx: hsq  Diet: regular  Dispo: DNR/DNI. Fam interested in hospice. Case discussed with Mental Hygiene services. Pt will complete steroid treatment for covid then will investigate hospice.

## 2021-04-16 NOTE — PROGRESS NOTE ADULT - PROBLEM SELECTOR PLAN 1
concern for malignancy (primary vs. secondary), recent unintentional weight loss, bone pain (cervical spine), active smoker: 1 ppd for "many years", father w/ hx of lung cancer  - CTH/CAP scan - large heterogeneous mass occupying the majority of the left intrathoracic cavity with invasion of the left pulmonary artery, left pulmonary veins, and left atrium and probably R pulmonary mets  -pt refusing biopsy  -discussed case w/ family, who initially wanted biopsy to confirm diagnosis, however, discussed case with IR, recommending against biopsy given the significant risk of biopsy under sedation i/s/o mass invading pulmonary vessels. Furthermore, given likely prognosis, biopsy likely not indicated. Discussed with family on 4/8.  -Palliative recs appreciated  -patient DNR/DNI, c/w reasonable medical treatments, pursue hospice concern for malignancy (primary vs. secondary), recent unintentional weight loss, bone pain (cervical spine), active smoker: 1 ppd for "many years", father w/ hx of lung cancer  - CTH/CAP scan - large heterogeneous mass occupying the majority of the left intrathoracic cavity with invasion of the left pulmonary artery, left pulmonary veins, and left atrium and probably R pulmonary mets  -pt refusing biopsy  -discussed case w/ family, who initially wanted biopsy to confirm diagnosis, however, discussed case with IR, recommending against biopsy given the significant risk of biopsy under sedation i/s/o mass invading pulmonary vessels. Furthermore, given likely prognosis, biopsy likely not indicated. Discussed with family on 4/8.  -Palliative recs appreciated  -patient DNR/DNI, c/w reasonable medical treatments, pursue hospice  -weaning off oxygen as tolerated

## 2021-04-16 NOTE — PROGRESS NOTE ADULT - ATTENDING COMMENTS
60M, active smoker, PMH HTN, Type 2 DM, schizoaffective disorder, polysubstance abuse (cocaine, alcohol, cigarettes), hx of DVT? previously on Eliquis p/w neck pain and hearing voices. Found to have a large heterogeneous mass occupying majority of the left intrathoracic cavity with invasion of the left pulmonary vasculature and left atrium. Tumor also occludes the left main bronchus and probable right pulmonary metastases. IR recommending against biopsy given the significant risk of biopsy under sedation i/s/o mass invading pulmonary vessels. Also COVID positive requiring supplemental oxygen - c/w dexamethasone, d/c remdesevir as patient refusing blood draws and cannot monitor renal/hepatic function. Patient with poor insight, and does not have decision making capacity, d/w family (NOK son Liam, mother Twyla, sister Ira) who are okay with hospice referral, comfort measures. Patient required MOLST checklist given [facility] connection - appreciate palliative care input - patient now DNR/DNI, no further work up for malignancy. Anticipate hospice once dexamethasone completed. 60M, active smoker, hx ofschizoaffective disorder, polysubstance abuse (cocaine, alcohol, cigarettes), DVT (?previously on Eliquis), HTN, T2DM, who p/w neck pain and hearing voices. Found to have a large heterogeneous mass occupying majority of the left intrathoracic cavity with invasion of the left pulmonary vasculature and left atrium. Tumor also occludes the left main bronchus and probable right pulmonary metastases. IR recommending against biopsy given the significant risk of biopsy under sedation i/s/o mass invading pulmonary vessels. Also COVID positive requiring supplemental oxygen - c/w dexamethasone, d/c remdesevir as patient refusing blood draws and cannot monitor renal/hepatic function. Patient with poor insight, and does not have decision making capacity, d/w family (NOK son Liam, mother Twyla, sister Ira) who are okay with hospice referral, comfort measures. Patient required MOLST checklist given [facility] connection - appreciate palliative care input - patient now DNR/DNI, no further work up for malignancy. Anticipate hospice once dexamethasone completed. 60M, active smoker, hx of schizoaffective disorder, polysubstance abuse (cocaine, alcohol, cigarettes), DVT (?previously on Eliquis), HTN, T2DM, who p/w neck pain and hearing voices. Found to have a large heterogeneous mass occupying majority of the left intrathoracic cavity with invasion of the left pulmonary vasculature and left atrium. Tumor also occludes the left main bronchus and probable right pulmonary metastases. IR recommending against biopsy given the significant risk of biopsy under sedation i/s/o mass invading pulmonary vessels. Also COVID positive requiring supplemental oxygen - c/w dexamethasone, d/c remdesevir as patient refusing blood draws and cannot monitor renal/hepatic function. Patient with poor insight, and does not have decision making capacity, d/w family (NOK son Liam, mother Twyla, sister Ira) who are okay with hospice referral, comfort measures. Patient required MOLST checklist given [facility] connection - appreciate palliative care input - patient now DNR/DNI, no further work up for malignancy. Anticipate hospice once dexamethasone completed. 60M, active smoker, hx of schizoaffective disorder, polysubstance abuse (cocaine, alcohol, cigarettes), DVT (?previously on Eliquis), HTN, T2DM, who p/w neck pain and hearing voices. Found to have a large heterogeneous mass occupying majority of the left intrathoracic cavity with invasion of the left pulmonary vasculature and left atrium. Tumor also occludes the left main bronchus and probable right pulmonary metastases. IR recommending against biopsy given the significant risk of biopsy under sedation i/s/o mass invading pulmonary vessels. Also COVID positive requiring supplemental oxygen - c/w dexamethasone, d/c remdesevir as patient refusing blood draws and cannot monitor renal/hepatic function. Patient with poor insight, and does not have decision making capacity, d/w family (NOK son Liam, mother Twyla, sister Ira) who are okay with hospice referral, comfort measures. Patient required MOLST checklist given [facility] connection - appreciate palliative care input - patient now DNR/DNI, no further work up for malignancy. Hospice on board - discussing with CM regarding 24 hour aide, hospice would be able to take the case when the 24hr aide was in place. Safe discharge planning ongoing.

## 2021-04-16 NOTE — PROGRESS NOTE ADULT - ASSESSMENT
60M w/ HTN, DM, schizoaffective disorder, polysubstance abuse (cocaine, alcohol, cigarettes), hx of DVT? previously on Eliquis presents to ED c/o neck pain, found to have a large heterogeneous mass occupying the majority of the left intrathoracic cavity with invasion of the left pulmonary artery, left pulmonary veins, and left atrium and probably R pulmonary mets, concern for new malignancy.

## 2021-04-16 NOTE — PROGRESS NOTE ADULT - SUBJECTIVE AND OBJECTIVE BOX
Gomez Lane MD PGY-1  Internal Medicine  Pager [phone] / [phone]    INTERVAL HPI / OVERNIGHT EVENTS:  -NAEON    SUBJECTIVE: Patient seen and examined at bedside.   -    INCOMPLETE ROS   CONSTITUTIONAL: No weakness, fevers or chills  EYES/ENT: No visual changes;  No vertigo or throat pain   NECK: No pain or stiffness  RESPIRATORY: No cough, wheezing, hemoptysis; No shortness of breath  CARDIOVASCULAR: No chest pain or palpitations  GASTROINTESTINAL: No abdominal or epigastric pain. No nausea, vomiting, or hematemesis; No diarrhea or constipation. No melena or hematochezia.  GENITOURINARY: No dysuria, frequency or hematuria  NEUROLOGICAL: No numbness or weakness  SKIN: No itching, rashes    OBJECTIVE:    VITAL SIGNS:  ICU Vital Signs Last 24 Hrs  T(C): 36.6 (15 Apr 2021 21:49), Max: 36.6 (15 Apr 2021 21:49)  T(F): 97.9 (15 Apr 2021 21:49), Max: 97.9 (15 Apr 2021 21:49)  HR: 107 (15 Apr 2021 21:49) (105 - 109)  BP: 130/86 (15 Apr 2021 21:49) (122/88 - 130/86)  BP(mean): --  ABP: --  ABP(mean): --  RR: 26 (15 Apr 2021 21:49) (24 - 26)  SpO2: 95% (15 Apr 2021 21:49) (90% - 100%)        04-15 @ 07:01  -  04-16 @ 07:00  --------------------------------------------------------  IN: 200 mL / OUT: 200 mL / NET: 0 mL      CAPILLARY BLOOD GLUCOSE      POCT Blood Glucose.: 228 mg/dL (15 Apr 2021 18:09)      PHYSICAL EXAM: INCOMPLETE PHYSICAL EXAM     General: NAD  HEENT: NC/AT; PERRL, clear conjunctiva  Neck: supple  Respiratory: CTA b/l  Cardiovascular: +S1/S2; RRR  Abdomen: soft, NT/ND; +BS x4  Extremities: WWP, 2+ peripheral pulses b/l; no LE edema  Skin: normal color and turgor; no rash  Neurological:    MEDICATIONS:  MEDICATIONS  (STANDING):  benztropine 1 milliGRAM(s) Oral two times a day  cholecalciferol 1000 Unit(s) Oral daily  dexAMETHasone  Injectable 6 milliGRAM(s) IV Push daily  dextrose 40% Gel 15 Gram(s) Oral once  dextrose 50% Injectable 25 Gram(s) IV Push once  glucagon  Injectable 1 milliGRAM(s) IntraMuscular once  haloperidol     Tablet 10 milliGRAM(s) Oral two times a day  heparin   Injectable 5000 Unit(s) SubCutaneous every 12 hours  insulin lispro (ADMELOG) corrective regimen sliding scale   SubCutaneous three times a day before meals  insulin lispro (ADMELOG) corrective regimen sliding scale   SubCutaneous at bedtime  nicotine - 21 mG/24Hr(s) Patch 1 patch Transdermal daily  OLANZapine 20 milliGRAM(s) Oral every 12 hours  traZODone 100 milliGRAM(s) Oral at bedtime    MEDICATIONS  (PRN):  acetaminophen   Tablet .. 650 milliGRAM(s) Oral every 6 hours PRN Temp greater or equal to 38C (100.4F), Mild Pain (1 - 3), Moderate Pain (4 - 6)  haloperidol    Injectable 2 milliGRAM(s) IntraMuscular every 6 hours PRN Agitation  lidocaine   Patch 1 Patch Transdermal daily PRN pain  nicotine  Polacrilex Gum 2 milliGRAM(s) Oral every 2 hours PRN smoking cessation  polyethylene glycol 3350 17 Gram(s) Oral daily PRN Constipation  senna 2 Tablet(s) Oral at bedtime PRN Constipation      ALLERGIES:  Allergies    No Known Allergies    Intolerances        LABS:       Creatinine Trend: 0.87<--, 0.76<--, 0.76<--, 0.94<--, 0.93<--, 1.07<--    Labs, imaging, EKG personally reviewed    RADIOLOGY & ADDITIONAL TESTS: Reviewed. Gomez Lane MD PGY-1  Internal Medicine  Pager [phone] / [phone]    INTERVAL HPI / OVERNIGHT EVENTS:  -NAEON    SUBJECTIVE: Patient seen and examined at bedside. ROS limited 2/2 psychiatric illness  -denies any symptoms  -denies difficulty breathing    CONSTITUTIONAL: No weakness, fevers or chills  NECK: No pain or stiffness  RESPIRATORY: No cough, wheezing, hemoptysis; No shortness of breath  CARDIOVASCULAR: No chest pain or palpitations  GASTROINTESTINAL: No abdominal or epigastric pain. No nausea, vomiting, or hematemesis; No diarrhea or constipation. No melena or hematochezia.  GENITOURINARY: No dysuria, frequency or hematuria  NEUROLOGICAL: No numbness or focal weakness    OBJECTIVE:    VITAL SIGNS:  ICU Vital Signs Last 24 Hrs  T(C): 36.6 (15 Apr 2021 21:49), Max: 36.6 (15 Apr 2021 21:49)  T(F): 97.9 (15 Apr 2021 21:49), Max: 97.9 (15 Apr 2021 21:49)  HR: 107 (15 Apr 2021 21:49) (105 - 109)  BP: 130/86 (15 Apr 2021 21:49) (122/88 - 130/86)  BP(mean): --  ABP: --  ABP(mean): --  RR: 26 (15 Apr 2021 21:49) (24 - 26)  SpO2: 95% (15 Apr 2021 21:49) (90% - 100%)        04-15 @ 07:01  -  04-16 @ 07:00  --------------------------------------------------------  IN: 200 mL / OUT: 200 mL / NET: 0 mL      CAPILLARY BLOOD GLUCOSE      POCT Blood Glucose.: 228 mg/dL (15 Apr 2021 18:09)      PHYSICAL EXAM: limited d/t patient allowing a limited exam    General: NAD  HEENT: NC/AT; PERRL, clear conjunctiva  Respiratory: tachypneic, auscultation of anterior fields: decreased breath sounds on left, scattered fine crackles on right, non-labored breathing  Cardiovascular: regular rate, no m/r/g  Abdomen: no ttp, bowel sounds x4  Extremities: no edema  Skin: normal color and turgor; no rash    MEDICATIONS:  MEDICATIONS  (STANDING):  benztropine 1 milliGRAM(s) Oral two times a day  cholecalciferol 1000 Unit(s) Oral daily  dexAMETHasone  Injectable 6 milliGRAM(s) IV Push daily  dextrose 40% Gel 15 Gram(s) Oral once  dextrose 50% Injectable 25 Gram(s) IV Push once  glucagon  Injectable 1 milliGRAM(s) IntraMuscular once  haloperidol     Tablet 10 milliGRAM(s) Oral two times a day  heparin   Injectable 5000 Unit(s) SubCutaneous every 12 hours  insulin lispro (ADMELOG) corrective regimen sliding scale   SubCutaneous three times a day before meals  insulin lispro (ADMELOG) corrective regimen sliding scale   SubCutaneous at bedtime  nicotine - 21 mG/24Hr(s) Patch 1 patch Transdermal daily  OLANZapine 20 milliGRAM(s) Oral every 12 hours  traZODone 100 milliGRAM(s) Oral at bedtime    MEDICATIONS  (PRN):  acetaminophen   Tablet .. 650 milliGRAM(s) Oral every 6 hours PRN Temp greater or equal to 38C (100.4F), Mild Pain (1 - 3), Moderate Pain (4 - 6)  haloperidol    Injectable 2 milliGRAM(s) IntraMuscular every 6 hours PRN Agitation  lidocaine   Patch 1 Patch Transdermal daily PRN pain  nicotine  Polacrilex Gum 2 milliGRAM(s) Oral every 2 hours PRN smoking cessation  polyethylene glycol 3350 17 Gram(s) Oral daily PRN Constipation  senna 2 Tablet(s) Oral at bedtime PRN Constipation      ALLERGIES:  Allergies    No Known Allergies    Intolerances        LABS:       Creatinine Trend: 0.87<--, 0.76<--, 0.76<--, 0.94<--, 0.93<--, 1.07<--    Labs, imaging, EKG personally reviewed    RADIOLOGY & ADDITIONAL TESTS: Reviewed. Gomez Lane MD PGY-1  Internal Medicine  Pager [phone] / [phone]    INTERVAL HPI / OVERNIGHT EVENTS:  -NAEON    SUBJECTIVE: Patient seen and examined at bedside. ROS limited 2/2 psychiatric illness  -denies any symptoms  -denies difficulty breathing    CONSTITUTIONAL: No weakness, fevers or chills  NECK: No pain or stiffness  RESPIRATORY: No cough, wheezing, hemoptysis; No shortness of breath  CARDIOVASCULAR: No chest pain or palpitations  GASTROINTESTINAL: No abdominal or epigastric pain. No nausea, vomiting, or hematemesis; No diarrhea or constipation. No melena or hematochezia.  GENITOURINARY: No dysuria, frequency or hematuria  NEUROLOGICAL: No numbness or focal weakness    OBJECTIVE:    VITAL SIGNS:  ICU Vital Signs Last 24 Hrs  T(C): 36.6 (15 Apr 2021 21:49), Max: 36.6 (15 Apr 2021 21:49)  T(F): 97.9 (15 Apr 2021 21:49), Max: 97.9 (15 Apr 2021 21:49)  HR: 107 (15 Apr 2021 21:49) (105 - 109)  BP: 130/86 (15 Apr 2021 21:49) (122/88 - 130/86)  BP(mean): --  ABP: --  ABP(mean): --  RR: 26 (15 Apr 2021 21:49) (24 - 26)  SpO2: 95% (15 Apr 2021 21:49) (90% - 100%)        04-15 @ 07:01  -  04-16 @ 07:00  --------------------------------------------------------  IN: 200 mL / OUT: 200 mL / NET: 0 mL      CAPILLARY BLOOD GLUCOSE      POCT Blood Glucose.: 228 mg/dL (15 Apr 2021 18:09)      PHYSICAL EXAM: limited d/t patient allowing a limited exam    General: NAD  HEENT: NC/AT; PERRL, clear conjunctiva  Respiratory: tachypneic, auscultation of anterior fields: decreased breath sounds on left, scattered fine crackles on right, non-labored breathing  Cardiovascular: tachycardic, no m/r/g  Abdomen: no ttp, bowel sounds x4  Extremities: no edema  Skin: normal color and turgor; no rash    MEDICATIONS:  MEDICATIONS  (STANDING):  benztropine 1 milliGRAM(s) Oral two times a day  cholecalciferol 1000 Unit(s) Oral daily  dexAMETHasone  Injectable 6 milliGRAM(s) IV Push daily  dextrose 40% Gel 15 Gram(s) Oral once  dextrose 50% Injectable 25 Gram(s) IV Push once  glucagon  Injectable 1 milliGRAM(s) IntraMuscular once  haloperidol     Tablet 10 milliGRAM(s) Oral two times a day  heparin   Injectable 5000 Unit(s) SubCutaneous every 12 hours  insulin lispro (ADMELOG) corrective regimen sliding scale   SubCutaneous three times a day before meals  insulin lispro (ADMELOG) corrective regimen sliding scale   SubCutaneous at bedtime  nicotine - 21 mG/24Hr(s) Patch 1 patch Transdermal daily  OLANZapine 20 milliGRAM(s) Oral every 12 hours  traZODone 100 milliGRAM(s) Oral at bedtime    MEDICATIONS  (PRN):  acetaminophen   Tablet .. 650 milliGRAM(s) Oral every 6 hours PRN Temp greater or equal to 38C (100.4F), Mild Pain (1 - 3), Moderate Pain (4 - 6)  haloperidol    Injectable 2 milliGRAM(s) IntraMuscular every 6 hours PRN Agitation  lidocaine   Patch 1 Patch Transdermal daily PRN pain  nicotine  Polacrilex Gum 2 milliGRAM(s) Oral every 2 hours PRN smoking cessation  polyethylene glycol 3350 17 Gram(s) Oral daily PRN Constipation  senna 2 Tablet(s) Oral at bedtime PRN Constipation      ALLERGIES:  Allergies    No Known Allergies    Intolerances        LABS:       Creatinine Trend: 0.87<--, 0.76<--, 0.76<--, 0.94<--, 0.93<--, 1.07<--    Labs, imaging, EKG personally reviewed    RADIOLOGY & ADDITIONAL TESTS: Reviewed.

## 2021-04-16 NOTE — PROGRESS NOTE ADULT - PROBLEM SELECTOR PLAN 3
-full code for now  -workup for malignancy as above  -patient's family (son, mother, sister) agrees with CMO and hospice ([facility] [place] however, patient known/protected by MHLS therefore EOL decisions must be approved through formal MOLST checklist, further discussion with [employer] are required prior to further EOL planning  -patient DNR/DNI, c/w reasonable medical treatments, pursue hospice -DNR/DNI  -workup for malignancy as above  -patient's family (son, mother, sister) agrees with CMO and hospice ([facility] conversation 4/9); however, patient known/protected by MHLS therefore EOL decisions must be approved through formal MOLST checklist, further discussion with [employer] are required prior to further EOL planning  -patient DNR/DNI, c/w reasonable medical treatments, pursue hospice

## 2021-04-16 NOTE — PROVIDER CONTACT NOTE (OTHER) - BACKGROUND
Patient is admitted with neck pain. Hx of dvt, htn, dm, substance abuse, schizophrenia, sickle cell anemia

## 2021-04-16 NOTE — PROGRESS NOTE ADULT - PROBLEM SELECTOR PLAN 2
Ca 15 on admission (corrected), with cervical spine pain on exam, likely 2/2 malignancy vs. paraneoplastic, PTH 2 (low), vit D 23 (low), improving with fluids  -f/u PTHrP  -c/w IV fluids  -malignancy work up as above  -miralax and senna PRN  -pamidronate 90 IV on 4/3  -Endocrinology consulted, recs appreciated  -patient refusing labs, unable to assess status of hyperCa++, most recent labs show improvement of hyperCa++ Ca 15 on admission (corrected), with cervical spine pain on exam, likely 2/2 malignancy vs. paraneoplastic, PTH 2 (low), vit D 23 (low), improving with fluids  -PTHrP: <2.0  -c/w IV fluids  -malignancy work up as above  -miralax and senna PRN  -pamidronate 90 IV on 4/3  -Endocrinology consulted, recs appreciated  -patient refusing labs, unable to assess status of hyperCa++, most recent labs show improvement of hyperCa++

## 2021-04-16 NOTE — PROGRESS NOTE ADULT - PROBLEM SELECTOR PLAN 4
found to be COVID+ during hospital admission; subsequently w/ desat, started on oxygen supplementation with remdesivir and dexamethasone  -remdesivir discontinued 4/11 i/s/o pt refusing labs found to be COVID+ during hospital admission; subsequently w/ desat, started on oxygen supplementation with remdesivir and dexamethasone  -remdesivir discontinued 4/11 i/s/o pt refusing labs  -c/w dexamethasone, consider transition to PO dexamethasone (ends 4/16) found to be COVID+ during hospital admission; subsequently w/ desat, started on oxygen supplementation with remdesivir and dexamethasone  -remdesivir discontinued 4/11 i/s/o pt refusing labs  -c/w dexamethasone, consider transition to PO dexamethasone (ends 4/19)

## 2021-04-17 LAB — GLUCOSE BLDC GLUCOMTR-MCNC: 117 MG/DL — HIGH (ref 70–99)

## 2021-04-17 PROCEDURE — 99232 SBSQ HOSP IP/OBS MODERATE 35: CPT | Mod: GC

## 2021-04-17 NOTE — PROGRESS NOTE ADULT - PROBLEM SELECTOR PLAN 3
-DNR/DNI  -workup for malignancy as above  -patient's family (son, mother, sister) agrees with CMO and hospice ([facility] conversation 4/9); however, patient known/protected by MHLS therefore EOL decisions must be approved through formal MOLST checklist, further discussion with [employer] are required prior to further EOL planning  -patient DNR/DNI, c/w reasonable medical treatments, pursue hospice

## 2021-04-17 NOTE — PROGRESS NOTE ADULT - ATTENDING COMMENTS
60M, active smoker, hx of schizoaffective disorder, polysubstance abuse (cocaine, alcohol, cigarettes), DVT (?previously on Eliquis), HTN, T2DM, who p/w neck pain and hearing voices. Found to have a large heterogeneous mass occupying majority of the left intrathoracic cavity with invasion of the left pulmonary vasculature and left atrium. Tumor also occludes the left main bronchus and probable right pulmonary metastases. IR recommending against biopsy given the significant risk of biopsy under sedation i/s/o mass invading pulmonary vessels. Also COVID positive requiring supplemental oxygen - c/w dexamethasone, d/c remdesevir as patient refusing blood draws and cannot monitor renal/hepatic function. Patient with poor insight, and does not have decision making capacity, d/w family (NOK son Liam, mother Twyla, sister Ira) who are okay with hospice referral, comfort measures. Patient required MOLST checklist given [facility] connection - appreciate palliative care input - patient now DNR/DNI, no further work up for malignancy. Hospice on board - discussing with CM regarding 24 hour aide, hospice would be able to take the case when the 24hr aide was in place. Safe discharge planning ongoing.

## 2021-04-17 NOTE — PROGRESS NOTE ADULT - PROBLEM SELECTOR PLAN 1
concern for malignancy (primary vs. secondary), recent unintentional weight loss, bone pain (cervical spine), active smoker: 1 ppd for "many years", father w/ hx of lung cancer  - CTH/CAP scan - large heterogeneous mass occupying the majority of the left intrathoracic cavity with invasion of the left pulmonary artery, left pulmonary veins, and left atrium and probably R pulmonary mets  -pt refusing biopsy  -discussed case w/ family, who initially wanted biopsy to confirm diagnosis, however, discussed case with IR, recommending against biopsy given the significant risk of biopsy under sedation i/s/o mass invading pulmonary vessels. Furthermore, given likely prognosis, biopsy likely not indicated. Discussed with family on 4/8.  -Palliative recs appreciated  -patient DNR/DNI, c/w reasonable medical treatments, pursue hospice  -weaning off oxygen as tolerated

## 2021-04-17 NOTE — PROGRESS NOTE ADULT - PROBLEM SELECTOR PLAN 4
found to be COVID+ during hospital admission; subsequently w/ desat, started on oxygen supplementation with remdesivir and dexamethasone  -remdesivir discontinued 4/11 i/s/o pt refusing labs  -c/w dexamethasone, consider transition to PO dexamethasone (ends 4/19)

## 2021-04-17 NOTE — PROGRESS NOTE ADULT - SUBJECTIVE AND OBJECTIVE BOX
Gomez Lane MD PGY-1  Internal Medicine  Pager [phone] / [phone]    INTERVAL HPI / OVERNIGHT EVENTS:  -    SUBJECTIVE: Patient seen and examined at bedside.   -    INCOMPLETE ROS   CONSTITUTIONAL: No weakness, fevers or chills  EYES/ENT: No visual changes;  No vertigo or throat pain   NECK: No pain or stiffness  RESPIRATORY: No cough, wheezing, hemoptysis; No shortness of breath  CARDIOVASCULAR: No chest pain or palpitations  GASTROINTESTINAL: No abdominal or epigastric pain. No nausea, vomiting, or hematemesis; No diarrhea or constipation. No melena or hematochezia.  GENITOURINARY: No dysuria, frequency or hematuria  NEUROLOGICAL: No numbness or weakness  SKIN: No itching, rashes    OBJECTIVE:    VITAL SIGNS:  ICU Vital Signs Last 24 Hrs  T(C): 36.7 (17 Apr 2021 05:41), Max: 37 (16 Apr 2021 23:15)  T(F): 98.1 (17 Apr 2021 05:41), Max: 98.6 (16 Apr 2021 23:15)  HR: 102 (17 Apr 2021 05:41) (102 - 117)  BP: 131/91 (17 Apr 2021 05:41) (131/91 - 148/98)  BP(mean): --  ABP: --  ABP(mean): --  RR: 20 (17 Apr 2021 05:41) (20 - 22)  SpO2: 99% (17 Apr 2021 05:41) (99% - 100%)        CAPILLARY BLOOD GLUCOSE      POCT Blood Glucose.: 117 mg/dL (17 Apr 2021 08:43)      PHYSICAL EXAM: INCOMPLETE PHYSICAL EXAM     General: NAD  HEENT: NC/AT; PERRL, clear conjunctiva  Neck: supple  Respiratory: CTA b/l  Cardiovascular: +S1/S2; RRR  Abdomen: soft, NT/ND; +BS x4  Extremities: WWP, 2+ peripheral pulses b/l; no LE edema  Skin: normal color and turgor; no rash  Neurological:    MEDICATIONS:  MEDICATIONS  (STANDING):  benztropine 1 milliGRAM(s) Oral two times a day  cholecalciferol 1000 Unit(s) Oral daily  dexAMETHasone  Injectable 6 milliGRAM(s) IV Push daily  dextrose 40% Gel 15 Gram(s) Oral once  dextrose 50% Injectable 25 Gram(s) IV Push once  glucagon  Injectable 1 milliGRAM(s) IntraMuscular once  haloperidol     Tablet 10 milliGRAM(s) Oral two times a day  heparin   Injectable 5000 Unit(s) SubCutaneous every 12 hours  insulin lispro (ADMELOG) corrective regimen sliding scale   SubCutaneous three times a day before meals  insulin lispro (ADMELOG) corrective regimen sliding scale   SubCutaneous at bedtime  nicotine - 21 mG/24Hr(s) Patch 1 patch Transdermal daily  OLANZapine 20 milliGRAM(s) Oral every 12 hours  traZODone 100 milliGRAM(s) Oral at bedtime    MEDICATIONS  (PRN):  acetaminophen   Tablet .. 650 milliGRAM(s) Oral every 6 hours PRN Temp greater or equal to 38C (100.4F), Mild Pain (1 - 3), Moderate Pain (4 - 6)  haloperidol    Injectable 2 milliGRAM(s) IntraMuscular every 6 hours PRN Agitation  lidocaine   Patch 1 Patch Transdermal daily PRN pain  nicotine  Polacrilex Gum 2 milliGRAM(s) Oral every 2 hours PRN smoking cessation  polyethylene glycol 3350 17 Gram(s) Oral daily PRN Constipation  senna 2 Tablet(s) Oral at bedtime PRN Constipation      ALLERGIES:  Allergies    No Known Allergies    Intolerances        LABS:              Creatinine Trend: 0.87<--, 0.76<--, 0.76<--, 0.94<--, 0.93<--, 1.07<--        hs Troponin:              CSF:                      EKG:   MICROBIOLOGY:    IMAGING:      Labs, imaging, EKG personally reviewed    RADIOLOGY & ADDITIONAL TESTS: Reviewed. Gomez Lane MD PGY-1  Internal Medicine  Pager [phone] / [phone]    INTERVAL HPI / OVERNIGHT EVENTS:  -NAEON    SUBJECTIVE: Patient seen and examined at bedside. ROS limited 2/2 psychiatric illness  -denies any symptoms  -denies difficulty breathing      OBJECTIVE:    VITAL SIGNS:  ICU Vital Signs Last 24 Hrs  T(C): 36.7 (17 Apr 2021 05:41), Max: 37 (16 Apr 2021 23:15)  T(F): 98.1 (17 Apr 2021 05:41), Max: 98.6 (16 Apr 2021 23:15)  HR: 102 (17 Apr 2021 05:41) (102 - 117)  BP: 131/91 (17 Apr 2021 05:41) (131/91 - 148/98)  BP(mean): --  ABP: --  ABP(mean): --  RR: 20 (17 Apr 2021 05:41) (20 - 22)  SpO2: 99% (17 Apr 2021 05:41) (99% - 100%)        CAPILLARY BLOOD GLUCOSE      POCT Blood Glucose.: 117 mg/dL (17 Apr 2021 08:43)      PHYSICAL EXAM:      General: NAD  HEENT: NC/AT; PERRL, clear conjunctiva  Respiratory: tachypneic, auscultation of anterior fields: decreased breath sounds on left, scattered fine crackles on right, labored breathing  Cardiovascular: tachycardic, no m/r/g  Abdomen: no ttp, bowel sounds x4  Extremities: no edema  Skin: normal color and turgor; no rash      MEDICATIONS:  MEDICATIONS  (STANDING):  benztropine 1 milliGRAM(s) Oral two times a day  cholecalciferol 1000 Unit(s) Oral daily  dexAMETHasone  Injectable 6 milliGRAM(s) IV Push daily  dextrose 40% Gel 15 Gram(s) Oral once  dextrose 50% Injectable 25 Gram(s) IV Push once  glucagon  Injectable 1 milliGRAM(s) IntraMuscular once  haloperidol     Tablet 10 milliGRAM(s) Oral two times a day  heparin   Injectable 5000 Unit(s) SubCutaneous every 12 hours  insulin lispro (ADMELOG) corrective regimen sliding scale   SubCutaneous three times a day before meals  insulin lispro (ADMELOG) corrective regimen sliding scale   SubCutaneous at bedtime  nicotine - 21 mG/24Hr(s) Patch 1 patch Transdermal daily  OLANZapine 20 milliGRAM(s) Oral every 12 hours  traZODone 100 milliGRAM(s) Oral at bedtime    MEDICATIONS  (PRN):  acetaminophen   Tablet .. 650 milliGRAM(s) Oral every 6 hours PRN Temp greater or equal to 38C (100.4F), Mild Pain (1 - 3), Moderate Pain (4 - 6)  haloperidol    Injectable 2 milliGRAM(s) IntraMuscular every 6 hours PRN Agitation  lidocaine   Patch 1 Patch Transdermal daily PRN pain  nicotine  Polacrilex Gum 2 milliGRAM(s) Oral every 2 hours PRN smoking cessation  polyethylene glycol 3350 17 Gram(s) Oral daily PRN Constipation  senna 2 Tablet(s) Oral at bedtime PRN Constipation      ALLERGIES:  Allergies    No Known Allergies    Intolerances        LABS:     Creatinine Trend: 0.87<--, 0.76<--, 0.76<--, 0.94<--, 0.93<--, 1.07<--     Labs, imaging, EKG personally reviewed    RADIOLOGY & ADDITIONAL TESTS: Reviewed.

## 2021-04-17 NOTE — PROGRESS NOTE ADULT - PROBLEM SELECTOR PLAN 2
Ca 15 on admission (corrected), with cervical spine pain on exam, likely 2/2 malignancy vs. paraneoplastic, PTH 2 (low), vit D 23 (low), improving with fluids  -PTHrP: <2.0  -c/w IV fluids  -malignancy work up as above  -miralax and senna PRN  -pamidronate 90 IV on 4/3  -Endocrinology consulted, recs appreciated  -patient refusing labs, unable to assess status of hyperCa++, most recent labs show improvement of hyperCa++

## 2021-04-18 PROCEDURE — 99232 SBSQ HOSP IP/OBS MODERATE 35: CPT | Mod: GC

## 2021-04-18 NOTE — PROGRESS NOTE ADULT - ATTENDING COMMENTS
60M, active smoker, hx of schizoaffective disorder, polysubstance abuse (cocaine, alcohol, cigarettes), DVT (?previously on Eliquis), HTN, T2DM, who p/w neck pain and hearing voices. Found to have a large heterogeneous mass occupying majority of the left intrathoracic cavity with invasion of the left pulmonary vasculature and left atrium. Tumor also occludes the left main bronchus and probable right pulmonary metastases. IR recommending against biopsy given the significant risk of biopsy under sedation i/s/o mass invading pulmonary vessels. Also COVID positive requiring supplemental oxygen - c/w dexamethasone, d/c remdesevir as patient refusing blood draws and cannot monitor renal/hepatic function. Patient with poor insight, and does not have decision making capacity, d/w family (NOK son Liam, mother Twyla, sister Ira) who are okay with hospice referral, comfort measures. Patient required MOLST checklist given [facility] connection - appreciate palliative care input - patient now DNR/DNI, no further work up for malignancy. Hospice on board - discussing with CM regarding 24 hour aide, hospice would be able to take the case when the 24hr aide was in place. Safe discharge planning ongoing.

## 2021-04-18 NOTE — PROGRESS NOTE ADULT - PROBLEM SELECTOR PLAN 1
concern for malignancy (primary vs. secondary), recent unintentional weight loss, bone pain (cervical spine), active smoker: 1 ppd for "many years", father w/ hx of lung cancer  - CTH/CAP scan - large heterogeneous mass occupying the majority of the left intrathoracic cavity with invasion of the left pulmonary artery, left pulmonary veins, and left atrium and probably R pulmonary mets  -pt refusing biopsy  -discussed case w/ family, who initially wanted biopsy to confirm diagnosis, however, discussed case with IR, recommending against biopsy given the significant risk of biopsy under sedation i/s/o mass invading pulmonary vessels. Furthermore, given likely prognosis, biopsy likely not indicated. Discussed with family on 4/8.  -Palliative recs appreciated  -patient DNR/DNI, c/w reasonable medical treatments, pursue hospice  -weaning off oxygen as tolerated concern for malignancy (primary vs. secondary), recent unintentional weight loss, bone pain (cervical spine), active smoker: 1 ppd for "many years", father w/ hx of lung cancer  - CTH/CAP scan - large heterogeneous mass occupying the majority of the left intrathoracic cavity with invasion of the left pulmonary artery, left pulmonary veins, and left atrium and probably R pulmonary mets  -pt refusing biopsy  -discussed case w/ family, who initially wanted biopsy to confirm diagnosis, however, discussed case with IR, recommending against biopsy given the significant risk of biopsy under sedation i/s/o mass invading pulmonary vessels. Furthermore, given likely prognosis, biopsy likely not indicated. Discussed with family on 4/8.  -Palliative recs appreciated  -patient DNR/DNI, c/w reasonable medical treatments, pursue hospice  -currently on room air, saturating well

## 2021-04-18 NOTE — PROGRESS NOTE ADULT - PROBLEM SELECTOR PLAN 3
-DNR/DNI  -workup for malignancy as above  -patient's family (son, mother, sister) agrees with CMO and hospice ([facility] conversation 4/9); however, patient known/protected by MHLS therefore EOL decisions must be approved through formal MOLST checklist, further discussion with Mental Hygiene Legal Services are required prior to further EOL planning  -patient DNR/DNI, c/w reasonable medical treatments, pursue hospice

## 2021-04-18 NOTE — PROGRESS NOTE ADULT - PROBLEM SELECTOR PLAN 6
microcytic, unclear baseline, last hgb 9.8 in 3/2020, hx of sickle cell trait?  -fe studies consistent with AOCD  -hgb down-trending, malignancy likely contributing  -s/p 1u pRBC 4/9 microcytic, unclear baseline, last hgb 9.8 in 3/2020, hx of sickle cell trait?  -fe studies consistent with AOCD  -hgb down-trending, malignancy likely contributing  -s/p 1u pRBC 4/9  -refusing labs

## 2021-04-18 NOTE — PROGRESS NOTE ADULT - SUBJECTIVE AND OBJECTIVE BOX
Gomez Lane MD PGY-1  Internal Medicine  Pager [phone] / [phone]    INTERVAL HPI / OVERNIGHT EVENTS:  -NAEON    SUBJECTIVE: Patient seen and examined at bedside.   -    INCOMPLETE ROS   CONSTITUTIONAL: No weakness, fevers or chills  EYES/ENT: No visual changes;  No vertigo or throat pain   NECK: No pain or stiffness  RESPIRATORY: No cough, wheezing, hemoptysis; No shortness of breath  CARDIOVASCULAR: No chest pain or palpitations  GASTROINTESTINAL: No abdominal or epigastric pain. No nausea, vomiting, or hematemesis; No diarrhea or constipation. No melena or hematochezia.  GENITOURINARY: No dysuria, frequency or hematuria  NEUROLOGICAL: No numbness or weakness  SKIN: No itching, rashes    OBJECTIVE:    VITAL SIGNS:  ICU Vital Signs Last 24 Hrs  T(C): 36.7 (18 Apr 2021 06:40), Max: 36.7 (18 Apr 2021 06:40)  T(F): 98 (18 Apr 2021 06:40), Max: 98 (18 Apr 2021 06:40)  HR: 75 (18 Apr 2021 06:40) (75 - 102)  BP: 122/82 (18 Apr 2021 06:40) (108/75 - 122/82)  BP(mean): --  ABP: --  ABP(mean): --  RR: 18 (18 Apr 2021 06:40) (18 - 20)  SpO2: 100% (18 Apr 2021 06:40) (100% - 100%)        CAPILLARY BLOOD GLUCOSE      POCT Blood Glucose.: 117 mg/dL (17 Apr 2021 08:43)      PHYSICAL EXAM:      General: NAD  HEENT: NC/AT; PERRL, clear conjunctiva  Respiratory: tachypneic, auscultation of anterior fields: decreased breath sounds on left, scattered fine crackles on right, labored breathing  Cardiovascular: tachycardic, no m/r/g  Abdomen: no ttp, bowel sounds x4  Extremities: no edema  Skin: normal color and turgor; no rash    MEDICATIONS:  MEDICATIONS  (STANDING):  benztropine 1 milliGRAM(s) Oral two times a day  cholecalciferol 1000 Unit(s) Oral daily  dexAMETHasone  Injectable 6 milliGRAM(s) IV Push daily  dextrose 40% Gel 15 Gram(s) Oral once  dextrose 50% Injectable 25 Gram(s) IV Push once  glucagon  Injectable 1 milliGRAM(s) IntraMuscular once  haloperidol     Tablet 10 milliGRAM(s) Oral two times a day  heparin   Injectable 5000 Unit(s) SubCutaneous every 12 hours  insulin lispro (ADMELOG) corrective regimen sliding scale   SubCutaneous three times a day before meals  insulin lispro (ADMELOG) corrective regimen sliding scale   SubCutaneous at bedtime  nicotine - 21 mG/24Hr(s) Patch 1 patch Transdermal daily  OLANZapine 20 milliGRAM(s) Oral every 12 hours  traZODone 100 milliGRAM(s) Oral at bedtime    MEDICATIONS  (PRN):  acetaminophen   Tablet .. 650 milliGRAM(s) Oral every 6 hours PRN Temp greater or equal to 38C (100.4F), Mild Pain (1 - 3), Moderate Pain (4 - 6)  haloperidol    Injectable 2 milliGRAM(s) IntraMuscular every 6 hours PRN Agitation  lidocaine   Patch 1 Patch Transdermal daily PRN pain  nicotine  Polacrilex Gum 2 milliGRAM(s) Oral every 2 hours PRN smoking cessation  polyethylene glycol 3350 17 Gram(s) Oral daily PRN Constipation  senna 2 Tablet(s) Oral at bedtime PRN Constipation      ALLERGIES:  Allergies    No Known Allergies    Intolerances        LABS:     Creatinine Trend: 0.87<--, 0.76<--, 0.76<--, 0.94<--, 0.93<--, 1.07<--     Labs, imaging, EKG personally reviewed    RADIOLOGY & ADDITIONAL TESTS: Reviewed. Gomez Lane MD PGY-1  Internal Medicine  Pager [phone] / [phone]    INTERVAL HPI / OVERNIGHT EVENTS:  -NAEON    SUBJECTIVE: Patient seen and examined at bedside. ROS limited 2/2 psychiatric illness  -denies any symptoms  -denies difficulty breathing       OBJECTIVE:    VITAL SIGNS:  ICU Vital Signs Last 24 Hrs  T(C): 36.7 (18 Apr 2021 06:40), Max: 36.7 (18 Apr 2021 06:40)  T(F): 98 (18 Apr 2021 06:40), Max: 98 (18 Apr 2021 06:40)  HR: 75 (18 Apr 2021 06:40) (75 - 102)  BP: 122/82 (18 Apr 2021 06:40) (108/75 - 122/82)  BP(mean): --  ABP: --  ABP(mean): --  RR: 18 (18 Apr 2021 06:40) (18 - 20)  SpO2: 100% (18 Apr 2021 06:40) (100% - 100%)       POCT Blood Glucose.: 117 mg/dL (17 Apr 2021 08:43)      PHYSICAL EXAM:      General: NAD  HEENT: NC/AT; PERRL, clear conjunctiva  Respiratory: tachypneic, auscultation of anterior fields: decreased breath sounds on left, scattered fine crackles on right  Cardiovascular: regular rate, no m/r/g  Abdomen: no ttp, bowel sounds x4  Extremities: no edema  Skin: normal color and turgor; no rash      MEDICATIONS:  MEDICATIONS  (STANDING):  benztropine 1 milliGRAM(s) Oral two times a day  cholecalciferol 1000 Unit(s) Oral daily  dexAMETHasone  Injectable 6 milliGRAM(s) IV Push daily  dextrose 40% Gel 15 Gram(s) Oral once  dextrose 50% Injectable 25 Gram(s) IV Push once  glucagon  Injectable 1 milliGRAM(s) IntraMuscular once  haloperidol     Tablet 10 milliGRAM(s) Oral two times a day  heparin   Injectable 5000 Unit(s) SubCutaneous every 12 hours  insulin lispro (ADMELOG) corrective regimen sliding scale   SubCutaneous three times a day before meals  insulin lispro (ADMELOG) corrective regimen sliding scale   SubCutaneous at bedtime  nicotine - 21 mG/24Hr(s) Patch 1 patch Transdermal daily  OLANZapine 20 milliGRAM(s) Oral every 12 hours  traZODone 100 milliGRAM(s) Oral at bedtime    MEDICATIONS  (PRN):  acetaminophen   Tablet .. 650 milliGRAM(s) Oral every 6 hours PRN Temp greater or equal to 38C (100.4F), Mild Pain (1 - 3), Moderate Pain (4 - 6)  haloperidol    Injectable 2 milliGRAM(s) IntraMuscular every 6 hours PRN Agitation  lidocaine   Patch 1 Patch Transdermal daily PRN pain  nicotine  Polacrilex Gum 2 milliGRAM(s) Oral every 2 hours PRN smoking cessation  polyethylene glycol 3350 17 Gram(s) Oral daily PRN Constipation  senna 2 Tablet(s) Oral at bedtime PRN Constipation      ALLERGIES:  Allergies    No Known Allergies    Intolerances        LABS:     Creatinine Trend: 0.87<--, 0.76<--, 0.76<--, 0.94<--, 0.93<--, 1.07<--     Labs, imaging, EKG personally reviewed    RADIOLOGY & ADDITIONAL TESTS: Reviewed.

## 2021-04-19 PROCEDURE — 99232 SBSQ HOSP IP/OBS MODERATE 35: CPT

## 2021-04-19 PROCEDURE — 99232 SBSQ HOSP IP/OBS MODERATE 35: CPT | Mod: GC

## 2021-04-19 NOTE — PROGRESS NOTE ADULT - PROBLEM SELECTOR PLAN 3
-DNR/DNI  -workup for malignancy as above  -patient's family (son, mother, sister) agrees with CMO and hospice ([facility] conversation 4/9); however, patient known/protected by MHLS therefore EOL decisions must be approved through formal MOLST checklist, further discussion with Mental Hygiene Legal Services are required prior to further EOL planning  -patient DNR/DNI, c/w reasonable medical treatments, pursue hospice

## 2021-04-19 NOTE — PROGRESS NOTE ADULT - ASSESSMENT
60M w/ HTN, DM, schizoaffective disorder, polysubstance abuse (cocaine, alcohol, cigarettes), hx of DVT? previously on Eliquis presents to ED c/o neck pain, found to have a large heterogeneous mass occupying the majority of the left intrathoracic cavity with invasion of the left pulmonary artery, left pulmonary veins, and left atrium and probably R pulmonary mets, concern for new malignancy. 60M w/ HTN, DM, schizoaffective disorder, polysubstance abuse (cocaine, alcohol, cigarettes), hx of DVT? previously on Eliquis presents to ED c/o neck pain, found to have a large heterogeneous mass occupying the majority of the left intrathoracic cavity with invasion of the left pulmonary artery, left pulmonary veins, and left atrium and probably R pulmonary mets, concern for new malignancy. Now DNR/DNI and pending hospice.

## 2021-04-19 NOTE — PROGRESS NOTE ADULT - ATTENDING COMMENTS
60M, active smoker, hx of schizoaffective disorder, polysubstance abuse (cocaine, alcohol, cigarettes), DVT (?previously on Eliquis), HTN, T2DM, who p/w neck pain and hearing voices. Found to have a large heterogeneous mass occupying majority of the left intrathoracic cavity with invasion of the left pulmonary vasculature and left atrium. Tumor also occludes the left main bronchus and probable right pulmonary metastases. IR recommending against biopsy given the significant risk of biopsy under sedation i/s/o mass invading pulmonary vessels. Also COVID positive requiring supplemental oxygen - c/w dexamethasone, d/c remdesevir as patient refusing blood draws and cannot monitor renal/hepatic function. Patient with poor insight, and does not have decision making capacity, d/w family (NOK son Liam, mother Twyla, sister Ira) who are okay with hospice referral, comfort measures. Patient required MOLST checklist given [facility] connection - appreciate palliative care input - patient now DNR/DNI, no further work up for malignancy. Hospice on board - discussing with CM regarding 24 hour aide, hospice would be able to take the case when the 24hr aide was in place. Safe discharge planning ongoing.

## 2021-04-19 NOTE — PROGRESS NOTE ADULT - PROBLEM SELECTOR PLAN 1
concern for malignancy (primary vs. secondary), recent unintentional weight loss, bone pain (cervical spine), active smoker: 1 ppd for "many years", father w/ hx of lung cancer  - CTH/CAP scan - large heterogeneous mass occupying the majority of the left intrathoracic cavity with invasion of the left pulmonary artery, left pulmonary veins, and left atrium and probably R pulmonary mets  -pt refusing biopsy  -discussed case w/ family, who initially wanted biopsy to confirm diagnosis, however, discussed case with IR, recommending against biopsy given the significant risk of biopsy under sedation i/s/o mass invading pulmonary vessels. Furthermore, given likely prognosis, biopsy likely not indicated. Discussed with family on 4/8.  -Palliative recs appreciated  -patient DNR/DNI, c/w reasonable medical treatments, pursue hospice  -currently on room air, saturating well concern for malignancy (primary vs. secondary), recent unintentional weight loss, bone pain (cervical spine), active smoker: 1 ppd for "many years", father w/ hx of lung cancer  - CTH/CAP scan - large heterogeneous mass occupying the majority of the left intrathoracic cavity with invasion of the left pulmonary artery, left pulmonary veins, and left atrium and probably R pulmonary mets  -discussed case w/ family, who initially wanted biopsy to confirm diagnosis, however, discussed case with IR, recommending against biopsy given the significant risk of biopsy under sedation i/s/o mass invading pulmonary vessels. Furthermore, given likely prognosis, biopsy likely not indicated. Discussed with family on 4/8.  -Palliative recs appreciated  -patient DNR/DNI, c/w reasonable medical treatments, pursue hospice  -currently on room air, saturating well

## 2021-04-19 NOTE — PROVIDER CONTACT NOTE (OTHER) - SITUATION
Patient refusing bedtime medications and fingersticks Patient refusing bedtime medications, fingersticks, and vital signs.

## 2021-04-19 NOTE — PROGRESS NOTE ADULT - PROBLEM SELECTOR PLAN 6
microcytic, unclear baseline, last hgb 9.8 in 3/2020, hx of sickle cell trait?  -fe studies consistent with AOCD  -hgb down-trending, malignancy likely contributing  -s/p 1u pRBC 4/9  -refusing labs

## 2021-04-19 NOTE — PROGRESS NOTE ADULT - ASSESSMENT
60M w/ HTN, Type 2 DM-not on medication, schizoaffective disorder, polysubstance abuse (cocaine, alcohol, cigarettes), a/w weakness and found to have intrathoracic mass involving pulmonary vasculature, hypercalecmia and COVID +.    #hypercalcemia  -improved since admission-corrected Ca 10.4 on last labs 4/9/21 (10 days ago), s/p Pamidronate 90mg IV on 4/3/21.   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  -will need treatment of underlying malignancy for long term calcium control; effects of Pamidronate will wear off in a few weeks  -continue to trend serum Ca, Alb if patient agrees to labs - no labs drawn past 10 days, would need to repeat calcium level as soon as patient agrees.  It is possible that hypercalcemia is recurring at this point.  -continue cholecalciferol 1000IU daily for low 25-OH Vit D.    #Type 2 diabetes  -patient not on medication for diabetes as outpatient, unable to order HbA1c thus far due to lab check refusal  -glucose now higher due to dexamethasone for COVID  -check HbA1c with next labs  -check FS qac and qhs with and can continue moderate Admelog correction scale before meals and moderate bedtime scale if patient willing to accept doses.  -Consistent carb diet  -DC plan based on HbA1c if able to obtain    #HTN  -goal bp <130/80-has been at goal    Vladimir Hanna MD  Division of Endocrinology  Pager: [phone]    If after 6PM or before 9AM, or on weekends/holidays, please call endocrine answering service for assistance ([phone]).  For nonurgent matters email [email or web address] for assistance.

## 2021-04-19 NOTE — PROGRESS NOTE ADULT - SUBJECTIVE AND OBJECTIVE BOX
Chief Complaint: Hypercalcemia    History: continues to refuse fingersticks and labs  goals of care discussions per primary team, possible planning for hospice    MEDICATIONS  (STANDING):  benztropine 1 milliGRAM(s) Oral two times a day  cholecalciferol 1000 Unit(s) Oral daily  dextrose 40% Gel 15 Gram(s) Oral once  dextrose 50% Injectable 25 Gram(s) IV Push once  glucagon  Injectable 1 milliGRAM(s) IntraMuscular once  haloperidol     Tablet 10 milliGRAM(s) Oral two times a day  heparin   Injectable 5000 Unit(s) SubCutaneous every 12 hours  insulin lispro (ADMELOG) corrective regimen sliding scale   SubCutaneous three times a day before meals  insulin lispro (ADMELOG) corrective regimen sliding scale   SubCutaneous at bedtime  nicotine - 21 mG/24Hr(s) Patch 1 patch Transdermal daily  OLANZapine 20 milliGRAM(s) Oral every 12 hours  traZODone 100 milliGRAM(s) Oral at bedtime    MEDICATIONS  (PRN):  acetaminophen   Tablet .. 650 milliGRAM(s) Oral every 6 hours PRN Temp greater or equal to 38C (100.4F), Mild Pain (1 - 3), Moderate Pain (4 - 6)  haloperidol    Injectable 2 milliGRAM(s) IntraMuscular every 6 hours PRN Agitation  lidocaine   Patch 1 Patch Transdermal daily PRN pain  nicotine  Polacrilex Gum 2 milliGRAM(s) Oral every 2 hours PRN smoking cessation  polyethylene glycol 3350 17 Gram(s) Oral daily PRN Constipation  senna 2 Tablet(s) Oral at bedtime PRN Constipation      Allergies    No Known Allergies    Intolerances      Review of Systems:  ALL OTHER SYSTEMS REVIEWED AND NEGATIVE      PHYSICAL EXAM:  VITALS: T(C): 36.8 (04-19-21 @ 06:28)  T(F): 98.3 (04-19-21 @ 06:28), Max: 98.3 (04-19-21 @ 06:28)  HR: 91 (04-19-21 @ 06:28) (91 - 91)  BP: 132/93 (04-19-21 @ 06:28) (132/93 - 132/93)  RR:  (20 - 20)  SpO2:  (96% - 96%)  Wt(kg): --  GENERAL: NAD, cachectic  EYES: No proptosis, no lid lag, anicteric  HEENT:  Atraumatic, Normocephalic, moist mucous membranes  RESPIRATORY: nonlabored respirations  MUSCULOSKELETAL: ambulatory  PSYCH: Alert and awake    CAPILLARY BLOOD GLUCOSE                      Thyroid Function Tests:  04-01 @ 12:33 TSH 2.61 FreeT4 -- T3 -- Anti TPO -- Anti Thyroglobulin Ab -- TSI --

## 2021-04-19 NOTE — PROGRESS NOTE ADULT - PROBLEM SELECTOR PLAN 10
DVT ppx: hsq 5000 q12h  Diet: regular  Dispo: DNR/DNI. Fam interested in hospice. Case discussed with Mental Hygiene services. Pt will complete steroid treatment for covid then will investigate hospice.

## 2021-04-20 LAB — SARS-COV-2 RNA SPEC QL NAA+PROBE: SIGNIFICANT CHANGE UP

## 2021-04-20 PROCEDURE — 99232 SBSQ HOSP IP/OBS MODERATE 35: CPT | Mod: GC

## 2021-04-20 NOTE — PROGRESS NOTE ADULT - PROBLEM SELECTOR PLAN 1
concern for malignancy (primary vs. secondary), recent unintentional weight loss, bone pain (cervical spine), active smoker: 1 ppd for "many years", father w/ hx of lung cancer  - CTH/CAP scan - large heterogeneous mass occupying the majority of the left intrathoracic cavity with invasion of the left pulmonary artery, left pulmonary veins, and left atrium and probably R pulmonary mets  -discussed case w/ family, who initially wanted biopsy to confirm diagnosis, however, discussed case with IR, recommending against biopsy given the significant risk of biopsy under sedation i/s/o mass invading pulmonary vessels. Furthermore, given likely prognosis, biopsy likely not indicated. Discussed with family on 4/8.  -Palliative recs appreciated  -patient DNR/DNI, c/w reasonable medical treatments, pursue hospice  -currently on room air, saturating well

## 2021-04-20 NOTE — PROGRESS NOTE ADULT - ASSESSMENT
60M w/ HTN, DM, schizoaffective disorder, polysubstance abuse (cocaine, alcohol, cigarettes), hx of DVT? previously on Eliquis presents to ED c/o neck pain, found to have a large heterogeneous mass occupying the majority of the left intrathoracic cavity with invasion of the left pulmonary artery, left pulmonary veins, and left atrium and probably R pulmonary mets, concern for new malignancy. Now DNR/DNI and pending hospice.

## 2021-04-20 NOTE — PROGRESS NOTE ADULT - PROBLEM SELECTOR PLAN 3
-DNR/DNI  -workup for malignancy as above  -patient's family (son, mother, sister) agrees with CMO and hospice ([facility] conversation 4/9); however, patient known/protected by MHLS therefore EOL decisions must be approved through formal MOLST checklist, further discussion with Mental Hygiene Legal Services are required prior to further EOL planning  -patient DNR/DNI, c/w reasonable medical treatments, pursue hospice

## 2021-04-20 NOTE — PROGRESS NOTE ADULT - SUBJECTIVE AND OBJECTIVE BOX
PROGRESS NOTE:   Authored by Luz Marina Verde MD  Pager: [facility] [phone]; [facility] [phone]    Patient is a 60y old  Male who presents with a chief complaint of neck pain (19 Apr 2021 15:37)      SUBJECTIVE / OVERNIGHT EVENTS:  NAEON. Patient refused bedtime vitals, fingersticks, and medications. This AM, patient sleeping in bed. Today, patient alert and oriented to person and place. Rest of interview limited as patient sleeping.     MEDICATIONS  (STANDING):  benztropine 1 milliGRAM(s) Oral two times a day  cholecalciferol 1000 Unit(s) Oral daily  dextrose 40% Gel 15 Gram(s) Oral once  dextrose 50% Injectable 25 Gram(s) IV Push once  glucagon  Injectable 1 milliGRAM(s) IntraMuscular once  haloperidol     Tablet 10 milliGRAM(s) Oral two times a day  heparin   Injectable 5000 Unit(s) SubCutaneous every 12 hours  insulin lispro (ADMELOG) corrective regimen sliding scale   SubCutaneous three times a day before meals  insulin lispro (ADMELOG) corrective regimen sliding scale   SubCutaneous at bedtime  nicotine - 21 mG/24Hr(s) Patch 1 patch Transdermal daily  OLANZapine 20 milliGRAM(s) Oral every 12 hours  traZODone 100 milliGRAM(s) Oral at bedtime    MEDICATIONS  (PRN):  acetaminophen   Tablet .. 650 milliGRAM(s) Oral every 6 hours PRN Temp greater or equal to 38C (100.4F), Mild Pain (1 - 3), Moderate Pain (4 - 6)  haloperidol    Injectable 2 milliGRAM(s) IntraMuscular every 6 hours PRN Agitation  lidocaine   Patch 1 Patch Transdermal daily PRN pain  nicotine  Polacrilex Gum 2 milliGRAM(s) Oral every 2 hours PRN smoking cessation  polyethylene glycol 3350 17 Gram(s) Oral daily PRN Constipation  senna 2 Tablet(s) Oral at bedtime PRN Constipation      CAPILLARY BLOOD GLUCOSE        I&O's Summary      PHYSICAL EXAM:  Vital Signs Last 24 Hrs  T(C): 36.6 (20 Apr 2021 05:31), Max: 36.6 (20 Apr 2021 05:31)  T(F): 97.9 (20 Apr 2021 05:31), Max: 97.9 (20 Apr 2021 05:31)  HR: 80 (20 Apr 2021 05:31) (80 - 80)  BP: 139/89 (20 Apr 2021 05:31) (139/89 - 139/89)  BP(mean): --  RR: 18 (20 Apr 2021 05:31) (18 - 18)  SpO2: 98% (20 Apr 2021 05:31) (98% - 98%)    General: NAD  HEENT: NC/AT; PERRL, clear conjunctiva  Respiratory: tachypneic, auscultation of anterior fields: decreased breath sounds on left, scattered fine crackles on right  Cardiovascular: regular rate, no m/r/g  Abdomen: no ttp, bowel sounds x4  Extremities: no edema  Skin: normal color and turgor; no rash  Neuro: AxO to person and place.   LABS:                      RADIOLOGY & ADDITIONAL TESTS:  Results Reviewed:   Imaging Personally Reviewed:  Electrocardiogram Personally Reviewed:    COORDINATION OF CARE:  Care Discussed with Consultants/Other Providers [Y/N]:  Prior or Outpatient Records Reviewed [Y/N]:

## 2021-04-20 NOTE — PROGRESS NOTE ADULT - ATTENDING COMMENTS
60M, active smoker, hx of schizoaffective disorder, polysubstance abuse (cocaine, alcohol, cigarettes), DVT (?previously on Eliquis), HTN, T2DM, who p/w neck pain and hearing voices. Found to have a large heterogeneous mass occupying majority of the left intrathoracic cavity with invasion of the left pulmonary vasculature and left atrium. Tumor also occludes the left main bronchus and probable right pulmonary metastases. IR recommending against biopsy given the significant risk of biopsy under sedation i/s/o mass invading pulmonary vessels. Also COVID positive requiring supplemental oxygen - c/w dexamethasone, d/c remdesevir as patient refusing blood draws and cannot monitor renal/hepatic function. Patient with poor insight, and does not have decision making capacity, d/w family (NOK son Liam, mother Twyla, sister Ira) who are okay with hospice referral, comfort measures. Patient required MOLST checklist given [facility] connection - appreciate palliative care input - patient now DNR/DNI, no further work up for malignancy. Hospice on board. Safe discharge planning ongoing.

## 2021-04-21 ENCOUNTER — TRANSCRIPTION ENCOUNTER (OUTPATIENT)
Age: 61
End: 2021-04-21

## 2021-04-21 VITALS
SYSTOLIC BLOOD PRESSURE: 105 MMHG | HEART RATE: 96 BPM | WEIGHT: 150.36 LBS | RESPIRATION RATE: 20 BRPM | TEMPERATURE: 98 F | OXYGEN SATURATION: 100 % | DIASTOLIC BLOOD PRESSURE: 66 MMHG

## 2021-04-21 PROCEDURE — 99238 HOSP IP/OBS DSCHRG MGMT 30/<: CPT | Mod: GC

## 2021-04-21 RX ORDER — CHOLECALCIFEROL (VITAMIN D3) 125 MCG
1000 CAPSULE ORAL
Qty: 0 | Refills: 0 | DISCHARGE
Start: 2021-04-21

## 2021-04-21 RX ORDER — SENNA PLUS 8.6 MG/1
2 TABLET ORAL
Qty: 0 | Refills: 0 | DISCHARGE
Start: 2021-04-21

## 2021-04-21 RX ORDER — ACETAMINOPHEN 500 MG
2 TABLET ORAL
Qty: 0 | Refills: 0 | DISCHARGE
Start: 2021-04-21

## 2021-04-21 RX ORDER — BENZTROPINE MESYLATE 1 MG
1 TABLET ORAL
Qty: 0 | Refills: 0 | DISCHARGE
Start: 2021-04-21

## 2021-04-21 RX ORDER — POLYETHYLENE GLYCOL 3350 17 G/17G
17 POWDER, FOR SOLUTION ORAL
Qty: 0 | Refills: 0 | DISCHARGE
Start: 2021-04-21

## 2021-04-21 RX ORDER — TRAZODONE HCL 50 MG
1 TABLET ORAL
Qty: 0 | Refills: 0 | DISCHARGE
Start: 2021-04-21

## 2021-04-21 RX ORDER — TRAZODONE HCL 50 MG
1 TABLET ORAL
Qty: 0 | Refills: 0 | DISCHARGE

## 2021-04-21 NOTE — PROGRESS NOTE ADULT - PROBLEM SELECTOR PROBLEM 1
Hypercalcemia
Lung mass
Lung mass
Hypercalcemia
Lung mass
Hypercalcemia
Lung mass
Other encephalopathy
Lung mass

## 2021-04-21 NOTE — DISCHARGE NOTE NURSING/CASE MANAGEMENT/SOCIAL WORK - NSDCPETBCESMAN_GEN_ALL_CORE
-- managed by primary care  -- on metformin   If you are a smoker, it is important for your health to stop smoking. Please be aware that second hand smoke is also harmful.

## 2021-04-21 NOTE — PROGRESS NOTE ADULT - NSICDXPILOT_GEN_ALL_CORE
[place]

## 2021-04-21 NOTE — PROGRESS NOTE ADULT - PROBLEM SELECTOR PROBLEM 2
Hypercalcemia
Hypercalcemia
Debility
Hypercalcemia
Type 2 diabetes mellitus without complication, without long-term current use of insulin
Hypercalcemia
Type 2 diabetes mellitus without complication, without long-term current use of insulin
Type 2 diabetes mellitus without complication, without long-term current use of insulin
Hypercalcemia
Type 2 diabetes mellitus without complication, without long-term current use of insulin
Hypercalcemia

## 2021-04-21 NOTE — PROGRESS NOTE ADULT - ATTENDING COMMENTS
60M, active smoker, hx of schizoaffective disorder, polysubstance abuse (cocaine, alcohol, cigarettes), DVT (?previously on Eliquis), HTN, T2DM, who p/w neck pain and hearing voices. Found to have a large heterogeneous mass occupying majority of the left intrathoracic cavity with invasion of the left pulmonary vasculature and left atrium. Tumor also occludes the left main bronchus and probable right pulmonary metastases. IR recommending against biopsy given the significant risk of biopsy under sedation i/s/o mass invading pulmonary vessels. Also COVID positive initially requiring supplemental oxygen now on RA- s/p course of dexamethasone, d/c'd remdesevir as patient refusing blood draws and cannot monitor renal/hepatic function. Patient with poor insight, and does not have decision making capacity, d/w family (NOK son Liam, mother Twyla, sister Ira) who are okay with hospice referral, comfort measures. Patient required MOLST checklist given [facility] connection - appreciate palliative care input - patient now DNR/DNI, no further work up for malignancy. Hospice on board. For d'c today.   - d'c this afternoon to hospice

## 2021-04-21 NOTE — PROGRESS NOTE ADULT - PROBLEM SELECTOR PLAN 3
-DNR/DNI  -workup for malignancy as above  -patient's family (son, mother, sister) agrees with CMO and hospice ([facility] conversation 4/9); however, patient known/protected by MHLS therefore EOL decisions must be approved through formal MOLST checklist, further discussion with Mental Hygiene Legal Services are required prior to further EOL planning  -patient DNR/DNI, c/w reasonable medical treatments, pursue hospice

## 2021-04-21 NOTE — DISCHARGE NOTE NURSING/CASE MANAGEMENT/SOCIAL WORK - NSDCFUADDAPPT_GEN_ALL_CORE_FT
Please follow-up with your PCP, Dr. Pate and Arin Carrillo, your psychiatry NP, after discharge.     Arin Carrillo (psychiatry NP) [phone]

## 2021-04-21 NOTE — PROGRESS NOTE ADULT - PROVIDER SPECIALTY LIST ADULT
Endocrinology
Internal Medicine
Endocrinology
Endocrinology
Internal Medicine
Endocrinology
Internal Medicine
Palliative Care
Internal Medicine

## 2021-04-21 NOTE — PROVIDER CONTACT NOTE (OTHER) - ACTION/TREATMENT ORDERED:
As per MD, do not aggravate pt more, and attempt to place tele/. Keep cardiac monitor/ off for now. continue with VS Q4h. and continue to monitor pt.
Pt has been refusing care MD aware.  will attempt assessment again
Risks and benefits explained, but patient continues to refuse
Continue to monitor and notify for change in status. RRT if needed
MD made aware. No further interventions at this time. Continue to monitor.
No action at this time
continue to monitor
no action at this time, remains on 3 liters o2 at this time
pt p/w cognitive limitations and continues to deny even post re-orientation to situations and education. Team notified and aware. Will cont. to monitor and await further instruction.
MD made aware. No further interventions at this time. Patient care continued.
No actions at this time. Team aware of the situation No acute distress noted. Will continue to monitor patient.
Risks and benefits explained, but patient continue to refuse
Continue to monitor patient with constant observation. Provider is aware of patient not cooperating and agrees with not agitating him.
Risks and benefits explained, but patient continue to refuse
no action, patient has been refusing services all day
MD made aware. Stated to give tylenol for pain, patient is refusing pain medication. Will continue to monitor.
Risks and benefits explained, but patient continue to refuse
MD notified. MD at bedside. UA and blood cultures ordered. Continue to monitor.
Pt has been refusing care MD aware.  will attempt assessment again, no further interventions at this time.
Risks and benefits explained, but patient continue to refuse
Risks and benefits explained, but patient continue to refuse
MD notified. no new orders at this time. Continue to monitor.
Pt has been refusing care MD aware.  will attempt assessment again, no further interventions at this time.
Risks and benefits explained, but patient continue to refuse
MD notified. retake bp, bolus will be ordered
discussed with team possibility of changing to nasal cannula and back to bipap when needed, presently  is registering  depending on position

## 2021-04-21 NOTE — DISCHARGE NOTE NURSING/CASE MANAGEMENT/SOCIAL WORK - PATIENT PORTAL LINK FT
You can access the FollowMyHealth Patient Portal offered by [facility] by registering at the following website: [email or web address]. By joining [employer]’s FollowMyHealth portal, you will also be able to view your health information using other applications (apps) compatible with our system.

## 2021-04-21 NOTE — PROGRESS NOTE ADULT - PROBLEM SELECTOR PROBLEM 9
Deep vein thrombosis (DVT)

## 2021-04-21 NOTE — PROVIDER CONTACT NOTE (OTHER) - NAME OF MD/NP/PA/DO NOTIFIED:
Gomez Lane  #3 [phone]
Ira Kong MD
Jeremy Culver
fanny
MD Dagoberto
Dr. Arenas
MD [facility]
fanny
hu
Danish Smith
Dr. Arenas
Gomez Lane
mari chavez
Danish Smith
Dr. Arenas
Dr. Arenas
Dagoberto
Dr. Arenas
Dr. Chakraborty
Grace Arenas
Grace Arenas MD
Julio C Trujillo MD [id number]
Sharon Laird
Julio C Trujillo
My signature below certifies that the above stated patient is homebound and upon completion of the Face-To-Face encounter, has the need for intermittent skilled nursing, physical therapy and/or speech or occupational therapy services in their home for their current diagnosis as outlined in their initial plan of care. These services will continue to be monitored by myself or another physician.

## 2021-04-21 NOTE — PROVIDER CONTACT NOTE (OTHER) - RECOMMENDATIONS
Continue to monitor patient.
Dr. Arenas notified
Dr. Arenas notified
Notify MD
Notify MD.
Dr. Arenas notified
discussed with md in person, discussed plan with patient and need for interventions
Notify MD
Notify MD
discussed with team
Continue to monitor. RRT if Pt status changes.
Dr. Arenas notified
Notify MD.
continue to monitor  retry again later
Dr. Arenas notified
Dr. Arenas notified
Dr. Chakraborty notified
Md to assess
Notify MD
RN continues to encourage pt to allow for check of FS and vitals signs, as well as with assistance for morning care. All risks/benefits explained, education continuously provided.
Notify MD.
called and discussed with team
Wound Care: Petrolatum

## 2021-04-21 NOTE — PROGRESS NOTE ADULT - ATTENDING SUPERVISION STATEMENT
Fellow
Resident

## 2021-04-21 NOTE — PROVIDER CONTACT NOTE (OTHER) - DATE AND TIME:
04-Apr-2021 21:38
10-Apr-2021 12:50
14-Apr-2021 23:30
15-Apr-2021 20:30
16-Apr-2021 06:30
16-Apr-2021 23:00
10-Apr-2021 17:30
19-Apr-2021 07:14
20-Apr-2021 21:20
21-Apr-2021 06:30
04-Apr-2021 07:04
14-Apr-2021 06:45
04-Apr-2021 15:00
15-Apr-2021 09:00
17-Apr-2021 06:00
02-Apr-2021 21:45
10-Apr-2021 09:46
11-Apr-2021 00:56
13-Apr-2021 10:00
19-Apr-2021 21:15
04-Apr-2021 15:48
03-Apr-2021 07:45
06-Apr-2021 08:28
10-Apr-2021 12:12
12-Apr-2021 23:30
18-Apr-2021 21:20

## 2021-04-21 NOTE — PROGRESS NOTE ADULT - REASON FOR ADMISSION
neck pain

## 2021-04-21 NOTE — PROGRESS NOTE ADULT - SUBJECTIVE AND OBJECTIVE BOX
PROGRESS NOTE:   Authored by Luz Marina Verde MD  Pager: [facility] [phone]; [facility] [phone]    Patient is a 60y old  Male who presents with a chief complaint of neck pain (20 Apr 2021 06:44)      SUBJECTIVE / OVERNIGHT EVENTS:  NAEON. Continued to refuse medications. This AM, denies CP, SOC, subjective f/c, n/v.     MEDICATIONS  (STANDING):  benztropine 1 milliGRAM(s) Oral two times a day  cholecalciferol 1000 Unit(s) Oral daily  haloperidol     Tablet 10 milliGRAM(s) Oral two times a day  heparin   Injectable 5000 Unit(s) SubCutaneous every 12 hours  nicotine - 21 mG/24Hr(s) Patch 1 patch Transdermal daily  OLANZapine 20 milliGRAM(s) Oral every 12 hours  traZODone 100 milliGRAM(s) Oral at bedtime    MEDICATIONS  (PRN):  acetaminophen   Tablet .. 650 milliGRAM(s) Oral every 6 hours PRN Temp greater or equal to 38C (100.4F), Mild Pain (1 - 3), Moderate Pain (4 - 6)  haloperidol    Injectable 2 milliGRAM(s) IntraMuscular every 6 hours PRN Agitation  lidocaine   Patch 1 Patch Transdermal daily PRN pain  nicotine  Polacrilex Gum 2 milliGRAM(s) Oral every 2 hours PRN smoking cessation  polyethylene glycol 3350 17 Gram(s) Oral daily PRN Constipation  senna 2 Tablet(s) Oral at bedtime PRN Constipation      CAPILLARY BLOOD GLUCOSE        I&O's Summary      PHYSICAL EXAM:  Vital Signs Last 24 Hrs  T(C): 36.6 (21 Apr 2021 05:35), Max: 36.6 (21 Apr 2021 05:35)  T(F): 97.8 (21 Apr 2021 05:35), Max: 97.8 (21 Apr 2021 05:35)  HR: 96 (21 Apr 2021 05:35) (83 - 96)  BP: 105/66 (21 Apr 2021 05:35) (103/75 - 105/66)  BP(mean): --  RR: 20 (21 Apr 2021 05:35) (18 - 20)  SpO2: 100% (21 Apr 2021 05:35) (98% - 100%)    General: NAD  HEENT: NC/AT; PERRL, clear conjunctiva  Respiratory: tachypneic, auscultation of anterior fields: decreased breath sounds on left, scattered fine crackles on right  Cardiovascular: regular rate, no m/r/g  Abdomen: no ttp, bowel sounds x4  Extremities: no edema  Skin: normal color and turgor; no rash  Neuro: AxO to person and place.     LABS:                      RADIOLOGY & ADDITIONAL TESTS:  Results Reviewed:   Imaging Personally Reviewed:  Electrocardiogram Personally Reviewed:    COORDINATION OF CARE:  Care Discussed with Consultants/Other Providers [Y/N]:  Prior or Outpatient Records Reviewed [Y/N]:

## 2021-05-24 NOTE — ED ADULT TRIAGE NOTE - PAIN RATING/NUMBER SCALE (0-10): ACTIVITY
Spoke to Lorri regarding her e -advice, per Dr Grubbs she recommends a repeat Audio Hearing test.   Patient insist on speaking to Dr Grubbs  Regarding Ototoxic concerns message forwarded to Dr Grubbs   0

## 2021-06-04 NOTE — ED ADULT NURSE NOTE - NSFALLRSKINDICATORS_ED_ALL_ED
wife

## 2021-06-18 NOTE — ED BEHAVIORAL HEALTH ASSESSMENT NOTE - NS ED BHA PLAN INPATIENT UNIT SELECTION YN
"GASTROENTEROLOGY PROGRESS NOTE        SUBJECTIVE:  No abdominal pain, nausea or vomiting. No fever. The patient and his wife expressed understanding of the plan going forward.     OBJECTIVE:    BP (!) 143/71 (BP Location: Left arm)   Pulse 62   Temp 97.5  F (36.4  C) (Oral)   Resp 16   Ht 1.753 m (5' 9\")   Wt 68 kg (150 lb)   SpO2 95%   BMI 22.15 kg/m    Temp (24hrs), Av.9  F (36.6  C), Min:97.3  F (36.3  C), Max:98.9  F (37.2  C)    Patient Vitals for the past 72 hrs:   Weight   21 1225 68 kg (150 lb)   21 2258 67.7 kg (149 lb 4.8 oz)   21 1859 68.5 kg (151 lb)       Intake/Output Summary (Last 24 hours) at 2021 1033  Last data filed at 2021 1002  Gross per 24 hour   Intake 1477 ml   Output 1975 ml   Net -498 ml        PHYSICAL EXAM     Constitutional: No distress    Abdomen: Soft nontender         Additional Comments:  ROS, FH, SH: See initial GI consult for details.     I have reviewed the patient's new clinical lab results:     Recent Labs   Lab Test 21  0830 21  1240 21  0654 21  1859 06/10/21  1326   WBC 9.2  --  6.6 8.4 7.5   HGB 12.9*  --  12.9* 14.1 14.5   MCV 91  --  91 93 92    306 260 307 301   INR  --  0.96  --  0.96 0.98     Recent Labs   Lab Test 21  0830 21  0654 21  1859   POTASSIUM 3.9 3.9 4.0   CHLORIDE 98 100 96   CO2 25 27 29   BUN 17 14 17   ANIONGAP 8 6 5     Recent Labs   Lab Test 21  0830 21  0654 21  1859 21  1350 21  1350 20  2342 20  0939   ALBUMIN 2.4* 2.5* 3.1*   < >  --   --   --    BILITOTAL 7.2* 7.2* 8.0*   < >  --   --   --    * 128* 164*   < >  --   --   --    * 125* 150*   < >  --   --   --    PROTEIN  --   --   --   --  Negative Negative Negative   LIPASE  --   --  506*  --   --   --   --     < > = values in this interval not displayed.     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 FNA done and pending. Obstructing PD and CBD.   -- Biliary and gallbladder sludge.               6/17 ERCP     Impression:          - Ampulla mass causing CBD obstruction,                             cholangitis. Post ERCP with stent placement,                             brushing/biopsy. Sphincterotomy not done due to                             Plavix use.                               ASSESSMENT/ PLAN  Elier Barber is a 74 year old with ALS, hypertension, prostate cancer status post prostatectomy, who was sent to the emergency room with elevated LFTs and an ultrasound concerning for pancreatic mass.     1.   CBD obstruction/cholangitis: Secondary to ampullary mass seen on EUS. Brushing/biopsy obtained. Stent placed in common bile duct. 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 CT scan of the chest and abdomen without evidence for metastatic disease. Oncology has been consulted     -- Await cytology results and await path results.   -- Use broad spectrum antibiotics for 1 week.   -- Repeat ERCP in 4 weeks to exchange stent. Will need to be off Plavix 5 days prior to procedure   -- Check CA 19-9     Discussed with Dr. Rhett Douglas, PAEllaC  [facility] ( [facility])       No

## 2021-07-06 ENCOUNTER — NON-APPOINTMENT (OUTPATIENT)
Age: 61
End: 2021-07-06

## 2021-07-30 ENCOUNTER — APPOINTMENT (OUTPATIENT)
Dept: INTERNAL MEDICINE | Facility: CLINIC | Age: 61
End: 2021-07-30
Payer: COMMERCIAL

## 2021-07-30 ENCOUNTER — NON-APPOINTMENT (OUTPATIENT)
Age: 61
End: 2021-07-30

## 2021-07-30 VITALS
WEIGHT: 164 LBS | SYSTOLIC BLOOD PRESSURE: 128 MMHG | BODY MASS INDEX: 24.29 KG/M2 | OXYGEN SATURATION: 97 % | RESPIRATION RATE: 16 BRPM | HEIGHT: 69 IN | HEART RATE: 61 BPM | TEMPERATURE: 97.6 F | DIASTOLIC BLOOD PRESSURE: 85 MMHG

## 2021-07-30 PROCEDURE — 93000 ELECTROCARDIOGRAM COMPLETE: CPT

## 2021-07-30 PROCEDURE — 99396 PREV VISIT EST AGE 40-64: CPT | Mod: 25

## 2021-07-30 RX ORDER — SULFAMETHOXAZOLE AND TRIMETHOPRIM 800; 160 MG/1; MG/1
800-160 TABLET ORAL TWICE DAILY
Qty: 28 | Refills: 0 | Status: DISCONTINUED | COMMUNITY
Start: 2019-07-08 | End: 2021-07-30

## 2021-07-30 RX ORDER — HYDROCORTISONE ACETATE 25 MG/1
25 SUPPOSITORY RECTAL
Qty: 10 | Refills: 2 | Status: DISCONTINUED | COMMUNITY
Start: 2019-07-02 | End: 2021-07-30

## 2021-07-30 RX ORDER — SCOPALAMINE 1 MG/3D
1 PATCH, EXTENDED RELEASE TRANSDERMAL
Qty: 4 | Refills: 0 | Status: DISCONTINUED | COMMUNITY
Start: 2019-01-28 | End: 2021-07-30

## 2021-07-30 RX ORDER — FINASTERIDE 5 MG/1
5 TABLET, FILM COATED ORAL DAILY
Qty: 30 | Refills: 0 | Status: DISCONTINUED | COMMUNITY
Start: 2019-07-02 | End: 2021-07-30

## 2021-07-30 NOTE — HEALTH RISK ASSESSMENT
[0] : 2) Feeling down, depressed, or hopeless: Not at all (0) [PHQ-2 Negative - No further assessment needed] : PHQ-2 Negative - No further assessment needed

## 2021-08-03 ENCOUNTER — TRANSCRIPTION ENCOUNTER (OUTPATIENT)
Age: 61
End: 2021-08-03

## 2021-08-03 LAB
ALBUMIN SERPL ELPH-MCNC: 4.4 G/DL
ALP BLD-CCNC: 85 U/L
ALT SERPL-CCNC: 16 U/L
ANION GAP SERPL CALC-SCNC: 15 MMOL/L
APPEARANCE: ABNORMAL
AST SERPL-CCNC: 19 U/L
BACTERIA: NEGATIVE
BASOPHILS # BLD AUTO: 0.07 K/UL
BASOPHILS NFR BLD AUTO: 1 %
BILIRUB SERPL-MCNC: 0.5 MG/DL
BILIRUBIN URINE: NEGATIVE
BLOOD URINE: NEGATIVE
BUN SERPL-MCNC: 12 MG/DL
CALCIUM SERPL-MCNC: 9.4 MG/DL
CHLORIDE SERPL-SCNC: 104 MMOL/L
CHOLEST SERPL-MCNC: 238 MG/DL
CO2 SERPL-SCNC: 23 MMOL/L
COLOR: YELLOW
CREAT SERPL-MCNC: 1.18 MG/DL
EOSINOPHIL # BLD AUTO: 0.21 K/UL
EOSINOPHIL NFR BLD AUTO: 3 %
ESTIMATED AVERAGE GLUCOSE: 117 MG/DL
GLUCOSE QUALITATIVE U: NEGATIVE
GLUCOSE SERPL-MCNC: 100 MG/DL
HBA1C MFR BLD HPLC: 5.7 %
HCT VFR BLD CALC: 49.4 %
HDLC SERPL-MCNC: 53 MG/DL
HGB BLD-MCNC: 16 G/DL
HYALINE CASTS: 0 /LPF
IMM GRANULOCYTES NFR BLD AUTO: 0.6 %
KETONES URINE: NEGATIVE
LDLC SERPL CALC-MCNC: 142 MG/DL
LEUKOCYTE ESTERASE URINE: NEGATIVE
LYMPHOCYTES # BLD AUTO: 2.37 K/UL
LYMPHOCYTES NFR BLD AUTO: 33.5 %
MAN DIFF?: NORMAL
MCHC RBC-ENTMCNC: 30.8 PG
MCHC RBC-ENTMCNC: 32.4 GM/DL
MCV RBC AUTO: 95 FL
MICROSCOPIC-UA: NORMAL
MONOCYTES # BLD AUTO: 0.53 K/UL
MONOCYTES NFR BLD AUTO: 7.5 %
NEUTROPHILS # BLD AUTO: 3.86 K/UL
NEUTROPHILS NFR BLD AUTO: 54.4 %
NITRITE URINE: NEGATIVE
NONHDLC SERPL-MCNC: 185 MG/DL
PH URINE: 5
PLATELET # BLD AUTO: 255 K/UL
POTASSIUM SERPL-SCNC: 4.6 MMOL/L
PROT SERPL-MCNC: 6.8 G/DL
PROTEIN URINE: NEGATIVE
PSA FREE FLD-MCNC: 34 %
PSA FREE SERPL-MCNC: 1.33 NG/ML
PSA SERPL-MCNC: 3.87 NG/ML
RBC # BLD: 5.2 M/UL
RBC # FLD: 12.5 %
RED BLOOD CELLS URINE: 1 /HPF
SODIUM SERPL-SCNC: 142 MMOL/L
SPECIFIC GRAVITY URINE: 1.02
SQUAMOUS EPITHELIAL CELLS: 0 /HPF
TRIGL SERPL-MCNC: 213 MG/DL
UROBILINOGEN URINE: NORMAL
WBC # FLD AUTO: 7.08 K/UL
WHITE BLOOD CELLS URINE: 0 /HPF

## 2021-08-03 NOTE — PLAN
[FreeTextEntry1] : Testicular Self-Exams recommended \par Annual skin cancer screening is recommended\par Safe Sex Counseling given\par exercise and Nutrition counseling given \par

## 2021-08-03 NOTE — HISTORY OF PRESENT ILLNESS
[de-identified] : 60 year old male patient came in for preventive visit \par \par diet is healthy\par MSM, , monogamous\par exercises regularly

## 2022-03-01 NOTE — PROGRESS NOTE ADULT - PROBLEM SELECTOR PROBLEM 7
Problem: Self Care Deficits Care Plan (Adult)  Goal: *Acute Goals and Plan of Care (Insert Text)  Description: FUNCTIONAL STATUS PRIOR TO ADMISSION: Pt was Mod I with RW and rollator for functional mobility and independent in ADLs. 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 HOME SUPPORT: The patient lived with wife but did not require assist. Children live near by and are able to provide some support. Occupational Therapy Goals  Initiated 3/1/2022  1. Patient will perform grooming standing at sink with minimal assistance/contact guard assist within 7 day(s). 2.  Patient will perform UB and LB bathing with minimal assistance/contact guard assist within 7 day(s). 3.  Patient will perform lower body dressing with minimal assistance/contact guard assist within 7 day(s). 4.  Patient will perform toilet transfers with minimal assistance/contact guard assist within 7 day(s). 5.  Patient will participate in upper extremity therapeutic exercise/activities with modified independence for 10 minutes within 7 day(s). 6.  Patient will utilize energy conservation techniques during functional activities with verbal cues within 7 day(s). Outcome: Not Met     OCCUPATIONAL THERAPY EVALUATION  Patient: Giovanna Escalante (41 y.o. male)  Date: 3/1/2022  Primary Diagnosis: [facility] [K56.609]  Procedure(s) (LRB):  RIGHT LEG ARTERIOGRAM.REQUEST TO FOLLOW (Right) 1 Day Post-Op   Precautions:   Fall,DNR    ASSESSMENT  Based on the objective data described below, the patient presents with generalized weakness and anxiety with functional mobility following admission for R leg arteriogram, plan for R leg bypass, and R 2nd toe gangrene. Pt cleared for therapy by nursing and received supine in bed and willing to work with therapy after persuasion. Pt pleasant, but anxious about functional mobility due to bowel and bladder concerns and quantity of lines.  Pt completed bed mobility to sit EOB with Min A and reported light headedness. BP monitored and stable, however, pt strongly requesting to lay down. Returned pt to supine and scooted up in bed with Mod Ax2. Pending medical stability and progression, recommend SNF at discharge as pt is functioning below Mod I baseline. Current Level of Function Impacting Discharge (ADLs/self-care): Mod-Min A for bed mobility, Set up for UB ADLs, Max A for LB ADLs    Functional Outcome Measure: The patient scored Total: 30/100 on the Barthel Index outcome measure which is indicative of being dependent in basic self-care. Other factors to consider for discharge: Is primary caretaker for wife with dementia      Patient will benefit from skilled therapy intervention to address the above noted impairments. PLAN :  Recommendations and Planned Interventions: self care training, functional mobility training, therapeutic exercise, balance training, therapeutic activities, endurance activities, patient education, home safety training, and family training/education    Frequency/Duration: Patient will be followed by occupational therapy 5 times a week to address goals. Recommendation for discharge: (in order for the patient to meet his/her long term goals)  Therapy up to 5 days/week in SNF setting vs HHOT with 24/hour supervision for ADLs and functional mobility     This discharge recommendation:  Has not yet been discussed the attending provider and/or case management    IF patient discharges home will need the following DME: TBD at discharge        SUBJECTIVE:   Patient stated if I move i'll have to go to the bathroom.     OBJECTIVE DATA SUMMARY:   HISTORY:   Past Medical History:   Diagnosis Date    AAA (abdominal aortic aneurysm) ([facility] 1/6/2012    Abnormal serum protein electrophoresis 4/25/2012    Anemia 4/6/2010    Anemia 1/5/2012    Anemia due to GI bleed 2007 4/6/2010    Aneurysm ([facility]     AAA    Arrhythmia     ATRIAL FIB PT STATES HE DOESN'T HAVE    Arthritis Atrial fibrillation 1/2012 2/22/2012    Bilateral Carotid Bruits, L>R 9/7/2010    Bone cancer ([facility]     Borderline diabetes mellitus 4/6/2010    Cancer ([facility]     SKIN MELANOMA ON BACK     Carotid stenosis 4/6/2010    Carotid stenosis     Chronic pain     Diabetes mellitus, type 2 ([facility] 9/7/2010    NO MEDS    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    ETOH abuse 4/6/2010    GERD (gastroesophageal reflux disease) 4/6/2010    GI bleed 4/6/2010    GI bleed, duodenitis 2007 4/6/2010    H/O Heavy Alcohol Use 9/7/2010    History of skin cancer 9/7/2010    HTN (hypertension) 4/6/2010    Hypercalcemia 4/25/2012    Hyperhomocysteinemia ([facility] 9/7/2010    Hypertriglyceridemia 9/7/2010    Left inguinal hernia 9/14/2015    Lipids blood increased 4/6/2010    Microalbuminuria 12/10/2015    Mild Allergic Rhinitis 9/7/2010    Pre-diabetes 9/7/2010 2005;  Optho Dr Barb Raines     Right inguinal hernia 11/2/2015    S/P AAA repair 2/9/2012 2/22/2012    Sinus tachycardia 9/7/2010    Tachycardia 4/6/2010    Vitamin B12 deficiency 9/7/2010     Past Surgical History:   Procedure Laterality Date    COLONOSCOPY  8/2007    Normal; Dr Hiram Mehta    EGD  8/2007    small hiatal hernia, mild duodenitis; Dr Felix Almaraz AAA REPAIR  2/9/2012    Dr Natalie Monahan CATARACT REMOVAL      both eyes    HX CHOLECYSTECTOMY  1-29-14    [facility]- [facility]- Dr. [facility]    HX HERNIA REPAIR Left 9/24/15    left indirect inguinal by Dr. Cleveland Antonio Right 11/2/15    inguinal w/ mesh by Dr. Cleveland Antonio Bilateral 10/2015 And 11/2015    Inguinal    BLUE DOPPLER  3/26/2012    BLUE Echo; + clot       Expanded or extensive additional review of patient history:     Home Situation  Home Environment: Private residence  # Steps to Enter: 5 (with landings)  Rails to Enter: Yes  Hand Rails : Right  One/Two Story Residence: Two story  Living Alone: No  Support Systems: Spouse/Significant Other,Child(manuela)  Patient Expects to be Discharged to[de-identified] Home  Current DME Used/Available at Home: Cane, straight,Walker, [employer], rollator,Shower chair  Tub or Shower Type: Tub/Shower combination    EXAMINATION OF PERFORMANCE DEFICITS:  Cognitive/Behavioral Status:  Neurologic State: Alert  Orientation Level: Oriented X4;Appropriate for age  Cognition: Follows commands  Perception: Appears intact  Perseveration: No perseveration noted  Safety/Judgement: Awareness of environment    Skin: dry, intact     Edema: none noted     Hearing: Auditory  Auditory Impairment: None    Vision/Perceptual:            Corrective Lenses: Glasses    Range of Motion:    AROM: Generally decreased, functional     Strength:    Strength: Generally decreased, functional     Coordination:     Fine Motor Skills-Upper: Left Intact; Right Intact    Gross Motor Skills-Upper: Left Intact; Right Intact    Tone & Sensation:     Sensation: Intact        Balance:  Sitting: Intact  Standing - Static: Good  Standing - Dynamic : Good;Fair    Functional Mobility and Transfers for ADLs:  Bed Mobility:  Supine to Sit: Minimum assistance  Sit to Supine: Moderate assistance    ADL Assessment:  Feeding: Setup; Independent    Oral Facial Hygiene/Grooming: Setup;Modified Independent (seated, supported )    Bathing: Moderate assistance    Upper Body Dressing: Modified independent;Setup    Lower Body Dressing: Maximum assistance    Toileting: Maximum assistance; Total assistance        ADL Intervention and task modifications:       Cognitive Retraining  Safety/Judgement: Awareness of environment      Functional Measure:    Barthel Index:  Bathin  Bladder: 5  Bowels: 0  Groomin  Dressin  Feeding: 10  Mobility: 0  Stairs: 0  Toilet Use: 0  Transfer (Bed to Chair and Back): 5  Total: 30/100      The Barthel ADL Index: Guidelines  1.  The index should be used as a record of what a patient does, not as a record of what a patient could do.  2. The main aim is to establish degree of independence from any help, physical or verbal, however minor and for whatever reason. 3. The need for supervision renders the patient not independent. 4. A patient's performance should be established using the best available evidence. Asking the patient, friends/relatives and nurses are the usual sources, but direct observation and common sense are also important. However direct testing is not needed. 5. Usually the patient's performance over the preceding 24-48 hours is important, but occasionally longer periods will be relevant. 6. Middle categories imply that the patient supplies over 50 per cent of the effort. 7. Use of aids to be independent is allowed. Score Interpretation (from [facility])    Independent   60-79 Minimally independent   40-59 Partially dependent   20-39 Very dependent   <20 Totally dependent     -Stephani Knapp., Barthel, DShirleyW. (1965). Functional evaluation: the Barthel Index. [address] [facility] ([address] (1997). The Barthel activities of daily living index: self-reporting versus actual performance in the old (> or = 75 years). Journal [address] 45(7), [facility], .ShirleyShirley, Michelle Bernal, [facility]. (1999). Measuring the change in disability after inpatient rehabilitation; comparison of the responsiveness of the Barthel Index and Functional [place]arton Measure. Journal of Neurology, Neurosurgery, and Psychiatry, 66(4), 283-398. TREVOR Sullivan.A, HARRISON Brooks.J.LANE, & Tori Chung MNILES. (2004) Assessment of post-stroke quality of life in cost-effectiveness studies: The usefulness of the Barthel Index and the EuroQoL-5D.  Quality of Life Research, 15, 339-02     Occupational Therapy Evaluation Charge Determination   History Examination Decision-Making   LOW Complexity : Brief history review  LOW Complexity : 1-3 performance deficits relating to physical, cognitive , or psychosocial skils that result in activity limitations and / or participation restrictions  LOW Complexity : No comorbidities that affect functional and no verbal or physical assistance needed to complete eval tasks       Based on the above components, the patient evaluation is determined to be of the following complexity level: LOW   Pain Rating:  None reported     Activity Tolerance:   Fair    After treatment patient left in no apparent distress:    Supine in bed, Heels elevated for pressure relief, Call bell within reach, and Side rails x 3    COMMUNICATION/EDUCATION:   The patients plan of care was discussed with: Physical therapist and Registered nurse. Home safety education was provided and the patient/caregiver indicated understanding. This patients plan of care is appropriate for delegation to [facility].     Thank you for this referral.  Donna Hong, OT  Time Calculation: 27 mins Substance abuse

## 2022-05-18 ENCOUNTER — APPOINTMENT (OUTPATIENT)
Dept: INTERNAL MEDICINE | Facility: CLINIC | Age: 62
End: 2022-05-18
Payer: COMMERCIAL

## 2022-05-18 VITALS
HEIGHT: 69 IN | DIASTOLIC BLOOD PRESSURE: 65 MMHG | RESPIRATION RATE: 16 BRPM | HEART RATE: 71 BPM | BODY MASS INDEX: 23.7 KG/M2 | SYSTOLIC BLOOD PRESSURE: 102 MMHG | OXYGEN SATURATION: 98 % | TEMPERATURE: 97.6 F | WEIGHT: 160 LBS

## 2022-05-18 PROCEDURE — 99214 OFFICE O/P EST MOD 30 MIN: CPT | Mod: 25

## 2022-05-18 NOTE — HISTORY OF PRESENT ILLNESS
[FreeTextEntry8] : 61 year old male patient came in with new complaint of hematuria and f/u on high cholesterol result. \par Patient reports last wednesday on one isolated incident, he has seen blood tinged urine while urianting, slef resolved, was not associated with pain with urination or flank pain, denies prior hx of such symptom, He has BPH and is currently not being treated for it. he was advised to f/u on rising PSA levels last year but forgot to follow up after his vacation and would like to follow up on that.\par \par Patient also had elevated TG and LDL, he exercises regularly, believes has a healthy diet, is concerned from high read, he would like to avoid taking medication if he can avoid it.

## 2022-05-18 NOTE — HEALTH RISK ASSESSMENT
[0] : 2) Feeling down, depressed, or hopeless: Not at all (0) [PHQ-2 Negative - No further assessment needed] : PHQ-2 Negative - No further assessment needed [[id number]] : 0

## 2022-05-18 NOTE — PHYSICAL EXAM
[Soft] : abdomen soft [Non Tender] : non-tender [No CVA Tenderness] : no CVA  tenderness [No Spinal Tenderness] : no spinal tenderness [Normal] : affect was normal and insight and judgment were intact [de-identified] : no suprapubic tenderness

## 2022-05-21 ENCOUNTER — APPOINTMENT (OUTPATIENT)
Dept: ULTRASOUND IMAGING | Facility: CLINIC | Age: 62
End: 2022-05-21
Payer: COMMERCIAL

## 2022-05-21 ENCOUNTER — RESULT REVIEW (OUTPATIENT)
Age: 62
End: 2022-05-21

## 2022-05-21 ENCOUNTER — OUTPATIENT (OUTPATIENT)
Dept: OUTPATIENT SERVICES | Facility: HOSPITAL | Age: 62
LOS: 1 days | End: 2022-05-21

## 2022-05-21 PROCEDURE — 76770 US EXAM ABDO BACK WALL COMP: CPT | Mod: 26

## 2022-05-23 ENCOUNTER — NON-APPOINTMENT (OUTPATIENT)
Age: 62
End: 2022-05-23

## 2022-05-23 ENCOUNTER — TRANSCRIPTION ENCOUNTER (OUTPATIENT)
Age: 62
End: 2022-05-23

## 2022-05-23 LAB
APPEARANCE: CLEAR
BACTERIA UR CULT: NORMAL
BACTERIA: NEGATIVE
BILIRUBIN URINE: NEGATIVE
BLOOD URINE: NEGATIVE
C TRACH RRNA SPEC QL NAA+PROBE: NOT DETECTED
CHOLEST SERPL-MCNC: 235 MG/DL
COLOR: NORMAL
GLUCOSE QUALITATIVE U: NEGATIVE
HDLC SERPL-MCNC: 52 MG/DL
HYALINE CASTS: 0 /LPF
KETONES URINE: NEGATIVE
LDLC SERPL CALC-MCNC: 140 MG/DL
LEUKOCYTE ESTERASE URINE: NEGATIVE
MICROSCOPIC-UA: NORMAL
N GONORRHOEA RRNA SPEC QL NAA+PROBE: NOT DETECTED
NITRITE URINE: NEGATIVE
NONHDLC SERPL-MCNC: 183 MG/DL
PH URINE: 5
PROTEIN URINE: NEGATIVE
PSA SERPL-MCNC: 4.75 NG/ML
RED BLOOD CELLS URINE: 2 /HPF
SOURCE AMPLIFICATION: NORMAL
SPECIFIC GRAVITY URINE: 1.03
SQUAMOUS EPITHELIAL CELLS: 0 /HPF
TRIGL SERPL-MCNC: 214 MG/DL
UROBILINOGEN URINE: NORMAL
WHITE BLOOD CELLS URINE: 1 /HPF

## 2022-05-25 ENCOUNTER — TRANSCRIPTION ENCOUNTER (OUTPATIENT)
Age: 62
End: 2022-05-25

## 2022-06-08 ENCOUNTER — APPOINTMENT (OUTPATIENT)
Dept: UROLOGY | Facility: CLINIC | Age: 62
End: 2022-06-08
Payer: COMMERCIAL

## 2022-06-08 VITALS
WEIGHT: 160 LBS | HEART RATE: 76 BPM | DIASTOLIC BLOOD PRESSURE: 74 MMHG | TEMPERATURE: 99 F | BODY MASS INDEX: 23.7 KG/M2 | SYSTOLIC BLOOD PRESSURE: 131 MMHG | HEIGHT: 69 IN

## 2022-06-08 DIAGNOSIS — R31.29 OTHER MICROSCOPIC HEMATURIA: ICD-10-CM

## 2022-06-08 PROCEDURE — 99214 OFFICE O/P EST MOD 30 MIN: CPT

## 2022-06-08 NOTE — HISTORY OF PRESENT ILLNESS
[Nocturia] : nocturia [Weak Stream] : weak stream 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\par no other sexual partners; no condoms; no anal sexual intercourse\par \par black male with urinary buring and frequency approximately 10 days ago\par treated with TMP/Sx and then told to stop because culture was reported as negative\par one week ago had acute urinary retention 600 ml catheterized x 1 \par thereafter back to baseline\par no further dysuria or frequency\par \par 6.8.2022\par patient noted drop of blood during urination\par urine was otherwise clear\par no other urinary symtoms\par not tobacco\par no family history of bladder cancer [Urinary Incontinence] : no urinary incontinence [Urinary Retention] : no urinary retention [Urinary Urgency] : no urinary urgency [Erectile Dysfunction] : no Erectile Dysfunction

## 2022-06-08 NOTE — ASSESSMENT
[FreeTextEntry1] : 58 year old male with a recent history of what seems consistent with acute prostatitis.  He was treated for several days with resolution of symptoms which was complicated by acute urinary retention.  He had an atraumatic catheterization and had had gradual return to baseline of urinary symptoms.\par We discussed his recent symptoms and physical findings and will assume that he has partially treated prostatitis. I would recommend completion of 2 week course.  We discussed side effects allergic reactions to TMP/SX\par \par His urinalysis from ED demonstrated microscopic hematuria which may be artifact of catheterization. \par ? traumatic from catheterization.  We will repeat at this point\par \par \par fu 3 weeks with flow rate \par \par 6..8.2022\par one drop of blood on one occasion\par no risk factors ( no tobacco no family history)\par urinalysis had been normal\par will repeat and if rbc proceed with evaluation\par probable prostatic in origin by description and size of prostate\par \par USG demonstrates prostate volume of 103 cc\par PSAD 0.04\par will repeat PSA and proceed with MRI if elevated\par The ANDREA ESCOBEDO  expressed fully understanding of the information provided, the consequences and the management.\par \par orchalgia\par no findings on exam\par We discussed conservative management with: a.  nonsteroidal anti-inflammatories, b. ice prn and c. scrotal support\par will proceed with scrotal ultrasound\par \par Plan\par 1 PSA\par 2. +/- MRI\par 3. urinalysis \par 4. Scrotal ultrasound\par \par \par repeat PSA if again elevated\par will proceed with MRI\par The ANDREA ESCOBEDO  expressed fully understanding of the information provided, the consequences and the management.\par

## 2022-06-08 NOTE — PHYSICAL EXAM
[Urethral Meatus] : meatus normal [Penis Abnormality] : normal circumcised penis [Epididymis] : the epididymides were normal [Testes Tenderness] : no tenderness of the testes [Testes Mass (___cm)] : there were no testicular masses [Prostate Tenderness] : the prostate was not tender [No Prostate Nodules] : no prostate nodules [Prostate Size ___ (0-4)] : prostate size [unfilled] (scale: 0-4)

## 2022-06-08 NOTE — LETTER BODY
[FreeTextEntry2] : SUSHMA Soni MD [FreeTextEntry1] :  \par Dear Doctor,\par \par \par I had the opportunity to see your patient, Mr. ANDREA ESCOBEDO in followup. I am enclosing my office note for your information.\par \par I will keep you informed of any developments.\par \par Feel free to contact me if you have any questions.\par \par Sincerely,\par \par Rob Michele MD, FACS\par Professor of Urology\par [facility]\par \par [address]\par [place], [place] [address]\par \par [address]\par [place], [place] [address]\par \par Office Telephone \par [phone]\par \par Fax\par [phone]\par

## 2022-06-09 ENCOUNTER — TRANSCRIPTION ENCOUNTER (OUTPATIENT)
Age: 62
End: 2022-06-09

## 2022-06-09 LAB
APPEARANCE: CLEAR
BACTERIA: NEGATIVE
BILIRUBIN URINE: NEGATIVE
BLOOD URINE: NEGATIVE
COLOR: NORMAL
GLUCOSE QUALITATIVE U: NEGATIVE
HYALINE CASTS: 0 /LPF
KETONES URINE: NEGATIVE
LEUKOCYTE ESTERASE URINE: NEGATIVE
MICROSCOPIC-UA: NORMAL
NITRITE URINE: NEGATIVE
PH URINE: 5.5
PROTEIN URINE: NORMAL
PSA SERPL-MCNC: 4.13 NG/ML
RED BLOOD CELLS URINE: 2 /HPF
SPECIFIC GRAVITY URINE: 1.03
SQUAMOUS EPITHELIAL CELLS: 0 /HPF
UROBILINOGEN URINE: NORMAL
WHITE BLOOD CELLS URINE: 0 /HPF

## 2022-06-14 ENCOUNTER — OUTPATIENT (OUTPATIENT)
Dept: OUTPATIENT SERVICES | Facility: HOSPITAL | Age: 62
LOS: 1 days | End: 2022-06-14

## 2022-06-14 ENCOUNTER — RESULT REVIEW (OUTPATIENT)
Age: 62
End: 2022-06-14

## 2022-06-14 ENCOUNTER — APPOINTMENT (OUTPATIENT)
Dept: ULTRASOUND IMAGING | Facility: CLINIC | Age: 62
End: 2022-06-14
Payer: COMMERCIAL

## 2022-06-14 ENCOUNTER — APPOINTMENT (OUTPATIENT)
Dept: MRI IMAGING | Facility: CLINIC | Age: 62
End: 2022-06-14
Payer: COMMERCIAL

## 2022-06-14 PROCEDURE — 72197 MRI PELVIS W/O & W/DYE: CPT | Mod: 26

## 2022-06-14 PROCEDURE — 93975 VASCULAR STUDY: CPT | Mod: 26

## 2022-06-14 PROCEDURE — 76870 US EXAM SCROTUM: CPT | Mod: 26

## 2022-06-15 ENCOUNTER — APPOINTMENT (OUTPATIENT)
Dept: UROLOGY | Facility: CLINIC | Age: 62
End: 2022-06-15
Payer: COMMERCIAL

## 2022-06-15 VITALS — TEMPERATURE: 97.6 F

## 2022-06-15 DIAGNOSIS — N44.2 BENIGN CYST OF TESTIS: ICD-10-CM

## 2022-06-15 DIAGNOSIS — R31.0 GROSS HEMATURIA: ICD-10-CM

## 2022-06-15 DIAGNOSIS — N50.819 TESTICULAR PAIN, UNSPECIFIED: ICD-10-CM

## 2022-06-15 PROCEDURE — 99214 OFFICE O/P EST MOD 30 MIN: CPT

## 2022-06-15 NOTE — ASSESSMENT
[FreeTextEntry1] : 58 year old male with a recent history of what seems consistent with acute prostatitis.  He was treated for several days with resolution of symptoms which was complicated by acute urinary retention.  He had an atraumatic catheterization and had had gradual return to baseline of urinary symptoms.\par We discussed his recent symptoms and physical findings and will assume that he has partially treated prostatitis. I would recommend completion of 2 week course.  We discussed side effects allergic reactions to TMP/SX\par \par His urinalysis from ED demonstrated microscopic hematuria which may be artifact of catheterization. \par ? traumatic from catheterization.  We will repeat at this point\par \par \par fu 3 weeks with flow rate \par \par 6..8.2022\par one drop of blood on one occasion\par no risk factors ( no tobacco no family history)\par urinalysis had been normal\par will repeat and if rbc proceed with evaluation\par probable prostatic in origin by description and size of prostate\par \par USG demonstrates prostate volume of 103 cc\par PSAD 0.04\par will repeat PSA and proceed with MRI if elevated\par The ANDREA ESCOBEDO  expressed fully understanding of the information provided, the consequences and the management.\par \par orchalgia\par no findings on exam\par We discussed conservative management with: a.  nonsteroidal anti-inflammatories, b. ice prn and c. scrotal support\par will proceed with scrotal ultrasound\par \par Plan\par 1 PSA\par 2. +/- MRI\par 3. urinalysis \par 4. Scrotal ultrasound\par \par \par repeat PSA if again elevated\par will proceed with MRI\par The ANDREA ESCOBEDO  expressed fully understanding of the information provided, the consequences and the management.\par \par 6.15.2022\par returns re elevated PSA\par returns re orchalgia\par USG demonstrates epididymal and testicular cysts\par reviewed prior ultrasound 2018\par no significant change\par images demonstrated to patient\par no longer symptomatic\par \par PSA and MRI\par PSA dynamics reviewed\par MRI no lesions\par MRI low PSAD\par PROSTATE VOLUME 80\par PSAD 0.05\par options:\par 1. observation\par 2. repeat PSA in 3 months\par discussed limitation of PSA and MRI\par discussed concept clinically important prostate cancer\par dsicussed normal MRI and PSAD does not eliminate possibility of prostate cancer but decreases the likely of significant disease\par The ANDREA ESCOBEDO  expressed fully understanding of the information provided, the consequences and the management.\par Patient choses to observe

## 2022-06-15 NOTE — HISTORY OF PRESENT ILLNESS
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

## 2022-06-15 NOTE — LETTER BODY
[FreeTextEntry2] : Dano Soni MD\par [address]\par [place], [place] [address]\par  [FreeTextEntry1] : Dear Coreyu,\par  \par Dear Doctor,\par \par \par I had the opportunity to see your patient, Mr. ANDREA ESCOBEDO in followup. I am enclosing my office note for your information.\par \par I will keep you informed of any developments.\par \par Feel free to contact me if you have any questions.\par \par Sincerely,\par \par Rob Michele MD, FACS\par Professor of Urology\par [facility]\par \par [address]\par [place], [place] [address]\par \par [address]\par [place], [place] [address]\par \par Office Telephone \par [phone]\par \par Fax\par [phone]\par

## 2022-07-02 NOTE — ED ADULT NURSE NOTE - NSSUSCREENINGQ2_ED_ALL_ED
No

## 2022-08-15 ENCOUNTER — NON-APPOINTMENT (OUTPATIENT)
Age: 62
End: 2022-08-15

## 2022-08-15 ENCOUNTER — APPOINTMENT (OUTPATIENT)
Dept: INTERNAL MEDICINE | Facility: CLINIC | Age: 62
End: 2022-08-15

## 2022-08-15 VITALS
TEMPERATURE: 97.3 F | OXYGEN SATURATION: 100 % | SYSTOLIC BLOOD PRESSURE: 126 MMHG | HEIGHT: 69 IN | HEART RATE: 68 BPM | BODY MASS INDEX: 24.5 KG/M2 | WEIGHT: 165.4 LBS | DIASTOLIC BLOOD PRESSURE: 80 MMHG

## 2022-08-15 PROCEDURE — 36415 COLL VENOUS BLD VENIPUNCTURE: CPT

## 2022-08-15 PROCEDURE — 99396 PREV VISIT EST AGE 40-64: CPT | Mod: 25

## 2022-08-15 NOTE — PHYSICAL EXAM
[No Acute Distress] : no acute distress [Well Nourished] : well nourished [Well Developed] : well developed [Well-Appearing] : well-appearing [Normal Sclera/Conjunctiva] : normal sclera/conjunctiva [PERRL] : pupils equal round and reactive to light [EOMI] : extraocular movements intact [Normal Outer Ear/Nose] : the outer ears and nose were normal in appearance [Normal Oropharynx] : the oropharynx was normal [No JVD] : no jugular venous distention [No Lymphadenopathy] : no lymphadenopathy [Supple] : supple [Thyroid Normal, No Nodules] : the thyroid was normal and there were no nodules present [No Respiratory Distress] : no respiratory distress  [No Accessory Muscle Use] : no accessory muscle use [Clear to Auscultation] : lungs were clear to auscultation bilaterally [Normal Rate] : normal rate  [Regular Rhythm] : with a regular rhythm [Normal S1, S2] : normal S1 and S2 [No Murmur] : no murmur heard [No Carotid Bruits] : no carotid bruits [No Abdominal Bruit] : a ~M bruit was not heard ~T in the abdomen [No Varicosities] : no varicosities [Pedal Pulses Present] : the pedal pulses are present [No Edema] : there was no peripheral edema [No Palpable Aorta] : no palpable aorta [No Extremity Clubbing/Cyanosis] : no extremity clubbing/cyanosis [Soft] : abdomen soft [Non Tender] : non-tender [Non-distended] : non-distended [No Masses] : no abdominal mass palpated [No HSM] : no HSM [Normal Bowel Sounds] : normal bowel sounds [Normal Posterior Cervical Nodes] : no posterior cervical lymphadenopathy [Normal Anterior Cervical Nodes] : no anterior cervical lymphadenopathy [No CVA Tenderness] : no CVA  tenderness [No Spinal Tenderness] : no spinal tenderness [No Joint Swelling] : no joint swelling [Grossly Normal Strength/Tone] : grossly normal strength/tone [No Rash] : no rash [Coordination Grossly Intact] : coordination grossly intact [No Focal Deficits] : no focal deficits [Normal Gait] : normal gait [Deep Tendon Reflexes (DTR)] : deep tendon reflexes were 2+ and symmetric [Normal Affect] : the affect was normal [Normal Insight/Judgement] : insight and judgment were intact [de-identified] : refused

## 2022-08-15 NOTE — HISTORY OF PRESENT ILLNESS
[de-identified] : 61 year year old patient came in for annual wellness exam. 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

## 2022-08-15 NOTE — PLAN
[FreeTextEntry1] : Patients last colonoscopy was 2018.  On the report they found a polyp which they biopsied.  He is due for another colonoscopy in 2023.\par \par Testicular Self-Exams recommended \par Annual skin cancer screening is recommended\par Safe Sex Counseling given\par Exercise and Nutrition counseling given \par

## 2022-08-24 ENCOUNTER — TRANSCRIPTION ENCOUNTER (OUTPATIENT)
Age: 62
End: 2022-08-24

## 2022-08-24 LAB
ALBUMIN SERPL ELPH-MCNC: 4.6 G/DL
ALP BLD-CCNC: 87 U/L
ALT SERPL-CCNC: 22 U/L
ANION GAP SERPL CALC-SCNC: 12 MMOL/L
APPEARANCE: CLEAR
AST SERPL-CCNC: 21 U/L
BACTERIA: NEGATIVE
BASOPHILS # BLD AUTO: 0.07 K/UL
BASOPHILS NFR BLD AUTO: 1 %
BILIRUB SERPL-MCNC: 0.5 MG/DL
BILIRUBIN URINE: NEGATIVE
BLOOD URINE: NEGATIVE
BUN SERPL-MCNC: 12 MG/DL
CALCIUM SERPL-MCNC: 9.8 MG/DL
CHLORIDE SERPL-SCNC: 102 MMOL/L
CHOLEST SERPL-MCNC: 259 MG/DL
CO2 SERPL-SCNC: 26 MMOL/L
COLOR: YELLOW
CREAT SERPL-MCNC: 1.13 MG/DL
EGFR: 73 ML/MIN/1.73M2
EOSINOPHIL # BLD AUTO: 0.22 K/UL
EOSINOPHIL NFR BLD AUTO: 3.1 %
ESTIMATED AVERAGE GLUCOSE: 120 MG/DL
GLUCOSE QUALITATIVE U: NEGATIVE
GLUCOSE SERPL-MCNC: 102 MG/DL
HBA1C MFR BLD HPLC: 5.8 %
HCT VFR BLD CALC: 51 %
HDLC SERPL-MCNC: 55 MG/DL
HGB BLD-MCNC: 16.5 G/DL
HYALINE CASTS: 0 /LPF
IMM GRANULOCYTES NFR BLD AUTO: 0.6 %
KETONES URINE: NEGATIVE
LDLC SERPL CALC-MCNC: 164 MG/DL
LEUKOCYTE ESTERASE URINE: NEGATIVE
LYMPHOCYTES # BLD AUTO: 2.2 K/UL
LYMPHOCYTES NFR BLD AUTO: 31.2 %
MAN DIFF?: NORMAL
MCHC RBC-ENTMCNC: 31.4 PG
MCHC RBC-ENTMCNC: 32.4 GM/DL
MCV RBC AUTO: 97 FL
MICROSCOPIC-UA: NORMAL
MONOCYTES # BLD AUTO: 0.5 K/UL
MONOCYTES NFR BLD AUTO: 7.1 %
NEUTROPHILS # BLD AUTO: 4.02 K/UL
NEUTROPHILS NFR BLD AUTO: 57 %
NITRITE URINE: NEGATIVE
NONHDLC SERPL-MCNC: 204 MG/DL
PH URINE: 5.5
PLATELET # BLD AUTO: 242 K/UL
POTASSIUM SERPL-SCNC: 4.4 MMOL/L
PROT SERPL-MCNC: 7.2 G/DL
PROTEIN URINE: NORMAL
PSA SERPL-MCNC: 3.58 NG/ML
RBC # BLD: 5.26 M/UL
RBC # FLD: 13 %
RED BLOOD CELLS URINE: 2 /HPF
SODIUM SERPL-SCNC: 140 MMOL/L
SPECIFIC GRAVITY URINE: 1.03
SQUAMOUS EPITHELIAL CELLS: 0 /HPF
TRIGL SERPL-MCNC: 198 MG/DL
UROBILINOGEN URINE: NORMAL
WBC # FLD AUTO: 7.05 K/UL
WHITE BLOOD CELLS URINE: 0 /HPF

## 2022-08-25 ENCOUNTER — NON-APPOINTMENT (OUTPATIENT)
Age: 62
End: 2022-08-25

## 2022-08-25 ENCOUNTER — TRANSCRIPTION ENCOUNTER (OUTPATIENT)
Age: 62
End: 2022-08-25

## 2022-08-29 ENCOUNTER — APPOINTMENT (OUTPATIENT)
Dept: INTERNAL MEDICINE | Facility: CLINIC | Age: 62
End: 2022-08-29

## 2022-08-29 PROCEDURE — 99214 OFFICE O/P EST MOD 30 MIN: CPT | Mod: 25,95

## 2022-09-02 NOTE — HISTORY OF PRESENT ILLNESS
[Home] : at home, [unfilled] , at the time of the visit. [Medical Office: ([facility])___] : at the medical office located in  [Verbal consent obtained from patient] : the patient, [unfilled] [de-identified] : 61-year-old male patient on televideo to discuss lab abnormalities showing hyperlipidemia and prediabetes.\par \par On routine screening patient's total cholesterol was 259 his LDL was 164, patient has normal BMI overall reports healthy diet is concerned about high cholesterol.  He never\par \par Patient routine lab work showed hemoglobin A1c of 5.8% which has been an increase from last year's result of 5.7%.  Patient overall reports healthy diet his weight has been pretty much stable compared to his weight last year.  He would like advice on how to prevent diabetes.\par \par

## 2022-09-26 ENCOUNTER — RX CHANGE (OUTPATIENT)
Age: 62
End: 2022-09-26

## 2022-09-26 RX ORDER — ATORVASTATIN CALCIUM 40 MG/1
40 TABLET, FILM COATED ORAL
Qty: 30 | Refills: 3 | Status: DISCONTINUED | COMMUNITY
Start: 2022-08-29 | End: 2022-09-26

## 2022-10-05 ENCOUNTER — TRANSCRIPTION ENCOUNTER (OUTPATIENT)
Age: 62
End: 2022-10-05

## 2022-10-06 ENCOUNTER — EMERGENCY (EMERGENCY)
Facility: HOSPITAL | Age: 62
LOS: 1 days | Discharge: HOME | End: 2022-10-06
Admitting: EMERGENCY MEDICINE

## 2022-10-06 ENCOUNTER — NON-APPOINTMENT (OUTPATIENT)
Age: 62
End: 2022-10-06

## 2022-10-06 VITALS
OXYGEN SATURATION: 96 % | SYSTOLIC BLOOD PRESSURE: 136 MMHG | DIASTOLIC BLOOD PRESSURE: 76 MMHG | RESPIRATION RATE: 18 BRPM | TEMPERATURE: 98 F | HEIGHT: 69 IN | HEART RATE: 86 BPM | WEIGHT: 162.92 LBS

## 2022-10-06 VITALS
HEART RATE: 80 BPM | OXYGEN SATURATION: 98 % | RESPIRATION RATE: 16 BRPM | SYSTOLIC BLOOD PRESSURE: 128 MMHG | DIASTOLIC BLOOD PRESSURE: 72 MMHG | TEMPERATURE: 98 F

## 2022-10-06 LAB
APPEARANCE UR: CLEAR — SIGNIFICANT CHANGE UP
BACTERIA # UR AUTO: PRESENT /HPF
BILIRUB UR-MCNC: NEGATIVE — SIGNIFICANT CHANGE UP
COLOR SPEC: YELLOW — SIGNIFICANT CHANGE UP
DIFF PNL FLD: ABNORMAL
EPI CELLS # UR: SIGNIFICANT CHANGE UP /HPF (ref 0–5)
GLUCOSE UR QL: NEGATIVE — SIGNIFICANT CHANGE UP
GRAN CASTS # UR COMP ASSIST: ABNORMAL /LPF
KETONES UR-MCNC: NEGATIVE — SIGNIFICANT CHANGE UP
LEUKOCYTE ESTERASE UR-ACNC: NEGATIVE — SIGNIFICANT CHANGE UP
NITRITE UR-MCNC: NEGATIVE — SIGNIFICANT CHANGE UP
PH UR: 5.5 — SIGNIFICANT CHANGE UP (ref 5–8)
PROT UR-MCNC: NEGATIVE MG/DL — SIGNIFICANT CHANGE UP
RBC CASTS # UR COMP ASSIST: < 5 /HPF — SIGNIFICANT CHANGE UP
SP GR SPEC: <=1.005 — SIGNIFICANT CHANGE UP (ref 1–1.03)
UROBILINOGEN FLD QL: 0.2 E.U./DL — SIGNIFICANT CHANGE UP
WBC UR QL: < 5 /HPF — SIGNIFICANT CHANGE UP

## 2022-10-06 PROCEDURE — 99283 EMERGENCY DEPT VISIT LOW MDM: CPT | Mod: 25

## 2022-10-06 RX ORDER — LIDOCAINE HCL 20 MG/ML
10 VIAL (ML) INJECTION ONCE
Refills: 0 | Status: COMPLETED | OUTPATIENT
Start: 2022-10-06 | End: 2022-10-06

## 2022-10-06 RX ADMIN — Medication 10 MILLILITER(S): at 03:12

## 2022-10-06 NOTE — ED PROVIDER NOTE - PHYSICAL EXAMINATION
Physical Exam    Vital Signs: I have reviewed the initial vital signs.  Constitutional: well-nourished, appears stated age, no acute distress  Cardiovascular: regular rate, regular rhythm, well-perfused extremities  Respiratory: unlabored respiratory effort, clear to auscultation bilaterally  Gastrointestinal: soft, non-tender abdomen, no pulsatile mass  : b/l nl testes, nl scrotum, no lesions, no penile discharge, nontender exam. +POCUS ~ 500 mL of urine, prostate enlarge but non boggy on exam  Musculoskeletal: supple neck, no lower extremity edema  Integumentary: warm, dry, no rash

## 2022-10-06 NOTE — ED PROVIDER NOTE - CLINICAL SUMMARY MEDICAL DECISION MAKING FREE TEXT BOX
pt pw Urinary obstruction with decreased urinary output with POCUS + distended bladder, plan: Wesley catheter, UA, ulctx

## 2022-10-06 NOTE — ED ADULT TRIAGE NOTE - HEIGHT IN CM
175.26 [Atrial Fibrillation] : atrial fibrillation [Hyperlipidemia] : hyperlipidemia [Hypertension] : hypertension

## 2022-10-06 NOTE — ED PROVIDER NOTE - PATIENT PORTAL LINK FT
You can access the FollowMyHealth Patient Portal offered by [facility] by registering at the following website: [email or web address]. By joining [employer]’s FollowMyHealth portal, you will also be able to view your health information using other applications (apps) compatible with our system.

## 2022-10-06 NOTE — ED ADULT NURSE NOTE - OBJECTIVE STATEMENT
Patient states he was barely able to urinate for the past two days with pressure increasing in his bladder. Patient has an enlarged prostate with similar situation 3 years prior.

## 2022-10-06 NOTE — ED PROVIDER NOTE - OBJECTIVE STATEMENT
60 yo m pmhx sig for enlarged prostate pw gradual onset of decreased UO over the course of the last 2 with frequency and dysuria on urination, pt reports difficulty voiding and suprapubic discomfort with no flank pain, no fevers, no chills.    I have reviewed available current nursing and previous documentation of past medical, surgical, family, and/or social history.

## 2022-10-06 NOTE — ED PROVIDER NOTE - NSFOLLOWUPINSTRUCTIONS_ED_ALL_ED_FT
Urinary Retention    Acute urinary retention is the temporary inability to urinate. This is a common problem in older men. As men age their prostates become larger and block the flow of urine from the bladder. If you are sent home with a ann catheter and a drainage system make sure to keep the drainage bag emptied and lower than your catheter. Keep the ann catheter in until you follow up with a urologist.    There are two main types of drainage bags. One is a large bag that usually is used at night. It has a good capacity that will allow you to sleep through the night without having to empty it. The second type is called a leg bag. It has a smaller capacity, so it needs to be emptied more frequently. However, the main advantage is that it can be attached by a leg strap and can go underneath your clothing, allowing you the freedom to move about or leave your home.     SEEK IMMEDIATE MEDICAL CARE IF YOU DEVELOP THE FOLLOWING SYMPTOMS: the catheter stops draining urine, the catheter falls out, abdominal pain, nausea/vomiting, or chills/fever.

## 2022-10-06 NOTE — ED ADULT TRIAGE NOTE - CHIEF COMPLAINT QUOTE
walk into ED states "my bladder won't empty" reports he has barely urinated in the past 2 days. c/o bladder "fullness" and "discomfort."  states this has happened once before and was diagnosed with prostatitis and required a urinary catheter

## 2022-10-06 NOTE — ED PROVIDER NOTE - CARE PROVIDER_API CALL
Rob Michele)  Urology  [address] [place] [address]  Phone: [phone]  Fax: [phone]  Follow Up Time: 1-3 Days

## 2022-10-07 ENCOUNTER — NON-APPOINTMENT (OUTPATIENT)
Age: 62
End: 2022-10-07

## 2022-10-07 ENCOUNTER — APPOINTMENT (OUTPATIENT)
Dept: UROLOGY | Facility: CLINIC | Age: 62
End: 2022-10-07

## 2022-10-07 VITALS — DIASTOLIC BLOOD PRESSURE: 72 MMHG | TEMPERATURE: 98.3 F | SYSTOLIC BLOOD PRESSURE: 110 MMHG | HEART RATE: 106 BPM

## 2022-10-07 DIAGNOSIS — N13.9 OBSTRUCTIVE AND REFLUX UROPATHY, UNSPECIFIED: ICD-10-CM

## 2022-10-07 DIAGNOSIS — R10.30 LOWER ABDOMINAL PAIN, UNSPECIFIED: ICD-10-CM

## 2022-10-07 DIAGNOSIS — N40.1 BENIGN PROSTATIC HYPERPLASIA WITH LOWER URINARY TRACT SYMPTOMS: ICD-10-CM

## 2022-10-07 LAB
CULTURE RESULTS: NO GROWTH — SIGNIFICANT CHANGE UP
SPECIMEN SOURCE: SIGNIFICANT CHANGE UP

## 2022-10-07 PROCEDURE — 99214 OFFICE O/P EST MOD 30 MIN: CPT

## 2022-10-07 RX ORDER — ALFUZOSIN HYDROCHLORIDE 10 MG/1
10 TABLET, EXTENDED RELEASE ORAL
Qty: 30 | Refills: 0 | Status: ACTIVE | COMMUNITY
Start: 2022-10-07 | End: 1900-01-01

## 2022-10-07 NOTE — HISTORY OF PRESENT ILLNESS
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\par no other sexual partners; no condoms; no anal sexual intercourse\par \par black male with urinary buring and frequency approximately 10 days ago\par treated with TMP/Sx and then told to stop because culture was reported as negative\par one week ago had acute urinary retention 600 ml catheterized x 1 \par thereafter back to baseline\par no further dysuria or frequency\par \par 6.8.2022\par patient noted drop of blood during urination\par urine was otherwise clear\par no other urinary symptoms\par not tobacco\par no family history of bladder cancer\par \par 10.7.2022\par Patient was seen in the emergency room at [facility] with acute urinary retention.  This started after several days of dysuria.  Patient denied fevers or chills.\par \par Urinalysis in the emergency room demonstrated bacteria on 1 urinalysis a second did not.  Urine culture is still pending.\par \par He previously had a similar episode in 2019.\par \par He presents today with an indwelling Wesley catheter.

## 2022-10-07 NOTE — ASSESSMENT
[FreeTextEntry1] : 58 year old male with a recent history of what seems consistent with acute prostatitis.  He was treated for several days with resolution of symptoms which was complicated by acute urinary retention.  He had an atraumatic catheterization and had had gradual return to baseline of urinary symptoms.\par We discussed his recent symptoms and physical findings and will assume that he has partially treated prostatitis. I would recommend completion of 2 week course.  We discussed side effects allergic reactions to TMP/SX\par \par His urinalysis from ED demonstrated microscopic hematuria which may be artifact of catheterization. \par ? traumatic from catheterization.  We will repeat at this point\par \par \par fu 3 weeks with flow rate \par \par 6..8.2022\par one drop of blood on one occasion\par no risk factors ( no tobacco no family history)\par urinalysis had been normal\par will repeat and if rbc proceed with evaluation\par probable prostatic in origin by description and size of prostate\par \par USG demonstrates prostate volume of 103 cc\par PSAD 0.04\par will repeat PSA and proceed with MRI if elevated\par The ANDREA ESCOBEDO  expressed fully understanding of the information provided, the consequences and the management.\par \par orchalgia\par no findings on exam\par We discussed conservative management with: a.  nonsteroidal anti-inflammatories, b. ice prn and c. scrotal support\par will proceed with scrotal ultrasound\par \par Plan\par 1 PSA\par 2. +/- MRI\par 3. urinalysis \par 4. Scrotal ultrasound\par \par \par repeat PSA if again elevated\par will proceed with MRI\par The ANDREA ESCOBEDO  expressed fully understanding of the information provided, the consequences and the management.\par \par 6.15.2022\par returns re elevated PSA\par returns re orchalgia\par USG demonstrates epididymal and testicular cysts\par reviewed prior ultrasound 2018\par no significant change\par images demonstrated to patient\par no longer symptomatic\par \par PSA and MRI\par PSA dynamics reviewed\par MRI no lesions\par MRI low PSAD\par PROSTATE VOLUME 80\par PSAD 0.05\par options:\par 1. observation\par 2. repeat PSA in 3 months\par discussed limitation of PSA and MRI\par discussed concept clinically important prostate cancer\par dsicussed normal MRI and PSAD does not eliminate possibility of prostate cancer but decreases the likely of significant disease\par The ANDREA FANNY  expressed fully understanding of the information provided, the consequences and the management.\par Patient choses to observe\par \par 10.7.2022\par Patient presents with acute urinary retention.  The etiology of this is unclear.  He did have dysuria.  His urine culture is pending.  He is not able to tolerate the Wesley catheter.\par \par We discussed the risks of removal of the catheter meaning need to reinsert the catheter.  We discussed potential for fevers and chills as result of removing the catheter.\par We discussed that he has had no intervention and recommendation would be for 48 hours of alpha blocker prior to catheter removal\par Denies constipation etc\par \par \par At this point we will start him on Uroxatral 10  meal once a day.\par culture from ER - no growth\par will start antibx 24 hours prior to TOV on Monday am\par The ANDREA ESCOBEDO  and partner expressed fully understanding of the information provided, the consequences and the management.

## 2022-10-09 ENCOUNTER — EMERGENCY (EMERGENCY)
Facility: HOSPITAL | Age: 62
LOS: 1 days | Discharge: HOME | End: 2022-10-09
Attending: EMERGENCY MEDICINE | Admitting: EMERGENCY MEDICINE

## 2022-10-09 VITALS
SYSTOLIC BLOOD PRESSURE: 155 MMHG | OXYGEN SATURATION: 95 % | HEIGHT: 69 IN | WEIGHT: 162.92 LBS | RESPIRATION RATE: 18 BRPM | HEART RATE: 101 BPM | DIASTOLIC BLOOD PRESSURE: 76 MMHG | TEMPERATURE: 98 F

## 2022-10-09 VITALS
RESPIRATION RATE: 18 BRPM | SYSTOLIC BLOOD PRESSURE: 128 MMHG | HEART RATE: 65 BPM | DIASTOLIC BLOOD PRESSURE: 73 MMHG | OXYGEN SATURATION: 98 % | TEMPERATURE: 98 F

## 2022-10-09 PROCEDURE — 99282 EMERGENCY DEPT VISIT SF MDM: CPT

## 2022-10-09 RX ORDER — LIDOCAINE HCL 20 MG/ML
20 VIAL (ML) INJECTION ONCE
Refills: 0 | Status: COMPLETED | OUTPATIENT
Start: 2022-10-09 | End: 2022-10-09

## 2022-10-09 RX ADMIN — Medication 20 MILLILITER(S): at 23:00

## 2022-10-09 NOTE — ED PROVIDER NOTE - PATIENT PORTAL LINK FT
You can access the FollowMyHealth Patient Portal offered by [facility] by registering at the following website: [email or web address]. By joining [employer]’s FollowMyHealth portal, you will also be able to view your health information using other applications (apps) compatible with our system.
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
## 2022-10-09 NOTE — ED PROVIDER NOTE - CLINICAL SUMMARY MEDICAL DECISION MAKING FREE TEXT BOX
new ann placed - + urine flow - pt feeling better     dc home with partner who was present throughout ed stay     The patient's symptoms progressively improved throughout the ED stay.   ED evaluation and management discussed with the patient and family (if available) in detail.  Close PMD and/or specialist follow up encouraged.  Strict ED return instructions discussed in detail for any worsening or new symptoms. The patient was given the opportunity to ask any questions about their discharge diagnosis and discharge instructions. The patient verbalized understanding of these instructions and need to return to the ED for any worsening of illness or for any concern. The patient received a printed version of the discharge instructions. The patient understands the Emergency Department diagnosis is a preliminary diagnosis often based on limited information and that the patient must adhere to the follow-up plan as discussed.  At the time of discharge from the Emergency Department, the patient is alert with fluent appropriate speech and ambulatory without difficulty.  A medical screening examination was performed and no emergency medical condition was identified.

## 2022-10-09 NOTE — ED ADULT NURSE REASSESSMENT NOTE - NS ED NURSE REASSESS COMMENT FT1
pt unable to void after removal of ann, new ann placed with sterile technique, 14fr, urine return noted, 100 mL, pt discharged by provider, given leg bag, pt to follow up with urology in the morning.  ambulatory A&Ox3 in [facility] and accompanied by partner.

## 2022-10-09 NOTE — ED PROVIDER NOTE - NSICDXFAMILYHX_GEN_ALL_CORE_FT
FAMILY HISTORY:  Father  Still living? Yes, Estimated age: 51-60  Family history of cerebrovascular accident (CVA), Age at diagnosis: Age Unknown  Family history of myocardial infarction, Age at diagnosis: Age Unknown    Mother  Still living? Yes, Estimated age: 51-60  Family history of myocardial infarction, Age at diagnosis: Age Unknown

## 2022-10-09 NOTE — ED ADULT TRIAGE NOTE - CHIEF COMPLAINT QUOTE
Pt came in c/o of "blocked urinary catheter" and leakage. Pt had a ann placed in this ed on Thursday for urinary retention. Reports feeling pelvic pressure.

## 2022-10-09 NOTE — ED PROVIDER NOTE - NSFOLLOWUPINSTRUCTIONS_ED_ALL_ED_FT
follow up with your urologist tomorrow without fail    take all medications as previously directed     return to ER for any concern

## 2022-10-09 NOTE — ED PROVIDER NOTE - OBJECTIVE STATEMENT
Triage VS: HR: 101, BP: 155/76, Resp: 18, Temp(F): 98.3, SpO2: 95  Triage Note: Pt came in c/o of "blocked urinary catheter" and leakage. Pt had a ann placed in this ed on Thursday for urinary retention. Reports feeling pelvic pressure.  Patient, Patient's spouse, Scribe, and myself were in the room during the evaluation.     60 y/o M presents complaining of Ann catheter being blocked for x1 day. Patient was seen in this ED 3 days ago when he had the Ann placed. He saw his Urologist Dr. Michele the following day who flushed the catheter put him on sulfamethoxazole-tmp and tamsulosin HCL 0.4 mg and said he would remove the Ann on Monday (tomorrow). Patient reports very little urine is coming out even though he feels the urge to go and his bladder feels full. He also states when small amounts of urine does come out, some goes in the bag and some falls outside of the bag. Patient has an appointment with his Urologist to remove the Ann tomorrow at 9:30 AM. Denies any fevers. Triage VS: HR: 101, BP: 155/76, Resp: 18, Temp(F): 98.3, SpO2: 95  Triage Note: Pt came in c/o of "blocked urinary catheter" and leakage. Pt had a ann placed in this ed on Thursday for urinary retention. Reports feeling pelvic pressure.  Patient, Patient's spouse, Scribe, and myself were in the room during the evaluation.     60 y/o M presents complaining of Ann catheter being blocked for x1 day. Patient was seen in this ED 3 days ago (thursday) when he had the Ann placed. He saw his Urologist Dr. Michele the following day (friday) who flushed the catheter put him on sulfamethoxazole-tmp and tamsulosin HCL 0.4 mg and said he would remove the Ann on Monday (tomorrow). Patient reports very little urine is coming out even though he feels the urge to go and his bladder feels full. He also states when small amounts of urine does come out, some goes in the bag and some falls outside of the bag. Patient has an appointment with his Urologist to remove the Ann tomorrow at 9:30 AM. Denies any fevers.

## 2022-10-09 NOTE — ED PROVIDER NOTE - NEUROLOGICAL, MLM
Alert and oriented, no focal deficits, no motor or sensory deficits. Alert and oriented, speech fluent and appropriate , no motor or sensory deficits.

## 2022-10-09 NOTE — ED ADULT TRIAGE NOTE - WEIGHT METHOD
stated

## 2022-10-10 ENCOUNTER — APPOINTMENT (OUTPATIENT)
Dept: UROLOGY | Facility: CLINIC | Age: 62
End: 2022-10-10

## 2022-10-10 VITALS
HEART RATE: 77 BPM | OXYGEN SATURATION: 97 % | DIASTOLIC BLOOD PRESSURE: 76 MMHG | SYSTOLIC BLOOD PRESSURE: 121 MMHG | TEMPERATURE: 98 F

## 2022-10-10 PROCEDURE — 51700 IRRIGATION OF BLADDER: CPT

## 2022-10-10 PROCEDURE — 51798 US URINE CAPACITY MEASURE: CPT | Mod: 59

## 2022-10-10 PROCEDURE — 51741 ELECTRO-UROFLOWMETRY FIRST: CPT

## 2022-10-10 PROCEDURE — 99213 OFFICE O/P EST LOW 20 MIN: CPT | Mod: 25

## 2022-10-10 PROCEDURE — A4218: CPT | Mod: NC

## 2022-10-10 NOTE — ASSESSMENT
[FreeTextEntry1] : 58 year old male with a recent history of what seems consistent with acute prostatitis.  He was treated for several days with resolution of symptoms which was complicated by acute urinary retention.  He had an atraumatic catheterization and had had gradual return to baseline of urinary symptoms.\par We discussed his recent symptoms and physical findings and will assume that he has partially treated prostatitis. I would recommend completion of 2 week course.  We discussed side effects allergic reactions to TMP/SX\par \par His urinalysis from ED demonstrated microscopic hematuria which may be artifact of catheterization. \par ? traumatic from catheterization.  We will repeat at this point\par \par \par fu 3 weeks with flow rate \par \par 6..8.2022\par one drop of blood on one occasion\par no risk factors ( no tobacco no family history)\par urinalysis had been normal\par will repeat and if rbc proceed with evaluation\par probable prostatic in origin by description and size of prostate\par \par USG demonstrates prostate volume of 103 cc\par PSAD 0.04\par will repeat PSA and proceed with MRI if elevated\par The ANDREA ESCOBEDO  expressed fully understanding of the information provided, the consequences and the management.\par \par orchalgia\par no findings on exam\par We discussed conservative management with: a.  nonsteroidal anti-inflammatories, b. ice prn and c. scrotal support\par will proceed with scrotal ultrasound\par \par Plan\par 1 PSA\par 2. +/- MRI\par 3. urinalysis \par 4. Scrotal ultrasound\par \par \par repeat PSA if again elevated\par will proceed with MRI\par The ANDREA ESCOBEDO  expressed fully understanding of the information provided, the consequences and the management.\par \par 6.15.2022\par returns re elevated PSA\par returns re orchalgia\par USG demonstrates epididymal and testicular cysts\par reviewed prior ultrasound 2018\par no significant change\par images demonstrated to patient\par no longer symptomatic\par \par PSA and MRI\par PSA dynamics reviewed\par MRI no lesions\par MRI low PSAD\par PROSTATE VOLUME 80\par PSAD 0.05\par options:\par 1. observation\par 2. repeat PSA in 3 months\par discussed limitation of PSA and MRI\par discussed concept clinically important prostate cancer\par dsicussed normal MRI and PSAD does not eliminate possibility of prostate cancer but decreases the likely of significant disease\par The ANDREA ESCOBEDO  expressed fully understanding of the information provided, the consequences and the management.\par Patient choses to observe\par \par 10.7.2022\par Patient presents with acute urinary retention.  The etiology of this is unclear.  He did have dysuria.  His urine culture is pending.  He is not able to tolerate the Wesley catheter.\par \par We discussed the risks of removal of the catheter meaning need to reinsert the catheter.  We discussed potential for fevers and chills as result of removing the catheter.\par We discussed that he has had no intervention and recommendation would be for 48 hours of alpha blocker prior to catheter removal\par Denies constipation etc\par \par \par At this point we will start him on Uroxatral 10  meal once a day.\par culture from ER - no growth\par will start antibx 24 hours prior to TOV on Monday am\par The ANDREA ESCOBEDO  and partner expressed fully understanding of the information provided, the consequences and the management. \par \par 10/10/2022.  Patient was seen multiple times through the day after attempted voiding trial.  He continued to void increasing amounts.  His postvoid residual at last check was approximately 65 cc.  He stated he felt better.  We discussed the potential risk of repeat urinary retention.  He is on TMP/SX.  He will continue this for 24 hours after catheter removal.  He will continue on Flomax.  He feels he is making progress.

## 2022-10-10 NOTE — HISTORY OF PRESENT ILLNESS
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\par no other sexual partners; no condoms; no anal sexual intercourse\par \par black male with urinary buring and frequency approximately 10 days ago\par treated with TMP/Sx and then told to stop because culture was reported as negative\par one week ago had acute urinary retention 600 ml catheterized x 1 \par thereafter back to baseline\par no further dysuria or frequency\par \par 6.8.2022\par patient noted drop of blood during urination\par urine was otherwise clear\par no other urinary symptoms\par not tobacco\par no family history of bladder cancer\par \par 10.7.2022\par Patient was seen in the emergency room at [facility] with acute urinary retention.  This started after several days of dysuria.  Patient denied fevers or chills.\par \par Urinalysis in the emergency room demonstrated bacteria on 1 urinalysis a second did not.  Urine culture is still pending.\par \par He previously had a similar episode in 2019.\par \par He presents today with an indwelling Wesley catheter.\par \par 10.10.2022\par Patient returns today for trial of void.  He was seen in the emergency room at [facility] last evening.  His catheter was apparently not working presented with small clot.  The catheter was changed.  Is not clear if he truly had a voiding trial.
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
## 2022-10-11 DIAGNOSIS — T83.031A LEAKAGE OF INDWELLING URETHRAL CATHETER, INITIAL ENCOUNTER: ICD-10-CM
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
## 2022-10-12 ENCOUNTER — TRANSCRIPTION ENCOUNTER (OUTPATIENT)
Age: 62
End: 2022-10-12

## 2022-10-12 ENCOUNTER — APPOINTMENT (OUTPATIENT)
Dept: UROLOGY | Facility: CLINIC | Age: 62
End: 2022-10-12

## 2022-10-12 ENCOUNTER — NON-APPOINTMENT (OUTPATIENT)
Age: 62
End: 2022-10-12

## 2022-10-12 VITALS
WEIGHT: 161 LBS | SYSTOLIC BLOOD PRESSURE: 121 MMHG | TEMPERATURE: 97.3 F | HEART RATE: 91 BPM | DIASTOLIC BLOOD PRESSURE: 74 MMHG | HEIGHT: 69 IN | BODY MASS INDEX: 23.85 KG/M2

## 2022-10-12 DIAGNOSIS — N32.81 OVERACTIVE BLADDER: ICD-10-CM

## 2022-10-12 DIAGNOSIS — R39.15 URGENCY OF URINATION: ICD-10-CM

## 2022-10-12 DIAGNOSIS — R32 UNSPECIFIED URINARY INCONTINENCE: ICD-10-CM

## 2022-10-12 DIAGNOSIS — Z87.898 PERSONAL HISTORY OF OTHER SPECIFIED CONDITIONS: ICD-10-CM

## 2022-10-12 PROCEDURE — 99213 OFFICE O/P EST LOW 20 MIN: CPT

## 2022-10-12 PROCEDURE — 51798 US URINE CAPACITY MEASURE: CPT

## 2022-10-12 NOTE — HISTORY OF PRESENT ILLNESS
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\par no other sexual partners; no condoms; no anal sexual intercourse\par \par black male with urinary buring and frequency approximately 10 days ago\par treated with TMP/Sx and then told to stop because culture was reported as negative\par one week ago had acute urinary retention 600 ml catheterized x 1 \par thereafter back to baseline\par no further dysuria or frequency\par \par 6.8.2022\par patient noted drop of blood during urination\par urine was otherwise clear\par no other urinary symptoms\par not tobacco\par no family history of bladder cancer\par \par 10.7.2022\par Patient was seen in the emergency room at [facility] with acute urinary retention.  This started after several days of dysuria.  Patient denied fevers or chills.\par \par Urinalysis in the emergency room demonstrated bacteria on 1 urinalysis a second did not.  Urine culture is still pending.\par \par He previously had a similar episode in 2019.\par \par He presents today with an indwelling Wesley catheter.\par \par 10.10.2022\par Patient returns today for trial of void.  He was seen in the emergency room at [facility] last evening.  His catheter was apparently not working presented with small clot.  The catheter was changed.  Is not clear if he truly had a voiding trial.  \par \par 10.12.2022\par Patient returns for follow-up including uroflow since void trial two days ago

## 2022-10-12 NOTE — ASSESSMENT
[FreeTextEntry1] : 58 year old male with a recent history of what seems consistent with acute prostatitis.  He was treated for several days with resolution of symptoms which was complicated by acute urinary retention.  He had an atraumatic catheterization and had had gradual return to baseline of urinary symptoms.\par We discussed his recent symptoms and physical findings and will assume that he has partially treated prostatitis. I would recommend completion of 2 week course.  We discussed side effects allergic reactions to TMP/SX\par \par His urinalysis from ED demonstrated microscopic hematuria which may be artifact of catheterization. \par ? traumatic from catheterization.  We will repeat at this point\par \par \par fu 3 weeks with flow rate \par \par 6..8.2022\par one drop of blood on one occasion\par no risk factors ( no tobacco no family history)\par urinalysis had been normal\par will repeat and if rbc proceed with evaluation\par probable prostatic in origin by description and size of prostate\par \par USG demonstrates prostate volume of 103 cc\par PSAD 0.04\par will repeat PSA and proceed with MRI if elevated\par The ANDREA ESCOBEDO  expressed fully understanding of the information provided, the consequences and the management.\par \par orchalgia\par no findings on exam\par We discussed conservative management with: a.  nonsteroidal anti-inflammatories, b. ice prn and c. scrotal support\par will proceed with scrotal ultrasound\par \par Plan\par 1 PSA\par 2. +/- MRI\par 3. urinalysis \par 4. Scrotal ultrasound\par \par \par repeat PSA if again elevated\par will proceed with MRI\par The ANDREA ESCOBEDO  expressed fully understanding of the information provided, the consequences and the management.\par \par 6.15.2022\par returns re elevated PSA\par returns re orchalgia\par USG demonstrates epididymal and testicular cysts\par reviewed prior ultrasound 2018\par no significant change\par images demonstrated to patient\par no longer symptomatic\par \par PSA and MRI\par PSA dynamics reviewed\par MRI no lesions\par MRI low PSAD\par PROSTATE VOLUME 80\par PSAD 0.05\par options:\par 1. observation\par 2. repeat PSA in 3 months\par discussed limitation of PSA and MRI\par discussed concept clinically important prostate cancer\par dsicussed normal MRI and PSAD does not eliminate possibility of prostate cancer but decreases the likely of significant disease\par The ANDREA ESCOBEDO  expressed fully understanding of the information provided, the consequences and the management.\par Patient choses to observe\par \par 10.7.2022\par Patient presents with acute urinary retention.  The etiology of this is unclear.  He did have dysuria.  His urine culture is pending.  He is not able to tolerate the Wesley catheter.\par \par We discussed the risks of removal of the catheter meaning need to reinsert the catheter.  We discussed potential for fevers and chills as result of removing the catheter.\par We discussed that he has had no intervention and recommendation would be for 48 hours of alpha blocker prior to catheter removal\par Denies constipation etc\par \par \par At this point we will start him on Uroxatral 10  meal once a day.\par culture from ER - no growth\par will start antibx 24 hours prior to TOV on Monday am\par The ANDREA ESCOBEDO  and partner expressed fully understanding of the information provided, the consequences and the management. \par \par 10/10/2022.  Patient was seen multiple times through the day after attempted voiding trial.  He continued to void increasing amounts.  His postvoid residual at last check was approximately 65 cc.  He stated he felt better.  We discussed the potential risk of repeat urinary retention.  He is on TMP/SX.  He will continue this for 24 hours after catheter removal.  He will continue on Flomax.  He feels he is making progress.\par \par 10.12.2022\par Patient unable to urinate sufficient amount for uroflow (voided 10 cc)\par PVR= 20\par continues to have urgency, urge incontinence, dysuria, and bladder spasms since catheter removal two days ago\par wearing diapers since catheter removal\par changes diaper every couple of hours\par reports incomplete emptying of bladder\par reports normal stream when he voids in toilet\par no rectal or bowel symptoms\par no MS symptoms\par \par LA on exam today normal. We discussed how the etiology of his current voiding symptoms are unknown. We discussed the medication options to relieve his urgency and bladder spasms. He will start oxybutynin 10 mg daily. Instructions and side effects of the medication were reviewed.\par \par Plan:\par 1. Start oxybutynin 10 mg\par 2. follow up in 1 week\par 3. warned re side effects/risks\par 4. if continues symptomatic will proceed with CYSTO at next visit\par

## 2022-10-12 NOTE — PHYSICAL EXAM
[General Appearance - Well Developed] : well developed [General Appearance - Well Nourished] : well nourished [Normal Appearance] : normal appearance [Well Groomed] : well groomed [General Appearance - In No Acute Distress] : no acute distress [Oriented To Time, Place, And Person] : oriented to person, place, and time [Affect] : the affect was normal [Mood] : the mood was normal [Not Anxious] : not anxious [Urethral Meatus] : meatus normal [Penis Abnormality] : normal circumcised penis [Epididymis] : the epididymides were normal [Testes Tenderness] : no tenderness of the testes [Testes Mass (___cm)] : there were no testicular masses [Prostate Tenderness] : the prostate was not tender [No Prostate Nodules] : no prostate nodules [Prostate Size ___ (0-4)] : prostate size [unfilled] (scale: 0-4) [FreeTextEntry1] : PVR 11-20

## 2022-10-19 ENCOUNTER — EMERGENCY (EMERGENCY)
Facility: HOSPITAL | Age: 62
LOS: 1 days | Discharge: HOME | End: 2022-10-19
Attending: EMERGENCY MEDICINE | Admitting: EMERGENCY MEDICINE

## 2022-10-19 VITALS
SYSTOLIC BLOOD PRESSURE: 112 MMHG | RESPIRATION RATE: 19 BRPM | HEART RATE: 84 BPM | HEIGHT: 69 IN | OXYGEN SATURATION: 97 % | TEMPERATURE: 99 F | DIASTOLIC BLOOD PRESSURE: 67 MMHG | WEIGHT: 160.06 LBS

## 2022-10-19 DIAGNOSIS — R30.0 DYSURIA: ICD-10-CM

## 2022-10-19 DIAGNOSIS — N45.1 EPIDIDYMITIS: ICD-10-CM

## 2022-10-19 DIAGNOSIS — N39.0 URINARY TRACT INFECTION, SITE NOT SPECIFIED: ICD-10-CM

## 2022-10-19 DIAGNOSIS — N40.1 BENIGN PROSTATIC HYPERPLASIA WITH LOWER URINARY TRACT SYMPTOMS: ICD-10-CM

## 2022-10-19 DIAGNOSIS — R33.8 OTHER RETENTION OF URINE: ICD-10-CM

## 2022-10-19 LAB
APPEARANCE UR: CLEAR — SIGNIFICANT CHANGE UP
BACTERIA # UR AUTO: ABNORMAL /HPF
BILIRUB UR-MCNC: NEGATIVE — SIGNIFICANT CHANGE UP
COLOR SPEC: YELLOW — SIGNIFICANT CHANGE UP
DIFF PNL FLD: ABNORMAL
EPI CELLS # UR: SIGNIFICANT CHANGE UP /HPF (ref 0–5)
GLUCOSE UR QL: NEGATIVE — SIGNIFICANT CHANGE UP
KETONES UR-MCNC: ABNORMAL MG/DL
LEUKOCYTE ESTERASE UR-ACNC: ABNORMAL
NITRITE UR-MCNC: POSITIVE
PH UR: 5.5 — SIGNIFICANT CHANGE UP (ref 5–8)
PROT UR-MCNC: NEGATIVE MG/DL — SIGNIFICANT CHANGE UP
RBC CASTS # UR COMP ASSIST: < 5 /HPF — SIGNIFICANT CHANGE UP
SP GR SPEC: 1.02 — SIGNIFICANT CHANGE UP (ref 1–1.03)
UROBILINOGEN FLD QL: 0.2 E.U./DL — SIGNIFICANT CHANGE UP
WBC UR QL: > 10 /HPF

## 2022-10-19 PROCEDURE — 99285 EMERGENCY DEPT VISIT HI MDM: CPT

## 2022-10-19 PROCEDURE — 76870 US EXAM SCROTUM: CPT | Mod: 26

## 2022-10-19 RX ORDER — KETOROLAC TROMETHAMINE 30 MG/ML
15 SYRINGE (ML) INJECTION ONCE
Refills: 0 | Status: DISCONTINUED | OUTPATIENT
Start: 2022-10-19 | End: 2022-10-19

## 2022-10-19 RX ORDER — HYDROMORPHONE HYDROCHLORIDE 2 MG/ML
1 INJECTION INTRAMUSCULAR; INTRAVENOUS; SUBCUTANEOUS ONCE
Refills: 0 | Status: DISCONTINUED | OUTPATIENT
Start: 2022-10-19 | End: 2022-10-19

## 2022-10-19 RX ORDER — CEFTRIAXONE 500 MG/1
1000 INJECTION, POWDER, FOR SOLUTION INTRAMUSCULAR; INTRAVENOUS ONCE
Refills: 0 | Status: COMPLETED | OUTPATIENT
Start: 2022-10-19 | End: 2022-10-19

## 2022-10-19 RX ADMIN — HYDROMORPHONE HYDROCHLORIDE 1 MILLIGRAM(S): 2 INJECTION INTRAMUSCULAR; INTRAVENOUS; SUBCUTANEOUS at 21:53

## 2022-10-19 RX ADMIN — Medication 15 MILLIGRAM(S): at 21:53

## 2022-10-19 NOTE — ED ADULT TRIAGE NOTE - CHIEF COMPLAINT QUOTE
Pt walked in w/ urinary retention. Pt last urinated normally this afternoon. Currently has the urge to go but nothing is coming out. Has been seen here recently for same issue.   Has urology appointment tomorrow morning at 11am.

## 2022-10-19 NOTE — ED ADULT NURSE NOTE - OBJECTIVE STATEMENT
Pt presents to ed reporting urinary retention since earlier today, has been an ongoing issue for a few days, has urology apt tomorrow  already takes meds for it (flomax)  also reporting possible hernia to right groin area  16 Fr ann placed using aseptic technique.

## 2022-10-20 ENCOUNTER — EMERGENCY (EMERGENCY)
Facility: HOSPITAL | Age: 62
LOS: 1 days | Discharge: HOME | End: 2022-10-20
Admitting: EMERGENCY MEDICINE

## 2022-10-20 ENCOUNTER — APPOINTMENT (OUTPATIENT)
Dept: UROLOGY | Facility: CLINIC | Age: 62
End: 2022-10-20

## 2022-10-20 ENCOUNTER — NON-APPOINTMENT (OUTPATIENT)
Age: 62
End: 2022-10-20

## 2022-10-20 VITALS
HEIGHT: 69 IN | TEMPERATURE: 98 F | HEART RATE: 91 BPM | OXYGEN SATURATION: 98 % | DIASTOLIC BLOOD PRESSURE: 78 MMHG | SYSTOLIC BLOOD PRESSURE: 133 MMHG | RESPIRATION RATE: 16 BRPM

## 2022-10-20 VITALS — SYSTOLIC BLOOD PRESSURE: 123 MMHG | TEMPERATURE: 98.4 F | HEART RATE: 103 BPM | DIASTOLIC BLOOD PRESSURE: 76 MMHG

## 2022-10-20 VITALS
HEART RATE: 87 BPM | DIASTOLIC BLOOD PRESSURE: 72 MMHG | SYSTOLIC BLOOD PRESSURE: 112 MMHG | RESPIRATION RATE: 18 BRPM | TEMPERATURE: 98 F | OXYGEN SATURATION: 96 %

## 2022-10-20 DIAGNOSIS — N45.1 EPIDIDYMITIS: ICD-10-CM

## 2022-10-20 PROCEDURE — 99284 EMERGENCY DEPT VISIT MOD MDM: CPT

## 2022-10-20 PROCEDURE — 51798 US URINE CAPACITY MEASURE: CPT

## 2022-10-20 PROCEDURE — 99214 OFFICE O/P EST MOD 30 MIN: CPT

## 2022-10-20 PROCEDURE — 51741 ELECTRO-UROFLOWMETRY FIRST: CPT

## 2022-10-20 RX ORDER — OXYCODONE AND ACETAMINOPHEN 5; 325 MG/1; MG/1
1 TABLET ORAL
Qty: 12 | Refills: 0
Start: 2022-10-20 | End: 2022-10-22

## 2022-10-20 RX ORDER — CEFUROXIME AXETIL 250 MG
1 TABLET ORAL
Qty: 28 | Refills: 0
Start: 2022-10-20 | End: 2022-11-02

## 2022-10-20 RX ADMIN — CEFTRIAXONE 100 MILLIGRAM(S): 500 INJECTION, POWDER, FOR SOLUTION INTRAMUSCULAR; INTRAVENOUS at 00:13

## 2022-10-20 NOTE — ED ADULT TRIAGE NOTE - CHIEF COMPLAINT QUOTE
" I cant urinate" hx of same, idc removed this am by urology , unable to pass 1830 " I cant urinate" hx of same, idc removed this am by urology , unable to pass since 1830,

## 2022-10-20 NOTE — ED PROVIDER NOTE - NS ED ROS FT
Constitutional:  No fever, no weakness, no dizziness  HEENT:  No discharge, no congestion  Cardiac:  No chest pain, palpitations  Pulm:  No cough, no sob  GI:  No abd pain, no vomiting, no diarrhea  : No hematuria +retention +testicular pain and swelling   Neuro:  No ha, no neck pain, no numbness, no weakness, no change in speech, vision or gait  MSK:  No joint pain or swelling  Skin:  No rash

## 2022-10-20 NOTE — ED PROVIDER NOTE - PATIENT PORTAL LINK FT
current weight 260#/adjusted
You can access the FollowMyHealth Patient Portal offered by [facility] by registering at the following website: [email or web address]. By joining [facility]’s FollowMyHealth portal, you will also be able to view your health information using other applications (apps) compatible with our system.

## 2022-10-20 NOTE — ED PROVIDER NOTE - PHYSICAL EXAMINATION
General:  Well appearing, no distress  HEENT:  No conjunctival injection, no ocular discharge, external ears WNL, no pharyngeal injection or exudates  Chest:  Non-tender, no crepitance  Lungs:  Clear to auscultation bilaterally   Heart:  s1s2 normal, no murmur  Abdomen:  soft, non-tender, non-distended  :  Moderate tenderness to posterior right testicle with mild swelling.  There is no erythema to the overlying skin, no suggestion of Celine's. No penile edema or erythema, no discharge   Rectal:  Deferred  Extremities: No edema, normal perfusion, no joint swelling or tenderness  Neuro:  Alert and oriented x 3, cn2-12 intact, full strength, sensory grossly intact, normal gait  Psychiatry:  Calm, cooperative, no expression of suicidal or homicidal ideation

## 2022-10-20 NOTE — PHYSICAL EXAM
[Urethral Meatus] : meatus normal [Penis Abnormality] : normal circumcised penis [Epididymis] : the epididymides were normal [Testes Tenderness] : no tenderness of the testes [Testes Mass (___cm)] : there were no testicular masses [Prostate Tenderness] : the prostate was not tender [No Prostate Nodules] : no prostate nodules [Prostate Size ___ (0-4)] : prostate size [unfilled] (scale: 0-4) [FreeTextEntry1] : right tender swollen epididymis

## 2022-10-20 NOTE — ED PROVIDER NOTE - OBJECTIVE STATEMENT
Patient unable to urinate today  Has been having issues this past week. Had catheter placed and then removed  In the past 4 days has also noted pain and swelling to the back area of the testicle  No hematuria   Mild dysuria  No fever, no abd pain no back pain   He has been on meds to promote flow of urine and was doing well until today  Has an appointment tomorrow morning with his Urologist   Yamila perdue dc

## 2022-10-20 NOTE — ED PROVIDER NOTE - CLINICAL SUMMARY MEDICAL DECISION MAKING FREE TEXT BOX
Patient with urinary retention as well as testicular pain and swelling.  Will need ann, ua/uc as well as sono.    Results, dx, treatment and fu plan as well as return precautions discussed in detail with the patient.  questions answered.  He will see his urologist this morning as scheduled.  Patient verbalized understnading of discussion and plan.

## 2022-10-20 NOTE — ED PROVIDER NOTE - NSFOLLOWUPINSTRUCTIONS_ED_ALL_ED_FT
Indwelling Urinary Catheter Insertion, Care After      This sheet gives you information about how to care for yourself after your procedure. Your health care provider may also give you more specific instructions. If you have problems or questions, contact your health care provider.      What can I expect after the procedure?    After the procedure, it is common to have:  •Slight discomfort around your urethra where the catheter enters your body.        Follow these instructions at home:    General instructions     •Keep the drainage bag at or below the level of your bladder. By doing this, your urine can only drain out instead of going back into your body.      •Secure the catheter tubing and drainage bag to your leg or thigh to keep it from moving.      •Check the catheter tubing regularly to make sure there are no kinks or blockages.      •Take showers daily to keep the catheter clean. Do not take a bath.      • Do not pull on your catheter.      •Disconnect the tubing and drainage bag as little as possible.      •Empty the drainage bag every 2–4 hours, or more often if needed. Do not let the bag get completely full.      •Wash your hands with soap and water before and after touching the catheter, tubing, or drainage bag.      • Do not let the drainage bag or catheter tubing touch the floor.      •Drink enough fluids to keep your urine pale yellow, or as told by your health care provider.        How to remove the catheter   Washing hands with soap and water.   Remove the catheter only if told by your health care provider. Follow instructions from your health care provider about when and how to remove the catheter. For most catheters, you will need to take the following steps:1.Prepare your supplies. You will need a:  •Syringe. This would be given to you by your health care provider.      •Towel.      •Wastebasket.        2.Empty the drainage bag if needed.      3.Wash your hands with soap and warm water.      4.Remove the tape that secures the catheter to your leg or thigh.    5.Get into a comfortable position, such as:  •Lying down with your head raised on pillows and your knees pointing to the ceiling.      •Sitting on a chair or the edge of a bed.        6.Place the towel under you to catch any spilled urine.      7.Put the syringe into the balloon port of the catheter. Use a firm push and twist motion to fit the syringe into the balloon port.      8.The water from the balloon will empty into the syringe.    9.Gently pull out the catheter once the balloon is empty.  •If the catheter doesn't slide easily, do not use force. Let your health care provider know that you are not able to remove the catheter.        10.Throw the used catheter and the syringe in the wastebasket.      11.Wipe any spilled urine or water with the towel.      12.Wash your hands with soap and warm water.      Safety     Let your health care provider know if:  •Your bladder is full, but you are not able to urinate.      •You have removed the catheter, but you are not able to urinate after 8 hours.        Contact a health care provider if:  •Your urine:  •Looks cloudy.      •Has a bad smell.      •Stops flowing into the drainage bag.      •Your catheter:  •Gets clogged.      •Starts to leak.        •You feel pain or pressure in the bladder area.      •You have back pain.      •Your drainage bag or tubing looks dirty.        Get help right away if:    •You have a fever or chills.      •You have severe pain in your back or your lower abdomen.      •You have warmth, redness, swelling, or pain in the urethra area.      •You notice blood in your urine.      •Your catheter gets pulled out.        Summary    •Wash your hands with soap and water before and after touching the catheter, tubing, or drainage bag.      • Do not pull on your catheter or try to remove it.      •Keep the drainage bag at or below the level of your bladder, but do not let the drainage bag or catheter tubing touch the floor.      •Get help right away if you have a fever, chills, or any other signs of infection.      This information is not intended to replace advice given to you by your health care provider. Make sure you discuss any questions you have with your health care provider.                                                                                                     Epididymitis    The male reproductive organ, showing the epididymis and the testicle.   Epididymitis is inflammation or swelling of the epididymis. This is caused by an infection. The epididymis is a cord-like structure that is located along the top and back part of the testicle. It collects and stores sperm from the testicle.    This condition can also cause pain and swelling of the testicle and scrotum. Symptoms usually start suddenly (acute epididymitis). Sometimes epididymitis starts gradually and lasts for a while (chronic epididymitis). Chronic epididymitis may be harder to treat.      What are the causes?    In men ages 20–40, this condition is usually caused by a bacterial infection or a sexually transmitted infection (STI), such as gonorrhea or chlamydia.    In men 40 and older, this condition is usually caused by bacteria from a urinary blockage or from abnormalities in the urinary system. These can result from:  •Having a tube placed into the bladder (urinary catheter).      •Having an enlarged or inflamed prostate gland.      •Having recently had urinary tract surgery.      •Having a problem with a backward flow of urine (retrograde).      In men who have a condition that weakens the body's defense system (immune system), such as human immunodeficiency virus (HIV), this condition can be caused by:  •Other bacteria, including tuberculosis and syphilis.      •Viruses.      •Fungi.      Sometimes this condition occurs without infection. This may happen because of trauma or repetitive activities such as sports.      What increases the risk?    You are more likely to develop this condition if you have:  •Unprotected sex with more than one partner.      •Anal sex.      •Had recent surgery.      •A urinary catheter.      •Urinary problems.      •A suppressed immune system.        What are the signs or symptoms?    This condition usually begins suddenly with chills, fever, and pain behind the scrotum and in the testicle. Other symptoms include:  •Swelling of the scrotum, testicle, or both.      •Pain when ejaculating or urinating.      •Pain in the back or abdomen.      •Nausea.      •Itching and discharge from the penis.      •A frequent need to pass urine.      •Redness, increased warmth, and tenderness of the scrotum.        How is this diagnosed?    Your health care provider can diagnose this condition based on your symptoms and medical history. Your health care provider will also do a physical exam to check your scrotum and testicle for swelling, pain, and redness. You may also have other tests, including:  •Testing of discharge from the penis.      •Testing your urine for infections, such as STIs.      •Ultrasound to check for blood flow and inflammation.      Your health care provider may test you for other STIs, including HIV.      How is this treated?    Treatment for this condition depends on the cause. If your condition is caused by a bacterial infection, oral antibiotic medicine may be prescribed. If the bacterial infection has spread to your blood, you may need to receive IV antibiotics.    For both bacterial and nonbacterial epididymitis, you may be treated with:  •Rest.      •Elevation of the scrotum.      •Pain medicines.      •Anti-inflammatory medicines.      Surgery may be needed if:  •You have pus buildup in the scrotum (abscess).      •You have epididymitis that has not responded to other treatments.        Follow these instructions at home:    Medicines     •Take over-the-counter and prescription medicines only as told by your health care provider.      •If you were prescribed an antibiotic medicine, take it as told by your health care provider. Do not stop taking the antibiotic even if your condition improves.      Sexual activity     •If your epididymitis was caused by an STI, avoid sexual activity until your treatment is complete.      •Inform your sexual partner or partners if you test positive for an STI. They may need to be treated. Do not engage in sexual activity with your partner or partners until their treatment is completed.        Managing pain and swelling   A bathtub partially filled with water. •If directed, raise (elevate) your scrotum and apply ice. To do this:  •Put ice in a plastic bag.      •Place a small towel or pillow between your legs.      •Rest your scrotum on the pillow or towel.      •Place another towel between your skin and the plastic bag.      •Leave the ice on for 20 minutes, 2–3 times a day.      •Remove the ice if your skin turns bright red. This is very important. If you cannot feel pain, heat, or cold, you have a greater risk of damage to the area.        •Keep your scrotum elevated and supported while resting. Ask your health care provider if you should wear a scrotal support, such as a jockstrap. Wear it as told by your health care provider.      •Try taking a sitz bath to help with discomfort. This is a warm water bath that is taken while you are sitting down. The water should come up to your hips and should cover your buttocks. Do this 3–4 times per day or as told by your health care provider.      General instructions     •Drink enough fluid to keep your urine pale yellow.      •Return to your normal activities as told by your health care provider. Ask your health care provider what activities are safe for you.      •Keep all follow-up visits. This is important.        Contact a health care provider if:    •You have a fever.      •Your pain medicine is not helping.      •Your pain is getting worse.      •Your symptoms do not improve within 3 days.        Summary    •Epididymitis is inflammation or swelling of the epididymis. This is caused by an infection. This condition can also cause pain and swelling of the testicle and scrotum.      •Treatment for this condition depends on the cause. If your condition is caused by a bacterial infection, oral antibiotic medicine may be prescribed.      •Inform your sexual partner or partners if you test positive for an STI. They may need to be treated. Do not engage in sexual activity with your partner or partners until their treatment is completed.      •Contact a health care provider if your symptoms do not improve within 3 days.      This information is not intended to replace advice given to you by your health care provider. Make sure you discuss any questions you have with your health care provider.      Document Revised: 07/27/2022 Document Reviewed: 07/27/2022    [employer] Patient Education © 2022 [employer]

## 2022-10-20 NOTE — ASSESSMENT
[FreeTextEntry1] : 58 year old male with a recent history of what seems consistent with acute prostatitis.  He was treated for several days with resolution of symptoms which was complicated by acute urinary retention.  He had an atraumatic catheterization and had had gradual return to baseline of urinary symptoms.\par We discussed his recent symptoms and physical findings and will assume that he has partially treated prostatitis. I would recommend completion of 2 week course.  We discussed side effects allergic reactions to TMP/SX\par \par His urinalysis from ED demonstrated microscopic hematuria which may be artifact of catheterization. \par ? traumatic from catheterization.  We will repeat at this point\par \par \par fu 3 weeks with flow rate \par \par 6..8.2022\par one drop of blood on one occasion\par no risk factors ( no tobacco no family history)\par urinalysis had been normal\par will repeat and if rbc proceed with evaluation\par probable prostatic in origin by description and size of prostate\par \par USG demonstrates prostate volume of 103 cc\par PSAD 0.04\par will repeat PSA and proceed with MRI if elevated\par The ANDREA ESCOBEDO  expressed fully understanding of the information provided, the consequences and the management.\par \par orchalgia\par no findings on exam\par We discussed conservative management with: a.  nonsteroidal anti-inflammatories, b. ice prn and c. scrotal support\par will proceed with scrotal ultrasound\par \par Plan\par 1 PSA\par 2. +/- MRI\par 3. urinalysis \par 4. Scrotal ultrasound\par \par \par repeat PSA if again elevated\par will proceed with MRI\par The ANDREA ESCOBEDO  expressed fully understanding of the information provided, the consequences and the management.\par \par 6.15.2022\par returns re elevated PSA\par returns re orchalgia\par USG demonstrates epididymal and testicular cysts\par reviewed prior ultrasound 2018\par no significant change\par images demonstrated to patient\par no longer symptomatic\par \par PSA and MRI\par PSA dynamics reviewed\par MRI no lesions\par MRI low PSAD\par PROSTATE VOLUME 80\par PSAD 0.05\par options:\par 1. observation\par 2. repeat PSA in 3 months\par discussed limitation of PSA and MRI\par discussed concept clinically important prostate cancer\par dsicussed normal MRI and PSAD does not eliminate possibility of prostate cancer but decreases the likely of significant disease\par The ANDREA ESCOBEDO  expressed fully understanding of the information provided, the consequences and the management.\par Patient choses to observe\par \par 10.7.2022\par Patient presents with acute urinary retention.  The etiology of this is unclear.  He did have dysuria.  His urine culture is pending.  He is not able to tolerate the Wesley catheter.\par \par We discussed the risks of removal of the catheter meaning need to reinsert the catheter.  We discussed potential for fevers and chills as result of removing the catheter.\par We discussed that he has had no intervention and recommendation would be for 48 hours of alpha blocker prior to catheter removal\par Denies constipation etc\par \par \par At this point we will start him on Uroxatral 10  meal once a day.\par culture from ER - no growth\par will start antibx 24 hours prior to TOV on Monday am\par The ANDREA ESCOBEDO  and partner expressed fully understanding of the information provided, the consequences and the management. \par \par 10/10/2022.  Patient was seen multiple times through the day after attempted voiding trial.  He continued to void increasing amounts.  His postvoid residual at last check was approximately 65 cc.  He stated he felt better.  We discussed the potential risk of repeat urinary retention.  He is on TMP/SX.  He will continue this for 24 hours after catheter removal.  He will continue on Flomax.  He feels he is making progress.\par \par 10.12.2022\par Patient unable to urinate sufficient amount for uroflow (voided 10 cc)\par PVR= 20\par continues to have urgency, urge incontinence, dysuria, and bladder spasms since catheter removal two days ago\par wearing diapers since catheter removal\par changes diaper every couple of hours\par reports incomplete emptying of bladder\par reports normal stream when he voids in toilet\par no rectal or bowel symptoms\par no MS symptoms\par \par LA on exam today normal. We discussed how the etiology of his current voiding symptoms are unknown. We discussed the medication options to relieve his urgency and bladder spasms. He will start oxybutynin 10 mg daily. Instructions and side effects of the medication were reviewed.\par \par Plan:\par 1. Start oxybutynin 10 mg\par 2. follow up in 1 week\par 3. warned re side effects/risks\par 4. if continues symptomatic will proceed with CYSTO at next visit\par \par 10.20.2022\par The patient returns with his partner.  He states that he was doing well with oxybutynin and Flomax.  He states that he was urinating with a good stream and without pain.  Yesterday he started developing difficulty with urination.  He presented to the emergency room at [facility].  He stated he was in urinary retention.  They catheterized him and received 200 cc return.  At that time he also was complaining of right testicular pain and underwent a scrotal ultrasound which demonstrated epididymitis.  (Reviewed) a urinalysis at that time demonstrated many bacteria and moderate nitrates.  He was treated with ceftriaxone and discharged on p.o. cephalosporins.\par \par He is afebrile.  He has a catheter indwelling.  His examination is consistent with right epididymitis.\par \par We discussed the fact that he had previous urine cultures that did not demonstrate bacteria.  It is possible that this acute urinary tract infection/epididymitis was a result of previous catheterization.  We discussed the cessation of the oxybutynin since he was having difficulty with urination at this time.  We will also increase his Flomax to 2 tabs.\par \par We discussed conservative management of his epididymitis with elevation ice and nonsteroidal anti-inflammatories.\par \par Plan:\par 1.  Voiding trial today to see if we can remove his Wesley catheter safely.  We discussed the fact that Wesley catheter can exacerbate or cause epididymitis.\par 2. Flomax 2 tablets daily.\par 3.  Stop oxybutynin\par 4.  Conservative measures for epididymitis\par 5.  Continue oral cephalosporins when culture is negative and epididymitis has resolved he will undergo cystoscopy in light of the unclear etiology of his urinary symptoms\par 6. We discussed conservative management with: a.  nonsteroidal anti-inflammatories, b. ice prn and c. scrotal support.\par discussed with Dr Soni\par

## 2022-10-20 NOTE — HISTORY OF PRESENT ILLNESS
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\par no other sexual partners; no condoms; no anal sexual intercourse\par \par black male with urinary buring and frequency approximately 10 days ago\par treated with TMP/Sx and then told to stop because culture was reported as negative\par one week ago had acute urinary retention 600 ml catheterized x 1 \par thereafter back to baseline\par no further dysuria or frequency\par \par 6.8.2022\par patient noted drop of blood during urination\par urine was otherwise clear\par no other urinary symptoms\par not tobacco\par no family history of bladder cancer\par \par 10.7.2022\par Patient was seen in the emergency room at [facility] with acute urinary retention.  This started after several days of dysuria.  Patient denied fevers or chills.\par \par Urinalysis in the emergency room demonstrated bacteria on 1 urinalysis a second did not.  Urine culture is still pending.\par \par He previously had a similar episode in 2019.\par \par He presents today with an indwelling Wesley catheter.\par \par 10.10.2022\par Patient returns today for trial of void.  He was seen in the emergency room at [facility] last evening.  His catheter was apparently not working presented with small clot.  The catheter was changed.  Is not clear if he truly had a voiding trial.  \par \par 10.20.2022\par returns with partner after ER visit last evening for acute urinary retention

## 2022-10-21 ENCOUNTER — TRANSCRIPTION ENCOUNTER (OUTPATIENT)
Age: 62
End: 2022-10-21

## 2022-10-21 ENCOUNTER — NON-APPOINTMENT (OUTPATIENT)
Age: 62
End: 2022-10-21

## 2022-10-21 RX ORDER — LIDOCAINE HCL 20 MG/ML
5 VIAL (ML) INJECTION ONCE
Refills: 0 | Status: COMPLETED | OUTPATIENT
Start: 2022-10-21 | End: 2022-10-21

## 2022-10-21 RX ADMIN — Medication 5 MILLILITER(S): at 00:09

## 2022-10-21 NOTE — ED PROVIDER NOTE - PHYSICAL EXAMINATION
Physical Exam    Vital Signs: I have reviewed the initial vital signs.  Constitutional: well-nourished, appears stated age, no acute distress  Cardiovascular: +S1/S2, no murmurs, regular rate, regular rhythm, well-perfused extremities  Respiratory: unlabored respiratory effort, clear to auscultation bilaterally, speaks in full sentences  Gastrointestinal: soft, non-tender abdomen, POCUS post void 500 mL of urine in the bladder, no cvat

## 2022-10-21 NOTE — ED PROVIDER NOTE - CLINICAL SUMMARY MEDICAL DECISION MAKING FREE TEXT BOX
English
well appearing pt pw urinary retention after passing outpatient post void, currently undergoing treatment for epididymitis, will follow up with Dr Butler. Plan: Wesley cathter.

## 2022-10-21 NOTE — ED PROVIDER NOTE - PATIENT PORTAL LINK FT
You can access the FollowMyHealth Patient Portal offered by [facility] by registering at the following website: [email or web address]. By joining [facility]’s FollowMyHealth portal, you will also be able to view your health information using other applications (apps) compatible with our system.

## 2022-10-21 NOTE — ED PROVIDER NOTE - OBJECTIVE STATEMENT
62 yo m pmhx sig for BPH and h/o urinary retention pw gradually worsening suprapubic discomfort and decreased urination over the course of the last 4 hr. Additionally pt was seen in the ED yesterday for similar complaint and found to have epididymitis and trated with abx. The next day pt was seen by Dr Butler earlier today for urinary retention and passed initial trial to void but did not wait 2 hr in the office. Over the course of the day pt had worsening urinary output and increasing discomfort.     I have reviewed available current nursing and previous documentation of past medical, surgical, family, and/or social history.

## 2022-10-22 DIAGNOSIS — N40.0 BENIGN PROSTATIC HYPERPLASIA WITHOUT LOWER URINARY TRACT SYMPTOMS: ICD-10-CM

## 2022-10-22 DIAGNOSIS — R33.9 RETENTION OF URINE, UNSPECIFIED: ICD-10-CM

## 2022-10-22 DIAGNOSIS — N45.1 EPIDIDYMITIS: ICD-10-CM

## 2022-10-24 ENCOUNTER — NON-APPOINTMENT (OUTPATIENT)
Age: 62
End: 2022-10-24

## 2022-10-27 ENCOUNTER — APPOINTMENT (OUTPATIENT)
Dept: UROLOGY | Facility: CLINIC | Age: 62
End: 2022-10-27
Payer: COMMERCIAL

## 2022-10-27 VITALS — SYSTOLIC BLOOD PRESSURE: 100 MMHG | HEART RATE: 81 BPM | TEMPERATURE: 97.4 F | DIASTOLIC BLOOD PRESSURE: 66 MMHG

## 2022-10-27 DIAGNOSIS — R33.9 RETENTION OF URINE, UNSPECIFIED: ICD-10-CM

## 2022-10-27 PROCEDURE — 99213 OFFICE O/P EST LOW 20 MIN: CPT

## 2022-10-27 PROCEDURE — 51741 ELECTRO-UROFLOWMETRY FIRST: CPT

## 2022-10-27 PROCEDURE — 51798 US URINE CAPACITY MEASURE: CPT

## 2022-10-27 NOTE — ASSESSMENT
[FreeTextEntry1] : 58 year old male with a recent history of what seems consistent with acute prostatitis.  He was treated for several days with resolution of symptoms which was complicated by acute urinary retention.  He had an atraumatic catheterization and had had gradual return to baseline of urinary symptoms.\par We discussed his recent symptoms and physical findings and will assume that he has partially treated prostatitis. I would recommend completion of 2 week course.  We discussed side effects allergic reactions to TMP/SX\par \par His urinalysis from ED demonstrated microscopic hematuria which may be artifact of catheterization. \par ? traumatic from catheterization.  We will repeat at this point\par \par \par fu 3 weeks with flow rate \par \par 6..8.2022\par one drop of blood on one occasion\par no risk factors ( no tobacco no family history)\par urinalysis had been normal\par will repeat and if rbc proceed with evaluation\par probable prostatic in origin by description and size of prostate\par \par USG demonstrates prostate volume of 103 cc\par PSAD 0.04\par will repeat PSA and proceed with MRI if elevated\par The ANDREA ESCOBEDO  expressed fully understanding of the information provided, the consequences and the management.\par \par orchalgia\par no findings on exam\par We discussed conservative management with: a.  nonsteroidal anti-inflammatories, b. ice prn and c. scrotal support\par will proceed with scrotal ultrasound\par \par Plan\par 1 PSA\par 2. +/- MRI\par 3. urinalysis \par 4. Scrotal ultrasound\par \par \par repeat PSA if again elevated\par will proceed with MRI\par The ANDREA ESCOBEDO  expressed fully understanding of the information provided, the consequences and the management.\par \par 6.15.2022\par returns re elevated PSA\par returns re orchalgia\par USG demonstrates epididymal and testicular cysts\par reviewed prior ultrasound 2018\par no significant change\par images demonstrated to patient\par no longer symptomatic\par \par PSA and MRI\par PSA dynamics reviewed\par MRI no lesions\par MRI low PSAD\par PROSTATE VOLUME 80\par PSAD 0.05\par options:\par 1. observation\par 2. repeat PSA in 3 months\par discussed limitation of PSA and MRI\par discussed concept clinically important prostate cancer\par dsicussed normal MRI and PSAD does not eliminate possibility of prostate cancer but decreases the likely of significant disease\par The ANDREA ESCOBEDO  expressed fully understanding of the information provided, the consequences and the management.\par Patient choses to observe\par \par 10.7.2022\par Patient presents with acute urinary retention.  The etiology of this is unclear.  He did have dysuria.  His urine culture is pending.  He is not able to tolerate the Wesley catheter.\par \par We discussed the risks of removal of the catheter meaning need to reinsert the catheter.  We discussed potential for fevers and chills as result of removing the catheter.\par We discussed that he has had no intervention and recommendation would be for 48 hours of alpha blocker prior to catheter removal\par Denies constipation etc\par \par \par At this point we will start him on Uroxatral 10  meal once a day.\par culture from ER - no growth\par will start antibx 24 hours prior to TOV on Monday am\par The ANDREA ESCOBEDO  and partner expressed fully understanding of the information provided, the consequences and the management. \par \par 10/10/2022.  Patient was seen multiple times through the day after attempted voiding trial.  He continued to void increasing amounts.  His postvoid residual at last check was approximately 65 cc.  He stated he felt better.  We discussed the potential risk of repeat urinary retention.  He is on TMP/SX.  He will continue this for 24 hours after catheter removal.  He will continue on Flomax.  He feels he is making progress.\par \par 10.12.2022\par Patient unable to urinate sufficient amount for uroflow (voided 10 cc)\par PVR= 20\par continues to have urgency, urge incontinence, dysuria, and bladder spasms since catheter removal two days ago\par wearing diapers since catheter removal\par changes diaper every couple of hours\par reports incomplete emptying of bladder\par reports normal stream when he voids in toilet\par no rectal or bowel symptoms\par no MS symptoms\par \par LA on exam today normal. We discussed how the etiology of his current voiding symptoms are unknown. We discussed the medication options to relieve his urgency and bladder spasms. He will start oxybutynin 10 mg daily. Instructions and side effects of the medication were reviewed.\par \par Plan:\par 1. Start oxybutynin 10 mg\par 2. follow up in 1 week\par 3. warned re side effects/risks\par 4. if continues symptomatic will proceed with CYSTO at next visit\par \par 10.20.2022\par The patient returns with his partner.  He states that he was doing well with oxybutynin and Flomax.  He states that he was urinating with a good stream and without pain.  Yesterday he started developing difficulty with urination.  He presented to the emergency room at [facility].  He stated he was in urinary retention.  They catheterized him and received 200 cc return.  At that time he also was complaining of right testicular pain and underwent a scrotal ultrasound which demonstrated epididymitis.  (Reviewed) a urinalysis at that time demonstrated many bacteria and moderate nitrates.  He was treated with ceftriaxone and discharged on p.o. cephalosporins.\par \par He is afebrile.  He has a catheter indwelling.  His examination is consistent with right epididymitis.\par \par We discussed the fact that he had previous urine cultures that did not demonstrate bacteria.  It is possible that this acute urinary tract infection/epididymitis was a result of previous catheterization.  We discussed the cessation of the oxybutynin since he was having difficulty with urination at this time.  We will also increase his Flomax to 2 tabs.\par \par We discussed conservative management of his epididymitis with elevation ice and nonsteroidal anti-inflammatories.\par \par Plan:\par 1.  Voiding trial today to see if we can remove his Wesley catheter safely.  We discussed the fact that Wesley catheter can exacerbate or cause epididymitis.\par 2. Flomax 2 tablets daily.\par 3.  Stop oxybutynin\par 4.  Conservative measures for epididymitis\par 5.  Continue oral cephalosporins when culture is negative and epididymitis has resolved he will undergo cystoscopy in light of the unclear etiology of his urinary symptoms\par 6. We discussed conservative management with: a.  nonsteroidal anti-inflammatories, b. ice prn and c. scrotal support.\par discussed with Dr Soni\par \par 10.27.2022\par Patient returns on tamsulosin 0.8 mg daily.  He has stopped oxybutynin.\par flow rate 15.3 cc/\par voided volume 147cc\par PVR 0\par discussed teaching CIC since he has recurrent retention\par discussed proceeding with cysto in 3 weeks\par Plan:\par 1. flomax 0.8 mg\par 2. culture in 2 weeks\par 3. CIC instruction\par 4. cysto in 3 weeks\par

## 2022-10-27 NOTE — HISTORY OF PRESENT ILLNESS
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\par no other sexual partners; no condoms; no anal sexual intercourse\par \par black male with urinary buring and frequency approximately 10 days ago\par treated with TMP/Sx and then told to stop because culture was reported as negative\par one week ago had acute urinary retention 600 ml catheterized x 1 \par thereafter back to baseline\par no further dysuria or frequency\par \par 6.8.2022\par patient noted drop of blood during urination\par urine was otherwise clear\par no other urinary symptoms\par not tobacco\par no family history of bladder cancer\par \par 10.7.2022\par Patient was seen in the emergency room at [facility] with acute urinary retention.  This started after several days of dysuria.  Patient denied fevers or chills.\par \par Urinalysis in the emergency room demonstrated bacteria on 1 urinalysis a second did not.  Urine culture is still pending.\par \par He previously had a similar episode in 2019.\par \par He presents today with an indwelling Wesley catheter.\par \par 10.10.2022\par Patient returns today for trial of void.  He was seen in the emergency room at [facility] last evening.  His catheter was apparently not working presented with small clot.  The catheter was changed.  Is not clear if he truly had a voiding trial.  \par \par 10.20.2022\par returns with partner after ER visit last evening for acute urinary retention\par \par 10.27.2022\par returns for TOV and management\par

## 2022-10-28 NOTE — PROGRESS NOTE ADULT - PROBLEM/PLAN-8
Statement Selected
DISPLAY PLAN FREE TEXT

## 2022-11-03 NOTE — ED ADULT NURSE NOTE - NS ED PATIENT SAFETY CONCERN
[FreeTextEntry1] : 1.  EKG today reveals normal sinus rhythm at 66 bpm.  Normal intervals.  Poor R-wave progression leads V1 through V3.  No ischemic changes. \par \par 2.  Review of recent bloodwork demonstrates a total cholesterol of 212, HDL 60, TC/HDL ratio 3.5, , triglycerides 104.  Patient is advised on a stricter low-fat / low-cholesterol diet.  At this time, she does not wish to begin statin therapy.  Repeat bloodwork will be performed in approximately three months.  \par \par 3.  Palpitations/lightheadedness and syncope:  Currently status-post an implantable loop recorder to rule out paroxysmal atrial fibrillation.  She is advised to follow-up with EP as well as here with us.  Office visit 3-4 months.\par \par    \par    No

## 2022-11-14 ENCOUNTER — APPOINTMENT (OUTPATIENT)
Dept: INTERNAL MEDICINE | Facility: CLINIC | Age: 62
End: 2022-11-14

## 2022-11-14 VITALS
DIASTOLIC BLOOD PRESSURE: 74 MMHG | RESPIRATION RATE: 16 BRPM | SYSTOLIC BLOOD PRESSURE: 120 MMHG | BODY MASS INDEX: 23.4 KG/M2 | HEART RATE: 64 BPM | OXYGEN SATURATION: 99 % | WEIGHT: 158 LBS | HEIGHT: 69 IN

## 2022-11-14 VITALS — SYSTOLIC BLOOD PRESSURE: 108 MMHG | DIASTOLIC BLOOD PRESSURE: 66 MMHG | HEART RATE: 58 BPM

## 2022-11-14 VITALS — DIASTOLIC BLOOD PRESSURE: 72 MMHG | SYSTOLIC BLOOD PRESSURE: 114 MMHG | HEART RATE: 85 BPM

## 2022-11-14 DIAGNOSIS — R42 DIZZINESS AND GIDDINESS: ICD-10-CM

## 2022-11-14 PROCEDURE — 36415 COLL VENOUS BLD VENIPUNCTURE: CPT

## 2022-11-14 PROCEDURE — 99214 OFFICE O/P EST MOD 30 MIN: CPT | Mod: 25

## 2022-11-14 NOTE — HEALTH RISK ASSESSMENT
[0] : 2) Feeling down, depressed, or hopeless: Not at all (0) [PHQ-2 Negative - No further assessment needed] : PHQ-2 Negative - No further assessment needed [BIC1Bpsol] : 0

## 2022-11-14 NOTE — PHYSICAL EXAM
[No Respiratory Distress] : no respiratory distress  [No Accessory Muscle Use] : no accessory muscle use [Normal Rate] : normal rate  [Normal] : affect was normal and insight and judgment were intact

## 2022-11-14 NOTE — HISTORY OF PRESENT ILLNESS
[de-identified] : 61-year-old male patient came in for follow-up on hyperlipidemia and dizziness.\par \par Patient is diagnosed with hyperlipidemia was initially prescribed atorvastatin 40 mg daily along end of August however he has been experiencing some dizziness and he has self lowered the dose to 20 mg daily.  He has been compliant with 20 mg dosage, denies significant adverse reactions.\par \par Patient reports occasional dizziness most prominent when he is getting up from a laying position to standing up.  Of note patient has BPH and had urinary retention.  He is under care of urology and is currently on alfuzosin.  Patient today does not endorse active dizziness however he reports if he experienced positional change he might feel lightheaded a little bit.\par

## 2022-11-16 ENCOUNTER — TRANSCRIPTION ENCOUNTER (OUTPATIENT)
Age: 62
End: 2022-11-16

## 2022-11-16 LAB
CHOLEST SERPL-MCNC: 122 MG/DL
HDLC SERPL-MCNC: 52 MG/DL
LDLC SERPL CALC-MCNC: 53 MG/DL
NONHDLC SERPL-MCNC: 71 MG/DL
TRIGL SERPL-MCNC: 87 MG/DL

## 2022-11-17 ENCOUNTER — APPOINTMENT (OUTPATIENT)
Dept: UROLOGY | Facility: CLINIC | Age: 62
End: 2022-11-17

## 2022-11-17 VITALS
BODY MASS INDEX: 23.4 KG/M2 | OXYGEN SATURATION: 99 % | HEART RATE: 75 BPM | TEMPERATURE: 98 F | SYSTOLIC BLOOD PRESSURE: 100 MMHG | DIASTOLIC BLOOD PRESSURE: 56 MMHG | HEIGHT: 69 IN | WEIGHT: 158 LBS

## 2022-11-17 PROCEDURE — 52000 CYSTOURETHROSCOPY: CPT

## 2022-11-17 PROCEDURE — 99213 OFFICE O/P EST LOW 20 MIN: CPT | Mod: 25

## 2022-11-17 NOTE — ASSESSMENT
[FreeTextEntry1] : 58 year old male with a recent history of what seems consistent with acute prostatitis.  He was treated for several days with resolution of symptoms which was complicated by acute urinary retention.  He had an atraumatic catheterization and had had gradual return to baseline of urinary symptoms.\par We discussed his recent symptoms and physical findings and will assume that he has partially treated prostatitis. I would recommend completion of 2 week course.  We discussed side effects allergic reactions to TMP/SX\par \par His urinalysis from ED demonstrated microscopic hematuria which may be artifact of catheterization. \par ? traumatic from catheterization.  We will repeat at this point\par \par \par fu 3 weeks with flow rate \par \par 6..8.2022\par one drop of blood on one occasion\par no risk factors ( no tobacco no family history)\par urinalysis had been normal\par will repeat and if rbc proceed with evaluation\par probable prostatic in origin by description and size of prostate\par \par USG demonstrates prostate volume of 103 cc\par PSAD 0.04\par will repeat PSA and proceed with MRI if elevated\par The ANDREA ESCOBEDO  expressed fully understanding of the information provided, the consequences and the management.\par \par orchalgia\par no findings on exam\par We discussed conservative management with: a.  nonsteroidal anti-inflammatories, b. ice prn and c. scrotal support\par will proceed with scrotal ultrasound\par \par Plan\par 1 PSA\par 2. +/- MRI\par 3. urinalysis \par 4. Scrotal ultrasound\par \par \par repeat PSA if again elevated\par will proceed with MRI\par The ANDREA ESCOBEDO  expressed fully understanding of the information provided, the consequences and the management.\par \par 6.15.2022\par returns re elevated PSA\par returns re orchalgia\par USG demonstrates epididymal and testicular cysts\par reviewed prior ultrasound 2018\par no significant change\par images demonstrated to patient\par no longer symptomatic\par \par PSA and MRI\par PSA dynamics reviewed\par MRI no lesions\par MRI low PSAD\par PROSTATE VOLUME 80\par PSAD 0.05\par options:\par 1. observation\par 2. repeat PSA in 3 months\par discussed limitation of PSA and MRI\par discussed concept clinically important prostate cancer\par dsicussed normal MRI and PSAD does not eliminate possibility of prostate cancer but decreases the likely of significant disease\par The ANDREA ESCOBEDO  expressed fully understanding of the information provided, the consequences and the management.\par Patient choses to observe\par \par 10.7.2022\par Patient presents with acute urinary retention.  The etiology of this is unclear.  He did have dysuria.  His urine culture is pending.  He is not able to tolerate the Wesley catheter.\par \par We discussed the risks of removal of the catheter meaning need to reinsert the catheter.  We discussed potential for fevers and chills as result of removing the catheter.\par We discussed that he has had no intervention and recommendation would be for 48 hours of alpha blocker prior to catheter removal\par Denies constipation etc\par \par \par At this point we will start him on Uroxatral 10  meal once a day.\par culture from ER - no growth\par will start antibx 24 hours prior to TOV on Monday am\par The ANDREA ESCOBEDO  and partner expressed fully understanding of the information provided, the consequences and the management. \par \par 10/10/2022.  Patient was seen multiple times through the day after attempted voiding trial.  He continued to void increasing amounts.  His postvoid residual at last check was approximately 65 cc.  He stated he felt better.  We discussed the potential risk of repeat urinary retention.  He is on TMP/SX.  He will continue this for 24 hours after catheter removal.  He will continue on Flomax.  He feels he is making progress.\par \par 10.12.2022\par Patient unable to urinate sufficient amount for uroflow (voided 10 cc)\par PVR= 20\par continues to have urgency, urge incontinence, dysuria, and bladder spasms since catheter removal two days ago\par wearing diapers since catheter removal\par changes diaper every couple of hours\par reports incomplete emptying of bladder\par reports normal stream when he voids in toilet\par no rectal or bowel symptoms\par no MS symptoms\par \par LA on exam today normal. We discussed how the etiology of his current voiding symptoms are unknown. We discussed the medication options to relieve his urgency and bladder spasms. He will start oxybutynin 10 mg daily. Instructions and side effects of the medication were reviewed.\par \par Plan:\par 1. Start oxybutynin 10 mg\par 2. follow up in 1 week\par 3. warned re side effects/risks\par 4. if continues symptomatic will proceed with CYSTO at next visit\par \par 10.20.2022\par The patient returns with his partner.  He states that he was doing well with oxybutynin and Flomax.  He states that he was urinating with a good stream and without pain.  Yesterday he started developing difficulty with urination.  He presented to the emergency room at [facility].  He stated he was in urinary retention.  They catheterized him and received 200 cc return.  At that time he also was complaining of right testicular pain and underwent a scrotal ultrasound which demonstrated epididymitis.  (Reviewed) a urinalysis at that time demonstrated many bacteria and moderate nitrates.  He was treated with ceftriaxone and discharged on p.o. cephalosporins.\par \par He is afebrile.  He has a catheter indwelling.  His examination is consistent with right epididymitis.\par \par We discussed the fact that he had previous urine cultures that did not demonstrate bacteria.  It is possible that this acute urinary tract infection/epididymitis was a result of previous catheterization.  We discussed the cessation of the oxybutynin since he was having difficulty with urination at this time.  We will also increase his Flomax to 2 tabs.\par \par We discussed conservative management of his epididymitis with elevation ice and nonsteroidal anti-inflammatories.\par \par Plan:\par 1.  Voiding trial today to see if we can remove his Wesley catheter safely.  We discussed the fact that Wesley catheter can exacerbate or cause epididymitis.\par 2. Flomax 2 tablets daily.\par 3.  Stop oxybutynin\par 4.  Conservative measures for epididymitis\par 5.  Continue oral cephalosporins when culture is negative and epididymitis has resolved he will undergo cystoscopy in light of the unclear etiology of his urinary symptoms\par 6. We discussed conservative management with: a.  nonsteroidal anti-inflammatories, b. ice prn and c. scrotal support.\par discussed with Dr Soni\par \par 10.27.2022\par Patient returns on tamsulosin 0.8 mg daily.  He has stopped oxybutynin.\par flow rate 15.3 cc/\par voided volume 147cc\par PVR 0\par discussed teaching CIC since he has recurrent retention\par discussed proceeding with cysto in 3 weeks\par Plan:\par 1. flomax 0.8 mg\par 2. culture in 2 weeks\par 3. CIC instruction\par 4. cysto in 3 weeks\par \par 11.17.2022\par Patient states that his urinary symptoms have improved on Flomax 2 tablets daily.  However he does note orthostasis which is bothersome to him.\par \par We discussed utilization of alfuzosin to see if this minimizes urinary symptoms.\par \par Patient then expressed desire to proceed with minimally invasive interventions. \par  We discussed Rezum procedure as well as recovery long-term of efficacy etc.  He is interested in proceeding with this.  Informational materials were provided.  We discussed attempting alfuzosin and see him in follow-up to determine his satisfaction with his form of therapy and potentially proceeding with Rezum.  We discussed the indications for intervention.  I

## 2022-11-17 NOTE — HISTORY OF PRESENT ILLNESS
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\par no other sexual partners; no condoms; no anal sexual intercourse\par \par black male with urinary buring and frequency approximately 10 days ago\par treated with TMP/Sx and then told to stop because culture was reported as negative\par one week ago had acute urinary retention 600 ml catheterized x 1 \par thereafter back to baseline\par no further dysuria or frequency\par \par 6.8.2022\par patient noted drop of blood during urination\par urine was otherwise clear\par no other urinary symptoms\par not tobacco\par no family history of bladder cancer\par \par 10.7.2022\par Patient was seen in the emergency room at [facility] with acute urinary retention.  This started after several days of dysuria.  Patient denied fevers or chills.\par \par Urinalysis in the emergency room demonstrated bacteria on 1 urinalysis a second did not.  Urine culture is still pending.\par \par He previously had a similar episode in 2019.\par \par He presents today with an indwelling Wesley catheter.\par \par 10.10.2022\par Patient returns today for trial of void.  He was seen in the emergency room at [facility] last evening.  His catheter was apparently not working presented with small clot.  The catheter was changed.  Is not clear if he truly had a voiding trial.  \par \par 10.20.2022\par returns with partner after ER visit last evening for acute urinary retention\par \par 10.27.2022\par returns for TOV and management\par \par 11.17.2022\par presents for cystoscopy and management of LUTS

## 2022-11-20 ENCOUNTER — TRANSCRIPTION ENCOUNTER (OUTPATIENT)
Age: 62
End: 2022-11-20

## 2022-12-05 ENCOUNTER — TRANSCRIPTION ENCOUNTER (OUTPATIENT)
Age: 62
End: 2022-12-05

## 2022-12-06 ENCOUNTER — TRANSCRIPTION ENCOUNTER (OUTPATIENT)
Age: 62
End: 2022-12-06

## 2022-12-07 ENCOUNTER — TRANSCRIPTION ENCOUNTER (OUTPATIENT)
Age: 62
End: 2022-12-07

## 2022-12-14 ENCOUNTER — APPOINTMENT (OUTPATIENT)
Dept: INTERVENTIONAL RADIOLOGY/VASCULAR | Facility: HOSPITAL | Age: 62
End: 2022-12-14

## 2022-12-14 ENCOUNTER — APPOINTMENT (OUTPATIENT)
Dept: UROLOGY | Facility: CLINIC | Age: 62
End: 2022-12-14

## 2022-12-14 VITALS
DIASTOLIC BLOOD PRESSURE: 72 MMHG | RESPIRATION RATE: 16 BRPM | HEART RATE: 71 BPM | SYSTOLIC BLOOD PRESSURE: 138 MMHG | TEMPERATURE: 98.9 F | OXYGEN SATURATION: 99 %

## 2022-12-14 PROCEDURE — 99203 OFFICE O/P NEW LOW 30 MIN: CPT

## 2022-12-14 NOTE — HISTORY OF PRESENT ILLNESS
[FreeTextEntry1] : 62F with enlarged prostate and LUTS. Pt's main complaint is incomplete voiding, which has been going on for 4-5 months. He has had bladder spasms and incidences of incontinence along with prostatitis in October. He was started on Flomax, which he did not tolerate well due to dizziness, then switched to Alfuzosin, which he also doesn't tolerate well. He has been to the ER for bladder catheterization 3 times in the past few months and had to go home with a Wesley for a few days at a time, which he did not tolerate well. His urologist suggested Rezum procedure, but patient found info about PAE online and would like to discuss having that instead. His IPSS score is 14 (moderate symptoms). He rates his quality of life due to urinary symptoms as 4 (mostly dissatisfied).

## 2022-12-14 NOTE — ASSESSMENT
[FreeTextEntry1] : 62F with BPH causing incomplete voiding and recent episodes of urinary retention requiring bladder catheterization. Pt interested in undergoing PAE rather than something more invasive. PAE was explained to him in detail, including risks and benefits. Pt will obtain medical clearance for anesthesia from his internist and we will schedule him for early January. All questions were answered in detail.

## 2022-12-14 NOTE — DATA REVIEWED
[FreeTextEntry1] : EXAM: [id number] - MR PROSTATE WAW IC - ORDERED BY: JAZMINE AJ \par \par \par PROCEDURE DATE: 06/14/2022 \par \par \par \par INTERPRETATION: MR PELVIS/PROSTATE \par \par \par CLINICAL INFORMATION: Elevated PSA. 61-year-old male. \par PSA: 4.13 ng/mL on 6/8/2022 \par Prior prostate biopsy: No prior biopsy. \par \par COMPARISON: None. \par \par CONTRAST/COMPLICATIONS: \par IV Contrast: Gadavist 7.5 cc administered 0 cc discarded \par Oral Contrast: NONE \par Complications: None reported at time of study completion \par \par PROCEDURE: \par 3.0T SIEMENS TOREY multiparametric MRI of the pelvis was performed as per Prostate Cancer Protocol including small field-of-view, thin section T2-weighted imaging, diffusion-weighted imaging (including ultra high b-values), and dynamic contrast-enhanced imaging. \par \par \par Computer aided detection/diagnosis was performed using kinetic enhancement analysis on a dedicated extraTKT workstation performed by the interpreting radiologist. \par \par FINDINGS: \par \par Size: 6.7 x 4.8 x 4.8 [transverse x AP x CC] cm. \par Volume: 80.7 mL . \par PSA density: 0.05 ng/mL/mL \par PSA density is abnormal if >0.15 ng/mL/mL: \par Hemorrhage: None. \par \par Central gland: Multiple BPH nodules. No suspicious nodule seen on T2-weighted imaging or on diffusion-weighted imaging. \par \par Peripheral zone: No suspicious nodule seen on T2-weighted imaging or on diffusion-weighted imaging with B value 2000 or on ADC map. No focal early enhancement post gadolinium on DCE study. \par \par \par \par Neurovascular bundle: No evidence of neurovascular bundle invasion. \par Seminal vesicles: No seminal vesicle invasion. \par Lymph nodes: No pelvic adenopathy. \par Bones: No suspicious lesions identified. \par Urinary bladder: Collapsed. \par Other: \par \par IMPRESSION: PIRADS 1- very low. \par

## 2022-12-14 NOTE — PHYSICAL EXAM
[Alert] : alert [No Acute Distress] : no acute distress [Normal Rate and Effort] : normal respiratory rhythm and effort [Clear to Auscultation] : lungs were clear to auscultation bilaterally [Normal Rate] : heart rate was normal  [Regular Rhythm] : with a regular rhythm [Not Tender] : non-tender [Soft] : abdomen soft [Not Distended] : not distended

## 2022-12-15 NOTE — H&P ADULT - NSTOBACCOEDUHP_GEN_A_NCS
Referring provider: Tina Kong, BERONICA  Primary eye provider: Tina Kong, BERONICA  Primary care provider:Jj Mejia MD    Does Ms. Kenney have  today: No.  Ms. Kenney gave us verbal permission to discuss their medical condition in the presence of the following individual(s) in the room: N/A.     CHIEF COMPLAINT/HPI (technician): Patient referred by Dr. Tina oKng for Posterior Opacification left eye.  Patient states that vision is blurry in both eyes, but especially the left.  She states she has noticed the blurry vision getting worse since having the membrane peel in the left eye in April 2022.  The patient has Type II Diabetes.  Last blood sugar was 238 this afternoon.  Age diagnosed: 41?    Hemoglobin A1C (%)   Date Value   03/15/2022 12.3 (H)       HISTORY OF PRESENT ILLNESS (DOCTOR):  Ms. Kenney is a 50 year old patient who presents with poor vision in the left eye after macular hole repair.  The patient has been suffering cataract evaluation.  She is noticed that everything is Dimmer in the left eye and that she is also having some headaches and some difficulties with her activities of daily living.    The ROS and PFSH were reviewed and I have updated these as appropriate. Pertinent findings were reviewed with the patient. Also see Histories section of the EMR for a full display of this information.      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 Cantrell in April 2022      CURRENT EYE MEDICATION:  Current eye drops - NONE      REVIEW OF SYSTEMS:  1. Do you have pain?  no  2. Do you have fever, chills, sweats or weight  change? no  3. Do you have headaches or seizures? no  4. Do you have ringing in your ear/s or hearing loss? no  5. Do you have chest pain, palpitations or heart murmur? no  6. Do you have shortness of breath or wheezing? no  7. Do you have abdominal pain, nausea, vomiting, diarrhea or constipation? no  8. Do you have pain, frequency or difficulty with urination? no  9. Do you have joint or back pain? no  10. Do you have weakness, numbness or tingling? Yes index finger on right hand numb  11. Do you have skin changes such as a rash? no  12. Do you experience easy bruising or bleeding? no  13. Are you depressed? yes      Base Eye Exam     Visual Acuity (Snellen - Linear)       Right Left    Dist cc 20/20 -2 20/150 +2    Dist ph cc  20/70 -1    Correction: Glasses          Tonometry (Applanation, 3:10 PM)       Right Left    Pressure 17 17          Pupils       Pupils React APD    Right PERRL 1+ Negative    Left PERRL 1+ Negative          Visual Fields       Left Right     Full Full          Neuro/Psych     Mood/Affect: Normal          Dilation     Both eyes: 2.5% Phenylephrine / 1% Tropicamide @ 3:12 PM            Additional Notes    Open angles Both Eyes        Dry eye syndrome bilateral lacrimal glands  Recommended preservative-free artificial tears 4 times per day (Refresh or Systane)  Recommended artificial tear ointment at night (Refresh or Systane)    Diabetes with mild retinopathy right eye, no retinopathy left eye   Has a pretty high A1c back in March  Next appointment with her primary care provider is in January   We will send a copy of this note to her primary care provider as we would like her diabetes under as good a control as possible before proceeding with the cataract surgery     Nuclear sclerotic cataract in the left eye   Likely the cause of most of her vision loss, but also complicated by macular atrophy after macular hole repair by Dr. Cantrell   I advised the patient that even with cataract surgery,  she is still going to have impaired vision in his eye due to the macular hole  Right shoulder copy of her mac OCT with the abnormal foveal contour  Patient understands this, but desires whenever visual rehabilitation possible  We will proceed with cataract surgery   We will bring her back for next available cataract consultation                 Offered and provided

## 2022-12-28 ENCOUNTER — NON-APPOINTMENT (OUTPATIENT)
Age: 62
End: 2022-12-28

## 2022-12-28 ENCOUNTER — APPOINTMENT (OUTPATIENT)
Dept: INTERNAL MEDICINE | Facility: CLINIC | Age: 62
End: 2022-12-28
Payer: COMMERCIAL

## 2022-12-28 VITALS
SYSTOLIC BLOOD PRESSURE: 114 MMHG | WEIGHT: 159 LBS | RESPIRATION RATE: 16 BRPM | HEART RATE: 76 BPM | OXYGEN SATURATION: 100 % | TEMPERATURE: 97.4 F | DIASTOLIC BLOOD PRESSURE: 68 MMHG | HEIGHT: 69 IN | BODY MASS INDEX: 23.55 KG/M2

## 2022-12-28 DIAGNOSIS — Z01.818 ENCOUNTER FOR OTHER PREPROCEDURAL EXAMINATION: ICD-10-CM

## 2022-12-28 DIAGNOSIS — N40.0 BENIGN PROSTATIC HYPERPLASIA WITHOUT LOWER URINARY TRACT SYMPMS: ICD-10-CM

## 2022-12-28 PROCEDURE — 99214 OFFICE O/P EST MOD 30 MIN: CPT | Mod: 25

## 2022-12-28 RX ORDER — IBUPROFEN 800 MG/1
TABLET, FILM COATED ORAL
Refills: 0 | Status: COMPLETED | COMMUNITY
End: 2022-12-28

## 2022-12-28 RX ORDER — LIDOCAINE AND PRILOCAINE 25; 25 MG/G; MG/G
2.5-2.5 CREAM TOPICAL
Qty: 1 | Refills: 2 | Status: COMPLETED | COMMUNITY
Start: 2019-07-02 | End: 2022-12-28

## 2022-12-28 RX ORDER — OXYBUTYNIN CHLORIDE 10 MG/1
10 TABLET, EXTENDED RELEASE ORAL
Qty: 30 | Refills: 2 | Status: COMPLETED | COMMUNITY
Start: 2022-10-12 | End: 2022-12-28

## 2022-12-28 RX ORDER — HYDROCORTISONE 10 MG/G
1 OINTMENT TOPICAL TWICE DAILY
Qty: 1 | Refills: 0 | Status: COMPLETED | COMMUNITY
Start: 2019-07-02 | End: 2022-12-28

## 2022-12-28 RX ORDER — TAMSULOSIN HYDROCHLORIDE 0.4 MG/1
0.4 CAPSULE ORAL
Qty: 180 | Refills: 0 | Status: COMPLETED | COMMUNITY
Start: 2022-10-27 | End: 2022-12-28

## 2022-12-29 ENCOUNTER — APPOINTMENT (OUTPATIENT)
Dept: RADIOLOGY | Facility: CLINIC | Age: 62
End: 2022-12-29
Payer: COMMERCIAL

## 2022-12-29 ENCOUNTER — OUTPATIENT (OUTPATIENT)
Dept: OUTPATIENT SERVICES | Facility: HOSPITAL | Age: 62
LOS: 1 days | End: 2022-12-29

## 2022-12-29 ENCOUNTER — RESULT REVIEW (OUTPATIENT)
Age: 62
End: 2022-12-29

## 2022-12-29 PROCEDURE — 71045 X-RAY EXAM CHEST 1 VIEW: CPT | Mod: 26

## 2022-12-30 ENCOUNTER — TRANSCRIPTION ENCOUNTER (OUTPATIENT)
Age: 62
End: 2022-12-30

## 2022-12-31 PROBLEM — N40.0 BPH (BENIGN PROSTATIC HYPERPLASIA): Status: ACTIVE | Noted: 2019-07-02

## 2022-12-31 LAB
ALBUMIN SERPL ELPH-MCNC: 4.2 G/DL
ALP BLD-CCNC: 89 U/L
ALT SERPL-CCNC: 35 U/L
ANION GAP SERPL CALC-SCNC: 12 MMOL/L
APTT BLD: 29.5 SEC
AST SERPL-CCNC: 23 U/L
BASOPHILS # BLD AUTO: 0.07 K/UL
BASOPHILS NFR BLD AUTO: 0.9 %
BILIRUB SERPL-MCNC: 0.6 MG/DL
BUN SERPL-MCNC: 14 MG/DL
CALCIUM SERPL-MCNC: 9.7 MG/DL
CHLORIDE SERPL-SCNC: 105 MMOL/L
CO2 SERPL-SCNC: 27 MMOL/L
CREAT SERPL-MCNC: 1.14 MG/DL
EGFR: 72 ML/MIN/1.73M2
EOSINOPHIL # BLD AUTO: 0.26 K/UL
EOSINOPHIL NFR BLD AUTO: 3.5 %
GLUCOSE SERPL-MCNC: 96 MG/DL
HCT VFR BLD CALC: 48 %
HGB BLD-MCNC: 15.1 G/DL
IMM GRANULOCYTES NFR BLD AUTO: 0.4 %
INR PPP: 0.97 RATIO
LYMPHOCYTES # BLD AUTO: 1.96 K/UL
LYMPHOCYTES NFR BLD AUTO: 26.4 %
MAN DIFF?: NORMAL
MCHC RBC-ENTMCNC: 30.6 PG
MCHC RBC-ENTMCNC: 31.5 GM/DL
MCV RBC AUTO: 97.2 FL
MONOCYTES # BLD AUTO: 0.44 K/UL
MONOCYTES NFR BLD AUTO: 5.9 %
NEUTROPHILS # BLD AUTO: 4.66 K/UL
NEUTROPHILS NFR BLD AUTO: 62.9 %
PLATELET # BLD AUTO: 223 K/UL
POTASSIUM SERPL-SCNC: 4.6 MMOL/L
PROT SERPL-MCNC: 6.6 G/DL
PT BLD: 11.3 SEC
RBC # BLD: 4.94 M/UL
RBC # FLD: 13.2 %
SODIUM SERPL-SCNC: 144 MMOL/L
WBC # FLD AUTO: 7.42 K/UL

## 2022-12-31 NOTE — HISTORY OF PRESENT ILLNESS
[(Patient denies any chest pain, claudication, dyspnea on exertion, orthopnea, palpitations or syncope)] : Patient denies any chest pain, claudication, dyspnea on exertion, orthopnea, palpitations or syncope [No Pertinent Cardiac History] : no history of aortic stenosis, atrial fibrillation, coronary artery disease, recent myocardial infarction, or implantable device/pacemaker [No Pertinent Pulmonary History] : no history of asthma, COPD, sleep apnea, or smoking [No Adverse Anesthesia Reaction] : no adverse anesthesia reaction in self or family member [Excellent (>10 METs)] : Excellent (>10 METs) [Chronic Anticoagulation] : no chronic anticoagulation [Chronic Kidney Disease] : no chronic kidney disease [Diabetes] : no diabetes [FreeTextEntry1] : Prostate artery embolization [FreeTextEntry2] : 1/10/2023 [FreeTextEntry3] : Dr. Derian Muller [FreeTextEntry4] : 62yoF with enlarged prostate and lower urinary tract symptoms came in for preop evaluation for prostate artery embolization. Patients main complaint is incomplete voiding, which has been going on for 4-5 months. He has had bladder spasms and incidences of incontinence along with prostatitis in October. He was started on Flomax, which he did not tolerate well due to dizziness, then switched to Alfuzosin, which states is making him drowzy. He has been to the ER for bladder catheterization 3 times in the past few months and left with a ann for a few days at a time. His urologist Dr Michele suggested Rezum procedure, but patient found info about Prostate artery embolization and would like to proceed with this procedure instead since the other procedure is too invasive.  Patient is able to run a mile without getting short of breath.  Patient denies chest pain and shortness of breath.

## 2022-12-31 NOTE — RESULTS/DATA
[] : results reviewed [de-identified] : WNL [de-identified] : WNL [de-identified] : WNL [de-identified] : NSR

## 2022-12-31 NOTE — ASSESSMENT
[High Risk Surgery - Intraperitoneal, Intrathoracic or Supringuinal Vascular Procedures] : High Risk Surgery - Intraperitoneal, Intrathoracic or Supringuinal Vascular Procedures - No (0) [Ischemic Heart Disease] : Ischemic Heart Disease - No (0) [Congestive Heart Failure] : Congestive Heart Failure - No (0) [Prior Cerebrovascular Accident or TIA] : Prior Cerebrovascular Accident or TIA - No (0) [Creatinine >= 2mg/dL (1 Point)] : Creatinine >= 2mg/dL - No (0) [Insulin-dependent Diabetic (1 Point)] : Insulin-dependent Diabetic - No (0) [0] : 0 , RCRI Class: I, Risk of Post-Op Cardiac Complications: 3.9%, 95% CI for Risk Estimate: 2.8% - 5.4% [Patient Optimized for Surgery] : Patient optimized for surgery [FreeTextEntry4] : patient is low risk going in for low/intermediate risk procedure

## 2022-12-31 NOTE — PLAN
[FreeTextEntry1] : Patient is low risk for intermediate risk procedure.\par Awaiting lab and cxr results.

## 2023-01-03 NOTE — DISCHARGE NOTE BEHAVIORAL HEALTH - NSBHDCTESTSPEND_PSY_A_CORE
Patient called to let us know that she got food poisoning over the weekend and had to go to the ER. She stopped taking capecitabine temporarily but states she restarted it today and is feeling much better.   
none

## 2023-01-05 ENCOUNTER — APPOINTMENT (OUTPATIENT)
Dept: INTERNAL MEDICINE | Facility: CLINIC | Age: 63
End: 2023-01-05
Payer: COMMERCIAL

## 2023-01-05 DIAGNOSIS — Z20.822 CONTACT WITH AND (SUSPECTED) EXPOSURE TO COVID-19: ICD-10-CM

## 2023-01-05 PROCEDURE — 87426 SARSCOV CORONAVIRUS AG IA: CPT | Mod: QW

## 2023-01-06 PROBLEM — Z20.822 ENCOUNTER FOR LABORATORY TESTING FOR COVID-19 VIRUS: Status: ACTIVE | Noted: 2022-12-30

## 2023-01-06 LAB — SARS-COV-2 N GENE NPH QL NAA+PROBE: DETECTED

## 2023-01-09 ENCOUNTER — APPOINTMENT (OUTPATIENT)
Dept: INTERVENTIONAL RADIOLOGY/VASCULAR | Facility: HOSPITAL | Age: 63
End: 2023-01-09

## 2023-01-13 NOTE — ED PROVIDER NOTE - OBJECTIVE STATEMENT
Order received for CT lung screening. EMR and order reviewed. Information regarding ct lung screening and smoking cessation referral mailed to patient. Patient is a 60 yo M with history of schizophrenia, hx of anemia, on Eliquis for history of blood clots, here for 5 days of dysuria. He reports recent hospitalization at [facility]. He states he has been having burning with urination for 5 days. No rashes. Denies fevers, chills, nausea, vomiting, abdominal pain, flank pain. Denies new sexual partners. Denies hearing voices. No SI/HI. Reports compliance with taking haldol and zyprexa. Patient is from [facility].

## 2023-01-18 ENCOUNTER — RX RENEWAL (OUTPATIENT)
Age: 63
End: 2023-01-18

## 2023-01-19 ENCOUNTER — RESULT REVIEW (OUTPATIENT)
Age: 63
End: 2023-01-19

## 2023-01-19 ENCOUNTER — OUTPATIENT (OUTPATIENT)
Dept: OUTPATIENT SERVICES | Facility: HOSPITAL | Age: 63
LOS: 1 days | End: 2023-01-19
Payer: COMMERCIAL

## 2023-01-19 ENCOUNTER — APPOINTMENT (OUTPATIENT)
Dept: INTERVENTIONAL RADIOLOGY/VASCULAR | Facility: HOSPITAL | Age: 63
End: 2023-01-19

## 2023-01-19 VITALS
OXYGEN SATURATION: 100 % | HEIGHT: 69 IN | DIASTOLIC BLOOD PRESSURE: 66 MMHG | TEMPERATURE: 98 F | WEIGHT: 158.95 LBS | RESPIRATION RATE: 18 BRPM | HEART RATE: 72 BPM | SYSTOLIC BLOOD PRESSURE: 116 MMHG

## 2023-01-19 PROCEDURE — 37243 VASC EMBOLIZE/OCCLUDE ORGAN: CPT

## 2023-01-19 PROCEDURE — 36247 INS CATH ABD/L-EXT ART 3RD: CPT

## 2023-01-19 NOTE — PRE-ANESTHESIA EVALUATION ADULT - NSANTHPMHFT_GEN_ALL_CORE
Cardiac: Positive for Hyperlipidemia. Denies HTN, MI/Angina/Heart Failure, Arrhythmia, Murmur/Valvular disorder. >4 METS  Pulmonary: Exercise induced asthma. Runs 20 miles per week. Denies ADRIAN, COPD.  Renal: Creatinine 1.1, denies kidney dysfunction. Positive for BPH and urinary retention.  Hepatic: Denies liver dysfunction  Gastrointestinal: Denies GERD, IBD.  Endocrine: Positive for prediabetes. Denies thyroid dysfunction.  Neurologic: Denies stroke/seizure disorder.  Hematologic: Denies anemia, blood clotting disorder, blood thinning disorder.    PSH: Colonoscopy x 2.

## 2023-01-19 NOTE — PRE-ANESTHESIA EVALUATION ADULT - NSRADCARDRESULTSFT_GEN_ALL_CORE
Echocardiogram/Cardiac CT unremarkable per outside physician report. Evaluation for shortness of breath.

## 2023-01-23 NOTE — ASSESSMENT
[FreeTextEntry1] : 58 year old male with a recent history of what seems consistent with acute prostatitis.  He was treated for several days with resolution of symptoms which was complicated by acute urinary retention.  He had an atraumatic catheterization and had had gradual return to baseline of urinary symptoms.\par We discussed his recent symptoms and physical findings and will assume that he has partially treated prostatitis. I would recommend completion of 2 week course.  We discussed side effects allergic reactions to TMP/SX\par \par His urinalysis from ED demonstrated microscopic hematuria which may be artifact of catheterization. \par ? traumatic from catheterization.  We will repeat at this point\par \par \par fu 3 weeks with flow rate \par \par 6..8.2022\par one drop of blood on one occasion\par no risk factors ( no tobacco no family history)\par urinalysis had been normal\par will repeat and if rbc proceed with evaluation\par probable prostatic in origin by description and size of prostate\par \par USG demonstrates prostate volume of 103 cc\par PSAD 0.04\par will repeat PSA and proceed with MRI if elevated\par The ANDREA ESCOBEDO  expressed fully understanding of the information provided, the consequences and the management.\par \par orchalgia\par no findings on exam\par We discussed conservative management with: a.  nonsteroidal anti-inflammatories, b. ice prn and c. scrotal support\par will proceed with scrotal ultrasound\par \par Plan\par 1 PSA\par 2. +/- MRI\par 3. urinalysis \par 4. Scrotal ultrasound\par \par \par repeat PSA if again elevated\par will proceed with MRI\par The ANDREA ESCOBEDO  expressed fully understanding of the information provided, the consequences and the management.\par \par 6.15.2022\par returns re elevated PSA\par returns re orchalgia\par USG demonstrates epididymal and testicular cysts\par reviewed prior ultrasound 2018\par no significant change\par images demonstrated to patient\par no longer symptomatic\par \par PSA and MRI\par PSA dynamics reviewed\par MRI no lesions\par MRI low PSAD\par PROSTATE VOLUME 80\par PSAD 0.05\par options:\par 1. observation\par 2. repeat PSA in 3 months\par discussed limitation of PSA and MRI\par discussed concept clinically important prostate cancer\par dsicussed normal MRI and PSAD does not eliminate possibility of prostate cancer but decreases the likely of significant disease\par The ANDREA ESCOBEDO  expressed fully understanding of the information provided, the consequences and the management.\par Patient choses to observe\par \par 10.7.2022\par Patient presents with acute urinary retention.  The etiology of this is unclear.  He did have dysuria.  His urine culture is pending.  He is not able to tolerate the Wesley catheter.\par \par We discussed the risks of removal of the catheter meaning need to reinsert the catheter.  We discussed potential for fevers and chills as result of removing the catheter.\par We discussed that he has had no intervention and recommendation would be for 48 hours of alpha blocker prior to catheter removal\par Denies constipation etc\par \par \par At this point we will start him on Uroxatral 10  meal once a day.\par culture from ER - no growth\par will start antibx 24 hours prior to TOV on Monday am\par The ANDREA ESCOBEDO  and partner expressed fully understanding of the information provided, the consequences and the management. \par \par 10/10/2022.  Patient was seen multiple times through the day after attempted voiding trial.  He continued to void increasing amounts.  His postvoid residual at last check was approximately 65 cc.  He stated he felt better.  We discussed the potential risk of repeat urinary retention.  He is on TMP/SX.  He will continue this for 24 hours after catheter removal.  He will continue on Flomax.  He feels he is making progress.\par \par 10.12.2022\par Patient unable to urinate sufficient amount for uroflow (voided 10 cc)\par PVR= 20\par continues to have urgency, urge incontinence, dysuria, and bladder spasms since catheter removal two days ago\par wearing diapers since catheter removal\par changes diaper every couple of hours\par reports incomplete emptying of bladder\par reports normal stream when he voids in toilet\par no rectal or bowel symptoms\par no MS symptoms\par \par LA on exam today normal. We discussed how the etiology of his current voiding symptoms are unknown. We discussed the medication options to relieve his urgency and bladder spasms. He will start oxybutynin 10 mg daily. Instructions and side effects of the medication were reviewed.\par \par Plan:\par 1. Start oxybutynin 10 mg\par 2. follow up in 1 week\par 3. warned re side effects/risks\par 4. if continues symptomatic will proceed with CYSTO at next visit\par \par 10.20.2022\par The patient returns with his partner.  He states that he was doing well with oxybutynin and Flomax.  He states that he was urinating with a good stream and without pain.  Yesterday he started developing difficulty with urination.  He presented to the emergency room at [facility].  He stated he was in urinary retention.  They catheterized him and received 200 cc return.  At that time he also was complaining of right testicular pain and underwent a scrotal ultrasound which demonstrated epididymitis.  (Reviewed) a urinalysis at that time demonstrated many bacteria and moderate nitrates.  He was treated with ceftriaxone and discharged on p.o. cephalosporins.\par \par He is afebrile.  He has a catheter indwelling.  His examination is consistent with right epididymitis.\par \par We discussed the fact that he had previous urine cultures that did not demonstrate bacteria.  It is possible that this acute urinary tract infection/epididymitis was a result of previous catheterization.  We discussed the cessation of the oxybutynin since he was having difficulty with urination at this time.  We will also increase his Flomax to 2 tabs.\par \par We discussed conservative management of his epididymitis with elevation ice and nonsteroidal anti-inflammatories.\par \par Plan:\par 1.  Voiding trial today to see if we can remove his Wesley catheter safely.  We discussed the fact that Wesley catheter can exacerbate or cause epididymitis.\par 2. Flomax 2 tablets daily.\par 3.  Stop oxybutynin\par 4.  Conservative measures for epididymitis\par 5.  Continue oral cephalosporins when culture is negative and epididymitis has resolved he will undergo cystoscopy in light of the unclear etiology of his urinary symptoms\par 6. We discussed conservative management with: a.  nonsteroidal anti-inflammatories, b. ice prn and c. scrotal support.\par discussed with Dr Soni\par \par 10.27.2022\par Patient returns on tamsulosin 0.8 mg daily.  He has stopped oxybutynin.\par flow rate 15.3 cc/\par voided volume 147cc\par PVR 0\par discussed teaching CIC since he has recurrent retention\par discussed proceeding with cysto in 3 weeks\par Plan:\par 1. flomax 0.8 mg\par 2. culture in 2 weeks\par 3. CIC instruction\par 4. cysto in 3 weeks\par \par 11.17.2022\par Patient states that his urinary symptoms have improved on Flomax 2 tablets daily.  However he does note orthostasis which is bothersome to him.\par \par We discussed utilization of alfuzosin to see if this minimizes urinary symptoms.\par \par Patient then expressed desire to proceed with minimally invasive interventions. \par  We discussed Rezum procedure as well as recovery long-term of efficacy etc.  He is interested in proceeding with this.  Informational materials were provided.  We discussed attempting alfuzosin and see him in follow-up to determine his satisfaction with his form of therapy and potentially proceeding with Rezum.  We discussed the indications for intervention.  I\par \par 1.24.23\par

## 2023-01-23 NOTE — HISTORY OF PRESENT ILLNESS
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\par no other sexual partners; no condoms; no anal sexual intercourse\par \par black male with urinary buring and frequency approximately 10 days ago\par treated with TMP/Sx and then told to stop because culture was reported as negative\par one week ago had acute urinary retention 600 ml catheterized x 1 \par thereafter back to baseline\par no further dysuria or frequency\par \par 6.8.2022\par patient noted drop of blood during urination\par urine was otherwise clear\par no other urinary symptoms\par not tobacco\par no family history of bladder cancer\par \par 10.7.2022\par Patient was seen in the emergency room at [facility] with acute urinary retention.  This started after several days of dysuria.  Patient denied fevers or chills.\par \par Urinalysis in the emergency room demonstrated bacteria on 1 urinalysis a second did not.  Urine culture is still pending.\par \par He previously had a similar episode in 2019.\par \par He presents today with an indwelling Wesley catheter.\par \par 10.10.2022\par Patient returns today for trial of void.  He was seen in the emergency room at [facility] last evening.  His catheter was apparently not working presented with small clot.  The catheter was changed.  Is not clear if he truly had a voiding trial.  \par \par 10.20.2022\par returns with partner after ER visit last evening for acute urinary retention\par \par 10.27.2022\par returns for TOV and management\par \par 11.17.2022\par presents for cystoscopy and management of LUTS\par \par 1.24.23

## 2023-01-24 ENCOUNTER — APPOINTMENT (OUTPATIENT)
Dept: UROLOGY | Facility: CLINIC | Age: 63
End: 2023-01-24
Payer: COMMERCIAL

## 2023-01-24 PROCEDURE — 99213 OFFICE O/P EST LOW 20 MIN: CPT | Mod: 95

## 2023-01-24 NOTE — ASSESSMENT
[FreeTextEntry1] : 58 year old male with a recent history of what seems consistent with acute prostatitis.  He was treated for several days with resolution of symptoms which was complicated by acute urinary retention.  He had an atraumatic catheterization and had had gradual return to baseline of urinary symptoms.\par We discussed his recent symptoms and physical findings and will assume that he has partially treated prostatitis. I would recommend completion of 2 week course.  We discussed side effects allergic reactions to TMP/SX\par \par His urinalysis from ED demonstrated microscopic hematuria which may be artifact of catheterization. \par ? traumatic from catheterization.  We will repeat at this point\par \par \par fu 3 weeks with flow rate \par \par 6..8.2022\par one drop of blood on one occasion\par no risk factors ( no tobacco no family history)\par urinalysis had been normal\par will repeat and if rbc proceed with evaluation\par probable prostatic in origin by description and size of prostate\par \par USG demonstrates prostate volume of 103 cc\par PSAD 0.04\par will repeat PSA and proceed with MRI if elevated\par The ANDREA ESCOBEDO  expressed fully understanding of the information provided, the consequences and the management.\par \par orchalgia\par no findings on exam\par We discussed conservative management with: a.  nonsteroidal anti-inflammatories, b. ice prn and c. scrotal support\par will proceed with scrotal ultrasound\par \par Plan\par 1 PSA\par 2. +/- MRI\par 3. urinalysis \par 4. Scrotal ultrasound\par \par \par repeat PSA if again elevated\par will proceed with MRI\par The ANDREA ESCOBEDO  expressed fully understanding of the information provided, the consequences and the management.\par \par 6.15.2022\par returns re elevated PSA\par returns re orchalgia\par USG demonstrates epididymal and testicular cysts\par reviewed prior ultrasound 2018\par no significant change\par images demonstrated to patient\par no longer symptomatic\par \par PSA and MRI\par PSA dynamics reviewed\par MRI no lesions\par MRI low PSAD\par PROSTATE VOLUME 80\par PSAD 0.05\par options:\par 1. observation\par 2. repeat PSA in 3 months\par discussed limitation of PSA and MRI\par discussed concept clinically important prostate cancer\par dsicussed normal MRI and PSAD does not eliminate possibility of prostate cancer but decreases the likely of significant disease\par The ANDREA ESCOBEDO  expressed fully understanding of the information provided, the consequences and the management.\par Patient choses to observe\par \par 10.7.2022\par Patient presents with acute urinary retention.  The etiology of this is unclear.  He did have dysuria.  His urine culture is pending.  He is not able to tolerate the Wesley catheter.\par \par We discussed the risks of removal of the catheter meaning need to reinsert the catheter.  We discussed potential for fevers and chills as result of removing the catheter.\par We discussed that he has had no intervention and recommendation would be for 48 hours of alpha blocker prior to catheter removal\par Denies constipation etc\par \par \par At this point we will start him on Uroxatral 10  meal once a day.\par culture from ER - no growth\par will start antibx 24 hours prior to TOV on Monday am\par The ANDREA ESCOBEDO  and partner expressed fully understanding of the information provided, the consequences and the management. \par \par 10/10/2022.  Patient was seen multiple times through the day after attempted voiding trial.  He continued to void increasing amounts.  His postvoid residual at last check was approximately 65 cc.  He stated he felt better.  We discussed the potential risk of repeat urinary retention.  He is on TMP/SX.  He will continue this for 24 hours after catheter removal.  He will continue on Flomax.  He feels he is making progress.\par \par 10.12.2022\par Patient unable to urinate sufficient amount for uroflow (voided 10 cc)\par PVR= 20\par continues to have urgency, urge incontinence, dysuria, and bladder spasms since catheter removal two days ago\par wearing diapers since catheter removal\par changes diaper every couple of hours\par reports incomplete emptying of bladder\par reports normal stream when he voids in toilet\par no rectal or bowel symptoms\par no MS symptoms\par \par LA on exam today normal. We discussed how the etiology of his current voiding symptoms are unknown. We discussed the medication options to relieve his urgency and bladder spasms. He will start oxybutynin 10 mg daily. Instructions and side effects of the medication were reviewed.\par \par Plan:\par 1. Start oxybutynin 10 mg\par 2. follow up in 1 week\par 3. warned re side effects/risks\par 4. if continues symptomatic will proceed with CYSTO at next visit\par \par 10.20.2022\par The patient returns with his partner.  He states that he was doing well with oxybutynin and Flomax.  He states that he was urinating with a good stream and without pain.  Yesterday he started developing difficulty with urination.  He presented to the emergency room at [facility].  He stated he was in urinary retention.  They catheterized him and received 200 cc return.  At that time he also was complaining of right testicular pain and underwent a scrotal ultrasound which demonstrated epididymitis.  (Reviewed) a urinalysis at that time demonstrated many bacteria and moderate nitrates.  He was treated with ceftriaxone and discharged on p.o. cephalosporins.\par \par He is afebrile.  He has a catheter indwelling.  His examination is consistent with right epididymitis.\par \par We discussed the fact that he had previous urine cultures that did not demonstrate bacteria.  It is possible that this acute urinary tract infection/epididymitis was a result of previous catheterization.  We discussed the cessation of the oxybutynin since he was having difficulty with urination at this time.  We will also increase his Flomax to 2 tabs.\par \par We discussed conservative management of his epididymitis with elevation ice and nonsteroidal anti-inflammatories.\par \par Plan:\par 1.  Voiding trial today to see if we can remove his Wesley catheter safely.  We discussed the fact that Wesley catheter can exacerbate or cause epididymitis.\par 2. Flomax 2 tablets daily.\par 3.  Stop oxybutynin\par 4.  Conservative measures for epididymitis\par 5.  Continue oral cephalosporins when culture is negative and epididymitis has resolved he will undergo cystoscopy in light of the unclear etiology of his urinary symptoms\par 6. We discussed conservative management with: a.  nonsteroidal anti-inflammatories, b. ice prn and c. scrotal support.\par discussed with Dr Soni\par \par 10.27.2022\par Patient returns on tamsulosin 0.8 mg daily.  He has stopped oxybutynin.\par flow rate 15.3 cc/\par voided volume 147cc\par PVR 0\par discussed teaching CIC since he has recurrent retention\par discussed proceeding with cysto in 3 weeks\par Plan:\par 1. flomax 0.8 mg\par 2. culture in 2 weeks\par 3. CIC instruction\par 4. cysto in 3 weeks\par \par 1.24.2022\par patient underwent PAE by Dr Muller at [facility]\par patient did not want to proceed with Rezum\par doing well immediately\par continues on alfuzosin\par recommend followup in 8 weeks\par

## 2023-01-24 NOTE — HISTORY OF PRESENT ILLNESS
[Home] : at home, [unfilled] , at the time of the visit. 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\par no other sexual partners; no condoms; no anal sexual intercourse\par \par black male with urinary buring and frequency approximately 10 days ago\par treated with TMP/Sx and then told to stop because culture was reported as negative\par one week ago had acute urinary retention 600 ml catheterized x 1 \par thereafter back to baseline\par no further dysuria or frequency\par \par 6.8.2022\par patient noted drop of blood during urination\par urine was otherwise clear\par no other urinary symptoms\par not tobacco\par no family history of bladder cancer\par \par 10.7.2022\par Patient was seen in the emergency room at [facility] with acute urinary retention.  This started after several days of dysuria.  Patient denied fevers or chills.\par \par Urinalysis in the emergency room demonstrated bacteria on 1 urinalysis a second did not.  Urine culture is still pending.\par \par He previously had a similar episode in 2019.\par \par He presents today with an indwelling Wesley catheter.\par \par 10.10.2022\par Patient returns today for trial of void.  He was seen in the emergency room at [facility] last evening.  His catheter was apparently not working presented with small clot.  The catheter was changed.  Is not clear if he truly had a voiding trial.  \par \par 10.20.2022\par returns with partner after ER visit last evening for acute urinary retention\par \par 10.27.2022\par returns for TOV and management\par \par 11.17.2022\par presents for cystoscopy and management of LUTS\par \par 1.24.2023\par returns s/p Prostate Embolization\par Dr Muller at [facility] one week ago\par

## 2023-01-26 ENCOUNTER — NON-APPOINTMENT (OUTPATIENT)
Age: 63
End: 2023-01-26

## 2023-01-26 ENCOUNTER — APPOINTMENT (OUTPATIENT)
Dept: INTERVENTIONAL RADIOLOGY/VASCULAR | Facility: HOSPITAL | Age: 63
End: 2023-01-26
Payer: COMMERCIAL

## 2023-01-26 PROCEDURE — G2012 BRIEF CHECK IN BY MD/QHP: CPT

## 2023-01-26 PROCEDURE — XXXXX: CPT | Mod: 1L

## 2023-02-21 NOTE — ED ADULT NURSE NOTE - NSSEPSISSUSPECTED_ED_A_ED
What Type Of Note Output Would You Prefer (Optional)?: Standard Output How Severe Are Your Spot(S)?: moderate Have Your Spot(S) Been Treated In The Past?: has not been treated Hpi Title: Evaluation of Skin Lesions No

## 2023-03-06 NOTE — ED PROVIDER NOTE - DR. NAME
Consent was obtained and risks were reviewed including but not limited to scarring, infection, bleeding, scabbing, incomplete removal, and allergy to anesthesia. Render Number Of Lesions Treated: yes Extraction Method: comedo extractor Post-Care Instructions: I reviewed with the patient in detail post-care instructions. Patient is to keep the treatment areas dry overnight, and then apply bacitracin twice daily until healed. Patient may apply hydrogen peroxide soaks to remove any crusting. Detail Level: Zone Acne Type: Comedonal Lesions Render Post-Care Instructions In Note?: no Prep Text (Optional): Prior to removal the treatment areas were prepped in the usual fashion. Dayanara

## 2023-04-13 ENCOUNTER — APPOINTMENT (OUTPATIENT)
Dept: UROLOGY | Facility: CLINIC | Age: 63
End: 2023-04-13
Payer: COMMERCIAL

## 2023-04-13 VITALS
TEMPERATURE: 98 F | BODY MASS INDEX: 23.85 KG/M2 | HEART RATE: 76 BPM | HEIGHT: 69 IN | OXYGEN SATURATION: 97 % | DIASTOLIC BLOOD PRESSURE: 68 MMHG | WEIGHT: 161 LBS | SYSTOLIC BLOOD PRESSURE: 116 MMHG

## 2023-04-13 DIAGNOSIS — R39.9 UNSPECIFIED SYMPTOMS AND SIGNS INVOLVING THE GENITOURINARY SYSTEM: ICD-10-CM

## 2023-04-13 DIAGNOSIS — R97.20 ELEVATED PROSTATE, SPECIFIC ANTIGEN [PSA]: ICD-10-CM

## 2023-04-13 PROCEDURE — 51798 US URINE CAPACITY MEASURE: CPT

## 2023-04-13 PROCEDURE — 99213 OFFICE O/P EST LOW 20 MIN: CPT

## 2023-04-13 NOTE — HISTORY OF PRESENT ILLNESS
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\par no other sexual partners; no condoms; no anal sexual intercourse\par \par black male with urinary buring and frequency approximately 10 days ago\par treated with TMP/Sx and then told to stop because culture was reported as negative\par one week ago had acute urinary retention 600 ml catheterized x 1 \par thereafter back to baseline\par no further dysuria or frequency\par \par 6.8.2022\par patient noted drop of blood during urination\par urine was otherwise clear\par no other urinary symptoms\par not tobacco\par no family history of bladder cancer\par \par 10.7.2022\par Patient was seen in the emergency room at [facility] with acute urinary retention.  This started after several days of dysuria.  Patient denied fevers or chills.\par \par Urinalysis in the emergency room demonstrated bacteria on 1 urinalysis a second did not.  Urine culture is still pending.\par \par He previously had a similar episode in 2019.\par \par He presents today with an indwelling Wesley catheter.\par \par 10.10.2022\par Patient returns today for trial of void.  He was seen in the emergency room at [facility] last evening.  His catheter was apparently not working presented with small clot.  The catheter was changed.  Is not clear if he truly had a voiding trial.  \par \par 10.20.2022\par returns with partner after ER visit last evening for acute urinary retention\par \par 10.27.2022\par returns for TOV and management\par \par 11.17.2022\par presents for cystoscopy and management of LUTS\par \par 1.24.2023\par returns s/p Prostate Embolization\par Dr Muller at [facility] one week ago\par \par \par 4.13.2023

## 2023-04-13 NOTE — ASSESSMENT
[FreeTextEntry1] : 58 year old male with a recent history of what seems consistent with acute prostatitis.  He was treated for several days with resolution of symptoms which was complicated by acute urinary retention.  He had an atraumatic catheterization and had had gradual return to baseline of urinary symptoms.\par We discussed his recent symptoms and physical findings and will assume that he has partially treated prostatitis. I would recommend completion of 2 week course.  We discussed side effects allergic reactions to TMP/SX\par \par His urinalysis from ED demonstrated microscopic hematuria which may be artifact of catheterization. \par ? traumatic from catheterization.  We will repeat at this point\par \par \par fu 3 weeks with flow rate \par \par 6..8.2022\par one drop of blood on one occasion\par no risk factors ( no tobacco no family history)\par urinalysis had been normal\par will repeat and if rbc proceed with evaluation\par probable prostatic in origin by description and size of prostate\par \par USG demonstrates prostate volume of 103 cc\par PSAD 0.04\par will repeat PSA and proceed with MRI if elevated\par The ANDREA ESCOBEDO  expressed fully understanding of the information provided, the consequences and the management.\par \par orchalgia\par no findings on exam\par We discussed conservative management with: a.  nonsteroidal anti-inflammatories, b. ice prn and c. scrotal support\par will proceed with scrotal ultrasound\par \par Plan\par 1 PSA\par 2. +/- MRI\par 3. urinalysis \par 4. Scrotal ultrasound\par \par \par repeat PSA if again elevated\par will proceed with MRI\par The ANDREA ESCOBEDO  expressed fully understanding of the information provided, the consequences and the management.\par \par 6.15.2022\par returns re elevated PSA\par returns re orchalgia\par USG demonstrates epididymal and testicular cysts\par reviewed prior ultrasound 2018\par no significant change\par images demonstrated to patient\par no longer symptomatic\par \par PSA and MRI\par PSA dynamics reviewed\par MRI no lesions\par MRI low PSAD\par PROSTATE VOLUME 80\par PSAD 0.05\par options:\par 1. observation\par 2. repeat PSA in 3 months\par discussed limitation of PSA and MRI\par discussed concept clinically important prostate cancer\par dsicussed normal MRI and PSAD does not eliminate possibility of prostate cancer but decreases the likely of significant disease\par The ANDREA ESCOBEDO  expressed fully understanding of the information provided, the consequences and the management.\par Patient choses to observe\par \par 10.7.2022\par Patient presents with acute urinary retention.  The etiology of this is unclear.  He did have dysuria.  His urine culture is pending.  He is not able to tolerate the Wesley catheter.\par \par We discussed the risks of removal of the catheter meaning need to reinsert the catheter.  We discussed potential for fevers and chills as result of removing the catheter.\par We discussed that he has had no intervention and recommendation would be for 48 hours of alpha blocker prior to catheter removal\par Denies constipation etc\par \par \par At this point we will start him on Uroxatral 10  meal once a day.\par culture from ER - no growth\par will start antibx 24 hours prior to TOV on Monday am\par The ANDREA ESCOBEDO  and partner expressed fully understanding of the information provided, the consequences and the management. \par \par 10/10/2022.  Patient was seen multiple times through the day after attempted voiding trial.  He continued to void increasing amounts.  His postvoid residual at last check was approximately 65 cc.  He stated he felt better.  We discussed the potential risk of repeat urinary retention.  He is on TMP/SX.  He will continue this for 24 hours after catheter removal.  He will continue on Flomax.  He feels he is making progress.\par \par 10.12.2022\par Patient unable to urinate sufficient amount for uroflow (voided 10 cc)\par PVR= 20\par continues to have urgency, urge incontinence, dysuria, and bladder spasms since catheter removal two days ago\par wearing diapers since catheter removal\par changes diaper every couple of hours\par reports incomplete emptying of bladder\par reports normal stream when he voids in toilet\par no rectal or bowel symptoms\par no MS symptoms\par \par LA on exam today normal. We discussed how the etiology of his current voiding symptoms are unknown. We discussed the medication options to relieve his urgency and bladder spasms. He will start oxybutynin 10 mg daily. Instructions and side effects of the medication were reviewed.\par \par Plan:\par 1. Start oxybutynin 10 mg\par 2. follow up in 1 week\par 3. warned re side effects/risks\par 4. if continues symptomatic will proceed with CYSTO at next visit\par \par 10.20.2022\par The patient returns with his partner.  He states that he was doing well with oxybutynin and Flomax.  He states that he was urinating with a good stream and without pain.  Yesterday he started developing difficulty with urination.  He presented to the emergency room at [facility].  He stated he was in urinary retention.  They catheterized him and received 200 cc return.  At that time he also was complaining of right testicular pain and underwent a scrotal ultrasound which demonstrated epididymitis.  (Reviewed) a urinalysis at that time demonstrated many bacteria and moderate nitrates.  He was treated with ceftriaxone and discharged on p.o. cephalosporins.\par \par He is afebrile.  He has a catheter indwelling.  His examination is consistent with right epididymitis.\par \par We discussed the fact that he had previous urine cultures that did not demonstrate bacteria.  It is possible that this acute urinary tract infection/epididymitis was a result of previous catheterization.  We discussed the cessation of the oxybutynin since he was having difficulty with urination at this time.  We will also increase his Flomax to 2 tabs.\par \par We discussed conservative management of his epididymitis with elevation ice and nonsteroidal anti-inflammatories.\par \par Plan:\par 1.  Voiding trial today to see if we can remove his Wesley catheter safely.  We discussed the fact that Wesley catheter can exacerbate or cause epididymitis.\par 2. Flomax 2 tablets daily.\par 3.  Stop oxybutynin\par 4.  Conservative measures for epididymitis\par 5.  Continue oral cephalosporins when culture is negative and epididymitis has resolved he will undergo cystoscopy in light of the unclear etiology of his urinary symptoms\par 6. We discussed conservative management with: a.  nonsteroidal anti-inflammatories, b. ice prn and c. scrotal support.\par discussed with Dr Soni\par \par 10.27.2022\par Patient returns on tamsulosin 0.8 mg daily.  He has stopped oxybutynin.\par flow rate 15.3 cc/\par voided volume 147cc\par PVR 0\par discussed teaching CIC since he has recurrent retention\par discussed proceeding with cysto in 3 weeks\par Plan:\par 1. flomax 0.8 mg\par 2. culture in 2 weeks\par 3. CIC instruction\par 4. cysto in 3 weeks\par \par 1.24.2022\par patient underwent PAE by Dr Muller at [facility]\par patient did not want to proceed with Rezum\par doing well immediately\par continues on alfuzosin\par recommend followup in 8 weeks\par \par 4.13.2023\par s;p PAE\par patient states improved\par nocturia x 1\par frequency q 2-3 hours\par no bleeding \par continues on alfuzosin\par PVR 2 cc\par Plan:\par 1. stop alfuzosin and assess symtoms\par 2. psa\par 3. 6 months

## 2023-04-14 ENCOUNTER — TRANSCRIPTION ENCOUNTER (OUTPATIENT)
Age: 63
End: 2023-04-14

## 2023-04-14 LAB — PSA SERPL-MCNC: 3.37 NG/ML

## 2023-08-10 ENCOUNTER — NON-APPOINTMENT (OUTPATIENT)
Age: 63
End: 2023-08-10

## 2023-09-01 ENCOUNTER — LABORATORY RESULT (OUTPATIENT)
Age: 63
End: 2023-09-01

## 2023-09-01 ENCOUNTER — APPOINTMENT (OUTPATIENT)
Dept: INTERNAL MEDICINE | Facility: CLINIC | Age: 63
End: 2023-09-01
Payer: COMMERCIAL

## 2023-09-01 VITALS
OXYGEN SATURATION: 97 % | RESPIRATION RATE: 16 BRPM | WEIGHT: 164 LBS | DIASTOLIC BLOOD PRESSURE: 74 MMHG | TEMPERATURE: 97.6 F | HEIGHT: 69 IN | HEART RATE: 72 BPM | SYSTOLIC BLOOD PRESSURE: 137 MMHG | BODY MASS INDEX: 24.29 KG/M2

## 2023-09-01 DIAGNOSIS — Z00.00 ENCOUNTER FOR GENERAL ADULT MEDICAL EXAMINATION W/OUT ABNORMAL FINDINGS: ICD-10-CM

## 2023-09-01 PROCEDURE — 99396 PREV VISIT EST AGE 40-64: CPT | Mod: 25

## 2023-09-01 NOTE — PHYSICAL EXAM
[No Acute Distress] : no acute distress [Well Nourished] : well nourished [Well-Appearing] : well-appearing [Normal] : no acute distress, well nourished, well developed and well-appearing [Normal Sclera/Conjunctiva] : normal sclera/conjunctiva [PERRL] : pupils equal round and reactive to light [Normal Outer Ear/Nose] : the outer ears and nose were normal in appearance [Normal Oropharynx] : the oropharynx was normal [No JVD] : no jugular venous distention [No Lymphadenopathy] : no lymphadenopathy [No Respiratory Distress] : no respiratory distress  [No Accessory Muscle Use] : no accessory muscle use [Clear to Auscultation] : lungs were clear to auscultation bilaterally [Normal Rate] : normal rate  [Normal S1, S2] : normal S1 and S2 [Pedal Pulses Present] : the pedal pulses are present [Soft] : abdomen soft [Non Tender] : non-tender [No HSM] : no HSM [Normal Bowel Sounds] : normal bowel sounds [Testes Tenderness] : no tenderness of the testes [Testes Mass (___cm)] : there were no testicular masses [Normal Posterior Cervical Nodes] : no posterior cervical lymphadenopathy [Normal Anterior Cervical Nodes] : no anterior cervical lymphadenopathy [No CVA Tenderness] : no CVA  tenderness [No Spinal Tenderness] : no spinal tenderness [No Joint Swelling] : no joint swelling [No Rash] : no rash [Coordination Grossly Intact] : coordination grossly intact [No Focal Deficits] : no focal deficits [Normal Affect] : the affect was normal [Normal Insight/Judgement] : insight and judgment were intact

## 2023-09-02 PROBLEM — Z00.00 ENCOUNTER FOR PREVENTIVE HEALTH EXAMINATION: Status: ACTIVE | Noted: 2018-01-11

## 2023-09-02 NOTE — PLAN
[FreeTextEntry1] : Testicular Self-Exams recommended  Annual skin cancer screening is recommended  Safe Sex Counseling given  Exercise and Nutrition counseling given  Hyperlipidemia continue atorvastatin

## 2023-09-02 NOTE — HISTORY OF PRESENT ILLNESS
[de-identified] : 62 year year old patient came in for annual wellness exam, hyperlipidemia and enlarged prostate.   employment: retired teacher  exercise: 6x/wk  diet: mostly healthy.   sexual activity: MSM  for 12 yrs and monogamous  Hyperlipidemia: tolerating atorvastatin 20mg daily. No adverse effects.   Prostate enlargement: prostate artery embolization in January 2023 at [facility], reports resolution of most symptoms, up to date with  follow ups

## 2023-09-05 ENCOUNTER — TRANSCRIPTION ENCOUNTER (OUTPATIENT)
Age: 63
End: 2023-09-05

## 2023-09-05 LAB
ALBUMIN SERPL ELPH-MCNC: 4.5 G/DL
ALP BLD-CCNC: 99 U/L
ALT SERPL-CCNC: 36 U/L
ANION GAP SERPL CALC-SCNC: 13 MMOL/L
APPEARANCE: CLEAR
AST SERPL-CCNC: 30 U/L
BASOPHILS # BLD AUTO: 0.07 K/UL
BASOPHILS NFR BLD AUTO: 0.9 %
BILIRUB SERPL-MCNC: 0.5 MG/DL
BILIRUBIN URINE: NEGATIVE
BLOOD URINE: NEGATIVE
BUN SERPL-MCNC: 14 MG/DL
CALCIUM SERPL-MCNC: 10.1 MG/DL
CHLORIDE SERPL-SCNC: 104 MMOL/L
CHOLEST SERPL-MCNC: 143 MG/DL
CO2 SERPL-SCNC: 24 MMOL/L
COLOR: YELLOW
CREAT SERPL-MCNC: 1.17 MG/DL
EGFR: 70 ML/MIN/1.73M2
EOSINOPHIL # BLD AUTO: 0.15 K/UL
EOSINOPHIL NFR BLD AUTO: 2 %
ESTIMATED AVERAGE GLUCOSE: 123 MG/DL
GLUCOSE QUALITATIVE U: NEGATIVE MG/DL
GLUCOSE SERPL-MCNC: 114 MG/DL
HBA1C MFR BLD HPLC: 5.9 %
HCT VFR BLD CALC: 51.5 %
HDLC SERPL-MCNC: 60 MG/DL
HGB BLD-MCNC: 16.1 G/DL
IMM GRANULOCYTES NFR BLD AUTO: 0.5 %
KETONES URINE: NEGATIVE MG/DL
LDLC SERPL CALC-MCNC: 64 MG/DL
LEUKOCYTE ESTERASE URINE: NEGATIVE
LYMPHOCYTES # BLD AUTO: 1.47 K/UL
LYMPHOCYTES NFR BLD AUTO: 19.9 %
MAN DIFF?: NORMAL
MCHC RBC-ENTMCNC: 31.3 GM/DL
MCHC RBC-ENTMCNC: 31.3 PG
MCV RBC AUTO: 100.2 FL
MONOCYTES # BLD AUTO: 0.52 K/UL
MONOCYTES NFR BLD AUTO: 7.1 %
NEUTROPHILS # BLD AUTO: 5.12 K/UL
NEUTROPHILS NFR BLD AUTO: 69.6 %
NITRITE URINE: NEGATIVE
NONHDLC SERPL-MCNC: 83 MG/DL
PH URINE: 5
PLATELET # BLD AUTO: 228 K/UL
POTASSIUM SERPL-SCNC: 4.7 MMOL/L
PROT SERPL-MCNC: 6.9 G/DL
PROTEIN URINE: 30 MG/DL
PSA SERPL-MCNC: 3.24 NG/ML
RBC # BLD: 5.14 M/UL
RBC # FLD: 12.9 %
SODIUM SERPL-SCNC: 141 MMOL/L
SPECIFIC GRAVITY URINE: 1.03
TRIGL SERPL-MCNC: 104 MG/DL
UROBILINOGEN URINE: 0.2 MG/DL
WBC # FLD AUTO: 7.37 K/UL

## 2023-09-07 DIAGNOSIS — R82.90 UNSPECIFIED ABNORMAL FINDINGS IN URINE: ICD-10-CM

## 2023-09-11 ENCOUNTER — TRANSCRIPTION ENCOUNTER (OUTPATIENT)
Age: 63
End: 2023-09-11

## 2023-09-11 LAB
APPEARANCE: CLEAR
BACTERIA: NEGATIVE /HPF
BILIRUBIN URINE: NEGATIVE
BLOOD URINE: NEGATIVE
CAST: 3 /LPF
COLOR: YELLOW
EPITHELIAL CELLS: 0 /HPF
GLUCOSE QUALITATIVE U: NEGATIVE MG/DL
KETONES URINE: NEGATIVE MG/DL
LEUKOCYTE ESTERASE URINE: NEGATIVE
MICROSCOPIC-UA: NORMAL
NITRITE URINE: NEGATIVE
PH URINE: 5
PROTEIN URINE: NEGATIVE MG/DL
RED BLOOD CELLS URINE: 0 /HPF
SPECIFIC GRAVITY URINE: 1.02
UROBILINOGEN URINE: 0.2 MG/DL
WHITE BLOOD CELLS URINE: 0 /HPF

## 2023-09-26 NOTE — DISCHARGE NOTE BEHAVIORAL HEALTH - NSBHDCCRISISPLAN1FT_PSY_A_CORE
Topical Metronidazole Pregnancy And Lactation Text: This medication is Pregnancy Category B and considered safe during pregnancy.  It is also considered safe to use while breastfeeding. Report to the nearest emergency room

## 2023-10-26 ENCOUNTER — TRANSCRIPTION ENCOUNTER (OUTPATIENT)
Age: 63
End: 2023-10-26

## 2023-11-20 ENCOUNTER — RESULT REVIEW (OUTPATIENT)
Age: 63
End: 2023-11-20

## 2023-11-20 ENCOUNTER — APPOINTMENT (OUTPATIENT)
Dept: GASTROENTEROLOGY | Facility: AMBULATORY SURGERY CENTER | Age: 63
End: 2023-11-20
Payer: COMMERCIAL

## 2023-11-20 PROCEDURE — 45380 COLONOSCOPY AND BIOPSY: CPT

## 2024-01-02 ENCOUNTER — TRANSCRIPTION ENCOUNTER (OUTPATIENT)
Age: 64
End: 2024-01-02

## 2024-01-09 ENCOUNTER — TRANSCRIPTION ENCOUNTER (OUTPATIENT)
Age: 64
End: 2024-01-09

## 2024-01-24 NOTE — DISCHARGE NOTE PROVIDER - NSDCADMDATE_GEN_ALL_CORE_FT
NAME: Nicola Draper        :  1949        MRN:  [id number]          Assessment :    Plan:  RODOLFO-    CKD 3B: Baseline CR appears to be around 1.5-1.7,  Likely due to early DN + hypertension.  Patient sees a local nephrologist in [place].       Horseshoe kidney  Hypertension  DM-2 with early DN  Mild BPH  Hypermagnesemia  CAD/NSTEMI  Status post CABG  A-fib Creatinine 2.5 to 2.2 to 1.8; non-oliguric    rodriguez for urine retention-removal plan per Urology    No renal mass on CT     Mg high on  - decrease po Mg to daily           Subjective:     Chief Complaint:  oob in chair. Alert. No complaint. We discussed the above.    Review of Systems:    Symptom Y/N Comments  Symptom Y/N Comments   Fever/Chills    Chest Pain     Poor Appetite    Edema     Cough    Abdominal Pain     Sputum    Joint Pain     SOB/SHELTON    Pruritis/Rash     Nausea/vomit    Tolerating PT/OT     Diarrhea    Tolerating Diet     Constipation    Other       Could not obtain due to:      Objective:     VITALS:   Last 24hrs VS reviewed since prior progress note. Most recent are:  Vitals:    24 0400   BP: (!) 117/51   Pulse: 61   Resp: 11   Temp: 97.7 °F (36.5 °C)   SpO2: 94%       Intake/Output Summary (Last 24 hours) at 2024 0612  Last data filed at 2024 0114  Gross per 24 hour   Intake 1869.46 ml   Output 1080 ml   Net 789.46 ml        Telemetry Reviewed:     PHYSICAL EXAM:  General: NAD      Lab Data Reviewed: (see below)    Medications Reviewed: (see below)    PMH/SH reviewed - no change compared to H&P  ________________________________________________________________________  Care Plan discussed with:  Patient     Family      RN     Care Manager                    Consultant:          Comments   >50% of visit spent in counseling and coordination of care       ________________________________________________________________________  Khadra MD Jackie  01-Apr-2021 15:00

## 2024-03-11 NOTE — H&P ADULT - PROBLEM/PLAN-5
MD PIMENTEL,    Patient call for refill atorvastatin.  Noted historical provider-.  Indra Shadia    Last appointment: VV 10/17/23, OV 6/5/23 MD PIMENTEL, lipid 6/2023  Next appointment: None- due 6/2024  Previous refill encounter(s): 1/28/19?- Historical provider    Requested Prescriptions     Pending Prescriptions Disp Refills    atorvastatin (LIPITOR) 20 MG tablet 90 tablet 0     Sig: Take 1 tablet by mouth daily     For Pharmacy Admin Tracking Only    Program: Medication Refill  CPA in place:    Recommendation Provided To:   Intervention Detail: New Rx: 1, reason: Patient Preference  Intervention Accepted By:   Gap Closed?:    Time Spent (min): 5     DISPLAY PLAN FREE TEXT

## 2024-04-01 RX ORDER — ATORVASTATIN CALCIUM 10 MG/1
10 TABLET, FILM COATED ORAL
Qty: 90 | Refills: 1 | Status: ACTIVE | COMMUNITY
Start: 2022-09-26 | End: 1900-01-01

## 2024-04-02 ENCOUNTER — TRANSCRIPTION ENCOUNTER (OUTPATIENT)
Age: 64
End: 2024-04-02

## 2024-04-09 NOTE — ED BEHAVIORAL HEALTH ASSESSMENT NOTE - NS ED BHA PLAN LEGAL STATUS
"Colposcopy    CC:  Pt presents for colposcopy  Chief Complaint   Patient presents with    Colposcopy       Pregnant: No  Birth control: None  Tobacco/Nicotine use:  Yes  Pap result:  LEEP Aline 2-3 margin positive   Uhcg:  Negative  Consent signed: Yes    Procedure reviewed in detail.  She understands the potential risks include, but are not limited to, pain, bleeding, and infection.  Her questions have been answered.    Subjective/HPI:Patient is a 28 y.o. female with positive margin on LEEP and desire for pregnancy in future      Objective:  /84   Pulse 98   Ht 167.6 cm (66\")   Wt 116 kg (255 lb)   LMP 03/09/2024 Comment: changing pad every 1.5 hrs on heaviest days, currently on menses  BMI 41.16 kg/m²     Vagina- Without lesion Vaginal Colposcopy : Vinegar solution and lugols used to stain vagina, speculum rotated to allow visualization of entire vagina  Cvx- Inadequate  Patient tolerated the procedure well.    Biopsy: None     ECC:  yes    T Zone seen:  no    Findings:  no visible lesions, no mosaicism, no punctation, and no abnormal vasculature    OBGyn Exam        Assessment and Plan:  Diagnoses and all orders for this visit:    1. Severe dysplasia of cervix (ALINE III) (Primary)  -     POC Pregnancy, Urine  -     Cancel: Tissue Pathology Exam  -     IgP, Aptima HPV  -     Tissue Pathology Exam  -     Urine Drug Screen - Urine, Clean Catch; Future        Counseling:    I recommend she quit smoking.  Smoking increases risk of progression and cervical cancer.  I recommend she FU w PCP to assist if unable to do on her own    She understands the need for continued follow up until she is cleared to return to routine screening pap smears.  She understands the importance of follow up and consequences with lack of follow up (i.e. potential for cervical cancer).  Follow up usually ranges every 6months - 12months.      PRECAUTIONS - It is common to have bright red spotting and dark discharge after today.  If she " has had cervical biopsies, she is to avoid intercourse or tampons as directed for 7 days.  She can use OTC pain relievers prn.  She needs to return to office or ER (if after hours/weekends) if she has pelvic pain, bleeding > 1 pad/2 hours, discharge that has a bad vaginal odor or vaginal itching, fever > 101.5, or any other concerns.      I spent 20 minutes on the separately reported service of Colposcopy . This time is not included in the time used to support the E/M service also reported today.    Follow Up:  Return in about 1 month (around 5/9/2024) for Fertility talk and results visit .      Magda Andrade DO  04/09/2024    [facility]  [facility] [place] [address]  Dept: [phone]  Dept Fax: [phone]  Loc: [phone]  Loc Fax: [phone]    9.13 9.27

## 2024-04-12 NOTE — ED BEHAVIORAL HEALTH ASSESSMENT NOTE - BEHAVIOR
Peripheral Block    Patient location during procedure: holding area    Reason for block: primary anesthetic    Diagnosis: right inguinal hernia   Timeout: 4/12/2024 6:34 AM     Staffing  Authorizing Provider: Rigoberto Jones MD  Performing Provider: Rigoberto Jones MD    Staffing  Performed by: Rigoberto Jones MD  Authorized by: Rigoberto Jones MD    Preanesthetic Checklist  Completed: patient identified, IV checked, site marked, risks and benefits discussed, surgical consent, monitors and equipment checked, pre-op evaluation and timeout performed  Peripheral Block  Patient position: supine  Prep: ChloraPrep  Patient monitoring: heart rate, cardiac monitor, continuous pulse ox and frequent blood pressure checks  Block type: TAP  Laterality: right  Injection technique: single shot  Needle  Needle gauge: 22 G  Needle length: 4 in  Needle localization: ultrasound guidance   -ultrasound image captured on disc.  Assessment  Injection assessment: negative aspiration, negative parasthesia and local visualized surrounding nerve  Paresthesia pain: none  Heart rate change: no  Slow fractionated injection: yes  Pain Tolerance: comfortable throughout block and no complaints  Medications:    Medications: ropivacaine (NAROPIN) injection 0.5% - Perineural   20 mL - 4/12/2024 6:41:00 AM            
Peripheral Block    Patient location during procedure: holding area    Reason for block: primary anesthetic    Diagnosis: right inguinal hernia   Timeout: 4/12/2024 6:34 AM     Staffing  Authorizing Provider: Rigoberto Jones MD  Performing Provider: Rigoberto Jones MD    Staffing  Performed by: Rigoberto Jones MD  Authorized by: Rigoberto Jones MD    Preanesthetic Checklist  Completed: patient identified, IV checked, site marked, risks and benefits discussed, surgical consent, monitors and equipment checked, pre-op evaluation and timeout performed  Peripheral Block  Patient position: supine  Prep: ChloraPrep  Patient monitoring: heart rate, cardiac monitor, continuous pulse ox and frequent blood pressure checks  Block type: ilioinguinal/iliohypogastric  Laterality: right  Injection technique: single shot  Needle  Needle gauge: 22 G  Needle length: 4 in  Needle localization: ultrasound guidance   -ultrasound image captured on disc.  Assessment  Injection assessment: negative aspiration, negative parasthesia and local visualized surrounding nerve  Paresthesia pain: none  Heart rate change: no  Slow fractionated injection: yes  Pain Tolerance: comfortable throughout block and no complaints  Medications:    Medications: ropivacaine (NAROPIN) injection 0.5% - Perineural   20 mL - 4/12/2024 6:38:00 AM            
Cooperative

## 2024-04-12 NOTE — ED ADULT NURSE NOTE - DRUG PRE-SCREENING (DAST -1)
04/12/24 [facility] Discharge Instructions    I will be responsible for sharing my medication information including changes as listed below with all my physicians.  I WILL NOT DRIVE OR OPERATE MACHINERY TODAY  I WILL NOT DRINK ALCOHOLIC BEVERAGES TODAY  I WILL AVOID MAKING ANY IMPORTANT DECISIONS TODAY    Please check the applicable procedure and the appropriate instructions.  x__ Colonoscopy __EGD (Gastroscopy) __ Flexible Sigmoidoscopy   __Esophageal Dilatation __ ERCP    __Other: _________________________    You have received the following medications:  __ Demerol __ Versed _x_Propofol __Fentanyl __Valium __Other:________________    Diet:  _x_ You may eat or drink normally now. However please allow your system to readjust by eating light at your next meal. You may then resume your normal diet eating lightly unless otherwise noted by your physician  __ Do not eat or drink anything for 2 hours. After 2 hours you may take sips of water. In addition, if you feel the water as you swallow you may advance to your regular diet  __ Diet Change:___________________________________________    Post Procedure Instructions:  x__ You may not remember the endoscopy or the doctor’s explanation of what your exam showed. This is a normal reaction to the medications you may have been given. Today you should plan to go home & rest.  x__You may have soreness at the IV site. If this occurs, apply warm compress to the area. If the area becomes red, swollen, has drainage, or becomes painful, call your GI physician  __ If you have a sore throat, throat lozenges or gargles help to relieve discomfort  x__ You may have some cramping or gas after the procedure. If you do, try mild activity to expel the air    Call physician’s office to:  x__Obtain results of procedure/biopsy from your GI physician  __Polyps were removed. You may have a small amount of rectal bleeding. If greater  than a few drops of blood, call your physician  x__  Report any signs of severe pain, fever, nausea, vomiting, diarrhea, difficulty in   swallowing, shortness of breath or persistent bloating to your GI physician.       If you experience an emergency situation and you are unable to reach your physician, go to the emergency department or call [phone] and ask for the emergency room.   Statement Selected

## 2024-04-25 ENCOUNTER — NON-APPOINTMENT (OUTPATIENT)
Age: 64
End: 2024-04-25

## 2024-04-29 ENCOUNTER — APPOINTMENT (OUTPATIENT)
Dept: INTERNAL MEDICINE | Facility: CLINIC | Age: 64
End: 2024-04-29
Payer: MEDICARE

## 2024-04-29 VITALS
DIASTOLIC BLOOD PRESSURE: 76 MMHG | HEIGHT: 69 IN | BODY MASS INDEX: 24.14 KG/M2 | WEIGHT: 163 LBS | HEART RATE: 70 BPM | SYSTOLIC BLOOD PRESSURE: 122 MMHG | OXYGEN SATURATION: 98 % | TEMPERATURE: 98 F

## 2024-04-29 DIAGNOSIS — T75.3XXA MOTION SICKNESS, INITIAL ENCOUNTER: ICD-10-CM

## 2024-04-29 DIAGNOSIS — E78.5 HYPERLIPIDEMIA, UNSPECIFIED: ICD-10-CM

## 2024-04-29 DIAGNOSIS — R53.83 OTHER FATIGUE: ICD-10-CM

## 2024-04-29 DIAGNOSIS — E55.9 VITAMIN D DEFICIENCY, UNSPECIFIED: ICD-10-CM

## 2024-04-29 PROCEDURE — 36415 COLL VENOUS BLD VENIPUNCTURE: CPT

## 2024-04-29 PROCEDURE — 99214 OFFICE O/P EST MOD 30 MIN: CPT | Mod: 25

## 2024-04-29 RX ORDER — SCOPOLAMINE 1.5 MG/1
1 PATCH, EXTENDED RELEASE TRANSDERMAL
Qty: 4 | Refills: 1 | Status: ACTIVE | COMMUNITY
Start: 2024-04-29 | End: 1900-01-01

## 2024-04-29 RX ORDER — ALFUZOSIN HYDROCHLORIDE 10 MG/1
10 TABLET, EXTENDED RELEASE ORAL
Qty: 30 | Refills: 2 | Status: DISCONTINUED | COMMUNITY
Start: 2022-11-17 | End: 2024-04-29

## 2024-04-29 NOTE — PLAN
[FreeTextEntry1] : Weight management counseling given Exercise and nutrition counseling provided I recommended patient to continue with atorvastatin as be discussed possible adverse reactions including more common ones and it is unlikely atorvastatin can cause fatigue.  If no other etiologies including B12 deficiency folate deficiency anemia and vitamin D deficiency are not identified we can do the test therapeutic and stay off on statin medication for 10 days to see if there is any improvement in terms of symptom  For prediabetes patient's seeks nutritionist appointment which referral is provided, exercise and nutrition counseling provided we will repeat A1c today

## 2024-04-29 NOTE — HISTORY OF PRESENT ILLNESS
[de-identified] : 63-year-old male patient came in with complaint of fatigue and follow-up on chronic issues of prediabetes and hyperlipidemia and to address concern of seasickness  Patient with history of seasickness, has been on cruise ships before and was prescribed scopolamine patch, he reports good benefit using the patch in terms of nausea and dizziness complaint he experiences while on see, denies any adverse reaction from prior use, requesting a refill as he has cruise coming up on June  Patient with complaint of fatigue, unsure however believes could be atorvastatin related, he has been taking atorvastatin 20 mg once daily for hyperlipidemia however with the concern of fatigue he has lowered his dose to 10 mg couple of months ago.  He has been compliant with atorvastatin 10 mg reports slight improvement of fatigue with the lower dosage however still present also associated with brain fog, complaint is intermittent sometimes it is worse, denies fever chills joint pains.  Has hyperlipidemia takes atorvastatin 10 mg compliant, denies adverse reactions however believes fatigue might be related.  Also was diagnosed with prediabetes currently not on medication, exercises regularly, diet is healthy, believes diabetes runs in the family and would like to lower his chances of developing diabetes

## 2024-05-01 ENCOUNTER — TRANSCRIPTION ENCOUNTER (OUTPATIENT)
Age: 64
End: 2024-05-01

## 2024-05-01 LAB
25(OH)D3 SERPL-MCNC: 46.9 NG/ML
BASOPHILS # BLD AUTO: 0.09 K/UL
BASOPHILS NFR BLD AUTO: 1.2 %
CHOLEST SERPL-MCNC: 150 MG/DL
EOSINOPHIL # BLD AUTO: 0.36 K/UL
EOSINOPHIL NFR BLD AUTO: 4.9 %
ESTIMATED AVERAGE GLUCOSE: 123 MG/DL
FOLATE SERPL-MCNC: >20 NG/ML
HBA1C MFR BLD HPLC: 5.9 %
HCT VFR BLD CALC: 45.8 %
HDLC SERPL-MCNC: 52 MG/DL
HGB BLD-MCNC: 15.3 G/DL
IMM GRANULOCYTES NFR BLD AUTO: 0.4 %
LDLC SERPL CALC-MCNC: 71 MG/DL
LYMPHOCYTES # BLD AUTO: 2.39 K/UL
LYMPHOCYTES NFR BLD AUTO: 32.7 %
MAN DIFF?: NORMAL
MCHC RBC-ENTMCNC: 31.7 PG
MCHC RBC-ENTMCNC: 33.4 GM/DL
MCV RBC AUTO: 95 FL
MONOCYTES # BLD AUTO: 0.57 K/UL
MONOCYTES NFR BLD AUTO: 7.8 %
NEUTROPHILS # BLD AUTO: 3.88 K/UL
NEUTROPHILS NFR BLD AUTO: 53 %
NONHDLC SERPL-MCNC: 98 MG/DL
PLATELET # BLD AUTO: 216 K/UL
RBC # BLD: 4.82 M/UL
RBC # FLD: 12.4 %
TRIGL SERPL-MCNC: 161 MG/DL
VIT B12 SERPL-MCNC: 613 PG/ML
WBC # FLD AUTO: 7.32 K/UL

## 2024-05-07 PROBLEM — I10 ESSENTIAL (PRIMARY) HYPERTENSION: Chronic | Status: ACTIVE | Noted: 2021-04-01

## 2024-05-07 PROBLEM — E11.9 TYPE 2 DIABETES MELLITUS WITHOUT COMPLICATIONS: Chronic | Status: ACTIVE | Noted: 2021-04-01

## 2024-05-07 PROBLEM — I82.409 ACUTE EMBOLISM AND THROMBOSIS OF UNSPECIFIED DEEP VEINS OF UNSPECIFIED LOWER EXTREMITY: Chronic | Status: ACTIVE | Noted: 2021-04-01

## 2024-05-08 ENCOUNTER — APPOINTMENT (OUTPATIENT)
Age: 64
End: 2024-05-08

## 2024-05-08 VITALS — WEIGHT: 161 LBS | HEIGHT: 69 IN | BODY MASS INDEX: 23.85 KG/M2

## 2024-05-08 DIAGNOSIS — R73.03 PREDIABETES.: ICD-10-CM

## 2024-05-08 PROCEDURE — 97802 MEDICAL NUTRITION INDIV IN: CPT | Mod: 95

## 2024-05-13 ENCOUNTER — APPOINTMENT (OUTPATIENT)
Dept: INTERNAL MEDICINE | Facility: CLINIC | Age: 64
End: 2024-05-13

## 2024-08-19 ENCOUNTER — NON-APPOINTMENT (OUTPATIENT)
Age: 64
End: 2024-08-19

## 2024-08-19 ENCOUNTER — APPOINTMENT (OUTPATIENT)
Dept: INTERNAL MEDICINE | Facility: CLINIC | Age: 64
End: 2024-08-19

## 2024-08-19 VITALS
HEIGHT: 69 IN | OXYGEN SATURATION: 96 % | WEIGHT: 160 LBS | RESPIRATION RATE: 16 BRPM | SYSTOLIC BLOOD PRESSURE: 129 MMHG | HEART RATE: 56 BPM | TEMPERATURE: 97.6 F | DIASTOLIC BLOOD PRESSURE: 77 MMHG | BODY MASS INDEX: 23.7 KG/M2

## 2024-08-19 DIAGNOSIS — Z00.00 ENCOUNTER FOR GENERAL ADULT MEDICAL EXAMINATION W/OUT ABNORMAL FINDINGS: ICD-10-CM

## 2024-08-19 DIAGNOSIS — E78.5 HYPERLIPIDEMIA, UNSPECIFIED: ICD-10-CM

## 2024-08-19 DIAGNOSIS — R73.03 PREDIABETES.: ICD-10-CM

## 2024-08-19 PROCEDURE — 99387 INIT PM E/M NEW PAT 65+ YRS: CPT | Mod: GY

## 2024-08-19 PROCEDURE — 36415 COLL VENOUS BLD VENIPUNCTURE: CPT

## 2024-08-19 PROCEDURE — 93000 ELECTROCARDIOGRAM COMPLETE: CPT

## 2024-08-19 PROCEDURE — 99214 OFFICE O/P EST MOD 30 MIN: CPT | Mod: 25

## 2024-08-19 RX ORDER — METFORMIN HYDROCHLORIDE 500 MG/1
500 TABLET, FILM COATED, EXTENDED RELEASE ORAL
Qty: 60 | Refills: 0 | Status: ACTIVE | COMMUNITY
Start: 2024-08-19 | End: 1900-01-01

## 2024-08-20 LAB
ALBUMIN SERPL ELPH-MCNC: 4.2 G/DL
ALP BLD-CCNC: 91 U/L
ALT SERPL-CCNC: 23 U/L
ANION GAP SERPL CALC-SCNC: 11 MMOL/L
APPEARANCE: CLEAR
AST SERPL-CCNC: 25 U/L
BACTERIA: NEGATIVE /HPF
BASOPHILS # BLD AUTO: 0.05 K/UL
BASOPHILS NFR BLD AUTO: 0.7 %
BILIRUB SERPL-MCNC: 0.4 MG/DL
BILIRUBIN URINE: NEGATIVE
BLOOD URINE: NEGATIVE
BUN SERPL-MCNC: 14 MG/DL
CALCIUM SERPL-MCNC: 9.2 MG/DL
CAST: 0 /LPF
CHLORIDE SERPL-SCNC: 104 MMOL/L
CHOLEST SERPL-MCNC: 128 MG/DL
CO2 SERPL-SCNC: 25 MMOL/L
COLOR: YELLOW
CREAT SERPL-MCNC: 1.2 MG/DL
EGFR: 67 ML/MIN/1.73M2
EOSINOPHIL # BLD AUTO: 0.38 K/UL
EOSINOPHIL NFR BLD AUTO: 5.3 %
EPITHELIAL CELLS: 0 /HPF
ESTIMATED AVERAGE GLUCOSE: 126 MG/DL
GLUCOSE QUALITATIVE U: NEGATIVE MG/DL
GLUCOSE SERPL-MCNC: 97 MG/DL
HBA1C MFR BLD HPLC: 6 %
HCT VFR BLD CALC: 50.4 %
HDLC SERPL-MCNC: 50 MG/DL
HGB BLD-MCNC: 15.8 G/DL
IMM GRANULOCYTES NFR BLD AUTO: 0.3 %
KETONES URINE: NEGATIVE MG/DL
LDLC SERPL CALC-MCNC: 59 MG/DL
LEUKOCYTE ESTERASE URINE: NEGATIVE
LYMPHOCYTES # BLD AUTO: 1.92 K/UL
LYMPHOCYTES NFR BLD AUTO: 27 %
MAN DIFF?: NORMAL
MCHC RBC-ENTMCNC: 31.1 PG
MCHC RBC-ENTMCNC: 31.3 GM/DL
MCV RBC AUTO: 99.2 FL
MICROSCOPIC-UA: NORMAL
MONOCYTES # BLD AUTO: 0.49 K/UL
MONOCYTES NFR BLD AUTO: 6.9 %
NEUTROPHILS # BLD AUTO: 4.26 K/UL
NEUTROPHILS NFR BLD AUTO: 59.8 %
NITRITE URINE: NEGATIVE
NONHDLC SERPL-MCNC: 78 MG/DL
PH URINE: 5.5
PLATELET # BLD AUTO: 222 K/UL
POTASSIUM SERPL-SCNC: 4.5 MMOL/L
PROT SERPL-MCNC: 6.7 G/DL
PROTEIN URINE: NEGATIVE MG/DL
PSA FREE FLD-MCNC: 27 %
PSA FREE SERPL-MCNC: 1.21 NG/ML
PSA SERPL-MCNC: 4.54 NG/ML
RBC # BLD: 5.08 M/UL
RBC # FLD: 13 %
RED BLOOD CELLS URINE: 1 /HPF
SODIUM SERPL-SCNC: 141 MMOL/L
SPECIFIC GRAVITY URINE: 1.02
TRIGL SERPL-MCNC: 106 MG/DL
UROBILINOGEN URINE: 0.2 MG/DL
WBC # FLD AUTO: 7.12 K/UL
WHITE BLOOD CELLS URINE: 0 /HPF

## 2024-08-21 ENCOUNTER — TRANSCRIPTION ENCOUNTER (OUTPATIENT)
Age: 64
End: 2024-08-21

## 2024-08-21 ENCOUNTER — NON-APPOINTMENT (OUTPATIENT)
Age: 64
End: 2024-08-21

## 2024-08-21 NOTE — HEALTH RISK ASSESSMENT
[0] : 2) Feeling down, depressed, or hopeless: Not at all (0) [PHQ-2 Negative - No further assessment needed] : PHQ-2 Negative - No further assessment needed [Never] : Never [[id number]] : 0

## 2024-08-21 NOTE — PLAN
[FreeTextEntry1] : Testicular Self-Exams recommended  Annual skin cancer screening is recommended Safe Sex Counseling given Exercise and Nutrition counseling given    # Hyperlipidemia Exercise and nutrition counseling provided EKG reviewed sinus bradycardia He will check lipid panel today Continue with atorvastatin  # Prediabetes Had a long counseling session about patient's risk factors Patient does not have a strong indication to start medication therapy however he is working hard in terms of lifestyle modifications, I believe elevated hemoglobin A1c's primary 's genetic risk factors, metformin can be useful in terms of reducing diabetes risk and other secondary benefits such as antineoplastic effects and wanted to be Patient will give a consideration after reviewing hemoglobin A1c

## 2024-08-21 NOTE — PHYSICAL EXAM
[No Acute Distress] : no acute distress [Well Nourished] : well nourished [Well Developed] : well developed [Well-Appearing] : well-appearing [Normal Sclera/Conjunctiva] : normal sclera/conjunctiva [PERRL] : pupils equal round and reactive to light [EOMI] : extraocular movements intact [Normal Outer Ear/Nose] : the outer ears and nose were normal in appearance [Normal Oropharynx] : the oropharynx was normal [No JVD] : no jugular venous distention [No Lymphadenopathy] : no lymphadenopathy [Supple] : supple [Thyroid Normal, No Nodules] : the thyroid was normal and there were no nodules present [No Respiratory Distress] : no respiratory distress  [No Accessory Muscle Use] : no accessory muscle use [Clear to Auscultation] : lungs were clear to auscultation bilaterally [Normal Rate] : normal rate  [Regular Rhythm] : with a regular rhythm [Normal S1, S2] : normal S1 and S2 [No Murmur] : no murmur heard [No Carotid Bruits] : no carotid bruits [No Abdominal Bruit] : a ~M bruit was not heard ~T in the abdomen [No Varicosities] : no varicosities [Pedal Pulses Present] : the pedal pulses are present [No Edema] : there was no peripheral edema [No Palpable Aorta] : no palpable aorta [No Extremity Clubbing/Cyanosis] : no extremity clubbing/cyanosis [Soft] : abdomen soft [Non Tender] : non-tender [Non-distended] : non-distended [No Masses] : no abdominal mass palpated [No HSM] : no HSM [Normal Bowel Sounds] : normal bowel sounds [Normal Posterior Cervical Nodes] : no posterior cervical lymphadenopathy [Normal Anterior Cervical Nodes] : no anterior cervical lymphadenopathy [No CVA Tenderness] : no CVA  tenderness [No Spinal Tenderness] : no spinal tenderness [No Joint Swelling] : no joint swelling [Grossly Normal Strength/Tone] : grossly normal strength/tone [No Rash] : no rash [Coordination Grossly Intact] : coordination grossly intact [No Focal Deficits] : no focal deficits [Normal Gait] : normal gait [Deep Tendon Reflexes (DTR)] : deep tendon reflexes were 2+ and symmetric [Normal Affect] : the affect was normal [Normal Insight/Judgement] : insight and judgment were intact [de-identified] : Refused

## 2024-08-21 NOTE — PHYSICAL EXAM
[No Acute Distress] : no acute distress [Well Nourished] : well nourished [Well Developed] : well developed [Well-Appearing] : well-appearing [Normal Sclera/Conjunctiva] : normal sclera/conjunctiva [PERRL] : pupils equal round and reactive to light [EOMI] : extraocular movements intact [Normal Outer Ear/Nose] : the outer ears and nose were normal in appearance [Normal Oropharynx] : the oropharynx was normal [No JVD] : no jugular venous distention [No Lymphadenopathy] : no lymphadenopathy [Supple] : supple [Thyroid Normal, No Nodules] : the thyroid was normal and there were no nodules present [No Respiratory Distress] : no respiratory distress  [No Accessory Muscle Use] : no accessory muscle use [Clear to Auscultation] : lungs were clear to auscultation bilaterally [Normal Rate] : normal rate  [Regular Rhythm] : with a regular rhythm [Normal S1, S2] : normal S1 and S2 [No Murmur] : no murmur heard [No Carotid Bruits] : no carotid bruits [No Abdominal Bruit] : a ~M bruit was not heard ~T in the abdomen [No Varicosities] : no varicosities [Pedal Pulses Present] : the pedal pulses are present [No Edema] : there was no peripheral edema [No Palpable Aorta] : no palpable aorta [No Extremity Clubbing/Cyanosis] : no extremity clubbing/cyanosis [Soft] : abdomen soft [Non Tender] : non-tender [Non-distended] : non-distended [No Masses] : no abdominal mass palpated [No HSM] : no HSM [Normal Bowel Sounds] : normal bowel sounds [Normal Posterior Cervical Nodes] : no posterior cervical lymphadenopathy [Normal Anterior Cervical Nodes] : no anterior cervical lymphadenopathy [No CVA Tenderness] : no CVA  tenderness [No Spinal Tenderness] : no spinal tenderness [No Joint Swelling] : no joint swelling [Grossly Normal Strength/Tone] : grossly normal strength/tone [No Rash] : no rash [Coordination Grossly Intact] : coordination grossly intact [No Focal Deficits] : no focal deficits [Normal Gait] : normal gait [Deep Tendon Reflexes (DTR)] : deep tendon reflexes were 2+ and symmetric [Normal Affect] : the affect was normal [Normal Insight/Judgement] : insight and judgment were intact [de-identified] : Refused

## 2024-08-21 NOTE — HISTORY OF PRESENT ILLNESS
[de-identified] : 63 year old male patient came in for preventive visit and follow-up on chronic issues of hyperlipidemia and prediabetes exercises regularly  diet is healthy  monogamous, with a male partners, together for 33 years   Patient with hyperlipidemia, takes atorvastatin 10 mg daily, compliant, denies adverse reactions, endorses weight loss, has a healthy lifestyle exercises regularly diet is healthy  Patient with prediabetes, last hemoglobin A1c was 5.9%, has been as low increased trend of his A1c, watches his diet exercises regularly, prefers not to take medication if possible

## 2024-08-21 NOTE — HISTORY OF PRESENT ILLNESS
[de-identified] : 63 year old male patient came in for preventive visit and follow-up on chronic issues of hyperlipidemia and prediabetes exercises regularly  diet is healthy  monogamous, with a male partners, together for 33 years   Patient with hyperlipidemia, takes atorvastatin 10 mg daily, compliant, denies adverse reactions, endorses weight loss, has a healthy lifestyle exercises regularly diet is healthy  Patient with prediabetes, last hemoglobin A1c was 5.9%, has been as low increased trend of his A1c, watches his diet exercises regularly, prefers not to take medication if possible

## 2024-08-21 NOTE — HEALTH RISK ASSESSMENT
[0] : 2) Feeling down, depressed, or hopeless: Not at all (0) [PHQ-2 Negative - No further assessment needed] : PHQ-2 Negative - No further assessment needed [Never] : Never [[id number]] : 0

## 2024-08-22 PROBLEM — Z87.438 PERSONAL HISTORY OF OTHER DISEASES OF MALE GENITAL ORGANS: Chronic | Status: ACTIVE | Noted: 2022-10-26

## 2024-08-28 NOTE — ED PROVIDER NOTE - PRO INTERPRETER NEED 2
English Quality 130: Documentation Of Current Medications In The Medical Record: Current Medications Documented Detail Level: Detailed Quality 226: Preventive Care And Screening: Tobacco Use: Screening And Cessation Intervention: Patient screened for tobacco use and is an ex/non-smoker

## 2024-09-07 ENCOUNTER — TRANSCRIPTION ENCOUNTER (OUTPATIENT)
Age: 64
End: 2024-09-07

## 2024-09-07 RX ORDER — METFORMIN HYDROCHLORIDE 500 MG/1
500 TABLET, EXTENDED RELEASE ORAL DAILY
Qty: 60 | Refills: 0 | Status: ACTIVE | COMMUNITY
Start: 2024-09-07 | End: 1900-01-01

## 2024-09-11 ENCOUNTER — APPOINTMENT (OUTPATIENT)
Dept: UROLOGY | Facility: CLINIC | Age: 64
End: 2024-09-11
Payer: MEDICARE

## 2024-09-11 DIAGNOSIS — N50.819 TESTICULAR PAIN, UNSPECIFIED: ICD-10-CM

## 2024-09-11 DIAGNOSIS — R32 UNSPECIFIED URINARY INCONTINENCE: ICD-10-CM

## 2024-09-11 DIAGNOSIS — R31.29 OTHER MICROSCOPIC HEMATURIA: ICD-10-CM

## 2024-09-11 DIAGNOSIS — N45.1 EPIDIDYMITIS: ICD-10-CM

## 2024-09-11 DIAGNOSIS — R33.9 RETENTION OF URINE, UNSPECIFIED: ICD-10-CM

## 2024-09-11 DIAGNOSIS — R39.9 UNSPECIFIED SYMPTOMS AND SIGNS INVOLVING THE GENITOURINARY SYSTEM: ICD-10-CM

## 2024-09-11 DIAGNOSIS — Z87.898 PERSONAL HISTORY OF OTHER SPECIFIED CONDITIONS: ICD-10-CM

## 2024-09-11 DIAGNOSIS — R97.20 ELEVATED PROSTATE, SPECIFIC ANTIGEN [PSA]: ICD-10-CM

## 2024-09-11 DIAGNOSIS — R31.0 GROSS HEMATURIA: ICD-10-CM

## 2024-09-11 DIAGNOSIS — N44.2 BENIGN CYST OF TESTIS: ICD-10-CM

## 2024-09-11 PROCEDURE — 99203 OFFICE O/P NEW LOW 30 MIN: CPT

## 2024-09-11 NOTE — PHYSICAL EXAM
[Chaperone Declined] : Patient declined chaperone [Abdomen Soft] : soft [Abdomen Tenderness] : non-tender [] : no hepato-splenomegaly [Abdomen Mass (___ Cm)] : no abdominal mass palpated [Abdomen Hernia] : no hernia was discovered [Urethral Meatus] : meatus normal [Penis Abnormality] : normal circumcised penis [Epididymis] : the epididymides were normal [Testes Tenderness] : no tenderness of the testes [Prostate Size ___ (0-4)] : prostate size [unfilled] (scale: 0-4)

## 2024-09-11 NOTE — ASSESSMENT
[FreeTextEntry1] : 58 year old male with a recent history of what seems consistent with acute prostatitis.  He was treated for several days with resolution of symptoms which was complicated by acute urinary retention.  He had an atraumatic catheterization and had had gradual return to baseline of urinary symptoms. We discussed his recent symptoms and physical findings and will assume that he has partially treated prostatitis. I would recommend completion of 2 week course.  We discussed side effects allergic reactions to TMP/SX  His urinalysis from ED demonstrated microscopic hematuria which may be artifact of catheterization.  ? traumatic from catheterization.  We will repeat at this point   fu 3 weeks with flow rate   6..8.2022 one drop of blood on one occasion no risk factors ( no tobacco no family history) urinalysis had been normal will repeat and if rbc proceed with evaluation probable prostatic in origin by description and size of prostate  USG demonstrates prostate volume of 103 cc PSAD 0.04 will repeat PSA and proceed with MRI if elevated [facility]  expressed fully understanding of the information provided, the consequences and the management.  orchalgia no findings on exam We discussed conservative management with: a.  nonsteroidal anti-inflammatories, b. ice prn and c. scrotal support will proceed with scrotal ultrasound  Plan 1 PSA 2. +/- MRI 3. urinalysis  4. Scrotal ultrasound   repeat PSA if again elevated will proceed with MRI [facility]  expressed fully understanding of the information provided, the consequences and the management.  6.15.2022 returns re elevated PSA returns re orchalgia USG demonstrates epididymal and testicular cysts reviewed prior ultrasound 2018 no significant change images demonstrated to patient no longer symptomatic  PSA and MRI PSA dynamics reviewed MRI no lesions MRI low PSAD PROSTATE VOLUME 80 PSAD 0.05 options: 1. observation 2. repeat PSA in 3 months discussed limitation of PSA and MRI discussed concept clinically important prostate cancer dsicussed normal MRI and PSAD does not eliminate possibility of prostate cancer but decreases the likely of significant disease [facility]  expressed fully understanding of the information provided, the consequences and the management. Patient choses to observe  10.7.2022 Patient presents with acute urinary retention.  The etiology of this is unclear.  He did have dysuria.  His urine culture is pending.  He is not able to tolerate the Wesley catheter.  We discussed the risks of removal of the catheter meaning need to reinsert the catheter.  We discussed potential for fevers and chills as result of removing the catheter. We discussed that he has had no intervention and recommendation would be for 48 hours of alpha blocker prior to catheter removal Denies constipation etc   At this point we will start him on Uroxatral 10  meal once a day. culture from ER - no growth will start antibx 24 hours prior to TOV on Monday am [facility]  and partner expressed fully understanding of the information provided, the consequences and the management.   10/10/2022.  Patient was seen multiple times through the day after attempted voiding trial.  He continued to void increasing amounts.  His postvoid residual at last check was approximately 65 cc.  He stated he felt better.  We discussed the potential risk of repeat urinary retention.  He is on TMP/SX.  He will continue this for 24 hours after catheter removal.  He will continue on Flomax.  He feels he is making progress.  10.12.2022 Patient unable to urinate sufficient amount for uroflow (voided 10 cc) PVR= 20 continues to have urgency, urge incontinence, dysuria, and bladder spasms since catheter removal two days ago wearing diapers since catheter removal changes diaper every couple of hours reports incomplete emptying of bladder reports normal stream when he voids in toilet no rectal or bowel symptoms no MS symptoms  LA on exam today normal. We discussed how the etiology of his current voiding symptoms are unknown. We discussed the medication options to relieve his urgency and bladder spasms. He will start oxybutynin 10 mg daily. Instructions and side effects of the medication were reviewed.  Plan: 1. Start oxybutynin 10 mg 2. follow up in 1 week 3. warned re side effects/risks 4. if continues symptomatic will proceed with CYSTO at next visit  10.20.2022 The patient returns with his partner.  He states that he was doing well with oxybutynin and Flomax.  He states that he was urinating with a good stream and without pain.  Yesterday he started developing difficulty with urination.  He presented to the emergency room at [facility].  He stated he was in urinary retention.  They catheterized him and received 200 cc return.  At that time he also was complaining of right testicular pain and underwent a scrotal ultrasound which demonstrated epididymitis.  (Reviewed) a urinalysis at that time demonstrated many bacteria and moderate nitrates.  He was treated with ceftriaxone and discharged on p.o. cephalosporins.  He is afebrile.  He has a catheter indwelling.  His examination is consistent with right epididymitis.  We discussed the fact that he had previous urine cultures that did not demonstrate bacteria.  It is possible that this acute urinary tract infection/epididymitis was a result of previous catheterization.  We discussed the cessation of the oxybutynin since he was having difficulty with urination at this time.  We will also increase his Flomax to 2 tabs.  We discussed conservative management of his epididymitis with elevation ice and nonsteroidal anti-inflammatories.  Plan: 1.  Voiding trial today to see if we can remove his Wesley catheter safely.  We discussed the fact that Wesley catheter can exacerbate or cause epididymitis. 2. Flomax 2 tablets daily. 3.  Stop oxybutynin 4.  Conservative measures for epididymitis 5.  Continue oral cephalosporins when culture is negative and epididymitis has resolved he will undergo cystoscopy in light of the unclear etiology of his urinary symptoms 6. We discussed conservative management with: a.  nonsteroidal anti-inflammatories, b. ice prn and c. scrotal support. discussed with Dr Soni  10.27.2022 Patient returns on tamsulosin 0.8 mg daily.  He has stopped oxybutynin. flow rate 15.3 cc/ voided volume 147cc PVR 0 discussed teaching CIC since he has recurrent retention discussed proceeding with cysto in 3 weeks Plan: 1. flomax 0.8 mg 2. culture in 2 weeks 3. CIC instruction 4. cysto in 3 weeks  1.24.2022 patient underwent PAE by Dr Muller at [facility] patient did not want to proceed with Rezum doing well immediately continues on alfuzosin recommend followup in 8 weeks  4.13.2023 s;p PAE patient states improved nocturia x 1 frequency q 2-3 hours no bleeding  continues on alfuzosin PVR 2 cc Plan: 1. stop alfuzosin and assess symtoms 2. psa 3. 6 months  9/11/2024  Patient returns status post prostate artery embolization.  He is urinary symptoms are improved.  He is off of alfuzosin.  His examination is unremarkable however his PSA was noted to be 4.54.  This is increased.  Certainly this is higher than 1 would expect after prostate artery embolization.  He had mild elevation of his PSA prior to his prostate artery embolization.  MRI at that time was unremarkable with a low PSA density.  He did not undergo biopsy.  Plan: 1.  PSA/free PSA 2.  +/- MRI pending the results of the PSA 3. Urinalysis (history of microscopic hematuria) Follow-up in 3 months or after MRI

## 2024-09-11 NOTE — HISTORY OF PRESENT ILLNESS
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 no other sexual partners; no condoms; no anal sexual intercourse  black male with urinary buring and frequency approximately 10 days ago treated with TMP/Sx and then told to stop because culture was reported as negative one week ago had acute urinary retention 600 ml catheterized x 1  thereafter back to baseline no further dysuria or frequency  6.8.2022 patient noted drop of blood during urination urine was otherwise clear no other urinary symptoms not tobacco no family history of bladder cancer  10.7.2022 Patient was seen in the emergency room at [facility] with acute urinary retention.  This started after several days of dysuria.  Patient denied fevers or chills.  Urinalysis in the emergency room demonstrated bacteria on 1 urinalysis a second did not.  Urine culture is still pending.  He previously had a similar episode in 2019.  He presents today with an indwelling Wesley catheter.  10.10.2022 Patient returns today for trial of void.  He was seen in the emergency room at [facility] last evening.  His catheter was apparently not working presented with small clot.  The catheter was changed.  Is not clear if he truly had a voiding trial.    10.20.2022 returns with partner after ER visit last evening for acute urinary retention  10.27.2022 returns for TOV and management  11.17.2022 presents for cystoscopy and management of LUTS  1.24.2023 returns s/p Prostate Embolization Dr Muller at [facility] one week ago   4.13.2023  9.11.2024 patient returns after PAE nocturia x 2 q 3 hours  good stream "pleased"

## 2024-09-12 ENCOUNTER — TRANSCRIPTION ENCOUNTER (OUTPATIENT)
Age: 64
End: 2024-09-12

## 2024-09-12 LAB
APPEARANCE: CLEAR
BILIRUBIN URINE: NEGATIVE
BLOOD URINE: NEGATIVE
COLOR: YELLOW
GLUCOSE QUALITATIVE U: NEGATIVE MG/DL
KETONES URINE: NEGATIVE MG/DL
LEUKOCYTE ESTERASE URINE: NEGATIVE
NITRITE URINE: NEGATIVE
PH URINE: 5
PROTEIN URINE: NEGATIVE MG/DL
PSA FREE FLD-MCNC: 29 %
PSA FREE SERPL-MCNC: 1.22 NG/ML
PSA SERPL-MCNC: 4.24 NG/ML
SPECIFIC GRAVITY URINE: 1.03
UROBILINOGEN URINE: 0.2 MG/DL

## 2024-09-13 ENCOUNTER — TRANSCRIPTION ENCOUNTER (OUTPATIENT)
Age: 64
End: 2024-09-13

## 2024-09-13 DIAGNOSIS — Z86.59 PERSONAL HISTORY OF OTHER MENTAL AND BEHAVIORAL DISORDERS: ICD-10-CM

## 2024-09-13 RX ORDER — ALPRAZOLAM 0.5 MG/1
0.5 TABLET ORAL
Qty: 6 | Refills: 0 | Status: ACTIVE | COMMUNITY
Start: 2024-09-13 | End: 1900-01-01

## 2024-09-18 ENCOUNTER — APPOINTMENT (OUTPATIENT)
Dept: DERMATOLOGY | Facility: CLINIC | Age: 64
End: 2024-09-18
Payer: MEDICARE

## 2024-09-18 VITALS — WEIGHT: 160 LBS | HEIGHT: 69 IN | BODY MASS INDEX: 23.7 KG/M2

## 2024-09-18 DIAGNOSIS — D22.9 MELANOCYTIC NEVI, UNSPECIFIED: ICD-10-CM

## 2024-09-18 DIAGNOSIS — L82.1 OTHER SEBORRHEIC KERATOSIS: ICD-10-CM

## 2024-09-18 DIAGNOSIS — D48.5 NEOPLASM OF UNCERTAIN BEHAVIOR OF SKIN: ICD-10-CM

## 2024-09-18 PROCEDURE — 11102 TANGNTL BX SKIN SINGLE LES: CPT

## 2024-09-18 PROCEDURE — 99203 OFFICE O/P NEW LOW 30 MIN: CPT | Mod: 25

## 2024-09-18 NOTE — HISTORY OF PRESENT ILLNESS
NTN given to Nurse Serna.   [FreeTextEntry1] : NPV- FBSE [de-identified] : Musa Duenas is 64 y/o M presents for tbse   Lists the following concerns today: mole on top of head, thinks new. Asx. Personal history of skin cancer: NO Family history of skin cancer: NO History of blistering sunburns: NO History of tanning bed use: NO Uses sunscreen regularly: [place] father was a  in the [employer]

## 2024-09-18 NOTE — PHYSICAL EXAM
[Alert] : alert [Oriented x 3] : ~L oriented x 3 [Full Body Skin Exam Performed] : performed [FreeTextEntry3] : PE:   General: well-appearing, alert, in no acute distress  Full body skin exam performed examining scalp, head, face, ears, eyes, mouth, neck, chest, back, abdomen, axilla, b/l arms, b/l forearms, b/l hands, b/l fingernails, b/l thighs, b/l legs, b/l feet, b/l toenails, groin, buttocks Pertinent findings include: -pink brown firm papule on R upper arm -brown stuck on papules, plaques on the trunk and extremities -scattered light brown to dark brown colored <6mm papules and macules on the trunk and extremities

## 2024-09-24 ENCOUNTER — APPOINTMENT (OUTPATIENT)
Dept: MRI IMAGING | Facility: CLINIC | Age: 64
End: 2024-09-24
Payer: MEDICARE

## 2024-09-24 ENCOUNTER — OUTPATIENT (OUTPATIENT)
Dept: OUTPATIENT SERVICES | Facility: HOSPITAL | Age: 64
LOS: 1 days | End: 2024-09-24

## 2024-09-24 ENCOUNTER — RESULT REVIEW (OUTPATIENT)
Age: 64
End: 2024-09-24

## 2024-09-24 PROCEDURE — 72197 MRI PELVIS W/O & W/DYE: CPT | Mod: 26,MH

## 2024-09-24 PROCEDURE — 76498P: CUSTOM | Mod: 26,MH

## 2024-09-26 LAB — DERMATOLOGY BIOPSY: NORMAL

## 2024-09-30 ENCOUNTER — TRANSCRIPTION ENCOUNTER (OUTPATIENT)
Age: 64
End: 2024-09-30

## 2024-10-01 ENCOUNTER — APPOINTMENT (OUTPATIENT)
Dept: UROLOGY | Facility: CLINIC | Age: 64
End: 2024-10-01
Payer: MEDICARE

## 2024-10-01 VITALS
DIASTOLIC BLOOD PRESSURE: 74 MMHG | OXYGEN SATURATION: 98 % | TEMPERATURE: 97.2 F | HEART RATE: 82 BPM | SYSTOLIC BLOOD PRESSURE: 118 MMHG

## 2024-10-01 DIAGNOSIS — R97.20 ELEVATED PROSTATE, SPECIFIC ANTIGEN [PSA]: ICD-10-CM

## 2024-10-01 PROCEDURE — 99214 OFFICE O/P EST MOD 30 MIN: CPT

## 2024-10-01 NOTE — HISTORY OF PRESENT ILLNESS
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 no other sexual partners; no condoms; no anal sexual intercourse  black male with urinary buring and frequency approximately 10 days ago treated with TMP/Sx and then told to stop because culture was reported as negative one week ago had acute urinary retention 600 ml catheterized x 1  thereafter back to baseline no further dysuria or frequency  6.8.2022 patient noted drop of blood during urination urine was otherwise clear no other urinary symptoms not tobacco no family history of bladder cancer  10.7.2022 Patient was seen in the emergency room at [facility] with acute urinary retention.  This started after several days of dysuria.  Patient denied fevers or chills.  Urinalysis in the emergency room demonstrated bacteria on 1 urinalysis a second did not.  Urine culture is still pending.  He previously had a similar episode in 2019.  He presents today with an indwelling Wesley catheter.  10.10.2022 Patient returns today for trial of void.  He was seen in the emergency room at [facility] last evening.  His catheter was apparently not working presented with small clot.  The catheter was changed.  Is not clear if he truly had a voiding trial.    10.20.2022 returns with partner after ER visit last evening for acute urinary retention  10.27.2022 returns for TOV and management  11.17.2022 presents for cystoscopy and management of LUTS  1.24.2023 returns s/p Prostate Embolization Dr Muller at [facility] one week ago   4.13.2023  9.11.2024 patient returns after PAE nocturia x 2 q 3 hours  good stream "pleased"   10.1.2024 returns after MRI

## 2024-10-01 NOTE — HISTORY OF PRESENT ILLNESS
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 no other sexual partners; no condoms; no anal sexual intercourse  black male with urinary buring and frequency approximately 10 days ago treated with TMP/Sx and then told to stop because culture was reported as negative one week ago had acute urinary retention 600 ml catheterized x 1  thereafter back to baseline no further dysuria or frequency  6.8.2022 patient noted drop of blood during urination urine was otherwise clear no other urinary symptoms not tobacco no family history of bladder cancer  10.7.2022 Patient was seen in the emergency room at [facility] with acute urinary retention.  This started after several days of dysuria.  Patient denied fevers or chills.  Urinalysis in the emergency room demonstrated bacteria on 1 urinalysis a second did not.  Urine culture is still pending.  He previously had a similar episode in 2019.  He presents today with an indwelling Wesley catheter.  10.10.2022 Patient returns today for trial of void.  He was seen in the emergency room at [facility] last evening.  His catheter was apparently not working presented with small clot.  The catheter was changed.  Is not clear if he truly had a voiding trial.    10.20.2022 returns with partner after ER visit last evening for acute urinary retention  10.27.2022 returns for TOV and management  11.17.2022 presents for cystoscopy and management of LUTS  1.24.2023 returns s/p Prostate Embolization Dr Muller at [facility] one week ago   4.13.2023  9.11.2024 patient returns after PAE nocturia x 2 q 3 hours  good stream "pleased"   10.1.2024 returns after MRI

## 2024-10-01 NOTE — ASSESSMENT
[FreeTextEntry1] : 58 year old male with a recent history of what seems consistent with acute prostatitis.  He was treated for several days with resolution of symptoms which was complicated by acute urinary retention.  He had an atraumatic catheterization and had had gradual return to baseline of urinary symptoms. We discussed his recent symptoms and physical findings and will assume that he has partially treated prostatitis. I would recommend completion of 2 week course.  We discussed side effects allergic reactions to TMP/SX  His urinalysis from ED demonstrated microscopic hematuria which may be artifact of catheterization.  ? traumatic from catheterization.  We will repeat at this point   fu 3 weeks with flow rate   6..8.2022 one drop of blood on one occasion no risk factors ( no tobacco no family history) urinalysis had been normal will repeat and if rbc proceed with evaluation probable prostatic in origin by description and size of prostate  USG demonstrates prostate volume of 103 cc PSAD 0.04 will repeat PSA and proceed with MRI if elevated The ANDREA ESCOBEDO  expressed fully understanding of the information provided, the consequences and the management.  orchalgia no findings on exam We discussed conservative management with: a.  nonsteroidal anti-inflammatories, b. ice prn and c. scrotal support will proceed with scrotal ultrasound  Plan 1 PSA 2. +/- MRI 3. urinalysis  4. Scrotal ultrasound   repeat PSA if again elevated will proceed with MRI The ANDREA ESCOBEDO  expressed fully understanding of the information provided, the consequences and the management.  6.15.2022 returns re elevated PSA returns re orchalgia USG demonstrates epididymal and testicular cysts reviewed prior ultrasound 2018 no significant change images demonstrated to patient no longer symptomatic  PSA and MRI PSA dynamics reviewed MRI no lesions MRI low PSAD PROSTATE VOLUME 80 PSAD 0.05 options: 1. observation 2. repeat PSA in 3 months discussed limitation of PSA and MRI discussed concept clinically important prostate cancer dsicussed normal MRI and PSAD does not eliminate possibility of prostate cancer but decreases the likely of significant disease The ANDREA ESCOBEDO  expressed fully understanding of the information provided, the consequences and the management. Patient choses to observe  10.7.2022 Patient presents with acute urinary retention.  The etiology of this is unclear.  He did have dysuria.  His urine culture is pending.  He is not able to tolerate the Wesley catheter.  We discussed the risks of removal of the catheter meaning need to reinsert the catheter.  We discussed potential for fevers and chills as result of removing the catheter. We discussed that he has had no intervention and recommendation would be for 48 hours of alpha blocker prior to catheter removal Denies constipation etc   At this point we will start him on Uroxatral 10  meal once a day. culture from ER - no growth will start antibx 24 hours prior to TOV on Monday am The ANDREA ESCOBEDO  and partner expressed fully understanding of the information provided, the consequences and the management.   10/10/2022.  Patient was seen multiple times through the day after attempted voiding trial.  He continued to void increasing amounts.  His postvoid residual at last check was approximately 65 cc.  He stated he felt better.  We discussed the potential risk of repeat urinary retention.  He is on TMP/SX.  He will continue this for 24 hours after catheter removal.  He will continue on Flomax.  He feels he is making progress.  10.12.2022 Patient unable to urinate sufficient amount for uroflow (voided 10 cc) PVR= 20 continues to have urgency, urge incontinence, dysuria, and bladder spasms since catheter removal two days ago wearing diapers since catheter removal changes diaper every couple of hours reports incomplete emptying of bladder reports normal stream when he voids in toilet no rectal or bowel symptoms no MS symptoms  LA on exam today normal. We discussed how the etiology of his current voiding symptoms are unknown. We discussed the medication options to relieve his urgency and bladder spasms. He will start oxybutynin 10 mg daily. Instructions and side effects of the medication were reviewed.  Plan: 1. Start oxybutynin 10 mg 2. follow up in 1 week 3. warned re side effects/risks 4. if continues symptomatic will proceed with CYSTO at next visit  10.20.2022 The patient returns with his partner.  He states that he was doing well with oxybutynin and Flomax.  He states that he was urinating with a good stream and without pain.  Yesterday he started developing difficulty with urination.  He presented to the emergency room at [facility].  He stated he was in urinary retention.  They catheterized him and received 200 cc return.  At that time he also was complaining of right testicular pain and underwent a scrotal ultrasound which demonstrated epididymitis.  (Reviewed) a urinalysis at that time demonstrated many bacteria and moderate nitrates.  He was treated with ceftriaxone and discharged on p.o. cephalosporins.  He is afebrile.  He has a catheter indwelling.  His examination is consistent with right epididymitis.  We discussed the fact that he had previous urine cultures that did not demonstrate bacteria.  It is possible that this acute urinary tract infection/epididymitis was a result of previous catheterization.  We discussed the cessation of the oxybutynin since he was having difficulty with urination at this time.  We will also increase his Flomax to 2 tabs.  We discussed conservative management of his epididymitis with elevation ice and nonsteroidal anti-inflammatories.  Plan: 1.  Voiding trial today to see if we can remove his Wesley catheter safely.  We discussed the fact that Wesley catheter can exacerbate or cause epididymitis. 2. Flomax 2 tablets daily. 3.  Stop oxybutynin 4.  Conservative measures for epididymitis 5.  Continue oral cephalosporins when culture is negative and epididymitis has resolved he will undergo cystoscopy in light of the unclear etiology of his urinary symptoms 6. We discussed conservative management with: a.  nonsteroidal anti-inflammatories, b. ice prn and c. scrotal support. discussed with Dr Soni  10.27.2022 Patient returns on tamsulosin 0.8 mg daily.  He has stopped oxybutynin. flow rate 15.3 cc/ voided volume 147cc PVR 0 discussed teaching CIC since he has recurrent retention discussed proceeding with cysto in 3 weeks Plan: 1. flomax 0.8 mg 2. culture in 2 weeks 3. CIC instruction 4. cysto in 3 weeks  1.24.2022 patient underwent PAE by Dr Muller at [facility] patient did not want to proceed with Rezum doing well immediately continues on alfuzosin recommend followup in 8 weeks  4.13.2023 s;p PAE patient states improved nocturia x 1 frequency q 2-3 hours no bleeding  continues on alfuzosin PVR 2 cc Plan: 1. stop alfuzosin and assess symtoms 2. psa 3. 6 months  9/11/2024  Patient returns status post prostate artery embolization.  He is urinary symptoms are improved.  He is off of alfuzosin.  His examination is unremarkable however his PSA was noted to be 4.54.  This is increased.  Certainly this is higher than 1 would expect after prostate artery embolization.  He had mild elevation of his PSA prior to his prostate artery embolization.  MRI at that time was unremarkable with a low PSA density.  He did not undergo biopsy.  Plan: 1.  PSA/free PSA 2.  +/- MRI pending the results of the PSA 3. Urinalysis (history of microscopic hematuria) Follow-up in 3 months or after MRI  10.1.2024 demonstrated MRI images concerning for pca PIRADS 3 PSAD  0.06 Prostate volume 61.7 (previoulsy 80.7) We discussed the risks and complications of prostate biopsy and discussed the procedure. We discussed that procedure is performed by placing a rectal probe and administering anesthesia locally. Biopsies are then taken with a needle. Multiple biopsies are taken,  Risks of the procedure include hematuria, hematospermia, blood per rectum. Risks include infection, sepsis and even death. We discussed perineal vs transrectal biopsy We discussed decreased risk of perineal biopsy vs transrectal but associated greater discomfort  We discussed options of: 1. local, 2. IV sedation in Operating room 3. po Valium Fleets enema should be taken. ASA and NSAID must be stopped 1 week prior to procedure.

## 2024-10-01 NOTE — ASSESSMENT
[FreeTextEntry1] : 58 year old male with a recent history of what seems consistent with acute prostatitis.  He was treated for several days with resolution of symptoms which was complicated by acute urinary retention.  He had an atraumatic catheterization and had had gradual return to baseline of urinary symptoms. We discussed his recent symptoms and physical findings and will assume that he has partially treated prostatitis. I would recommend completion of 2 week course.  We discussed side effects allergic reactions to TMP/SX  His urinalysis from ED demonstrated microscopic hematuria which may be artifact of catheterization.  ? traumatic from catheterization.  We will repeat at this point   fu 3 weeks with flow rate   6..8.2022 one drop of blood on one occasion no risk factors ( no tobacco no family history) urinalysis had been normal will repeat and if rbc proceed with evaluation probable prostatic in origin by description and size of prostate  USG demonstrates prostate volume of 103 cc PSAD 0.04 will repeat PSA and proceed with MRI if elevated The ANDREA ESCOBEDO  expressed fully understanding of the information provided, the consequences and the management.  orchalgia no findings on exam We discussed conservative management with: a.  nonsteroidal anti-inflammatories, b. ice prn and c. scrotal support will proceed with scrotal ultrasound  Plan 1 PSA 2. +/- MRI 3. urinalysis  4. Scrotal ultrasound   repeat PSA if again elevated will proceed with MRI The ANDREA ESCOBEDO  expressed fully understanding of the information provided, the consequences and the management.  6.15.2022 returns re elevated PSA returns re orchalgia USG demonstrates epididymal and testicular cysts reviewed prior ultrasound 2018 no significant change images demonstrated to patient no longer symptomatic  PSA and MRI PSA dynamics reviewed MRI no lesions MRI low PSAD PROSTATE VOLUME 80 PSAD 0.05 options: 1. observation 2. repeat PSA in 3 months discussed limitation of PSA and MRI discussed concept clinically important prostate cancer dsicussed normal MRI and PSAD does not eliminate possibility of prostate cancer but decreases the likely of significant disease The ANDREA ESCOBEDO  expressed fully understanding of the information provided, the consequences and the management. Patient choses to observe  10.7.2022 Patient presents with acute urinary retention.  The etiology of this is unclear.  He did have dysuria.  His urine culture is pending.  He is not able to tolerate the Wesley catheter.  We discussed the risks of removal of the catheter meaning need to reinsert the catheter.  We discussed potential for fevers and chills as result of removing the catheter. We discussed that he has had no intervention and recommendation would be for 48 hours of alpha blocker prior to catheter removal Denies constipation etc   At this point we will start him on Uroxatral 10  meal once a day. culture from ER - no growth will start antibx 24 hours prior to TOV on Monday am The ANDREA ESCOBEDO  and partner expressed fully understanding of the information provided, the consequences and the management.   10/10/2022.  Patient was seen multiple times through the day after attempted voiding trial.  He continued to void increasing amounts.  His postvoid residual at last check was approximately 65 cc.  He stated he felt better.  We discussed the potential risk of repeat urinary retention.  He is on TMP/SX.  He will continue this for 24 hours after catheter removal.  He will continue on Flomax.  He feels he is making progress.  10.12.2022 Patient unable to urinate sufficient amount for uroflow (voided 10 cc) PVR= 20 continues to have urgency, urge incontinence, dysuria, and bladder spasms since catheter removal two days ago wearing diapers since catheter removal changes diaper every couple of hours reports incomplete emptying of bladder reports normal stream when he voids in toilet no rectal or bowel symptoms no MS symptoms  LA on exam today normal. We discussed how the etiology of his current voiding symptoms are unknown. We discussed the medication options to relieve his urgency and bladder spasms. He will start oxybutynin 10 mg daily. Instructions and side effects of the medication were reviewed.  Plan: 1. Start oxybutynin 10 mg 2. follow up in 1 week 3. warned re side effects/risks 4. if continues symptomatic will proceed with CYSTO at next visit  10.20.2022 The patient returns with his partner.  He states that he was doing well with oxybutynin and Flomax.  He states that he was urinating with a good stream and without pain.  Yesterday he started developing difficulty with urination.  He presented to the emergency room at [facility].  He stated he was in urinary retention.  They catheterized him and received 200 cc return.  At that time he also was complaining of right testicular pain and underwent a scrotal ultrasound which demonstrated epididymitis.  (Reviewed) a urinalysis at that time demonstrated many bacteria and moderate nitrates.  He was treated with ceftriaxone and discharged on p.o. cephalosporins.  He is afebrile.  He has a catheter indwelling.  His examination is consistent with right epididymitis.  We discussed the fact that he had previous urine cultures that did not demonstrate bacteria.  It is possible that this acute urinary tract infection/epididymitis was a result of previous catheterization.  We discussed the cessation of the oxybutynin since he was having difficulty with urination at this time.  We will also increase his Flomax to 2 tabs.  We discussed conservative management of his epididymitis with elevation ice and nonsteroidal anti-inflammatories.  Plan: 1.  Voiding trial today to see if we can remove his Wesley catheter safely.  We discussed the fact that Wesley catheter can exacerbate or cause epididymitis. 2. Flomax 2 tablets daily. 3.  Stop oxybutynin 4.  Conservative measures for epididymitis 5.  Continue oral cephalosporins when culture is negative and epididymitis has resolved he will undergo cystoscopy in light of the unclear etiology of his urinary symptoms 6. We discussed conservative management with: a.  nonsteroidal anti-inflammatories, b. ice prn and c. scrotal support. discussed with Dr Soni  10.27.2022 Patient returns on tamsulosin 0.8 mg daily.  He has stopped oxybutynin. flow rate 15.3 cc/ voided volume 147cc PVR 0 discussed teaching CIC since he has recurrent retention discussed proceeding with cysto in 3 weeks Plan: 1. flomax 0.8 mg 2. culture in 2 weeks 3. CIC instruction 4. cysto in 3 weeks  1.24.2022 patient underwent PAE by Dr Muller at [facility] patient did not want to proceed with Rezum doing well immediately continues on alfuzosin recommend followup in 8 weeks  4.13.2023 s;p PAE patient states improved nocturia x 1 frequency q 2-3 hours no bleeding  continues on alfuzosin PVR 2 cc Plan: 1. stop alfuzosin and assess symtoms 2. psa 3. 6 months  9/11/2024  Patient returns status post prostate artery embolization.  He is urinary symptoms are improved.  He is off of alfuzosin.  His examination is unremarkable however his PSA was noted to be 4.54.  This is increased.  Certainly this is higher than 1 would expect after prostate artery embolization.  He had mild elevation of his PSA prior to his prostate artery embolization.  MRI at that time was unremarkable with a low PSA density.  He did not undergo biopsy.  Plan: 1.  PSA/free PSA 2.  +/- MRI pending the results of the PSA 3. Urinalysis (history of microscopic hematuria) Follow-up in 3 months or after MRI  10.1.2024 demonstrated MRI images concerning for pca PIRADS 3 PSAD  0.06 Prostate volume 61.7 (previoulsy 80.7) We discussed the risks and complications of prostate biopsy and discussed the procedure. We discussed that procedure is performed by placing a rectal probe and administering anesthesia locally. Biopsies are then taken with a needle. Multiple biopsies are taken,  Risks of the procedure include hematuria, hematospermia, blood per rectum. Risks include infection, sepsis and even death. We discussed perineal vs transrectal biopsy We discussed decreased risk of perineal biopsy vs transrectal but associated greater discomfort  We discussed options of: 1. local, 2. IV sedation in Operating room 3. po Valium Fleets enema should be taken. ASA and NSAID must be stopped 1 week prior to procedure.

## 2024-10-03 LAB — BACTERIA UR CULT: NORMAL

## 2024-10-16 ENCOUNTER — APPOINTMENT (OUTPATIENT)
Dept: INTERNAL MEDICINE | Facility: CLINIC | Age: 64
End: 2024-10-16
Payer: MEDICARE

## 2024-10-16 ENCOUNTER — NON-APPOINTMENT (OUTPATIENT)
Age: 64
End: 2024-10-16

## 2024-10-16 VITALS
TEMPERATURE: 97.6 F | OXYGEN SATURATION: 98 % | WEIGHT: 164 LBS | SYSTOLIC BLOOD PRESSURE: 126 MMHG | HEART RATE: 77 BPM | RESPIRATION RATE: 16 BRPM | DIASTOLIC BLOOD PRESSURE: 76 MMHG | HEIGHT: 69 IN | BODY MASS INDEX: 24.29 KG/M2

## 2024-10-16 DIAGNOSIS — R97.20 ELEVATED PROSTATE, SPECIFIC ANTIGEN [PSA]: ICD-10-CM

## 2024-10-16 DIAGNOSIS — Z01.818 ENCOUNTER FOR OTHER PREPROCEDURAL EXAMINATION: ICD-10-CM

## 2024-10-16 PROCEDURE — 36415 COLL VENOUS BLD VENIPUNCTURE: CPT

## 2024-10-16 PROCEDURE — 99214 OFFICE O/P EST MOD 30 MIN: CPT | Mod: 25

## 2024-10-16 PROCEDURE — 93000 ELECTROCARDIOGRAM COMPLETE: CPT

## 2024-10-21 ENCOUNTER — TRANSCRIPTION ENCOUNTER (OUTPATIENT)
Age: 64
End: 2024-10-21

## 2024-10-21 LAB
ALBUMIN SERPL ELPH-MCNC: 4.1 G/DL
ALP BLD-CCNC: 103 U/L
ALT SERPL-CCNC: 27 U/L
ANION GAP SERPL CALC-SCNC: 9 MMOL/L
APPEARANCE: CLEAR
APTT BLD: 31.8 SEC
AST SERPL-CCNC: 24 U/L
BACTERIA UR CULT: NORMAL
BACTERIA: NEGATIVE /HPF
BASOPHILS # BLD AUTO: 0.07 K/UL
BASOPHILS NFR BLD AUTO: 0.9 %
BILIRUB SERPL-MCNC: 0.6 MG/DL
BILIRUBIN URINE: NEGATIVE
BLOOD URINE: NEGATIVE
BUN SERPL-MCNC: 15 MG/DL
CALCIUM SERPL-MCNC: 9.4 MG/DL
CAST: 0 /LPF
CHLORIDE SERPL-SCNC: 104 MMOL/L
CO2 SERPL-SCNC: 28 MMOL/L
COLOR: YELLOW
CREAT SERPL-MCNC: 1.19 MG/DL
EGFR: 68 ML/MIN/1.73M2
EOSINOPHIL # BLD AUTO: 0.34 K/UL
EOSINOPHIL NFR BLD AUTO: 4.4 %
EPITHELIAL CELLS: 0 /HPF
GLUCOSE QUALITATIVE U: NEGATIVE MG/DL
GLUCOSE SERPL-MCNC: 93 MG/DL
HCT VFR BLD CALC: 48.7 %
HGB BLD-MCNC: 15.7 G/DL
IMM GRANULOCYTES NFR BLD AUTO: 0.4 %
INR PPP: 0.96 RATIO
KETONES URINE: NEGATIVE MG/DL
LEUKOCYTE ESTERASE URINE: NEGATIVE
LYMPHOCYTES # BLD AUTO: 1.47 K/UL
LYMPHOCYTES NFR BLD AUTO: 19 %
MAN DIFF?: NORMAL
MCHC RBC-ENTMCNC: 31.3 PG
MCHC RBC-ENTMCNC: 32.2 GM/DL
MCV RBC AUTO: 97 FL
MICROSCOPIC-UA: NORMAL
MONOCYTES # BLD AUTO: 0.75 K/UL
MONOCYTES NFR BLD AUTO: 9.7 %
NEUTROPHILS # BLD AUTO: 5.09 K/UL
NEUTROPHILS NFR BLD AUTO: 65.6 %
NITRITE URINE: NEGATIVE
PH URINE: 5
PLATELET # BLD AUTO: 205 K/UL
POTASSIUM SERPL-SCNC: 4.8 MMOL/L
PROT SERPL-MCNC: 6.7 G/DL
PROTEIN URINE: NEGATIVE MG/DL
PT BLD: 11.3 SEC
RBC # BLD: 5.02 M/UL
RBC # FLD: 12.6 %
RED BLOOD CELLS URINE: 0 /HPF
SODIUM SERPL-SCNC: 141 MMOL/L
SPECIFIC GRAVITY URINE: 1.02
UROBILINOGEN URINE: 0.2 MG/DL
WBC # FLD AUTO: 7.75 K/UL
WHITE BLOOD CELLS URINE: 0 /HPF

## 2024-11-04 ENCOUNTER — OUTPATIENT (OUTPATIENT)
Dept: OUTPATIENT SERVICES | Facility: HOSPITAL | Age: 64
LOS: 1 days | End: 2024-11-04
Payer: MEDICARE

## 2024-11-04 DIAGNOSIS — R97.20 ELEVATED PROSTATE SPECIFIC ANTIGEN [PSA]: ICD-10-CM

## 2024-11-05 NOTE — ASU PATIENT PROFILE, ADULT - NS PREOP UNDERSTANDS INFO
NPO after midnight solids water till 12noon escort required for discharge home bring picture ID and insurance info.

## 2024-11-06 ENCOUNTER — RESULT REVIEW (OUTPATIENT)
Age: 64
End: 2024-11-06

## 2024-11-06 ENCOUNTER — APPOINTMENT (OUTPATIENT)
Dept: UROLOGY | Facility: AMBULATORY SURGERY CENTER | Age: 64
End: 2024-11-06

## 2024-11-06 ENCOUNTER — OUTPATIENT (OUTPATIENT)
Dept: OUTPATIENT SERVICES | Facility: HOSPITAL | Age: 64
LOS: 1 days | Discharge: HOME | End: 2024-11-06
Payer: MEDICARE

## 2024-11-06 ENCOUNTER — TRANSCRIPTION ENCOUNTER (OUTPATIENT)
Age: 64
End: 2024-11-06

## 2024-11-06 VITALS
OXYGEN SATURATION: 97 % | RESPIRATION RATE: 16 BRPM | SYSTOLIC BLOOD PRESSURE: 120 MMHG | DIASTOLIC BLOOD PRESSURE: 75 MMHG | HEART RATE: 66 BPM | TEMPERATURE: 98 F

## 2024-11-06 VITALS
TEMPERATURE: 98 F | HEIGHT: 69 IN | HEART RATE: 66 BPM | OXYGEN SATURATION: 99 % | SYSTOLIC BLOOD PRESSURE: 126 MMHG | WEIGHT: 158.73 LBS | RESPIRATION RATE: 16 BRPM | DIASTOLIC BLOOD PRESSURE: 71 MMHG

## 2024-11-06 DIAGNOSIS — Z98.890 OTHER SPECIFIED POSTPROCEDURAL STATES: Chronic | ICD-10-CM

## 2024-11-06 PROBLEM — I10 ESSENTIAL (PRIMARY) HYPERTENSION: Chronic | Status: INACTIVE | Noted: 2021-04-01 | Resolved: 2024-11-06

## 2024-11-06 PROBLEM — E11.9 TYPE 2 DIABETES MELLITUS WITHOUT COMPLICATIONS: Chronic | Status: INACTIVE | Noted: 2021-04-01 | Resolved: 2024-11-06

## 2024-11-06 PROBLEM — I82.409 ACUTE EMBOLISM AND THROMBOSIS OF UNSPECIFIED DEEP VEINS OF UNSPECIFIED LOWER EXTREMITY: Chronic | Status: INACTIVE | Noted: 2021-04-01 | Resolved: 2024-11-06

## 2024-11-06 PROBLEM — D64.9 ANEMIA, UNSPECIFIED: Chronic | Status: INACTIVE | Noted: 2017-03-11 | Resolved: 2024-11-06

## 2024-11-06 PROBLEM — D57.1 SICKLE-CELL DISEASE WITHOUT CRISIS: Chronic | Status: INACTIVE | Noted: 2017-03-11 | Resolved: 2024-11-06

## 2024-11-06 PROCEDURE — G0416: CPT | Mod: 26

## 2024-11-06 PROCEDURE — 76999F: CUSTOM | Mod: 26

## 2024-11-06 PROCEDURE — 76872 US TRANSRECTAL: CPT | Mod: 26

## 2024-11-06 PROCEDURE — 55700: CPT

## 2024-11-06 PROCEDURE — 88344 IMHCHEM/IMCYTCHM EA MLT ANTB: CPT | Mod: 26

## 2024-11-06 RX ORDER — FLUTICASONE PROPIONATE 50 UG/1
1 SPRAY, METERED NASAL
Refills: 0 | DISCHARGE

## 2024-11-06 RX ORDER — ONDANSETRON HYDROCHLORIDE 2 MG/ML
4 INJECTION, SOLUTION INTRAMUSCULAR; INTRAVENOUS ONCE
Refills: 0 | Status: DISCONTINUED | OUTPATIENT
Start: 2024-11-06 | End: 2024-11-06

## 2024-11-06 NOTE — ASU DISCHARGE PLAN (ADULT/PEDIATRIC) - ASU DC SPECIAL INSTRUCTIONSFT
PROSTATE BIOPSY    GENERAL: Expect blood in the urine, stool, and ejaculate for up to two (2) months postoperatively. This is normal and to be expected after this procedure. Increase hydration to keep the urine as clear as possible.    SURGICAL WOUND: There are often lumps and bumps that can be felt in the perineum (skin between scrotum and anus) for up to two (2) months or more post operatively. These are of no concern and with time they will soften and disappear.  Any “black and blue” bruising areas will also resolve.  You may apply an ice-pack for 15 minutes out of every hour for the first 24 -36 hours to minimize pain and swelling. You may apply over the counter Bacitracin to the wound several times a day, and keep covered with over the counter gauze as necessary.    CATHETER: Some patients are sent home with a Wesley catheter, while others go home urinating on their own. A Wesley catheter continuously drains the urine from the bladder. If you still have a catheter, the nurses will review instructions and care before you go home. For men, you may have a prescription for lidocaine jelly to apply to the tip of your penis, as needed, for catheter related discomfort.     PAIN: You may have some intermittent pain for up to six (6) weeks post operatively. Pain does not signify any problem unless associated with fever, chills, or inability to void.  If you experience any fevers or chills please call immediately as this may be signs of an infection. You may take Tylenol (acetaminophen) 650-975mg and/or Motrin (ibuprofen) 400-600mg, both available over the counter, for pain every 6 hours as needed. Do not exceed 4000mg of Tylenol (acetaminophen) daily. You may alternate these medications such that you take one or the other every 3 hours for around the clock pain coverage.     ANTICOAGULATION: If you are taking any blood thinning medications, please discuss with your urologist prior to restarting these medications unless otherwise specified.    BATHING: You may shower with soap and water, and pat dry the needle insertion sites in the perineum. Avoid sitting in water for prolonged periods of time for the next few days.    DIET: You may resume your regular diet and regular medication regimen.    ACTIVITY: No heavy lifting or strenuous exercise until you are evaluated at your post-operative appointment. Otherwise, you may return to your usual level of physical activity.    FOLLOW-UP: If you did not already schedule your post-operative appointment, please call your urologist to schedule and follow-up appointment.    CALL YOUR UROLOGIST IF: You have any bleeding that does not stop, inability to void >8 hours, fever over 100.4 F, chills, persistent nausea/vomiting, changes in your incision concerning for infection, or if your pain is not controlled on your discharge pain medications.

## 2024-11-06 NOTE — BRIEF OPERATIVE NOTE - NSICDXBRIEFPROCEDURE_GEN_ALL_CORE_FT
PROCEDURES:  Biopsy, prostate, with integrated MRI-transrectal US guidance 06-Nov-2024 07:08:45  Sarah Gannon

## 2024-11-06 NOTE — ASU DISCHARGE PLAN (ADULT/PEDIATRIC) - FINANCIAL ASSISTANCE
[facility] provides services at a reduced cost to those who are determined to be eligible through [facility]’s financial assistance program. Information regarding [facility]’s financial assistance program can be found by going to [email or web address] or by calling [phone].

## 2024-11-06 NOTE — ASU PREOP CHECKLIST - WEIGHT IN LBS
"Subjective   Yuni Garcia is a 40 y.o. female who presents today for initial evaluation     Chief Complaint:  Depression, no motivation    History of Present Illness:   Yuni presents today at [facility] for her initial evaluation for medication management.  She appears disheveled. Frequently tearful during appointment. Previous psychiatric medication was being managed by Kandis Cuevas in  and her PCP Ace Trinidad.      Yuni is  a 40-year-old   female, mother of adult 2 sons.  She lives in [place], [place] with her  and children.  She had a good childhood.  Denies any history of trauma, substance use, or legal problems. 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 She has 2 brothers that are nurses.  Her support people include her mom and . Previous psychiatric diagnosis include depression.  She complains that she always \"picks\" at her skin and fingernails but it has worsened over the last 3 years.  She removes her mask to reveal scars on her face from picking.  Her family psychiatric history includes a maternal aunt that was bipolar and a mom who is a compulsive \"digger \".  Her highest level of education is completion of her nursing degree.  She was in  school to learn how to apply eyelashes.  She quit because her back hurt and her hands were too unsteady.    Yuni states \"I've  just had it; I'm  tired of being tired; all I do is cry.\"  Her mom is sick and her dad   3 weeks ago.  \"It about killed me.\" She had a Boxer named Santos for 12 years, that was like her son.  He  5 months ago. Yuni  pulls a  stuffed animal dog from her large shoulder bag and states that she had Santos cremated and his ashes placed inside the dog.  She carries him with her always because it makes her feel like Tom is with her .   Yuni's sleep quality is very poor.  Duration of sleep 2-4 hours per night.  She denies nightmares but has problems with " both initiating and maintaining sleep.  A sleep study done 7 months ago confirmed  sleep apnea.  She was given a CPAP machine but is unable to tolerate the mask.  She previously took trazodone 150 mg for sleep but it made her have crazy dreams.  Appetite is poor.  She eats only when she is happy and has lost about 30-40 pounds in the last 6 months or so. Her PHQ score is 21.  Areas of anhedonia, insomnia and fatigue most problematic.  Additional depression symptoms include feeling depressed, anorexia, poor self-esteem, poor concentration, and slow movement.  Patient is unable to remember the last time she experienced happiness.  Believes it has been longer than two years.  Yuni's  JUSTUS score is 16.  Areas is of generalized worrying , trouble relaxing, restless, and irritability most impairing.  She also identified feeling nervous, inability to control worry as problematic.  Depression and anxiety significantly impair Yuni's daily function.  She is unable to stay focused. Patient educated on correlation of severe depression and impairment of motivation and cognition. Patient denies previous history of self-harm and currently denies SI/HI/AVH.  Assessed for bipolar disorder.  Patient denies  ever experiencing  consecutive nights with little/no sleep or other indications of gaurav or hypomania.  She states her mood changes are either 80/100 or 0. Deja identifies her level of 80 as being able to function like a typical adult, rather than mood elevation.  She experiences irritability and prefers  to self isolate.  Prior to depression, Yuni enjoyed playing with her dogs and spending time with kids.    Medication treatments discussed with patient.  She identifies depression and insomnia as her priorities for treatment.  Patient states lithium helped her significantly when she first started it, and thought she would be able to go back to work. Over time, it became less effective.  Psychotropic  meds prescribed by Ace  Dye include: Topamax 50 mg for skin picking, bupropion  mg, Prozac 20 mg, which made her shake; trazodone 150 mg which caused vivid dreams.  Lithium 150 p.o. 3 times daily was prescribed by Kandis Cuevas as well as Trintellix 20 mg p.o. daily.  Trintellix makes her  extremely nauseous but taking Zofran with it helps. She does believe it helps her depression.  Benefits, risks, side effects of increasing Trintellix, increasing Lithium and starting Seroquel  discussed including risk of metabolic side effects and weight gain, especially with Seroquel. Patient states she would like to remain on lithium for now and measure level.  Patient cautioned regarding risk of lithium toxicity associated with simultaneous use of lithium and  Hydrochlorothiazide. Patient states she is aware of symptoms of lithium toxicity and the risks associated with thyroid and kidney function. Patient states she has never had a lithium level checked and was concerned because she is aware of potential toxicity.      Importance of obtaining baseline lab values to both rule out organic causes of disease and therapeutically dose lithium.  Patient agrees to have labs drawn today.  Orders placed for CBC with differential, CMP, iron, folate, B12 vitamin D thyroid function lipid panel and liver function placed. Medication changes as follows:  Seroquel 25 mg p.o. at bedtime, Trintellix increased to 20 mg p.o. daily and lithium 300 mg p.o. twice daily electronically sent to [facility] pharmacy.  Patient is advised to maintain current dose of lithium which is 150 mg p.o. three times daily until provider notifies of her lithium level and authorizes  dose increase. Extensive education regarding high risk of lithium toxicity and thyroid and renal impairment and  the  Importance of consuming at least 1 L of water per day, maintaining current sodium level intake, replacing any liquid lost from sweating,  vomiting or diarrhea. Patient verbalizes  understanding and agrees.  Symptoms of lithium toxicity also reviewed including but not limited to severe nausea, vomiting, and tremors and confusion.  Patient agrees to follow-up with provider in 2 weeks to reassess medication management plan necessary changes.  Patient highly encouraged to pursue psychotherapy for grief counseling.  She is  concerned transportation would be a problem.     Jimmy report reviewed and appropriate    1/8/21 [facility] tox positive for amphetamines- States she obtained a prescription for Adipex 6-8 months ago from a weight loss clinic in [place], [place].  She still has the prescription and takes them only when she is unable to get out of the bed.  Last dose of Adipex was 2 days ago. WILL MONITOR    12/17/2020 UDS + for Amphetamine    The following portions of the patient's history were reviewed and updated as appropriate: allergies, current medications, past family history, past medical history, past social history, past surgical history and problem list.    Past Medical History:  Past Medical History:   Diagnosis Date   • HTN (hypertension)      Social History:  Social History     Socioeconomic History   • Marital status:      Spouse name: Not on file   • Number of children: Not on file   • Years of education: Not on file   • Highest education level: Not on file   Tobacco Use   • Smoking status: Never Smoker   • Smokeless tobacco: Never Used   Substance and Sexual Activity   • Alcohol use: Never     Frequency: Never   • Drug use: Never     Family History:  Family History   Problem Relation Age of Onset   • Diabetes Mother    • Heart disease Father      Past Surgical History:  Past Surgical History:   Procedure Laterality Date   • BREAST SURGERY      lift   • GALLBLADDER SURGERY     • STOMACH SURGERY      tummy tuck   • TUBAL ABDOMINAL LIGATION       Problem List:  Patient Active Problem List   Diagnosis   • Abdominal wall bulge     Allergy:   Allergies   Allergen Reactions   •  "Latex Hives        Current Medications:   Current Outpatient Medications   Medication Sig Dispense Refill   • cyanocobalamin 1000 MCG/ML injection      • fexofenadine (ALLEGRA) 180 MG tablet      • hydroCHLOROthiazide (HYDRODIURIL) 25 MG tablet Take 25 mg by mouth Daily.     • losartan (COZAAR) 50 MG tablet Take 50 mg by mouth Daily.     • phentermine (ADIPEX-P) 37.5 MG tablet Take 37.5 mg by mouth Every Morning Before Breakfast.     • rOPINIRole (REQUIP) 1 MG tablet Take 1 mg by mouth Every Night. Take 1 hour before bedtime.     • silver sulfadiazine (SILVADENE, SSD) 1 % cream Apply  topically to the appropriate area as directed 2 (Two) Times a Day.     • vitamin B-12 (CYANOCOBALAMIN) 1000 MCG tablet Take 1,000 mcg by mouth Daily.     • lithium 300 MG tablet Take 1 tablet by mouth 2 (two) times a day. 60 tablet 0   • QUEtiapine (SEROquel) 25 MG tablet Take 1 tablet by mouth Every Night. 30 tablet 0   • Vortioxetine HBr (Trintellix) 20 MG tablet Take 20 mg by mouth Daily. Take with food 30 tablet 1     No current facility-administered medications for this visit.      Review of Symptoms:    Review of Systems   HENT:        Bilateral conjunctival injection   Musculoskeletal: Positive for arthralgias and back pain.   Neurological: Positive for memory problem.   Psychiatric/Behavioral: Positive for agitation, behavioral problems, decreased concentration, dysphoric mood, sleep disturbance, depressed mood and stress. The patient is nervous/anxious.         Crying   All other systems reviewed and are negative.    Physical Exam:   Blood pressure 136/86, pulse 89, height 162.2 cm (63.86\"), weight 90.7 kg (200 lb).  Body mass index is 34.48 kg/m².    Appearance: disheveled  Gait, Station, Strength: posture upright, gait steady    Mental Status Exam:   Hygiene:   fairly clean, unkempt  Cooperation:  Cooperative  Eye Contact:  Good  Psychomotor Behavior:  Aggitated  Affect:  Inappropriate  Mood: fluctates  Hopelessness: " 8  Speech:  Monotone  Thought Process:  delayed  Thought Content:  Mood congruent  Suicidal:  None  Homicidal:  None  Hallucinations:  None  Delusion:  None  Memory:  Deficits  Orientation:  Person, Place, Time and Situation  Reliability:  fair  Insight:  Poor  Judgement:  Fair  Impulse Control:  Good  Physical/Medical Issues:  Yes Obesity, chronic pain     PHQ-Score Total:  PHQ-9 Total Score: 21  PHQ-9 Depression Screening  Little interest or pleasure in doing things? 3   Feeling down, depressed, or hopeless? 2   Trouble falling or staying asleep, or sleeping too much? 3   Feeling tired or having little energy? 3   Poor appetite or overeating? 2   Feeling bad about yourself - or that you are a failure or have let yourself or your family down? 2   Trouble concentrating on things, such as reading the newspaper or watching television? 2   Moving or speaking so slowly that other people could have noticed? Or the opposite - being so fidgety or restless that you have been moving around a lot more than usual? 2   Thoughts that you would be better off dead, or of hurting yourself in some way? 2   PHQ-9 Total Score 21   If you checked off any problems, how difficult have these problems made it for you to do your work, take care of things at home, or get along with other people? Extremely dIfficult       Lab Results: Labs pulled in after pt visit  Lab on 01/08/2021   Component Date Value Ref Range Status   • Lithium 01/08/2021 0.2* 0.6 - 1.2 mmol/L Final   • Glucose 01/08/2021 99  65 - 99 mg/dL Final   • BUN 01/08/2021 8  6 - 20 mg/dL Final   • Creatinine 01/08/2021 0.79  0.57 - 1.00 mg/dL Final   • Sodium 01/08/2021 137  136 - 145 mmol/L Final   • Potassium 01/08/2021 3.7  3.5 - 5.2 mmol/L Final   • Chloride 01/08/2021 100  98 - 107 mmol/L Final   • CO2 01/08/2021 27.0  22.0 - 29.0 mmol/L Final   • Calcium 01/08/2021 9.3  8.6 - 10.5 mg/dL Final   • Total Protein 01/08/2021 7.7  6.0 - 8.5 g/dL Final   • Albumin 01/08/2021  4.70  3.50 - 5.20 g/dL Final   • ALT (SGPT) 01/08/2021 15  1 - 33 U/L Final   • AST (SGOT) 01/08/2021 20  1 - 32 U/L Final   • Alkaline Phosphatase 01/08/2021 81  39 - 117 U/L Final   • Total Bilirubin 01/08/2021 0.6  0.0 - 1.2 mg/dL Final   • eGFR Non  Amer 01/08/2021 80  >60 mL/min/1.73 Final   • Globulin 01/08/2021 3.0  gm/dL Final   • A/G Ratio 01/08/2021 1.6  g/dL Final   • BUN/Creatinine Ratio 01/08/2021 10.1  7.0 - 25.0 Final   • Anion Gap 01/08/2021 10.0  5.0 - 15.0 mmol/L Final   • 1,25-Dihydroxy, Vitamin D 01/08/2021 44.2  19.9 - 79.3 pg/mL Final   • Total Cholesterol 01/08/2021 174  0 - 200 mg/dL Final   • Triglycerides 01/08/2021 185* 0 - 150 mg/dL Final   • HDL Cholesterol 01/08/2021 45  40 - 60 mg/dL Final   • LDL Cholesterol  01/08/2021 97  0 - 100 mg/dL Final   • VLDL Cholesterol 01/08/2021 32  5 - 40 mg/dL Final   • LDL/HDL Ratio 01/08/2021 2.04   Final   • WBC 01/08/2021 11.78* 3.40 - 10.80 10*3/mm3 Final   • RBC 01/08/2021 4.93  3.77 - 5.28 10*6/mm3 Final   • Hemoglobin 01/08/2021 14.4  12.0 - 15.9 g/dL Final   • Hematocrit 01/08/2021 43.2  34.0 - 46.6 % Final   • MCV 01/08/2021 87.6  79.0 - 97.0 fL Final   • MCH 01/08/2021 29.2  26.6 - 33.0 pg Final   • MCHC 01/08/2021 33.3  31.5 - 35.7 g/dL Final   • RDW 01/08/2021 13.1  12.3 - 15.4 % Final   • RDW-SD 01/08/2021 41.6  37.0 - 54.0 fl Final   • MPV 01/08/2021 10.9  6.0 - 12.0 fL Final   • Platelets 01/08/2021 376  140 - 450 10*3/mm3 Final   • Neutrophil % 01/08/2021 69.3  42.7 - 76.0 % Final   • Lymphocyte % 01/08/2021 22.8  19.6 - 45.3 % Final   • Monocyte % 01/08/2021 5.4  5.0 - 12.0 % Final   • Eosinophil % 01/08/2021 1.6  0.3 - 6.2 % Final   • Basophil % 01/08/2021 0.5  0.0 - 1.5 % Final   • Immature Grans % 01/08/2021 0.4  0.0 - 0.5 % Final   • Neutrophils, Absolute 01/08/2021 8.16* 1.70 - 7.00 10*3/mm3 Final   • Lymphocytes, Absolute 01/08/2021 2.68  0.70 - 3.10 10*3/mm3 Final   • Monocytes, Absolute 01/08/2021 0.64  0.10 - 0.90  10*3/mm3 Final   • Eosinophils, Absolute 01/08/2021 0.19  0.00 - 0.40 10*3/mm3 Final   • Basophils, Absolute 01/08/2021 0.06  0.00 - 0.20 10*3/mm3 Final   • Immature Grans, Absolute 01/08/2021 0.05  0.00 - 0.05 10*3/mm3 Final   • nRBC 01/08/2021 0.0  0.0 - 0.2 /100 WBC Final   • Bilirubin, Direct 01/08/2021 <0.2  0.0 - 0.3 mg/dL Final   Office Visit on 01/08/2021   Component Date Value Ref Range Status   • External Amphetamine Screen Urine 01/08/2021 Positive   Final   • Amphetamine Cut-Off 01/08/2021 1000NG/ML   Final   • External Benzodiazepine Screen Uri* 01/08/2021 Negative   Final   • Benzodiazipine Cut-Off 01/08/2021 300NG/ML   Final   • External Cocaine Screen Urine 01/08/2021 Negative   Final   • Cocaine Cut-Off 01/08/2021 300NG/ML   Final   • External THC Screen Urine 01/08/2021 Negative   Final   • THC Cut-Off 01/08/2021 50NG/ML   Final   • External Methadone Screen Urine 01/08/2021 Negative   Final   • Methadone Cut-Off 01/08/2021 300NG/ML   Final   • External Methamphetamine Screen Ur* 01/08/2021 Negative   Final   • Methamphetamine Cut-Off 01/08/2021 1000NG/ML   Final   • External Oxycodone Screen Urine 01/08/2021 Negative   Final   • Oxycodone Cut-Off 01/08/2021 100NG/ML   Final   • External Buprenorphine Screen Urine 01/08/2021 Negative   Final   • Buprenorphine Cut-Off 01/08/2021 10NG/ML   Final   • External MDMA 01/08/2021 Negative   Final   • MDMA Cut-Off 01/08/2021 500NG/ML   Final   • External Opiates Screen Urine 01/08/2021 Negative   Final   • Opiates Cut-Off 01/08/2021 300NG/ML   Final   • Folate 01/08/2021 >20.00  4.78 - 24.20 ng/mL Final   • Vitamin B-12 01/08/2021 1,289* 211 - 946 pg/mL Final   • TSH 01/08/2021 2.400  0.270 - 4.200 uIU/mL Final   • Free T4 01/08/2021 1.23  0.93 - 1.70 ng/dL Final   Erroneous Encounter on 12/17/2020   Component Date Value Ref Range Status   • External Amphetamine Screen Urine 12/17/2020 Positive   Final   • Amphetamine Cut-Off 12/17/2020 1000ng/ml   Final    • External Benzodiazepine Screen Uri* 12/17/2020 Negative   Final   • Benzodiazipine Cut-Off 12/17/2020 300ng/ml   Final   • External Cocaine Screen Urine 12/17/2020 Negative   Final   • Cocaine Cut-Off 12/17/2020 300ng/ml   Final   • External THC Screen Urine 12/17/2020 Negative   Final   • THC Cut-Off 12/17/2020 50ng/ml   Final   • External Methadone Screen Urine 12/17/2020 Negative   Final   • Methadone Cut-Off 12/17/2020 300ng/ml   Final   • External Methamphetamine Screen Ur* 12/17/2020 Negative   Final   • Methamphetamine Cut-Off 12/17/2020 1000ng/ml   Final   • External Oxycodone Screen Urine 12/17/2020 Negative   Final   • Oxycodone Cut-Off 12/17/2020 100ng/ml   Final   • External Buprenorphine Screen Urine 12/17/2020 Negative   Final   • Buprenorphine Cut-Off 12/17/2020 10ng/ml   Final   • External MDMA 12/17/2020 Negative   Final   • MDMA Cut-Off 12/17/2020 500ng/ml   Final   • External Opiates Screen Urine 12/17/2020 Negative   Final   • Opiates Cut-Off 12/17/2020 300ng/ml   Final     Assessment/Plan   Diagnoses and all orders for this visit:    1. Current severe episode of major depressive disorder without psychotic features, unspecified whether recurrent (CMS/HCC) (Primary)  -     Vortioxetine HBr (Trintellix) 20 MG tablet; Take 20 mg by mouth Daily. Take with food  Dispense: 30 tablet; Refill: 1  -     QUEtiapine (SEROquel) 25 MG tablet; Take 1 tablet by mouth Every Night.  Dispense: 30 tablet; Refill: 0  -     lithium 300 MG tablet; Take 1 tablet by mouth 2 (two) times a day.  Dispense: 60 tablet; Refill: 0    2. Skin-picking disorder    3. JUSTUS (generalized anxiety disorder)  -     Vortioxetine HBr (Trintellix) 20 MG tablet; Take 20 mg by mouth Daily. Take with food  Dispense: 30 tablet; Refill: 1    4. Grief reaction with prolonged bereavement  -     Vortioxetine HBr (Trintellix) 20 MG tablet; Take 20 mg by mouth Daily. Take with food  Dispense: 30 tablet; Refill: 1  -     QUEtiapine (SEROquel) 25  MG tablet; Take 1 tablet by mouth Every Night.  Dispense: 30 tablet; Refill: 0  -     lithium 300 MG tablet; Take 1 tablet by mouth 2 (two) times a day.  Dispense: 60 tablet; Refill: 0    5. Low energy    6. Obesity (BMI 30.0-34.9)    7. Medication management  -     [facility] Drug Screen  -     Lithium Level; Future  -     CBC & Differential; Future  -     Comprehensive Metabolic Panel; Future  -     Vitamin D 1,25 Dihydroxy; Future  -     Vitamin B12 & Folate  -     TSH  -     T4, free  -     Hepatic Function Panel; Future  -     Lipid Panel; Future      Visit Diagnoses:    ICD-10-CM ICD-9-CM   1. Current severe episode of major depressive disorder without psychotic features, unspecified whether recurrent (CMS/HCC)  F32.2 296.23   2. Skin-picking disorder  F42.4 698.4   3. JUSTUS (generalized anxiety disorder)  F41.1 300.02   4. Grief reaction with prolonged bereavement  F43.21 309.0   5. Low energy  R53.83 780.79   6. Obesity (BMI 30.0-34.9)  E66.9 278.00   7. Medication management  Z79.899 V58.69     TREATMENT PLAN/GOALS:   Short Term  1. Pt will keep all appts for med mgt   2. Pt will take medications as prescribed and report intolerable side effects  3. Pt will pursue psychotherapy services to help gain coping skill and process trauma    Long term:   1. Pt will exhibit emotional stability  2. Pt will use coping skills to work through depression  3. Pt will manage feeling of anger using anger management strategies    MEDICATION ISSUES:    Discussed medication options and treatment plan of prescribed medication as well as the risks, benefits, and side effects including potential falls, possible impaired driving and metabolic adversities among others. Patient is agreeable to call the office with any worsening of symptoms or onset of side effects. Patient is agreeable to call 911 or go to the nearest ER should he/she begin having SI/HI.     MEDS ORDERED DURING VISIT:  New Medications Ordered This Visit   Medications   •  Vortioxetine HBr (Trintellix) 20 MG tablet     Sig: Take 20 mg by mouth Daily. Take with food     Dispense:  30 tablet     Refill:  1   • QUEtiapine (SEROquel) 25 MG tablet     Sig: Take 1 tablet by mouth Every Night.     Dispense:  30 tablet     Refill:  0   • lithium 300 MG tablet     Sig: Take 1 tablet by mouth 2 (two) times a day.     Dispense:  60 tablet     Refill:  0     Return in about 2 weeks (around 1/22/2021), or if symptoms worsen or fail to improve.         This document has been electronically signed by KRISHNA Rivers   January 8, 2021 17:09 EST    Part of this note may be an electronic transcription/translation of spoken language to printed text using the Dragon Dictation System.   158.7

## 2024-11-07 ENCOUNTER — NON-APPOINTMENT (OUTPATIENT)
Age: 64
End: 2024-11-07

## 2024-11-07 PROBLEM — E78.5 HYPERLIPIDEMIA, UNSPECIFIED: Chronic | Status: ACTIVE | Noted: 2024-11-06

## 2024-11-07 PROBLEM — R73.03 PREDIABETES: Chronic | Status: ACTIVE | Noted: 2024-11-06

## 2024-11-13 ENCOUNTER — APPOINTMENT (OUTPATIENT)
Dept: UROLOGY | Facility: CLINIC | Age: 64
End: 2024-11-13
Payer: MEDICARE

## 2024-11-13 DIAGNOSIS — C61 MALIGNANT NEOPLASM OF PROSTATE: ICD-10-CM

## 2024-11-13 PROCEDURE — G2211 COMPLEX E/M VISIT ADD ON: CPT

## 2024-11-13 PROCEDURE — 99214 OFFICE O/P EST MOD 30 MIN: CPT

## 2024-11-14 ENCOUNTER — APPOINTMENT (OUTPATIENT)
Dept: UROLOGY | Facility: CLINIC | Age: 64
End: 2024-11-14
Payer: MEDICARE

## 2024-11-20 PROCEDURE — C8001: CPT

## 2024-11-27 ENCOUNTER — NON-APPOINTMENT (OUTPATIENT)
Age: 64
End: 2024-11-27

## 2024-11-27 ENCOUNTER — TRANSCRIPTION ENCOUNTER (OUTPATIENT)
Age: 64
End: 2024-11-27

## 2024-11-27 DIAGNOSIS — R30.0 DYSURIA: ICD-10-CM

## 2024-11-30 ENCOUNTER — TRANSCRIPTION ENCOUNTER (OUTPATIENT)
Age: 64
End: 2024-11-30

## 2024-11-30 LAB
APPEARANCE: CLEAR
BACTERIA: NEGATIVE /HPF
BILIRUBIN URINE: NEGATIVE
BLOOD URINE: ABNORMAL
CAST: 1 /LPF
COLOR: YELLOW
EPITHELIAL CELLS: 0 /HPF
GLUCOSE QUALITATIVE U: NEGATIVE MG/DL
KETONES URINE: NEGATIVE MG/DL
LEUKOCYTE ESTERASE URINE: NEGATIVE
MICROSCOPIC-UA: NORMAL
NITRITE URINE: NEGATIVE
PH URINE: 5
PROTEIN URINE: NEGATIVE MG/DL
RED BLOOD CELLS URINE: 14 /HPF
SPECIFIC GRAVITY URINE: 1.02
UROBILINOGEN URINE: 0.2 MG/DL
WHITE BLOOD CELLS URINE: 0 /HPF

## 2024-12-02 ENCOUNTER — TRANSCRIPTION ENCOUNTER (OUTPATIENT)
Age: 64
End: 2024-12-02

## 2024-12-02 ENCOUNTER — LABORATORY RESULT (OUTPATIENT)
Age: 64
End: 2024-12-02

## 2024-12-02 ENCOUNTER — NON-APPOINTMENT (OUTPATIENT)
Age: 64
End: 2024-12-02

## 2024-12-11 ENCOUNTER — APPOINTMENT (OUTPATIENT)
Dept: UROLOGY | Facility: CLINIC | Age: 64
End: 2024-12-11

## 2024-12-20 NOTE — PROGRESS NOTE BEHAVIORAL HEALTH - NSBHCHARTREVIEWVS_PSY_A_CORE FT
T(C): --  HR: 100 (06-28-19 @ 16:44) (100 - 100)  BP: 137/80 (06-28-19 @ 16:44) (137/80 - 137/80)  RR: 18 (06-28-19 @ 16:44) (18 - 18)  SpO2: --
T(C): 35.7 (07-09-19 @ 10:09), Max: 35.7 (07-09-19 @ 10:09)  HR: 70 (07-09-19 @ 10:09) (70 - 70)  BP: 103/58 (07-09-19 @ 10:09) (103/58 - 103/58)  RR: 18 (07-09-19 @ 10:09) (18 - 18)  SpO2: --
refused
refused
T(C): 35.9 (06-26-19 @ 19:50), Max: 36.5 (06-26-19 @ 15:36)  HR: 80 (06-26-19 @ 15:36) (80 - 80)  BP: 100/80 (06-26-19 @ 15:36) (100/80 - 100/80)  RR: 18 (06-26-19 @ 19:50) (18 - 18)  SpO2: 100% (06-26-19 @ 15:36) (100% - 100%)
T(C): --  HR: 105 (07-07-19 @ 17:35) (105 - 105)  BP: 112/82 (07-07-19 @ 17:35) (112/82 - 112/82)  RR: 17 (07-07-19 @ 17:35) (17 - 17)  SpO2: --
No
T(C): 36.6 (07-11-19 @ 09:30), Max: 36.6 (07-11-19 @ 09:30)  HR: 84 (07-11-19 @ 09:30) (84 - 93)  BP: 100/57 (07-11-19 @ 09:30) (100/57 - 100/66)  RR: 18 (07-11-19 @ 09:30) (16 - 18)  SpO2: --
refused
T(C): 35.7 (07-09-19 @ 10:09), Max: 35.7 (07-09-19 @ 10:09)  HR: 70 (07-09-19 @ 10:09) (70 - 70)  BP: 103/58 (07-09-19 @ 10:09) (103/58 - 103/58)  RR: 18 (07-09-19 @ 10:09) (18 - 18)  SpO2: --

## 2025-03-12 ENCOUNTER — NON-APPOINTMENT (OUTPATIENT)
Age: 65
End: 2025-03-12

## 2025-03-12 ENCOUNTER — APPOINTMENT (OUTPATIENT)
Dept: UROLOGY | Facility: CLINIC | Age: 65
End: 2025-03-12
Payer: MEDICARE

## 2025-03-12 VITALS
SYSTOLIC BLOOD PRESSURE: 119 MMHG | DIASTOLIC BLOOD PRESSURE: 77 MMHG | TEMPERATURE: 97.6 F | OXYGEN SATURATION: 98 % | HEART RATE: 67 BPM

## 2025-03-12 DIAGNOSIS — C61 MALIGNANT NEOPLASM OF PROSTATE: ICD-10-CM

## 2025-03-12 PROCEDURE — 99213 OFFICE O/P EST LOW 20 MIN: CPT

## 2025-03-13 ENCOUNTER — TRANSCRIPTION ENCOUNTER (OUTPATIENT)
Age: 65
End: 2025-03-13

## 2025-03-13 LAB — PSA SERPL-MCNC: 3.77 NG/ML

## 2025-04-07 NOTE — ED PROVIDER NOTE - CPE EDP RESP NORM
[facility]   [address] [place] [address], [address], [place] [address]     Patient Name: Tushar Nix  Gender:  Male  Exam Date: 04/03/2025 Visit Number:  [id number]  Age: 54 Years YOB: 1970  Attending MD: Lon Chamberlain MD Medical Record#:  [id number]    Procedure: Colonoscopy   Indications: Previous adenomatous polyp(s)      Referring MD: Referral Self  Primary MD:      Alice Domínguez MD  Medications: Admitting Medications:   0.9% Normal Saline at TKO   Intra Procedure Medications:   Patient received monitored anesthesia care.     Complications: No immediate complications  ______________________________________________________________________________  Procedure:   An examination of the heart and lungs was performed and found to be within acceptable limits.  .  The patient was therefore deemed a reasonable candidate for endoscopy and sedation.   The risks and benefits of the procedure were explained to the patient.After obtaining informed consent, the patient received monitored anesthesia care and I passed the scope   without difficulty via the rectum to the cecum.  The appendiceal orifice and ic valve were identified.  The scope was retroflexed during the examination  The quality of the prep was excellent  (Miralax/Gatorade/2 tablets Bisacodyl/Magnesium Citrate).    This was a complete examination throughout the entire colon.    Findings:    Polyp location: transverse colon.  Quantity: 1.  Size: 7 mm.  Polyp shape:  sessile.         Maneuver: polypectomy was performed with a cold snare.       Removal:  complete.  Retrieval: complete.  Bleeding: none.    Impression:  Personal history of colonic polyps  Colorectal polyps  MD impression comments:      2025:  7 mm polyp removed  2020:  6 mm SSA    Preliminary Plan:  Repeat colonoscopy in 7 years  Pathology Results:  A: COLON, TRANSVERSE, POLYP:           1. Sessile serrated adenoma           2. Negative for overt dysplasia            3. Per the colonoscopy report:               a. Polyp size: 7 mm               b. Resection: Complete               c. Retrieval: Complete      MICROSCOPIC  A: Performed     Electronically signed by: Lew Talley MD    Interpreted at [facility], [address] [place] [id number]    Orders    Instruction(s)/Education:  Instruction/Education Timeframe Assessment   Colon Cancer Prevention  K63.5   Colon Polyps  K63.5       Final Plan:  Repeat colonoscopy in 7 years.    We will attempt to contact you at appropriate intervals via [place] mail.  We may not be able to find you or contact you at that time, therefore you should know that the responsibility for following our recommendation rests with you.  If you don't hear from us at the time your procedure is due, please contact our office to schedule an appointment.  If your contact information should change, please contact our office so that we can update your record.      _Electronically signed by:___________________  Lon Chamberlain MD                 04/03/2025    cc: Alice Domínguez MD         normal...

## 2025-05-27 NOTE — ED BEHAVIORAL HEALTH ASSESSMENT NOTE - NS ED BHA MSE SPEECH SPONTANEITY
Patient Education   Diabetes weakens the blood vessels all over the body, including the eyes. Damage to the blood vessels in the eyes can cause swelling or bleeding into part of the eye (called the retina). This is called diabetic retinopathy (AMBAR-[id number]-AH-puh-thee). If not treated, this disease can cause vision loss or blindness.   Symptoms may include blurred or distorted vision, but many people have no symptoms. It's important to see your eye doctor regularly to check for problems.   Early treatment and good control can help protect your vision. Here are the things you can do to help prevent vision loss:      1. Keep your blood sugar levels under tight control.      2. Bring high blood pressure under control.      3. No smoking.      4. Have yearly dilated eye exams.    No retinopathy        Pingueculae are a common degenerative condition of the conjunctival tissue, the clear membrane on the white of the eye. They most likely occur on the nasal side of the eye with age and UV exposure. They can become inflamed from wind, dust, and dryness and are treated with artificial tears. They are benign and are usually not removed surgically.   
Normal

## 2025-06-11 ENCOUNTER — APPOINTMENT (OUTPATIENT)
Dept: UROLOGY | Facility: CLINIC | Age: 65
End: 2025-06-11
Payer: MEDICARE

## 2025-06-11 VITALS
HEART RATE: 71 BPM | OXYGEN SATURATION: 97 % | DIASTOLIC BLOOD PRESSURE: 71 MMHG | TEMPERATURE: 97.5 F | SYSTOLIC BLOOD PRESSURE: 112 MMHG

## 2025-06-11 PROCEDURE — 99213 OFFICE O/P EST LOW 20 MIN: CPT

## 2025-06-12 ENCOUNTER — TRANSCRIPTION ENCOUNTER (OUTPATIENT)
Age: 65
End: 2025-06-12

## 2025-06-12 LAB — PSA SERPL-MCNC: 3.67 NG/ML

## 2025-09-10 ENCOUNTER — APPOINTMENT (OUTPATIENT)
Dept: UROLOGY | Facility: CLINIC | Age: 65
End: 2025-09-10
Payer: MEDICARE

## 2025-09-10 VITALS
TEMPERATURE: 97.4 F | HEART RATE: 73 BPM | DIASTOLIC BLOOD PRESSURE: 75 MMHG | OXYGEN SATURATION: 98 % | SYSTOLIC BLOOD PRESSURE: 120 MMHG

## 2025-09-10 DIAGNOSIS — C61 MALIGNANT NEOPLASM OF PROSTATE: ICD-10-CM

## 2025-09-10 PROCEDURE — 99213 OFFICE O/P EST LOW 20 MIN: CPT

## 2025-09-11 ENCOUNTER — TRANSCRIPTION ENCOUNTER (OUTPATIENT)
Age: 65
End: 2025-09-11

## 2025-09-11 LAB — PSA SERPL-MCNC: 3.99 NG/ML

## 2025-09-14 LAB — BACTERIA UR CULT: NORMAL

## (undated) DEVICE — NDL BIOPSY CHIBA 22G X 20CM

## (undated) DEVICE — VENODYNE/SCD SLEEVE CALF MEDIUM

## (undated) DEVICE — WARMING BLANKET UPPER ADULT

## (undated) DEVICE — DRAPE TOWEL BLUE 17" X 24"

## (undated) DEVICE — PLASTIC SOLUTION BOWL 160Z

## (undated) DEVICE — NDL MAX CORE 18G X 25CM

## (undated) DEVICE — SYR LUER LOK 50CC

## (undated) DEVICE — GRID BRACHYTHERAPY EZ 18G

## (undated) DEVICE — BALLOON ENDOCAVITY 2X14CM

## (undated) DEVICE — SYR LUER LOK 10CC

## (undated) DEVICE — DRSG TELFA 3 X 8

## (undated) DEVICE — PREP CHLORAPREP HI-LITE ORANGE 26ML

## (undated) DEVICE — SYR CATHETER TIP 50ML

## (undated) DEVICE — DRAPE MEDIUM SHEET 44" X 70"

## (undated) DEVICE — GLV 7.5 PROTEXIS (WHITE)

## (undated) DEVICE — NDL HYPO REGULAR BEVEL 25G X 1.5" (BLUE)